# Patient Record
Sex: MALE | Race: WHITE | NOT HISPANIC OR LATINO | Employment: FULL TIME | URBAN - METROPOLITAN AREA
[De-identification: names, ages, dates, MRNs, and addresses within clinical notes are randomized per-mention and may not be internally consistent; named-entity substitution may affect disease eponyms.]

---

## 2017-06-01 ENCOUNTER — HOSPITAL ENCOUNTER (EMERGENCY)
Facility: CLINIC | Age: 32
Discharge: HOME | End: 2017-06-01
Attending: NURSE PRACTITIONER | Admitting: STUDENT IN AN ORGANIZED HEALTH CARE EDUCATION/TRAINING PROGRAM

## 2017-06-01 VITALS
OXYGEN SATURATION: 97 % | DIASTOLIC BLOOD PRESSURE: 89 MMHG | SYSTOLIC BLOOD PRESSURE: 125 MMHG | RESPIRATION RATE: 20 BRPM | TEMPERATURE: 98.6 F

## 2017-06-01 DIAGNOSIS — J03.90 TONSILLITIS WITH EXUDATE: ICD-10-CM

## 2017-06-01 DIAGNOSIS — J02.9 PHARYNGITIS, UNSPECIFIED ETIOLOGY: ICD-10-CM

## 2017-06-01 LAB
ALBUMIN SERPL-MCNC: 3.5 G/DL (ref 3.4–5)
ALP SERPL-CCNC: 84 U/L (ref 40–150)
ALT SERPL W P-5'-P-CCNC: 64 U/L (ref 0–70)
ANION GAP SERPL CALCULATED.3IONS-SCNC: 8 MMOL/L (ref 3–14)
AST SERPL W P-5'-P-CCNC: 27 U/L (ref 0–45)
BASOPHILS # BLD AUTO: 0 10E9/L (ref 0–0.2)
BASOPHILS NFR BLD AUTO: 0.2 %
BILIRUB SERPL-MCNC: 0.6 MG/DL (ref 0.2–1.3)
BUN SERPL-MCNC: 13 MG/DL (ref 7–30)
CALCIUM SERPL-MCNC: 8.8 MG/DL (ref 8.5–10.1)
CHLORIDE SERPL-SCNC: 107 MMOL/L (ref 94–109)
CO2 SERPL-SCNC: 26 MMOL/L (ref 20–32)
CREAT SERPL-MCNC: 0.79 MG/DL (ref 0.66–1.25)
DEPRECATED S PYO AG THROAT QL EIA: NORMAL
DIFFERENTIAL METHOD BLD: ABNORMAL
EOSINOPHIL # BLD AUTO: 0.1 10E9/L (ref 0–0.7)
EOSINOPHIL NFR BLD AUTO: 1.1 %
ERYTHROCYTE [DISTWIDTH] IN BLOOD BY AUTOMATED COUNT: 12.9 % (ref 10–15)
GFR SERPL CREATININE-BSD FRML MDRD: NORMAL ML/MIN/1.7M2
GLUCOSE SERPL-MCNC: 97 MG/DL (ref 70–99)
HCT VFR BLD AUTO: 44.4 % (ref 40–53)
HETEROPH AB SER QL: NEGATIVE
HGB BLD-MCNC: 14.7 G/DL (ref 13.3–17.7)
IMM GRANULOCYTES # BLD: 0.1 10E9/L (ref 0–0.4)
IMM GRANULOCYTES NFR BLD: 0.4 %
LYMPHOCYTES # BLD AUTO: 1.9 10E9/L (ref 0.8–5.3)
LYMPHOCYTES NFR BLD AUTO: 15.3 %
MCH RBC QN AUTO: 30.2 PG (ref 26.5–33)
MCHC RBC AUTO-ENTMCNC: 33.1 G/DL (ref 31.5–36.5)
MCV RBC AUTO: 91 FL (ref 78–100)
MICRO REPORT STATUS: NORMAL
MONOCYTES # BLD AUTO: 0.7 10E9/L (ref 0–1.3)
MONOCYTES NFR BLD AUTO: 5.5 %
NEUTROPHILS # BLD AUTO: 9.5 10E9/L (ref 1.6–8.3)
NEUTROPHILS NFR BLD AUTO: 77.5 %
PLATELET # BLD AUTO: 219 10E9/L (ref 150–450)
POTASSIUM SERPL-SCNC: 4 MMOL/L (ref 3.4–5.3)
PROT SERPL-MCNC: 7.6 G/DL (ref 6.8–8.8)
RBC # BLD AUTO: 4.87 10E12/L (ref 4.4–5.9)
SODIUM SERPL-SCNC: 141 MMOL/L (ref 133–144)
SPECIMEN SOURCE: NORMAL
WBC # BLD AUTO: 12.3 10E9/L (ref 4–11)

## 2017-06-01 PROCEDURE — 99284 EMERGENCY DEPT VISIT MOD MDM: CPT | Performed by: STUDENT IN AN ORGANIZED HEALTH CARE EDUCATION/TRAINING PROGRAM

## 2017-06-01 PROCEDURE — 87880 STREP A ASSAY W/OPTIC: CPT | Performed by: STUDENT IN AN ORGANIZED HEALTH CARE EDUCATION/TRAINING PROGRAM

## 2017-06-01 PROCEDURE — 96374 THER/PROPH/DIAG INJ IV PUSH: CPT

## 2017-06-01 PROCEDURE — 96361 HYDRATE IV INFUSION ADD-ON: CPT

## 2017-06-01 PROCEDURE — 86308 HETEROPHILE ANTIBODY SCREEN: CPT | Performed by: STUDENT IN AN ORGANIZED HEALTH CARE EDUCATION/TRAINING PROGRAM

## 2017-06-01 PROCEDURE — 25000128 H RX IP 250 OP 636: Performed by: STUDENT IN AN ORGANIZED HEALTH CARE EDUCATION/TRAINING PROGRAM

## 2017-06-01 PROCEDURE — 87077 CULTURE AEROBIC IDENTIFY: CPT | Performed by: STUDENT IN AN ORGANIZED HEALTH CARE EDUCATION/TRAINING PROGRAM

## 2017-06-01 PROCEDURE — 99284 EMERGENCY DEPT VISIT MOD MDM: CPT | Mod: 25

## 2017-06-01 PROCEDURE — 87081 CULTURE SCREEN ONLY: CPT | Performed by: STUDENT IN AN ORGANIZED HEALTH CARE EDUCATION/TRAINING PROGRAM

## 2017-06-01 PROCEDURE — 96375 TX/PRO/DX INJ NEW DRUG ADDON: CPT

## 2017-06-01 PROCEDURE — 85025 COMPLETE CBC W/AUTO DIFF WBC: CPT | Performed by: STUDENT IN AN ORGANIZED HEALTH CARE EDUCATION/TRAINING PROGRAM

## 2017-06-01 PROCEDURE — 80053 COMPREHEN METABOLIC PANEL: CPT | Performed by: STUDENT IN AN ORGANIZED HEALTH CARE EDUCATION/TRAINING PROGRAM

## 2017-06-01 RX ORDER — HYDROCODONE BITARTRATE AND ACETAMINOPHEN 7.5; 325 MG/15ML; MG/15ML
15 SOLUTION ORAL EVERY 4 HOURS PRN
Qty: 120 ML | Refills: 0 | Status: SHIPPED | OUTPATIENT
Start: 2017-06-01 | End: 2018-08-11

## 2017-06-01 RX ORDER — FENTANYL CITRATE 50 UG/ML
50 INJECTION, SOLUTION INTRAMUSCULAR; INTRAVENOUS ONCE
Status: DISCONTINUED | OUTPATIENT
Start: 2017-06-01 | End: 2017-06-01 | Stop reason: HOSPADM

## 2017-06-01 RX ORDER — DEXAMETHASONE SODIUM PHOSPHATE 10 MG/ML
12 INJECTION, SOLUTION INTRAMUSCULAR; INTRAVENOUS ONCE
Status: COMPLETED | OUTPATIENT
Start: 2017-06-01 | End: 2017-06-01

## 2017-06-01 RX ORDER — KETOROLAC TROMETHAMINE 30 MG/ML
15 INJECTION, SOLUTION INTRAMUSCULAR; INTRAVENOUS ONCE
Status: COMPLETED | OUTPATIENT
Start: 2017-06-01 | End: 2017-06-01

## 2017-06-01 RX ADMIN — KETOROLAC TROMETHAMINE 15 MG: 30 INJECTION, SOLUTION INTRAMUSCULAR at 20:09

## 2017-06-01 RX ADMIN — DEXAMETHASONE SODIUM PHOSPHATE 12 MG: 10 INJECTION, SOLUTION INTRAMUSCULAR; INTRAVENOUS at 17:36

## 2017-06-01 RX ADMIN — SODIUM CHLORIDE 1000 ML: 9 INJECTION, SOLUTION INTRAVENOUS at 17:36

## 2017-06-01 NOTE — ED PROVIDER NOTES
I have discussed the patient's presentation and complaint with the provider that initially evaluated the patient, ANTOINETTE Nagy. Please refer to their documentation for greater detail. I have also personally seen and evaluated the patient and feel the workup has been appropriate. My history and exam is consistent with the previous provider's. Given the patient's symptoms of sore throat with painful swallowing, we have ordered dexamethasone for tonsillitis and diagnostic testing including mononucleosis.    /89  Temp 98.6  F (37  C) (Oral)  Resp 20  SpO2 97%    Constitutional: Well developed, well nourished. Appears nontoxic and in no acute distress.  Head: Normocephalic and atraumatic. Symmetric in appearance.  Eyes: Conjunctivae are normal.  Oral: Patient is without trismus. Moist oral mucosa. Moderate pharyngeal erythema with significant tonsillitis and exudative formation. No uvular asymmetry. Without sublingual or submental swelling. Voice is slightly muffled but he tolerates secretions.   Neck: Neck supple. Negative for stridor.  Cardiovascular: No cyanosis. RRR. No audible murmurs noted.   Respiratory: Effort normal, no respiratory distress. CTAB without diminished regions. No wheezing, rhonchi, or crackles.  Gastrointestinal: Soft, nondistended abdomen. Nontender and without guarding. No rigidity or rebound tenderness. Negative McBurney's point. Negative for Bhardwaj's sign.   Musculoskeletal: Moves all extremities spontaneously and without complaint.  Neuro: Patient is alert.  Skin: Skin is warm and dry, not diaphoretic.      Results for orders placed or performed during the hospital encounter of 06/01/17 (from the past 24 hour(s))   CBC with platelets, differential   Result Value Ref Range    WBC 12.3 (H) 4.0 - 11.0 10e9/L    RBC Count 4.87 4.4 - 5.9 10e12/L    Hemoglobin 14.7 13.3 - 17.7 g/dL    Hematocrit 44.4 40.0 - 53.0 %    MCV 91 78 - 100 fl    MCH 30.2 26.5 - 33.0 pg    MCHC 33.1 31.5 - 36.5  g/dL    RDW 12.9 10.0 - 15.0 %    Platelet Count 219 150 - 450 10e9/L    Diff Method Automated Method     % Neutrophils 77.5 %    % Lymphocytes 15.3 %    % Monocytes 5.5 %    % Eosinophils 1.1 %    % Basophils 0.2 %    % Immature Granulocytes 0.4 %    Absolute Neutrophil 9.5 (H) 1.6 - 8.3 10e9/L    Absolute Lymphocytes 1.9 0.8 - 5.3 10e9/L    Absolute Monocytes 0.7 0.0 - 1.3 10e9/L    Absolute Eosinophils 0.1 0.0 - 0.7 10e9/L    Absolute Basophils 0.0 0.0 - 0.2 10e9/L    Abs Immature Granulocytes 0.1 0 - 0.4 10e9/L   Comprehensive metabolic panel   Result Value Ref Range    Sodium 141 133 - 144 mmol/L    Potassium 4.0 3.4 - 5.3 mmol/L    Chloride 107 94 - 109 mmol/L    Carbon Dioxide 26 20 - 32 mmol/L    Anion Gap 8 3 - 14 mmol/L    Glucose 97 70 - 99 mg/dL    Urea Nitrogen 13 7 - 30 mg/dL    Creatinine 0.79 0.66 - 1.25 mg/dL    GFR Estimate >90  Non  GFR Calc   >60 mL/min/1.7m2    GFR Estimate If Black >90   GFR Calc   >60 mL/min/1.7m2    Calcium 8.8 8.5 - 10.1 mg/dL    Bilirubin Total 0.6 0.2 - 1.3 mg/dL    Albumin 3.5 3.4 - 5.0 g/dL    Protein Total 7.6 6.8 - 8.8 g/dL    Alkaline Phosphatase 84 40 - 150 U/L    ALT 64 0 - 70 U/L    AST 27 0 - 45 U/L   Mono   Result Value Ref Range    Mononucleosis Screen Negative NEG   Rapid Strep Screen   Result Value Ref Range    Specimen Description Throat     Rapid Strep A Screen       NEGATIVE: No Group A streptococcal antigen detected by immunoassay, await   culture report.      Micro Report Status FINAL 06/01/2017          Ge Hansen is a 31 year old male who presents to the department for complaint of 24 hours of sore throat with painful swallowing. He has large bilateral exudative palatine tonsils, however rapid strep test negative. Uvula is midline, no sign of peritonsillar abscess or Jay's angina, and patient is able to breathe comfortably despite the size of his tonsils. They are not touching and he has become quite comfortable after  minimal dose of analgesic medication in the department. His apparent muscled voice resolved when pain was controlled. He was also given a dose of dexamethasone in the department for his tonsillitis.    Clinical impression is that the patient is likely suffering from viral pharyngitis, possibly mono despite negative Monospot test given the acute onset of symptoms. Recommend monitor closely for any increase in pain, difficulty swallowing, or difficulty breathing. He is also provided a short course of analgesic medication. Prior to discharge, I made it clear that illness can unexpectedly develop/progress so he has been instructed to return to the emergency department for reevaluation of evolving symptoms, change in severity, or other concerns. He seems comfortable with the discharge plan we discussed including follow up.    Disclaimer: This note consists of symbols derived from keyboarding, dictation, and/or voice recognition software. As a result, there may be errors in the script that have gone undetected.  Please consider this when interpreting information found in the chart.          Dx:  Tonsillitis with exudate, pharyngitis    Louie Vargas DO  Emergency Physician         Louie Vargas DO  06/02/17 0033

## 2017-06-01 NOTE — ED NOTES
Sent from urgent care with throat swelling and difficulty swallowing. Here for IV fluids and lab tests.  Currently spitting saliva in a basin, unable to swallow secretions.

## 2017-06-01 NOTE — LETTER
[facility] EMERGENCY DEPARTMENT  [address] [facility] [address]  [phone]    2017    Ge Hansen  [address]   [facility] [place] [address]  [place] (home)     : 1985      To Whom it may concern:    Ge Hansen was seen in our Emergency Department today, 2017.  I expect his condition to improve over the next 3 days.  He may return to work/school when improved.    Sincerely,        Louie Vargas

## 2017-06-01 NOTE — ED AVS SNAPSHOT
[facility] Emergency Department    [address] [facility] [address]    Phone:  [phone]    Fax:  [phone]                                       Ge Hansen   MRN: [id number]    Department:  [facility] Emergency Department   Date of Visit:  6/1/2017           Patient Information     Date Of Birth          1985        Your diagnoses for this visit were:     Pharyngitis, unspecified etiology     Tonsillitis with exudate        You were seen by Abi Nagy APRN CNP and Louie Vargas DO.      Follow-up Information     Follow up with [facility]. Schedule an appointment as soon as possible for a visit in 1 week.    Specialty:  ENT    Why:  Followup for reevaluation if symptoms persist.    Contact information:    [address] [place] [address] [phone]    Additional information:    The medical center is located at   [address] [place] [address] (between [facility] and   [address], four miles north   of [place]).        Go to [facility] Emergency Department.    Specialty:  EMERGENCY MEDICINE    Why:  Return if you develop difficulty breathing, swallowing, or your symptoms change/progress.    Contact information:    [address] [place] [address] [phone]    Additional information:    The [facility] center is located at   [address] [place] [address] (between [facility] and   [address], four miles north   of [place]).      Discharge References/Attachments     PHARYNGITIS, VIRAL (ENGLISH)    MONONUCLEOSIS (ENGLISH)      24 Hour Appointment Hotline       To make an appointment at any [facility], call [phone] ([phone]). If you don't have a family doctor or clinic, we will help you find one. [facility] are conveniently located to serve the needs of you and your family.             Review of your medicines      START taking        Dose / Directions Last dose taken    HYDROcodone-acetaminophen  7.5-325 MG/15ML solution   Commonly known as:  LORTAB   Dose:  15 mL   Quantity:  120 mL        Take 15 mLs by mouth every 4 hours as needed for moderate to severe pain   Refills:  0                Prescriptions were sent or printed at these locations (1 Prescription)                   Other Prescriptions                Printed at Department/Unit printer (1 of 1)         HYDROcodone-acetaminophen (LORTAB) 7.5-325 MG/15ML solution                Procedures and tests performed during your visit     Beta strep group A culture    CBC with platelets, differential    Comprehensive metabolic panel    Mono    Rapid Strep Screen      Orders Needing Specimen Collection     None      Pending Results     Date and Time Order Name Status Description    6/1/2017 1730 Beta strep group A culture In process             Pending Culture Results     Date and Time Order Name Status Description    6/1/2017 1730 Beta strep group A culture In process             Pending Results Instructions     If you had any lab results that were not finalized at the time of your Discharge, you can call the ED Lab Result RN at [phone]. You will be contacted by this team for any positive Lab results or changes in treatment. The nurses are available 7 days a week from 10A to 6:30P.  You can leave a message 24 hours per day and they will return your call.        Test Results From Your Hospital Stay        6/1/2017  5:52 PM      Component Results     Component Value Ref Range & Units Status    WBC 12.3 (H) 4.0 - 11.0 10e9/L Final    RBC Count 4.87 4.4 - 5.9 10e12/L Final    Hemoglobin 14.7 13.3 - 17.7 g/dL Final    Hematocrit 44.4 40.0 - 53.0 % Final    MCV 91 78 - 100 fl Final    MCH 30.2 26.5 - 33.0 pg Final    MCHC 33.1 31.5 - 36.5 g/dL Final    RDW 12.9 10.0 - 15.0 % Final    Platelet Count 219 150 - 450 10e9/L Final    Diff Method Automated Method  Final    % Neutrophils 77.5 % Final    % Lymphocytes 15.3 % Final    % Monocytes 5.5 % Final    %  Eosinophils 1.1 % Final    % Basophils 0.2 % Final    % Immature Granulocytes 0.4 % Final    Absolute Neutrophil 9.5 (H) 1.6 - 8.3 10e9/L Final    Absolute Lymphocytes 1.9 0.8 - 5.3 10e9/L Final    Absolute Monocytes 0.7 0.0 - 1.3 10e9/L Final    Absolute Eosinophils 0.1 0.0 - 0.7 10e9/L Final    Absolute Basophils 0.0 0.0 - 0.2 10e9/L Final    Abs Immature Granulocytes 0.1 0 - 0.4 10e9/L Final         6/1/2017  6:03 PM      Component Results     Component Value Ref Range & Units Status    Sodium 141 133 - 144 mmol/L Final    Potassium 4.0 3.4 - 5.3 mmol/L Final    Chloride 107 94 - 109 mmol/L Final    Carbon Dioxide 26 20 - 32 mmol/L Final    Anion Gap 8 3 - 14 mmol/L Final    Glucose 97 70 - 99 mg/dL Final    Urea Nitrogen 13 7 - 30 mg/dL Final    Creatinine 0.79 0.66 - 1.25 mg/dL Final    GFR Estimate >90  Non  GFR Calc   >60 mL/min/1.7m2 Final    GFR Estimate If Black >90   GFR Calc   >60 mL/min/1.7m2 Final    Calcium 8.8 8.5 - 10.1 mg/dL Final    Bilirubin Total 0.6 0.2 - 1.3 mg/dL Final    Albumin 3.5 3.4 - 5.0 g/dL Final    Protein Total 7.6 6.8 - 8.8 g/dL Final    Alkaline Phosphatase 84 40 - 150 U/L Final    ALT 64 0 - 70 U/L Final    AST 27 0 - 45 U/L Final         6/1/2017  6:05 PM      Component Results     Component Value Ref Range & Units Status    Mononucleosis Screen Negative NEG Final         6/1/2017  5:54 PM      Component Results     Component    Specimen Description    Throat    Rapid Strep A Screen    NEGATIVE: No Group A streptococcal antigen detected by immunoassay, await   culture report.      Micro Report Status    FINAL 06/01/2017 6/1/2017  5:57 PM                Thank you for choosing [facility]       Thank you for choosing [facility] for your care. Our goal is always to provide you with excellent care. Hearing back from our patients is one way we can continue to improve our services. Please take a few minutes to complete the written survey that you may  "receive in the mail after you visit with us. Thank you!        [employer] Information     [employer] lets you send messages to your doctor, view your test results, renew your prescriptions, schedule appointments and more. To sign up, go to [email or web address] . Click on \"Log in\" on the left side of the screen, which will take you to the Welcome page. Then click on \"Sign up Now\" on the right side of the page.     You will be asked to enter the access code listed below, as well as some personal information. Please follow the directions to create your username and password.     Your access code is: [email or web address]  Expires: 2017  8:04 PM     Your access code will  in 90 days. If you need help or a new code, please call your [facility] clinic or [phone].        Care EveryWhere ID     This is your Care EveryWhere ID. This could be used by other organizations to access your [facility] medical records  [id number]        After Visit Summary       This is your record. Keep this with you and show to your community pharmacist(s) and doctor(s) at your next visit.                  "

## 2017-06-01 NOTE — ED AVS SNAPSHOT
[facility] Emergency Department    [address] [facility] [address]    Phone:  [phone]    Fax:  [phone]                                       Ge Hansen   MRN: [id number]    Department:  [facility] Emergency Department   Date of Visit:  6/1/2017           After Visit Summary Signature Page     I have received my discharge instructions, and my questions have been answered. I have discussed any challenges I see with this plan with the nurse or doctor.    ..........................................................................................................................................  Patient/Patient Representative Signature      ..........................................................................................................................................  Patient Representative Print Name and Relationship to Patient    ..................................................               ................................................  Date                                            Time    ..........................................................................................................................................  Reviewed by Signature/Title    ...................................................              ..............................................  Date                                                            Time

## 2017-06-01 NOTE — ED PROVIDER NOTES
History     Chief Complaint   Patient presents with     Otalgia     Pharyngitis     states that he's also short of breath and has asthma but he hasn't taken his inhalors because they won't fix his problem on the sore throat     HPI  Ge Hansen is a 31 year old male who presents with acute onset of pharyngitis with difficulty swallowing and chills that are intermittent.  Pt states this morning he had difficulty swallowing and now he reports he is unable to eats solids and is unable to swallow any liquids.  He reports bilateral otalgia for the past two weeks also.  He reports the pain is severe and has prevented from sleeping today.  He has no known associated contacts with similar symptoms.    I have reviewed the Medications, Allergies, Past Medical and Surgical History, and Social History in the Epic system.    Review of Systems    Physical Exam   BP (!) 181/98  Temp 98.6  F (37  C) (Oral)  Resp 20  SpO2 97%  Focused Exam  Physical Exam  Exam:  Constitutional: alert, moderate distress, severe distress, cooperative and over weight and speech is muffled  Head: Normocephalic. No masses, lesions, tenderness or abnormalities  ENT: bilateral TM normal without fluid or infection and posterior pharynx severely swollen, tonsils red, enlarged, kissing with exudate present; pt unable to swallow and spitting up secretions  Cardiovascular: negative, PMI normal. No lifts, heaves, or thrills. RRR. No murmurs, clicks gallops or rub  Respiratory: negative, negative findings: normal respiratory rate and rhythm, positive findings: wheezing inspiratory bilateral, throughout and mild       ED Course     ED Course   discussed pt and exam with Dr. Vargas and request transfer to ER for higher level of care; he agreed;  Procedures    Labs Ordered and Resulted from Time of ED Arrival Up to the Time of Departure from the ED - No data to display  Quick strep obtained and was negative in urgent  Assessments & Plan (with Medical Decision  Making)     I have reviewed the nursing notes.    I have reviewed the findings, diagnosis, plan and need for follow up with the patient.  Pt transferred to ED for further evaluation and care. Concern due to lack of ability to swallow and exam of profusely enlarged tonsils and posterior pharynx swelling and also concern for possible impending airway management problems    New Prescriptions    No medications on file       Final diagnoses:   None       6/1/2017   [facility] EMERGENCY DEPARTMENT     Abi Nagy APRN CNP  06/01/17 1756       Abi Nagy APRN CNP  06/01/17 1800       Abi Nagy APRN CNP  06/01/17 1801

## 2017-06-03 LAB
BACTERIA SPEC CULT: ABNORMAL
MICRO REPORT STATUS: ABNORMAL
SPECIMEN SOURCE: ABNORMAL

## 2018-08-11 ENCOUNTER — APPOINTMENT (OUTPATIENT)
Dept: GENERAL RADIOLOGY | Facility: CLINIC | Age: 33
End: 2018-08-11
Attending: NURSE PRACTITIONER
Payer: MEDICAID

## 2018-08-11 ENCOUNTER — HOSPITAL ENCOUNTER (EMERGENCY)
Facility: CLINIC | Age: 33
Discharge: HOME | End: 2018-08-11
Attending: NURSE PRACTITIONER | Admitting: NURSE PRACTITIONER
Payer: MEDICAID

## 2018-08-11 VITALS
OXYGEN SATURATION: 97 % | SYSTOLIC BLOOD PRESSURE: 160 MMHG | TEMPERATURE: 98.2 F | RESPIRATION RATE: 24 BRPM | DIASTOLIC BLOOD PRESSURE: 104 MMHG

## 2018-08-11 DIAGNOSIS — S89.92XA KNEE INJURY, LEFT, INITIAL ENCOUNTER: ICD-10-CM

## 2018-08-11 PROCEDURE — 99213 OFFICE O/P EST LOW 20 MIN: CPT | Performed by: NURSE PRACTITIONER

## 2018-08-11 PROCEDURE — 73562 X-RAY EXAM OF KNEE 3: CPT | Mod: LT

## 2018-08-11 PROCEDURE — G0463 HOSPITAL OUTPT CLINIC VISIT: HCPCS

## 2018-08-11 RX ORDER — HYDROCODONE BITARTRATE AND ACETAMINOPHEN 5; 325 MG/1; MG/1
1 TABLET ORAL EVERY 4 HOURS PRN
Qty: 18 TABLET | Refills: 0 | Status: SHIPPED | OUTPATIENT
Start: 2018-08-11 | End: 2018-10-24

## 2018-08-11 ASSESSMENT — ENCOUNTER SYMPTOMS
APPETITE CHANGE: 0
WHEEZING: 0
NAUSEA: 0
DIFFICULTY URINATING: 0
DIAPHORESIS: 0
DIARRHEA: 0
HEMATURIA: 0
CHILLS: 0
COUGH: 0
SINUS PAIN: 0
VOMITING: 0
SHORTNESS OF BREATH: 0
SORE THROAT: 0
FACIAL SWELLING: 0
EYE PAIN: 0
FREQUENCY: 0
FEVER: 0
CONSTIPATION: 0
ARTHRALGIAS: 1
ABDOMINAL PAIN: 0
SEIZURES: 0
DYSURIA: 0

## 2018-08-11 NOTE — ED PROVIDER NOTES
"  History     Chief Complaint   Patient presents with     Knee Pain     felt a \"pop\" when walking up stairs     HPI  Ge Hansen is a 33 year old male who presents to urgent care with left knee pain.  Patient states that he was at [facility] today and he was going upstairs and as he was stepping up the stairs there was a pop and immediate pain in his left knee.  He states that it felt like his knee gave out.  He states he has applied ice and has been taking 3 Aleve tablets every 12 hours.  He reports that his pain level is an 8-9 out of 10 currently.  He denies any fever, aches, chills, sweats, rashes or lesions to the knee.  He reports he has not been able to bear weight.    He reports last week when he was carrying a box he recalls having twisted knee but not had no pain at that point in time but did report that later he felt a little discomfort and was taking Aleve for that and it was helping.  Problem List:    There are no active problems to display for this patient.       Past Medical History:    History reviewed. No pertinent past medical history.  Denies  Past Surgical History:    History reviewed. No pertinent surgical history.  Left rotator cuff surgery : 2014    Family History:    No family history on file.    Social History:  Marital Status:  Single [1]  Social History   Substance Use Topics     Smoking status: Current Every Day Smoker     Packs/day: 1.00     Smokeless tobacco: Never Used     Alcohol use Not on file        Medications:      HYDROcodone-acetaminophen (NORCO) 5-325 MG per tablet         Review of Systems   Constitutional: Negative for appetite change, chills, diaphoresis and fever.   HENT: Negative for congestion, ear pain, facial swelling, sinus pain and sore throat.    Eyes: Negative for pain and visual disturbance.   Respiratory: Negative for cough, shortness of breath and wheezing.    Gastrointestinal: Negative for abdominal pain, constipation, diarrhea, nausea and " vomiting.   Genitourinary: Negative for difficulty urinating, dysuria, frequency, hematuria and urgency.   Musculoskeletal: Positive for arthralgias (left knee pain).   Neurological: Negative for seizures.   All other systems reviewed and are negative.      Physical Exam   BP: (!) 160/104  Temp: 98.2  F (36.8  C)  Resp: 24  SpO2: 97 %      Physical Exam   Constitutional: He appears well-developed and well-nourished. He appears distressed (appear to be uncofortable with pain).   HENT:   Head: Normocephalic and atraumatic.   Cardiovascular: Normal rate, regular rhythm and normal heart sounds.  Exam reveals no gallop and no friction rub.    No murmur heard.  Pulmonary/Chest: Effort normal and breath sounds normal. No respiratory distress. He has no wheezes. He has no rales. He exhibits no tenderness.   Musculoskeletal:        Left knee: He exhibits no swelling, no effusion, no ecchymosis, no deformity, no laceration, no erythema and normal alignment.        Legs:  Skin: Skin is warm and dry. No rash noted. He is not diaphoretic. No erythema. No pallor.   Psychiatric: He has a normal mood and affect.   Nursing note and vitals reviewed.      ED Course     ED Course     Procedures      Results for orders placed or performed during the hospital encounter of 08/11/18 (from the past 24 hour(s))   XR Knee Left 3 Views    Narrative    LEFT KNEE THREE VIEWS   8/11/2018 6:34 PM     HISTORY: Stepping up and knee gave out.     COMPARISON: None.      Impression    IMPRESSION: Normal.    HAROLDO STEVENS MD       Medications - No data to display    Assessments & Plan (with Medical Decision Making)     I have reviewed the nursing notes.    I have reviewed the findings, diagnosis, plan and need for follow up with the patient.  Ge Hansen is a 33 year old male who presents to urgent care with left knee pain.  Patient is a morbidly obese man who was walking [place] cement [place] water park and felt that his left knee gave out.  There was  no acute trauma per se.  Patient recorded his pain at a 8-9 out of 10.  And denies being able to be weightbearing.  Exam of the knee reveals generalized tenderness with more distinct tenderness lateral to the left tibial insertion and superior to lateral joint line.  There is no obvious swelling or deformities noted.  X-ray obtained and no acute fractures or locations noted will treat as knee strain.  Patient is in crutches and advised to be on rest and then start walking.  Recommend follow-up within 1 week for reevaluation.  Patient given prescription for some Vicodin and given instructions on addictability and precautions for constipation.  Patient verbalizes understanding and denies any questions at this point in time was discharged in stable condition.  Discharge Medication List as of 8/11/2018  6:44 PM      START taking these medications    Details   HYDROcodone-acetaminophen (NORCO) 5-325 MG per tablet Take 1 tablet by mouth every 4 hours as needed for pain, Disp-18 tablet, R-0, Local Print             Final diagnoses:   Knee injury, left, initial encounter       8/11/2018   [facility] EMERGENCY DEPARTMENT     Abi Nagy APRN CNP  08/12/18 7378

## 2018-08-11 NOTE — ED AVS SNAPSHOT
[facility] Emergency Department    [address] [facility] [address]    Phone:  [phone]    Fax:  [phone]                                       Ge Hansen   MRN: [id number]    Department:  [facility] Emergency Department   Date of Visit:  8/11/2018           After Visit Summary Signature Page     I have received my discharge instructions, and my questions have been answered. I have discussed any challenges I see with this plan with the nurse or doctor.    ..........................................................................................................................................  Patient/Patient Representative Signature      ..........................................................................................................................................  Patient Representative Print Name and Relationship to Patient    ..................................................               ................................................  Date                                            Time    ..........................................................................................................................................  Reviewed by Signature/Title    ...................................................              ..............................................  Date                                                            Time

## 2018-08-11 NOTE — DISCHARGE INSTRUCTIONS
Use ibuprofen 400-600 mg up to 4 times per day if needed for pain. Stop if it is causing nausea or abdominal pain.   Add Norco 5-325 (hydrocodone-acetaminophen) 1-2 pills up to every 4 hours if needed for pain. Do not use alcohol, operate machinery, drive, or climb on ladders for 8 hours after taking Norco. Use docusate (100mg) 2 times a day to prevent constipation while on narcotics.

## 2018-08-11 NOTE — ED TRIAGE NOTES
Patient here for pain in the L knee - originally injured about a week ago - fell today.  Patient presents in wheel chair to the urgent care.

## 2018-08-11 NOTE — LETTER
August 11, 2018      To Whom It May Concern:      Ge Hansen was seen in our Emergency Department today, 08/11/18.  I expect his condition to improve over the next 5 days.  He may return to work/school when improved.    Sincerely,        SONIA Le CNP

## 2018-08-11 NOTE — ED AVS SNAPSHOT
[facility] Emergency Department    [address] [address]    Phone:  [phone]    Fax:  [phone]                                       Ge Hansen   MRN: [id number]    Department:  [facility] Emergency Department   Date of Visit:  8/11/2018           Patient Information     Date Of Birth          1985        Your diagnoses for this visit were:     Knee injury, left, initial encounter        You were seen by Abi Nagy APRN CNP.      Follow-up Information     Follow up with family practice. Schedule an appointment as soon as possible for a visit in 1 week.    Why:  follow up on knee pain        Discharge Instructions       Use ibuprofen 400-600 mg up to 4 times per day if needed for pain. Stop if it is causing nausea or abdominal pain.   Add Norco 5-325 (hydrocodone-acetaminophen) 1-2 pills up to every 4 hours if needed for pain. Do not use alcohol, operate machinery, drive, or climb on ladders for 8 hours after taking Norco. Use docusate (100mg) 2 times a day to prevent constipation while on narcotics.      Discharge References/Attachments     KNEE PAIN OF UNCERTAIN CAUSE (ENGLISH)      24 Hour Appointment Hotline       To make an appointment at any [facility] clinic, call [phone] ([phone]). If you don't have a family doctor or clinic, we will help you find one. [facility] clinics are conveniently located to serve the needs of you and your family.          ED Discharge Orders     Alum Crutches: Adult       Use gait belt during crutch training.                     Review of your medicines      START taking        Dose / Directions Last dose taken    HYDROcodone-acetaminophen 5-325 MG per tablet   Commonly known as:  NORCO   Dose:  1 tablet   Quantity:  18 tablet        Take 1 tablet by mouth every 4 hours as needed for pain   Refills:  0                Information about OPIOIDS     PRESCRIPTION OPIOIDS: WHAT YOU NEED TO KNOW   We gave you an opioid  (narcotic) pain medicine. It is important to manage your pain, but opioids are not always the best choice. You should first try all the other options your care team gave you. Take this medicine for as short a time (and as few doses) as possible.    Some activities can increase your pain, such as bandage changes or therapy sessions. It may help to take your pain medicine 30 to 60 minutes before these activities. Reduce your stress by getting enough sleep, working on hobbies you enjoy and practicing relaxation or meditation. Talk to your care team about ways to manage your pain beyond prescription opioids.    These medicines have risks:    DO NOT drive when on new or higher doses of pain medicine. These medicines can affect your alertness and reaction times, and you could be arrested for driving under the influence (DUI). If you need to use opioids long-term, talk to your care team about driving.    DO NOT operate heavy machinery    DO NOT do any other dangerous activities while taking these medicines.    DO NOT drink any alcohol while taking these medicines.     If the opioid prescribed includes acetaminophen, DO NOT take with any other medicines that contain acetaminophen. Read all labels carefully. Look for the word  acetaminophen  or  Tylenol.  Ask your pharmacist if you have questions or are unsure.    You can get addicted to pain medicines, especially if you have a history of addiction (chemical, alcohol or substance dependence). Talk to your care team about ways to reduce this risk.    All opioids tend to cause constipation. Drink plenty of water and eat foods that have a lot of fiber, such as fruits, vegetables, prune juice, apple juice and high-fiber cereal. Take a laxative (Miralax, milk of magnesia, Colace, Senna) if you don t move your bowels at least every other day. Other side effects include upset stomach, sleepiness, dizziness, throwing up, tolerance (needing more of the medicine to have the same  effect), physical dependence and slowed breathing.    Store your pills in a secure place, locked if possible. We will not replace any lost or stolen medicine. If you don t finish your medicine, please throw away (dispose) as directed by your pharmacist. The [place] [employer] has more information about safe disposal: https://www.pca.state.[place]/living-green/managing-unwanted-medications        Prescriptions were sent or printed at these locations (1 Prescription)                   Other Prescriptions                Printed at Department/Unit printer (1 of 1)         HYDROcodone-acetaminophen (NORCO) 5-325 MG per tablet                Procedures and tests performed during your visit     XR Knee Left 3 Views      Orders Needing Specimen Collection     None      Pending Results     Date and Time Order Name Status Description    8/11/2018 1809 XR Knee Left 3 Views Preliminary             Pending Culture Results     No orders found from 8/9/2018 to 8/12/2018.            Pending Results Instructions     If you had any lab results that were not finalized at the time of your Discharge, you can call the ED Lab Result RN at [phone]. You will be contacted by this team for any positive Lab results or changes in treatment. The nurses are available 7 days a week from 10A to 6:30P.  You can leave a message 24 hours per day and they will return your call.        Test Results From Your Hospital Stay        8/11/2018  6:37 PM      Narrative     LEFT KNEE THREE VIEWS   8/11/2018 6:34 PM     HISTORY: Stepping up and knee gave out.     COMPARISON: None.        Impression     IMPRESSION: Normal.                Thank you for choosing [facility]       Thank you for choosing [facility] for your care. Our goal is always to provide you with excellent care. Hearing back from our patients is one way we can continue to improve our services. Please take a few minutes to complete the written survey that you may receive in the mail  "after you visit with us. Thank you!        [employer] Information     [employer] lets you send messages to your doctor, view your test results, renew your prescriptions, schedule appointments and more. To sign up, go to [email or web address] . Click on \"Log in\" on the left side of the screen, which will take you to the Welcome page. Then click on \"Sign up Now\" on the right side of the page.     You will be asked to enter the access code listed below, as well as some personal information. Please follow the directions to create your username and password.     Your access code is: [email or web address]  Expires: 2018  6:41 PM     Your access code will  in 90 days. If you need help or a new code, please call your [facility] clinic or [phone].        Care EveryWhere ID     This is your Care EveryWhere ID. This could be used by other organizations to access your [facility] medical records  [id number]        Equal Access to Services     EDD STEWART : Hadii velma mccoy hadasho Sojose rafaelali, waaxda luqadaha, qaybta kaalmada adeegyahermilo, aracelis rojas . So [facility] [phone].    ATENCIÓN: Si habla español, tiene a barnes disposición servicios gratuitos de asistencia lingüística. Llame al [phone].    We comply with applicable federal civil rights laws and [place] laws. We do not discriminate on the basis of race, color, national origin, age, disability, sex, sexual orientation, or gender identity.            After Visit Summary       This is your record. Keep this with you and show to your community pharmacist(s) and doctor(s) at your next visit.                  "

## 2018-09-10 ENCOUNTER — HOSPITAL ENCOUNTER (EMERGENCY)
Facility: CLINIC | Age: 33
Discharge: HOME | End: 2018-09-10
Attending: EMERGENCY MEDICINE | Admitting: EMERGENCY MEDICINE
Payer: MEDICAID

## 2018-09-10 VITALS
WEIGHT: 315 LBS | HEIGHT: 73 IN | RESPIRATION RATE: 20 BRPM | BODY MASS INDEX: 41.75 KG/M2 | SYSTOLIC BLOOD PRESSURE: 156 MMHG | TEMPERATURE: 97 F | DIASTOLIC BLOOD PRESSURE: 103 MMHG | OXYGEN SATURATION: 99 %

## 2018-09-10 DIAGNOSIS — J45.901 EXACERBATION OF ASTHMA, UNSPECIFIED ASTHMA SEVERITY, UNSPECIFIED WHETHER PERSISTENT: ICD-10-CM

## 2018-09-10 PROCEDURE — 99283 EMERGENCY DEPT VISIT LOW MDM: CPT | Mod: 25

## 2018-09-10 PROCEDURE — 25000125 ZZHC RX 250: Performed by: EMERGENCY MEDICINE

## 2018-09-10 PROCEDURE — 99284 EMERGENCY DEPT VISIT MOD MDM: CPT | Mod: Z6 | Performed by: EMERGENCY MEDICINE

## 2018-09-10 PROCEDURE — 94640 AIRWAY INHALATION TREATMENT: CPT

## 2018-09-10 RX ORDER — IPRATROPIUM BROMIDE AND ALBUTEROL SULFATE 2.5; .5 MG/3ML; MG/3ML
3 SOLUTION RESPIRATORY (INHALATION) ONCE
Status: COMPLETED | OUTPATIENT
Start: 2018-09-10 | End: 2018-09-10

## 2018-09-10 RX ORDER — ALBUTEROL SULFATE 90 UG/1
2 AEROSOL, METERED RESPIRATORY (INHALATION) EVERY 6 HOURS PRN
Qty: 1 INHALER | Refills: 0 | Status: SHIPPED | OUTPATIENT
Start: 2018-09-10 | End: 2018-12-24

## 2018-09-10 RX ORDER — ALBUTEROL SULFATE 0.83 MG/ML
2.5 SOLUTION RESPIRATORY (INHALATION) EVERY 4 HOURS PRN
Qty: 1 BOX | Refills: 0 | Status: SHIPPED | OUTPATIENT
Start: 2018-09-10 | End: 2018-12-19

## 2018-09-10 RX ADMIN — IPRATROPIUM BROMIDE AND ALBUTEROL SULFATE 3 ML: .5; 3 SOLUTION RESPIRATORY (INHALATION) at 12:04

## 2018-09-10 NOTE — ED AVS SNAPSHOT
[facility] Emergency Department    [address] [address]    Phone:  [phone]    Fax:  [phone]                                       Ge Hansen   MRN: [id number]    Department:  [facility] Emergency Department   Date of Visit:  9/10/2018           After Visit Summary Signature Page     I have received my discharge instructions, and my questions have been answered. I have discussed any challenges I see with this plan with the nurse or doctor.    ..........................................................................................................................................  Patient/Patient Representative Signature      ..........................................................................................................................................  Patient Representative Print Name and Relationship to Patient    ..................................................               ................................................  Date                                            Time    ..........................................................................................................................................  Reviewed by Signature/Title    ...................................................              ..............................................  Date                                                            Time          22EPIC Rev 08/18

## 2018-09-10 NOTE — ED NOTES
Pt recently moved and lost his MDI.  He also states his nebulizer broke during the move, pt just got new health insurance and would like new Rx for both of these medications.

## 2018-09-10 NOTE — ED PROVIDER NOTES
"  History     Chief Complaint   Patient presents with     Asthma     out of asthma meds     HPI  Ge Hansen is a 33 year old male with history of asthma who reports an acute asthma exacerbation overnight last night.  No known precipitant or allergen exposure.  He has shortness of breath, chest tightness and wheezing.  No chest pain, cough, hemoptysis or acute leg pain or leg swelling.  He recently broke his nebulizer machine while moving.  He is out of albuterol neb solution, and albuterol inhaler and Advair inhaler.  He also wants to know if he could get a CPAP machine.  He has an appointment in primary care clinic later this week.  No other acute complaints or concerns.    Problem List:    There are no active problems to display for this patient.       Past Medical History: Asthma  History reviewed. No pertinent past medical history.    Past Surgical History:    History reviewed. No pertinent surgical history.    Family History:    No family history on file.    Social History:  Marital Status:  Single [1]  Social History   Substance Use Topics     Smoking status: Current Every Day Smoker     Packs/day: 1.00     Smokeless tobacco: Never Used     Alcohol use Not on file        Medications:      albuterol (2.5 MG/3ML) 0.083% neb solution   albuterol (PROAIR HFA/PROVENTIL HFA/VENTOLIN HFA) 108 (90 Base) MCG/ACT inhaler   fluticasone-salmeterol (ADVAIR DISKUS) 250-50 MCG/DOSE diskus inhaler   Respiratory Therapy Supplies (NEBULIZER) JENNY   HYDROcodone-acetaminophen (NORCO) 5-325 MG per tablet       Review of Systems  As mentioned above in the history present illness.  All other systems were reviewed and are negative.      Physical Exam   BP: (!) 156/103  Heart Rate: 94  Temp: 97  F (36.1  C)  Resp: 20  Height: 185.4 cm (6' 1\")  Weight: (!) 199.6 kg (440 lb)  SpO2: 95 %      Physical Exam   Constitutional: He is oriented to person, place, and time. He appears well-developed and well-nourished. No distress.   HENT: "   Head: Normocephalic and atraumatic.   Mouth/Throat: Oropharynx is clear and moist.   Eyes: Conjunctivae and EOM are normal. No scleral icterus.   Neck: Normal range of motion. Neck supple. No tracheal deviation present. No thyromegaly present.   Cardiovascular: Normal rate, regular rhythm and normal heart sounds.  Exam reveals no gallop and no friction rub.    No murmur heard.  Pulmonary/Chest: Effort normal. No stridor. No respiratory distress. He has no wheezes. He has no rales.   Prolonged expiratory phase with scattered faint and expiratory wheezes.  Good air movement.  No respiratory distress.  Speaks in normal sentences.   Musculoskeletal: Normal range of motion. He exhibits no tenderness.   Symmetrical, bilateral distal lower extremity lymphedema.   Neurological: He is alert and oriented to person, place, and time. No cranial nerve deficit. Coordination normal.   Skin: Skin is warm and dry. No rash noted. He is not diaphoretic. No erythema. No pallor.   Psychiatric: He has a normal mood and affect. His behavior is normal.   Nursing note and vitals reviewed.      ED Course     ED Course     Procedures               No results found for this or any previous visit (from the past 24 hour(s)).    Medications   ipratropium - albuterol 0.5 mg/2.5 mg/3 mL (DUONEB) neb solution 3 mL (3 mLs Nebulization Given 9/10/18 1204)     Improved after single DuoNeb.  Lungs are clear, good air movement.    Assessments & Plan (with Medical Decision Making)   Relatively mild acute asthma exacerbation, probably secondary to environmental allergen exposure, with resolution of symptoms with a single albuterol neb.  He is out of an albuterol inhaler, albuterol neb solution, Advair Diskus, and recently broke his neb machine while moving.  He was prescribed these and instructed in supportive care.  Doubt pneumonia, PE/DVT, CHF or emergent disease process.    I have reviewed the nursing notes.    I have reviewed the findings, diagnosis,  plan and need for follow up with the patient.    New Prescriptions    ALBUTEROL (2.5 MG/3ML) 0.083% NEB SOLUTION    Take 1 vial (2.5 mg) by nebulization every 4 hours as needed for shortness of breath / dyspnea or wheezing    ALBUTEROL (PROAIR HFA/PROVENTIL HFA/VENTOLIN HFA) 108 (90 BASE) MCG/ACT INHALER    Inhale 2 puffs into the lungs every 6 hours as needed for shortness of breath / dyspnea or wheezing    FLUTICASONE-SALMETEROL (ADVAIR DISKUS) 250-50 MCG/DOSE DISKUS INHALER    Inhale 1 puff into the lungs 2 times daily    RESPIRATORY THERAPY SUPPLIES (NEBULIZER) JENNY    Take 1 Device by mouth every 4 hours as needed Use for nebulizer treatments every 4 hours as needed for shortness of breath or wheezing       Final diagnoses:   Exacerbation of asthma, unspecified asthma severity, unspecified whether persistent       9/10/2018   [facility] EMERGENCY DEPARTMENT     Jovi Villanueva MD  09/10/18 4338

## 2018-09-10 NOTE — ED AVS SNAPSHOT
[facility] Emergency Department    [address] [address]    Phone:  [phone]    Fax:  [phone]                                       Ge Hansen   MRN: [id number]    Department:  [facility] Emergency Department   Date of Visit:  9/10/2018           Patient Information     Date Of Birth          1985        Your diagnoses for this visit were:     Exacerbation of asthma, unspecified asthma severity, unspecified whether persistent        You were seen by Jovi Villanueva MD.      Follow-up Information     Follow up with [facility] Emergency Department.    Specialty:  EMERGENCY MEDICINE    Why:  If symptoms worsen    Contact information:    [facility] [facility] [address] [phone]    Additional information:    The medical center is located at   [address]. (between [address] and   [address] in [place], [place] miles north   of [place]).        Follow up with No Ref-Primary, Physician.    Why:  Recheck in primary care clinic later this week as scheduled      Discharge References/Attachments     ASTHMA, ACUTE (ADULT) (ENGLISH)    ASTHMA, UNDERSTANDING (ENGLISH)    ASTHMA, CONTROLLING YOUR TRIGGERS: ALLERGENS (ENGLISH)      Your next 10 appointments already scheduled     Sep 13, 2018 11:20 AM CDT   PHYSICAL with Wesley Davis MD   [facility]    [address] [place] [address] [phone]              24 Hour Appointment Hotline       To make an appointment at any [facility] [place], call [phone] ([phone]). If you don't have a family doctor or clinic, we will help you find one. [facility] are conveniently located to serve the needs of you and your family.             Review of your medicines      START taking        Dose / Directions Last dose taken    * albuterol (2.5 MG/3ML) 0.083% neb solution   Dose:  2.5 mg   Quantity:  1 Box        Take 1 vial (2.5 mg) by  nebulization every 4 hours as needed for shortness of breath / dyspnea or wheezing   Refills:  0        * albuterol 108 (90 Base) MCG/ACT inhaler   Commonly known as:  PROAIR HFA/PROVENTIL HFA/VENTOLIN HFA   Dose:  2 puff   Quantity:  1 Inhaler        Inhale 2 puffs into the lungs every 6 hours as needed for shortness of breath / dyspnea or wheezing   Refills:  0        fluticasone-salmeterol 250-50 MCG/DOSE diskus inhaler   Commonly known as:  ADVAIR DISKUS   Dose:  1 puff   Quantity:  1 Inhaler        Inhale 1 puff into the lungs 2 times daily   Refills:  0        nebulizer Laverne   Dose:  1 Device   Quantity:  1 each        Take 1 Device by mouth every 4 hours as needed Use for nebulizer treatments every 4 hours as needed for shortness of breath or wheezing   Refills:  0        * Notice:  This list has 2 medication(s) that are the same as other medications prescribed for you. Read the directions carefully, and ask your doctor or other care provider to review them with you.      Our records show that you are taking the medicines listed below. If these are incorrect, please call your family doctor or clinic.        Dose / Directions Last dose taken    HYDROcodone-acetaminophen 5-325 MG per tablet   Commonly known as:  NORCO   Dose:  1 tablet   Quantity:  18 tablet        Take 1 tablet by mouth every 4 hours as needed for pain   Refills:  0                Prescriptions were sent or printed at these locations (4 Prescriptions)                   [id number] [place] [id number] [address]    Telephone:  [phone]   Fax:  [phone]   Hours:  Closed 10-16-08 business to [facility]                E-Prescribed (4 of 4)         albuterol (2.5 MG/3ML) 0.083% neb solution               albuterol (PROAIR HFA/PROVENTIL HFA/VENTOLIN HFA) 108 (90 Base) MCG/ACT inhaler               fluticasone-salmeterol (ADVAIR DISKUS) 250-50 MCG/DOSE diskus inhaler             "   Respiratory Therapy Supplies (NEBULIZER) JENNY                Orders Needing Specimen Collection     None      Pending Results     No orders found from 2018 to 2018.            Pending Culture Results     No orders found from 2018 to 2018.            Pending Results Instructions     If you had any lab results that were not finalized at the time of your Discharge, you can call the ED Lab Result RN at [phone]. You will be contacted by this team for any positive Lab results or changes in treatment. The nurses are available 7 days a week from 10A to 6:30P.  You can leave a message 24 hours per day and they will return your call.        Test Results From Your Hospital Stay               Thank you for choosing [facility]       Thank you for choosing [facility] for your care. Our goal is always to provide you with excellent care. Hearing back from our patients is one way we can continue to improve our services. Please take a few minutes to complete the written survey that you may receive in the mail after you visit with us. Thank you!        [employer] Information     [employer] lets you send messages to your doctor, view your test results, renew your prescriptions, schedule appointments and more. To sign up, go to [email or web address] . Click on \"Log in\" on the left side of the screen, which will take you to the Welcome page. Then click on \"Sign up Now\" on the right side of the page.     You will be asked to enter the access code listed below, as well as some personal information. Please follow the directions to create your username and password.     Your access code is: [email or web address]  Expires: 2018  6:41 PM     Your access code will  in 90 days. If you need help or a new code, please call your [facility] clinic or [phone].        Care EveryWhere ID     This is your Care EveryWhere ID. This could be used by other organizations to access your [facility] medical records  [id number]      "   Equal Access to Services     EDD STEWART : Dante Schaefer, rajan prince, aracelsi hernandez. So [facility] [phone].    ATENCIÓN: Si habla español, tiene a barnes disposición servicios gratuitos de asistencia lingüística. Llame al [phone].    We comply with applicable federal civil rights laws and [place] laws. We do not discriminate on the basis of race, color, national origin, age, disability, sex, sexual orientation, or gender identity.            After Visit Summary       This is your record. Keep this with you and show to your community pharmacist(s) and doctor(s) at your next visit.

## 2018-09-13 ENCOUNTER — OFFICE VISIT (OUTPATIENT)
Dept: FAMILY MEDICINE | Facility: CLINIC | Age: 33
End: 2018-09-13
Payer: MEDICAID

## 2018-09-13 VITALS
TEMPERATURE: 97.8 F | HEART RATE: 100 BPM | WEIGHT: 315 LBS | SYSTOLIC BLOOD PRESSURE: 148 MMHG | DIASTOLIC BLOOD PRESSURE: 88 MMHG | HEIGHT: 73 IN | BODY MASS INDEX: 41.75 KG/M2

## 2018-09-13 DIAGNOSIS — E66.2 OBESITY HYPOVENTILATION SYNDROME (H): ICD-10-CM

## 2018-09-13 DIAGNOSIS — R73.09 ELEVATED GLUCOSE: ICD-10-CM

## 2018-09-13 DIAGNOSIS — Z72.0 TOBACCO USE: ICD-10-CM

## 2018-09-13 DIAGNOSIS — Z00.00 ROUTINE GENERAL MEDICAL EXAMINATION AT A HEALTH CARE FACILITY: Primary | ICD-10-CM

## 2018-09-13 DIAGNOSIS — E66.01 MORBID OBESITY (H): ICD-10-CM

## 2018-09-13 DIAGNOSIS — G47.33 OSA (OBSTRUCTIVE SLEEP APNEA): ICD-10-CM

## 2018-09-13 DIAGNOSIS — R03.0 ELEVATED BLOOD PRESSURE READING WITHOUT DIAGNOSIS OF HYPERTENSION: ICD-10-CM

## 2018-09-13 DIAGNOSIS — Z11.3 SCREEN FOR STD (SEXUALLY TRANSMITTED DISEASE): ICD-10-CM

## 2018-09-13 LAB
ANION GAP SERPL CALCULATED.3IONS-SCNC: 5 MMOL/L (ref 3–14)
BUN SERPL-MCNC: 14 MG/DL (ref 7–30)
CALCIUM SERPL-MCNC: 8.2 MG/DL (ref 8.5–10.1)
CHLORIDE SERPL-SCNC: 103 MMOL/L (ref 94–109)
CO2 SERPL-SCNC: 29 MMOL/L (ref 20–32)
CREAT SERPL-MCNC: 0.69 MG/DL (ref 0.66–1.25)
GFR SERPL CREATININE-BSD FRML MDRD: >90 ML/MIN/1.7M2
GLUCOSE SERPL-MCNC: 85 MG/DL (ref 70–99)
HBA1C MFR BLD: 5.5 % (ref 0–5.6)
POTASSIUM SERPL-SCNC: 4.4 MMOL/L (ref 3.4–5.3)
SODIUM SERPL-SCNC: 137 MMOL/L (ref 133–144)
TSH SERPL DL<=0.005 MIU/L-ACNC: 1.64 MU/L (ref 0.4–4)

## 2018-09-13 PROCEDURE — 87591 N.GONORRHOEAE DNA AMP PROB: CPT | Performed by: FAMILY MEDICINE

## 2018-09-13 PROCEDURE — 87389 HIV-1 AG W/HIV-1&-2 AB AG IA: CPT | Performed by: FAMILY MEDICINE

## 2018-09-13 PROCEDURE — 80048 BASIC METABOLIC PNL TOTAL CA: CPT | Performed by: FAMILY MEDICINE

## 2018-09-13 PROCEDURE — 83036 HEMOGLOBIN GLYCOSYLATED A1C: CPT | Performed by: FAMILY MEDICINE

## 2018-09-13 PROCEDURE — 99385 PREV VISIT NEW AGE 18-39: CPT | Performed by: FAMILY MEDICINE

## 2018-09-13 PROCEDURE — 36415 COLL VENOUS BLD VENIPUNCTURE: CPT | Performed by: FAMILY MEDICINE

## 2018-09-13 PROCEDURE — 84443 ASSAY THYROID STIM HORMONE: CPT | Performed by: FAMILY MEDICINE

## 2018-09-13 PROCEDURE — 87491 CHLMYD TRACH DNA AMP PROBE: CPT | Performed by: FAMILY MEDICINE

## 2018-09-13 PROCEDURE — 86780 TREPONEMA PALLIDUM: CPT | Performed by: FAMILY MEDICINE

## 2018-09-13 PROCEDURE — G0499 HEPB SCREEN HIGH RISK INDIV: HCPCS | Performed by: FAMILY MEDICINE

## 2018-09-13 ASSESSMENT — PAIN SCALES - GENERAL: PAINLEVEL: SEVERE PAIN (6)

## 2018-09-13 NOTE — PROGRESS NOTES
SUBJECTIVE:   CC: Ge Hansen is an 33 year old male who presents for preventative health visit.     Physical   Annual:     Getting at least 3 servings of Calcium per day:  Yes    Bi-annual eye exam:  Yes    Dental care twice a year:  NO    Sleep apnea or symptoms of sleep apnea:  Sleep apnea    Diet:  Regular (no restrictions)    Frequency of exercise:  None    Taking medications regularly:  Yes    Medication side effects:  Not applicable    Additional concerns today:  No    1.) corn on bottom of foot, wart on finger  2.) Need nebulizer machine and CPAP machine  3.) Test diabetes and STDS      Tons of issues    500+ pounds.  Main issue.  Needs wt loss surgery, cpap machine, labs updated.  L knee with MRI tomorrow for suspected meniscal injuiry    Has wheezing intermittently, multifactorial.  Needs new neb machine, has nebs    Wants std screening, no sx but has unprotected sex with GF, wants to make sure things are clean    No cp or new sob.  No fever, no dysuria or hematuria, no fever or cough or other illness.            Today's PHQ-2 Score:   PHQ-2 ( 1999 Pfizer) 9/13/2018   Q1: Little interest or pleasure in doing things 0   Q2: Feeling down, depressed or hopeless 0   PHQ-2 Score 0   Q1: Little interest or pleasure in doing things Not at all   Q2: Feeling down, depressed or hopeless Not at all   PHQ-2 Score 0       Abuse: Current or Past(Physical, Sexual or Emotional)- No  Do you feel safe in your environment - Yes    Social History   Substance Use Topics     Smoking status: Current Every Day Smoker     Packs/day: 1.00     Smokeless tobacco: Never Used     Alcohol use Not on file     Alcohol Use 9/13/2018   If you drink alcohol do you typically have greater than 3 drinks per day OR greater than 7 drinks per week? No       Last PSA: No results found for: PSA    Reviewed orders with patient. Reviewed health maintenance and updated orders accordingly - Yes      Reviewed and updated as needed this visit by clinical  "staff         Reviewed and updated as needed this visit by Provider            Review of Systems  CONSTITUTIONAL: NEGATIVE for fever, chills, change in weight  INTEGUMENTARY/SKIN: NEGATIVE for worrisome rashes, moles or lesions  EYES: NEGATIVE for vision changes or irritation  ENT: NEGATIVE for ear, mouth and throat problems  RESP: NEGATIVE for significant cough or SOB  CV: NEGATIVE for chest pain, palpitations or peripheral edema  GI: NEGATIVE for nausea, abdominal pain, heartburn, or change in bowel habits   male: negative for dysuria, hematuria, decreased urinary stream, erectile dysfunction, urethral discharge  MUSCULOSKELETAL: NEGATIVE for significant arthralgias or myalgia  NEURO: NEGATIVE for weakness, dizziness or paresthesias  PSYCHIATRIC: NEGATIVE for changes in mood or affect    OBJECTIVE:   /88 (BP Location: Right arm, Patient Position: Chair, Cuff Size: Adult Large)  Pulse 100  Temp 97.8  F (36.6  C) (Tympanic)  Ht 6' 1\" (1.854 m)  Wt (!) 522 lb (236.8 kg)  BMI 68.87 kg/m2    Physical Exam  GEN: Alert and oriented, in no acute distress  Morbid obesity limiting exam otherwise  No redness in L knee  Gait OK               ASSESSMENT/PLAN:   Well visit  Morbid obesity  Std screening  Elevated glucose  Tobacco use  Elevated BP  Hypoventilation syndrome    Std screening.  Labs updated.  Neb machine.  Bariatric referral, sleep center referral.      He knows he needs bariatric surgery, everything else is secondary  F/u prn results       COUNSELING:   Reviewed preventive health counseling, as reflected in patient instructions       Regular exercise       Healthy diet/nutrition    BP Readings from Last 1 Encounters:   09/10/18 (!) 156/103     Estimated body mass index is 58.05 kg/(m^2) as calculated from the following:    Height as of 9/10/18: 6' 1\" (1.854 m).    Weight as of 9/10/18: 440 lb (199.6 kg).    BP Screening:   Last 3 BP Readings:    BP Readings from Last 3 Encounters:   09/13/18 148/88 "   09/10/18 (!) 156/103   08/11/18 (!) 160/104       The following was recommended to the patient:  Re-screen BP within a year and recommended lifestyle modifications  Weight management plan: see above      reports that he has been smoking.  He has been smoking about 1.00 pack per day. He has never used smokeless tobacco.  Tobacco Cessation Action Plan: next visit        Wesley Davis MD  [facility]

## 2018-09-13 NOTE — NURSING NOTE
"Chief Complaint   Patient presents with     Physical       Initial /88 (BP Location: Right arm, Patient Position: Chair, Cuff Size: Adult Large)  Pulse 100  Temp 97.8  F (36.6  C) (Tympanic)  Ht 6' 1\" (1.854 m)  Wt (!) 522 lb (236.8 kg)  BMI 68.87 kg/m2 Estimated body mass index is 68.87 kg/(m^2) as calculated from the following:    Height as of this encounter: 6' 1\" (1.854 m).    Weight as of this encounter: 522 lb (236.8 kg).    Patient presents to the clinic using No DME    Health Maintenance that is potentially due pending provider review:  NONE    Jenni Trevino MA  11:34 AM 9/13/2018        "

## 2018-09-13 NOTE — LETTER
September 17, 2018      Ge Hansen  [address] [address]        Dear ,    We are writing to inform you of your test results.    Your test results fall within the expected range(s) or remain unchanged from previous results.  Please continue with current treatment plan.    Resulted Orders   NEISSERIA GONORRHOEA PCR   Result Value Ref Range    Specimen Descrip Urine     N Gonorrhea PCR Negative NEG^Negative      Comment:      Negative for N. gonorrhoeae rRNA by transcription mediated amplification.  A negative result by transcription mediated amplification does not preclude   the presence of N. gonorrhoeae infection because results are dependent on   proper and adequate collection, absence of inhibitors, and sufficient rRNA to   be detected.     CHLAMYDIA TRACHOMATIS PCR   Result Value Ref Range    Specimen Description Urine     Chlamydia Trachomatis PCR Negative NEG^Negative      Comment:      Negative for C. trachomatis rRNA by transcription mediated amplification.  A negative result by transcription mediated amplification does not preclude   the presence of C. trachomatis infection because results are dependent on   proper and adequate collection, absence of inhibitors, and sufficient rRNA to   be detected.     Hepatitis B surface antigen   Result Value Ref Range    Hep B Surface Agn Nonreactive NR^Nonreactive   HIV Antigen Antibody Combo   Result Value Ref Range    HIV Antigen Antibody Combo Nonreactive NR^Nonreactive          Comment:      HIV-1 p24 Ag & HIV-1/HIV-2 Ab Not Detected   Treponema Abs w Reflex to RPR and Titer   Result Value Ref Range    Treponema Antibodies Nonreactive NR^Nonreactive   Hemoglobin A1c   Result Value Ref Range    Hemoglobin A1C 5.5 0 - 5.6 %      Comment:      Normal <5.7% Prediabetes 5.7-6.4%  Diabetes 6.5% or higher - adopted from ADA   consensus guidelines.     Basic metabolic panel  (Ca, Cl, CO2, Creat, Gluc, K, Na, BUN)   Result Value Ref Range    Sodium  137 133 - 144 mmol/L    Potassium 4.4 3.4 - 5.3 mmol/L    Chloride 103 94 - 109 mmol/L    Carbon Dioxide 29 20 - 32 mmol/L    Anion Gap 5 3 - 14 mmol/L    Glucose 85 70 - 99 mg/dL    Urea Nitrogen 14 7 - 30 mg/dL    Creatinine 0.69 0.66 - 1.25 mg/dL    GFR Estimate >90 >60 mL/min/1.7m2      Comment:      Non  GFR Calc    GFR Estimate If Black >90 >60 mL/min/1.7m2      Comment:       GFR Calc    Calcium 8.2 (L) 8.5 - 10.1 mg/dL   TSH with free T4 reflex   Result Value Ref Range    TSH 1.64 0.40 - 4.00 mU/L       If you have any questions or concerns, please call the clinic at the number listed above.       Sincerely,        Wesley Davis MD

## 2018-09-13 NOTE — MR AVS SNAPSHOT
After Visit Summary   9/13/2018    Ge Hansen    MRN: [id number]           Patient Information     Date Of Birth          1985        Visit Information        Provider Department      9/13/2018 11:20 AM Wesley Davis MD [facility]        Today's Diagnoses     Routine general medical examination at a health care facility    -  1    Screen for STD (sexually transmitted disease)        Morbid obesity (H)        Elevated glucose        Obesity hypoventilation syndrome (H)        MAYDA (obstructive sleep apnea)        Tobacco use        Elevated blood pressure reading without diagnosis of hypertension          Care Instructions      Preventive Health Recommendations  Male Ages 26 - 39    Yearly exam:             See your health care provider every year in order to  o   Review health changes.   o   Discuss preventive care.    o   Review your medicines if your doctor has prescribed any.    You should be tested each year for STDs (sexually transmitted diseases), if you re at risk.     After age 35, talk to your provider about cholesterol testing. If you are at risk for heart disease, have your cholesterol tested at least every 5 years.     If you are at risk for diabetes, you should have a diabetes test (fasting glucose).  Shots: Get a flu shot each year. Get a tetanus shot every 10 years.     Nutrition:    Eat at least 5 servings of fruits and vegetables daily.     Eat whole-grain bread, whole-wheat pasta and brown rice instead of white grains and rice.     Get adequate Calcium and Vitamin D.     Lifestyle    Exercise for at least 150 minutes a week (30 minutes a day, 5 days a week). This will help you control your weight and prevent disease.     Limit alcohol to one drink per day.     No smoking.     Wear sunscreen to prevent skin cancer.     See your dentist every six months for an exam and cleaning.             Follow-ups after your visit        Additional Services     BARIATRIC  ADULT REFERRAL       Your provider has referred you to: [facility]: [facility] [phone]. [email or web address]    Please be aware that coverage of these services is subject to the terms and limitations of your health insurance plan.  Call member services at your health plan with any benefit or coverage questions.      Please bring the following with you to your appointment:      (1) List of current medications   (2) This referral request   (3) Any documents/labs given to you for this referral            SLEEP EVALUATION & MANAGEMENT REFERRAL - ADULT -[facility]  [phone] (Age 2 and up)       Please be aware that coverage of these services is subject to the terms and limitations of your health insurance plan.  Call member services at your health plan with any benefit or coverage questions.      Please bring the following to your appointment:    >>   List of current medications   >>   This referral request   >>   Any documents/labs given to you for this referral                      Follow-up notes from your care team     Return based on resullts.      Your next 10 appointments already scheduled     Sep 14, 2018  8:30 AM CDT   MR KNEE LEFT W/O CONTRAST with WYMR2   [facility] ([facility])    [address] [address] [phone]           Take your medicines as usual, unless your doctor tells you not to. Bring a list of your current medicines to your exam (including vitamins, minerals and over-the-counter drugs). Also bring the results of similar scans you may have had.  Please remove any body piercings and hair extensions before you arrive.  Follow your doctor s orders. If you do not, we may have to postpone your exam.  You may or may not receive IV contrast for this exam pending the discretion of the Radiologist.  You do not need to do  anything special to prepare.  The MRI machine uses a strong magnet. Please wear clothes without metal (snaps, zippers). A sweatsuit works well, or we may give you a hospital gown.   **IMPORTANT** THE INSTRUCTIONS BELOW ARE ONLY FOR THOSE PATIENTS WHO HAVE BEEN PRESCRIBED SEDATION OR GENERAL ANESTHESIA DURING THEIR MRI PROCEDURE:  IF YOUR DOCTOR PRESCRIBED ORAL SEDATION (take medicine to help you relax during your exam):   You must get the medicine from your doctor (oral medication) before you arrive. Bring the medicine to the exam. Do not take it at home. You ll be told when to take it upon arriving for your exam.   Arrive one hour early. Bring someone who can take you home after the test. Your medicine will make you sleepy. After the exam, you may not drive, take a bus or take a taxi by yourself.  IF YOUR DOCTOR PRESCRIBED IV SEDATION:   Arrive one hour early. Bring someone who can take you home after the test. Your medicine will make you sleepy. After the exam, you may not drive, take a bus or take a taxi by yourself.   No eating 6 hours before your exam. You may have clear liquids up until 4 hours before your exam. (Clear liquids include water, clear tea, black coffee and fruit juice without pulp.)  IF YOUR DOCTOR PRESCRIBED ANESTHESIA (be asleep for your exam):   Arrive 1 1/2 hours early. Bring someone who can take you home after the test. You may not drive, take a bus or take a taxi by yourself.   No eating 8 hours before your exam. You may have clear liquids up until 4 hours before your exam. (Clear liquids include water, clear tea, black coffee and fruit juice without pulp.)   You will spend four to five hours in the recovery room.  Please call the Imaging Department at your exam site with any questions.              Future tests that were ordered for you today     Open Future Orders        Priority Expected Expires Ordered    SLEEP EVALUATION & MANAGEMENT REFERRAL - [facility]   "[phone] (Age 2 and up) Routine  9/13/2019 9/13/2018    MR Knee Left w/o Contrast Routine  9/12/2019 9/12/2018            Who to contact     If you have questions or need follow up information about today's clinic visit or your schedule please contact [facility] directly at [phone].  Normal or non-critical lab and imaging results will be communicated to you by MyChart, letter or phone within 4 business days after the clinic has received the results. If you do not hear from us within 7 days, please contact the clinic through MyChart or phone. If you have a critical or abnormal lab result, we will notify you by phone as soon as possible.  Submit refill requests through [facility] or call your pharmacy and they will forward the refill request to us. Please allow 3 business days for your refill to be completed.          Additional Information About Your Visit        Care EveryWhere ID     This is your Care EveryWhere ID. This could be used by other organizations to access your [place] medical records  [id number]        Your Vitals Were     Pulse Temperature Height BMI (Body Mass Index)          100 97.8  F (36.6  C) (Tympanic) 6' 1\" (1.854 m) 68.87 kg/m2         Blood Pressure from Last 3 Encounters:   09/13/18 148/88   09/10/18 (!) 156/103   08/11/18 (!) 160/104    Weight from Last 3 Encounters:   09/13/18 (!) 522 lb (236.8 kg)   09/10/18 (!) 440 lb (199.6 kg)              We Performed the Following     BARIATRIC ADULT REFERRAL     Basic metabolic panel  (Ca, Cl, CO2, Creat, Gluc, K, Na, BUN)     CHLAMYDIA TRACHOMATIS PCR     Hemoglobin A1c     Hepatitis B surface antigen     HIV Antigen Antibody Combo     NEISSERIA GONORRHOEA PCR     Treponema Abs w Reflex to RPR and Titer     TSH with free T4 reflex          Today's Medication Changes          These changes are accurate as of 9/13/18 12:16 PM.  If you have any questions, ask your nurse or doctor.               Start taking these medicines. "        Dose/Directions    order for DME   Used for:  Obesity hypoventilation syndrome (H)   Started by:  Wesley Davis MD        Equipment being ordered: Nebulizer   Quantity:  1 Package   Refills:  0            Where to get your medicines      Some of these will need a paper prescription and others can be bought over the counter.  Ask your nurse if you have questions.     Bring a paper prescription for each of these medications     order for DME                Primary Care Provider Office Phone # Fax #    Wesley Davis [phone] [address] [place] [id number]        Equal Access to Services     [facility]: Hadii aad ku hadasho Soomaali, waaxda luqadaha, qaybta kaalmada adeegyada, waxay warrenin hayaan adeeg clint rojas . So [facility] [phone].    ATENCIÓN: Si habla español, tiene a barnes disposición servicios gratuitos de asistencia lingüística. [facility] [phone].    We comply with applicable federal civil rights laws and [place] laws. We do not discriminate on the basis of race, color, national origin, age, disability, sex, sexual orientation, or gender identity.            Thank you!     Thank you for choosing [facility]  for your care. Our goal is always to provide you with excellent care. Hearing back from our patients is one way we can continue to improve our services. Please take a few minutes to complete the written survey that you may receive in the mail after your visit with us. Thank you!             Your Updated Medication List - Protect others around you: Learn how to safely use, store and throw away your medicines at www.disposemymeds.org.          This list is accurate as of 9/13/18 12:16 PM.  Always use your most recent med list.                   Brand Name Dispense Instructions for use Diagnosis    * albuterol (2.5 MG/3ML) 0.083% neb solution     1 Box    Take 1 vial (2.5 mg) by nebulization every 4 hours as needed for shortness of  breath / dyspnea or wheezing        * albuterol 108 (90 Base) MCG/ACT inhaler    PROAIR HFA/PROVENTIL HFA/VENTOLIN HFA    1 Inhaler    Inhale 2 puffs into the lungs every 6 hours as needed for shortness of breath / dyspnea or wheezing        fluticasone-salmeterol 250-50 MCG/DOSE diskus inhaler    ADVAIR DISKUS    1 Inhaler    Inhale 1 puff into the lungs 2 times daily        HYDROcodone-acetaminophen 5-325 MG per tablet    NORCO    18 tablet    Take 1 tablet by mouth every 4 hours as needed for pain        nebulizer Laverne     1 each    Take 1 Device by mouth every 4 hours as needed Use for nebulizer treatments every 4 hours as needed for shortness of breath or wheezing        order for DME     1 Package    Equipment being ordered: Nebulizer    Obesity hypoventilation syndrome (H)       * Notice:  This list has 2 medication(s) that are the same as other medications prescribed for you. Read the directions carefully, and ask your doctor or other care provider to review them with you.

## 2018-09-14 ENCOUNTER — TELEPHONE (OUTPATIENT)
Dept: FAMILY MEDICINE | Facility: CLINIC | Age: 33
End: 2018-09-14

## 2018-09-14 LAB
C TRACH DNA SPEC QL NAA+PROBE: NEGATIVE
HBV SURFACE AG SERPL QL IA: NONREACTIVE
HIV 1+2 AB+HIV1 P24 AG SERPL QL IA: NONREACTIVE
N GONORRHOEA DNA SPEC QL NAA+PROBE: NEGATIVE
SPECIMEN SOURCE: NORMAL
SPECIMEN SOURCE: NORMAL
T PALLIDUM AB SER QL: NONREACTIVE

## 2018-09-14 NOTE — TELEPHONE ENCOUNTER
Please call back calling looking for lab result all final not reviewed by Dr Davis.    Mary Gary CSS

## 2018-09-18 ENCOUNTER — OFFICE VISIT (OUTPATIENT)
Dept: SLEEP MEDICINE | Facility: CLINIC | Age: 33
End: 2018-09-18
Attending: FAMILY MEDICINE
Payer: MEDICAID

## 2018-09-18 VITALS
DIASTOLIC BLOOD PRESSURE: 89 MMHG | HEIGHT: 73 IN | HEART RATE: 87 BPM | WEIGHT: 315 LBS | SYSTOLIC BLOOD PRESSURE: 141 MMHG | OXYGEN SATURATION: 94 % | BODY MASS INDEX: 41.75 KG/M2

## 2018-09-18 DIAGNOSIS — R06.00 DYSPNEA AND RESPIRATORY ABNORMALITY: Primary | ICD-10-CM

## 2018-09-18 DIAGNOSIS — E66.01 MORBID OBESITY (H): ICD-10-CM

## 2018-09-18 DIAGNOSIS — G47.33 OSA (OBSTRUCTIVE SLEEP APNEA): ICD-10-CM

## 2018-09-18 DIAGNOSIS — R53.81 MALAISE AND FATIGUE: ICD-10-CM

## 2018-09-18 DIAGNOSIS — G47.30 SLEEP APNEA, UNSPECIFIED TYPE: ICD-10-CM

## 2018-09-18 DIAGNOSIS — R53.83 MALAISE AND FATIGUE: ICD-10-CM

## 2018-09-18 DIAGNOSIS — R06.89 DYSPNEA AND RESPIRATORY ABNORMALITY: Primary | ICD-10-CM

## 2018-09-18 PROCEDURE — 99244 OFF/OP CNSLTJ NEW/EST MOD 40: CPT | Performed by: PHYSICIAN ASSISTANT

## 2018-09-18 RX ORDER — ZOLPIDEM TARTRATE 5 MG/1
TABLET ORAL
Qty: 1 TABLET | Refills: 0 | Status: SHIPPED | OUTPATIENT
Start: 2018-09-18 | End: 2018-10-01

## 2018-09-18 NOTE — MR AVS SNAPSHOT
After Visit Summary   9/18/2018    Ge Hansen    MRN: [id number]           Patient Information     Date Of Birth          1985        Visit Information        Provider Department      9/18/2018 9:30 AM Elaine Rowe PA [facility]        Today's Diagnoses     Dyspnea and respiratory abnormality    -  1    Morbid obesity (H)        MAYDA (obstructive sleep apnea)        Class 3 obesity due to excess calories with body mass index (BMI) of 60.0 to 69.9 in adult, unspecified whether serious comorbidity present (H)        Sleep apnea, unspecified type        Malaise and fatigue          Care Instructions      Your BMI is Body mass index is 69.86 kg/(m^2).  Weight management is a personal decision.  If you are interested in exploring weight loss strategies, the following discussion covers the approaches that may be successful. Body mass index (BMI) is one way to tell whether you are at a healthy weight, overweight, or obese. It measures your weight in relation to your height.  A BMI of 18.5 to 24.9 is in the healthy range. A person with a BMI of 25 to 29.9 is considered overweight, and someone with a BMI of 30 or greater is considered obese. More than two-thirds of American adults are considered overweight or obese.  Being overweight or obese increases the risk for further weight gain. Excess weight may lead to heart disease and diabetes.  Creating and following plans for healthy eating and physical activity may help you improve your health.  Weight control is part of healthy lifestyle and includes exercise, emotional health, and healthy eating habits. Careful eating habits lifelong are the mainstay of weight control. Though there are significant health benefits from weight loss, long-term weight loss with diet alone may be very difficult to achieve- studies show long-term success with dietary management in less than 10% of people. Attaining a healthy weight may be especially difficult  to achieve in those with severe obesity. In some cases, medications, devices and surgical management might be considered.  What can you do?  If you are overweight or obese and are interested in methods for weight loss, you should discuss this with your provider.     Consider reducing daily calorie intake by 500 calories.     Keep a food journal.     Avoiding skipping meals, consider cutting portions instead.    Diet combined with exercise helps maintain muscle while optimizing fat loss. Strength training is particularly important for building and maintaining muscle mass. Exercise helps reduce stress, increase energy, and improves fitness. Increasing exercise without diet control, however, may not burn enough calories to loose weight.       Start walking three days a week 10-20 minutes at a time    Work towards walking thirty minutes five days a week     Eventually, increase the speed of your walking for 1-2 minutes at time    In addition, we recommend that you review healthy lifestyles and methods for weight loss available through the National Institutes of Health patient information sites:  http://win.niddk.nih.gov/publications/index.htm    And look into health and wellness programs that may be available through your health insurance provider, employer, local community center, or [employer].    Weight management plan: Patient was referred to their PCP to discuss a diet and exercise plan.            Follow-ups after your visit        Your next 10 appointments already scheduled     Oct 03, 2018 10:00 AM CDT   LAB with [facility] ([facility])    [address] [facility] [id number] [phone]           Please do not eat 10-12 hours before your appointment if you are coming in fasting for labs on lipids, cholesterol, or glucose (sugar). This does not apply to pregnant women. Water, hot tea and black coffee (with nothing added) are okay. Do not drink other fluids, diet  soda or chew gum.            Oct 03, 2018  8:00 PM CDT   PSG Titration w/TCM with SLEEP LAB, BED ONE   [facility])    [address]  [facility] [address]   [phone]            Oct 09, 2018  1:00 PM CDT   Return Sleep Patient with LISSY Ridley   [facility] ([facility])    [address]  [facility] [address]   [phone]            Oct 17, 2018  1:45 PM CDT   (Arrive by 1:30 PM)   New Bariatric Visit with Ilsa Bautista PA-C   [facility] ([facility])    [address] [place] [id number] [phone]            Oct 17, 2018  2:30 PM CDT   (Arrive by 2:15 PM)   New Bariatric Nutrition Visit with Clarissa Cox RD   [facility] ([facility])    [address] [place] [id number] [phone]              Future tests that were ordered for you today     Open Future Orders        Priority Expected Expires Ordered    Blood gas venous Routine  9/18/2019 9/18/2018    Comprehensive Sleep Study Routine  3/17/2019 9/18/2018            Who to contact     If you have questions or need follow up information about today's clinic visit or your schedule please contact [facility] directly at [phone].  Normal or non-critical lab and imaging results will be communicated to you by MyChart, letter or phone within 4 business days after the clinic has received the results. If you do not hear from us within 7 days, please contact the clinic through [facility] or phone. If you have a critical or abnormal lab result, we will notify you by phone as soon as possible.  Submit refill requests through [employer] or call your pharmacy and they will forward the refill request to us. Please allow 3 business days for your refill to be completed.        "   Additional Information About Your Visit        MyChart Information     [employer] gives you secure access to your electronic health record. If you see a primary care provider, you can also send messages to your care team and make appointments. If you have questions, please call your primary care clinic.  If you do not have a primary care provider, please call [phone] and they will assist you.        Care EveryWhere ID     This is your Care EveryWhere ID. This could be used by other organizations to access your [facility] medical records  [id number]        Your Vitals Were     Pulse Height Pulse Oximetry BMI (Body Mass Index)          87 1.854 m (6' 1\") 94% 69.86 kg/m2         Blood Pressure from Last 3 Encounters:   09/18/18 141/89   09/13/18 148/88   09/10/18 (!) 156/103    Weight from Last 3 Encounters:   09/18/18 (!) 240.2 kg (529 lb 8 oz)   09/13/18 (!) 236.8 kg (522 lb)   09/10/18 (!) 199.6 kg (440 lb)              We Performed the Following     SLEEP EVALUATION & MANAGEMENT REFERRAL - ADULT [facility]  [phone] (Age 2 and up)          Today's Medication Changes          These changes are accurate as of 9/18/18 10:54 AM.  If you have any questions, ask your nurse or doctor.               Start taking these medicines.        Dose/Directions    zolpidem 5 MG tablet   Commonly known as:  AMBIEN   Started by:  Elaine Rowe PA        Take tablet by mouth 15 minutes prior to sleep, for Sleep Study   Quantity:  1 tablet   Refills:  0            Where to get your medicines      Some of these will need a paper prescription and others can be bought over the counter.  Ask your nurse if you have questions.     Bring a paper prescription for each of these medications     zolpidem 5 MG tablet                Primary Care Provider Office Phone # Fax #    Wesley Davis [phone] [address] [place] [phone]        Equal Access to Services     [facility] " GAAR : Hadii aad ku hadstaci Estebanali, waaxda luqadaha, qaybta kaalmada adecesar, aracelis warrenin hayaavenice montielcharu jaramillo crystal . So [facility] [phone].    ATENCIÓN: Si habla español, tiene a barnes disposición servicios gratuitos de asistencia lingüística. Angelicaame al [phone].    We comply with applicable federal civil rights laws and [place] laws. We do not discriminate on the basis of race, color, national origin, age, disability, sex, sexual orientation, or gender identity.            Thank you!     Thank you for choosing [facility]  for your care. Our goal is always to provide you with excellent care. Hearing back from our patients is one way we can continue to improve our services. Please take a few minutes to complete the written survey that you may receive in the mail after your visit with us. Thank you!             Your Updated Medication List - Protect others around you: Learn how to safely use, store and throw away your medicines at www.disposemymeds.org.          This list is accurate as of 9/18/18 10:54 AM.  Always use your most recent med list.                   Brand Name Dispense Instructions for use Diagnosis    * albuterol (2.5 MG/3ML) 0.083% neb solution     1 Box    Take 1 vial (2.5 mg) by nebulization every 4 hours as needed for shortness of breath / dyspnea or wheezing        * albuterol 108 (90 Base) MCG/ACT inhaler    PROAIR HFA/PROVENTIL HFA/VENTOLIN HFA    1 Inhaler    Inhale 2 puffs into the lungs every 6 hours as needed for shortness of breath / dyspnea or wheezing        fluticasone-salmeterol 250-50 MCG/DOSE diskus inhaler    ADVAIR DISKUS    1 Inhaler    Inhale 1 puff into the lungs 2 times daily        HYDROcodone-acetaminophen 5-325 MG per tablet    NORCO    18 tablet    Take 1 tablet by mouth every 4 hours as needed for pain        nebulizer Laverne     1 each    Take 1 Device by mouth every 4 hours as needed Use for nebulizer treatments every 4 hours as needed for  shortness of breath or wheezing        order for DME     1 Package    Equipment being ordered: Nebulizer    Obesity hypoventilation syndrome (H)       zolpidem 5 MG tablet    AMBIEN    1 tablet    Take tablet by mouth 15 minutes prior to sleep, for Sleep Study        * Notice:  This list has 2 medication(s) that are the same as other medications prescribed for you. Read the directions carefully, and ask your doctor or other care provider to review them with you.

## 2018-09-18 NOTE — PATIENT INSTRUCTIONS

## 2018-09-18 NOTE — PROGRESS NOTES
Sleep Consultation:    Date on this visit: 9/18/2018    Primary Physician: Wesley Davis     Chief Complaint   Patient presents with     Consult     History of Sleep Apnea. Has had study in the past but unsure timing.     Ge Hansen is a 33 year old male with medical history remarkable for morbid obesity who  is sent by Wesley Davis for a sleep consultation regarding sleep disordered breathing.     Ge reports undergoing a sleep study >10 years ago(no records to review today) for snoring and was told that he was stopping breathing >30 times an hour and prescribed CPAP. He stopped using CPAP after about a month due to dislike for wearing a mask to sleep. He states that now that he is grown he wants to take care of his problems better and have refreshing sleep.     Currently Ge is unemployed. He goes to sleep at 11:00 PM. He wakes up at 9:00 AM with an alarm. He falls asleep in 30 minutes.  He takes melatonin some nights. He wakes up 3-4 times a night for few minutes before falling back to sleep.  Ge wakes up to go to the bathroom and gasping for air.  Patient gets an average of 6 hours of sleep per night. He does not feel refreshed.     Patient does not use electronics in bed.     Ge does not do shift work.      Ge does snore every night and snoring is very loud. Patient does have a regular bed partner. There is report of snoring and gasping.  He does have witnessed apneas. They occasionally sleep separately.  Patient sleeps on his back, side and stomach. He has frequent morning dry mouth and morning headaches(3-4/week). Ge has occasional sleep talking and denies any sleep walking, dream enactment, sleep paralysis, cataplexy and hypnogogic/hypnopompic hallucinations.    Ge denies difficulty breathing through his nose, claustrophobia and reflux at night.      Ge has gained 100 pounds in the last 2-3 years.  Patient describes himself as a night person.  He would prefer to go to sleep  at 11:00 PM and wake up at 6:00 AM.  Patient's Bumpass Sleepiness score 17/24 consistent with excessive  daytime sleepiness.      Ge naps 5-6 times per week for 30 minutes to 3 hours, feels unrefreshed after naps. He takes some inadvertant naps.  He denies closing eyes, dozing and falling asleep while driving.   Patient was counseled on the importance of driving while alert, to pull over if drowsy, or nap before getting into the vehicle if sleepy.  He uses 1-2 cups/day of coffee, 5 sodas/day, 1-2 energy drinks/day. Last caffeine intake is usually 1-2 hours before bedtime.     Allergies:    No Known Allergies    Medications:    Current Outpatient Prescriptions   Medication Sig Dispense Refill     albuterol (2.5 MG/3ML) 0.083% neb solution Take 1 vial (2.5 mg) by nebulization every 4 hours as needed for shortness of breath / dyspnea or wheezing 1 Box 0     albuterol (PROAIR HFA/PROVENTIL HFA/VENTOLIN HFA) 108 (90 Base) MCG/ACT inhaler Inhale 2 puffs into the lungs every 6 hours as needed for shortness of breath / dyspnea or wheezing 1 Inhaler 0     fluticasone-salmeterol (ADVAIR DISKUS) 250-50 MCG/DOSE diskus inhaler Inhale 1 puff into the lungs 2 times daily 1 Inhaler 0     HYDROcodone-acetaminophen (NORCO) 5-325 MG per tablet Take 1 tablet by mouth every 4 hours as needed for pain 18 tablet 0     order for DME Equipment being ordered: Nebulizer 1 Package 0     Respiratory Therapy Supplies (NEBULIZER) JENNY Take 1 Device by mouth every 4 hours as needed Use for nebulizer treatments every 4 hours as needed for shortness of breath or wheezing 1 each 0     zolpidem (AMBIEN) 5 MG tablet Take tablet by mouth 15 minutes prior to sleep, for Sleep Study 1 tablet 0       Problem List:  Patient Active Problem List    Diagnosis Date Noted     Morbid obesity (H) 09/13/2018     Priority: Medium     Elevated glucose 09/13/2018     Priority: Medium     Obesity hypoventilation syndrome (H) 09/13/2018     Priority: Medium     Some  asthma hx too.         Tobacco use 09/13/2018     Priority: Medium     MAYDA (obstructive sleep apnea) 09/13/2018     Priority: Medium     No records.        Elevated blood pressure reading without diagnosis of hypertension 09/13/2018     Priority: Medium        Past Medical/Surgical History:  No past medical history on file.  No past surgical history on file.    Social History:  Social History     Social History     Marital status: Single     Spouse name: N/A     Number of children: N/A     Years of education: N/A     Occupational History     Not on file.     Social History Main Topics     Smoking status: Current Every Day Smoker     Packs/day: 1.00     Types: Cigarettes     Smokeless tobacco: Current User     Types: Chew, Snuff     Alcohol use Not on file     Drug use: Not on file     Sexual activity: Not on file     Other Topics Concern     Not on file     Social History Narrative       Family History:  No family history on file.    Review of Systems:  A complete review of systems reviewed by me is negative with the exeption of what has been mentioned in the history of present illness.  CONSTITUTIONAL: NEGATIVE for weight gain/loss, fever, chills, sweats or night sweats, drug allergies.  EYES:  POSITIVE for changes in vision  ENT:  POSITIVE for  runny nose  CARDIAC:  POSITIVE for  swollen legs and swollen feet  NEUROLOGIC:  POSITIVE for  headaches and weakness or numbness in the arms or legs  DERMATOLOGIC: NEGATIVE for rashes, new moles or change in mole(s)  PULMONARY:  POSITIVE for  SOB at rest, SOB with activity, coughing up blood and wheezing   GASTROINTESTINAL: NEGATIVE for nausea or vomitting, loose or watery stools, fat or grease in stools, constipation, abdominal pain, bowel movements black in color or blood noted.  GENITOURINARY: NEGATIVE for pain during urination, blood in urine, urinating more frequently than usual, irregular menstrual periods.  MUSCULOSKELETAL:  POSITIVE for  bone or joint  "pain  ENDOCRINE: NEGATIVE for increased thirst or urination, diabetes.  LYMPHATIC: NEGATIVE for swollen lymph nodes, lumps or bumps in the breasts or nipple discharge.    Physical Examination:  Vitals: /89  Pulse 87  Ht 1.854 m (6' 1\")  Wt (!) 240.2 kg (529 lb 8 oz)  SpO2 94%  BMI 69.86 kg/m2  BMI= Body mass index is 69.86 kg/(m^2).    Neck Cir (cm): 50 cm    Boulder Total Score 9/18/2018   Total score - Boulder 17       SPARKLE Total Score: 21 (09/18/18 0900)    GENERAL APPEARANCE: no distress  EYES: Eyes grossly normal to inspection, PERRL and conjunctivae and sclerae normal  HENT: ear canals and TM's normal, nose and mouth without ulcers or lesions, oropharynx crowded, uvula elongated, tongue base enlarged and tonsillar exudate  NECK: no asymmetry, masses, or scars  RESP: lungs clear to auscultation - no rales, rhonchi or wheezes  CV: regular rates and rhythm and normal S1 S2, no S3 or S4  MS: extremities normal- no gross deformities noted  NEURO: Normal strength and tone, mentation intact and speech normal  PSYCH: mentation appears normal and affect normal/bright  Mallampati Class: III.  Tonsillar Stage: 3  extending beyond pillars.  Last Comprehensive Metabolic Panel:  Sodium   Date Value Ref Range Status   09/13/2018 137 133 - 144 mmol/L Final     Potassium   Date Value Ref Range Status   09/13/2018 4.4 3.4 - 5.3 mmol/L Final     Chloride   Date Value Ref Range Status   09/13/2018 103 94 - 109 mmol/L Final     Carbon Dioxide   Date Value Ref Range Status   09/13/2018 29 20 - 32 mmol/L Final     Anion Gap   Date Value Ref Range Status   09/13/2018 5 3 - 14 mmol/L Final     Glucose   Date Value Ref Range Status   09/13/2018 85 70 - 99 mg/dL Final     Urea Nitrogen   Date Value Ref Range Status   09/13/2018 14 7 - 30 mg/dL Final     Creatinine   Date Value Ref Range Status   09/13/2018 0.69 0.66 - 1.25 mg/dL Final     GFR Estimate   Date Value Ref Range Status   09/13/2018 >90 >60 mL/min/1.7m2 Final     " Comment:     Non  GFR Calc     Calcium   Date Value Ref Range Status   09/13/2018 8.2 (L) 8.5 - 10.1 mg/dL Final     TSH   Date Value Ref Range Status   09/13/2018 1.64 0.40 - 4.00 mU/L Final     Ge Hansen is a 33 year old male with medical history remarkable for morbid obesity who  is sent by Wesley Davis for a sleep consultation regarding sleep disordered breathing.    Impression:  1. History of obstructive sleep apnea, unknown severity, was told severe, currently untreated.  --Current features and risk factors of MAYDA include morbid obesity, loud snoring, witnessed apnea, excessive daytime sleepiness(ESS 17), frequent arousals, morning headaches, crowded oropharynx and large neck circumference. The STOP-BANG score is 6/8  2. Significant risk of hypoventilation based on Morbid obesity with BMI 69 kg/m^2, frequent morning headaches and elevated bicarb(29)  3.Poor sleep hygiene: 5 cans of soda/day, 1-2 coffee/day, 1-2 energy drinks/day till close to bedtime.    Plan:  1. Recommend Polysomnogram with transcutaneous C02 monitoring and pre-study VBG to evaluate for obstructive sleep apnea, hypoventilation and hypoxemia.  Ambien if needed.    2. Discussed sleep hygiene.   3. Advised him against drowsy driving.    4. Recommend weight management.     Literature provided regarding sleep apnea and sleep hygiene.      He will follow up with me in approximately one week after his sleep study has been completed to review the results and discuss plan of care.       Polysomnography reviewed.  Obstructive sleep apnea reviewed.  Complications of untreated sleep apnea were reviewed.    Elaine Rowe PA-C    CC: [facility] Davis

## 2018-09-20 ENCOUNTER — APPOINTMENT (OUTPATIENT)
Dept: GENERAL RADIOLOGY | Facility: CLINIC | Age: 33
End: 2018-09-20
Attending: PHYSICIAN ASSISTANT
Payer: MEDICAID

## 2018-09-20 ENCOUNTER — HOSPITAL ENCOUNTER (EMERGENCY)
Facility: CLINIC | Age: 33
Discharge: HOME | End: 2018-09-20
Attending: PHYSICIAN ASSISTANT | Admitting: PHYSICIAN ASSISTANT
Payer: MEDICAID

## 2018-09-20 ENCOUNTER — TELEPHONE (OUTPATIENT)
Dept: FAMILY MEDICINE | Facility: CLINIC | Age: 33
End: 2018-09-20

## 2018-09-20 VITALS
HEIGHT: 73 IN | BODY MASS INDEX: 41.75 KG/M2 | OXYGEN SATURATION: 96 % | WEIGHT: 315 LBS | DIASTOLIC BLOOD PRESSURE: 91 MMHG | TEMPERATURE: 98 F | RESPIRATION RATE: 20 BRPM | SYSTOLIC BLOOD PRESSURE: 179 MMHG

## 2018-09-20 DIAGNOSIS — J45.901 EXACERBATION OF ASTHMA, UNSPECIFIED ASTHMA SEVERITY, UNSPECIFIED WHETHER PERSISTENT: ICD-10-CM

## 2018-09-20 DIAGNOSIS — J06.9 UPPER RESPIRATORY TRACT INFECTION, UNSPECIFIED TYPE: ICD-10-CM

## 2018-09-20 PROCEDURE — 99213 OFFICE O/P EST LOW 20 MIN: CPT | Mod: Z6 | Performed by: PHYSICIAN ASSISTANT

## 2018-09-20 PROCEDURE — 71046 X-RAY EXAM CHEST 2 VIEWS: CPT

## 2018-09-20 PROCEDURE — G0463 HOSPITAL OUTPT CLINIC VISIT: HCPCS | Mod: 25 | Performed by: PHYSICIAN ASSISTANT

## 2018-09-20 PROCEDURE — 25000125 ZZHC RX 250: Performed by: PHYSICIAN ASSISTANT

## 2018-09-20 PROCEDURE — 94640 AIRWAY INHALATION TREATMENT: CPT | Performed by: PHYSICIAN ASSISTANT

## 2018-09-20 RX ORDER — ALBUTEROL SULFATE 90 UG/1
2 AEROSOL, METERED RESPIRATORY (INHALATION) EVERY 6 HOURS PRN
Qty: 1 INHALER | Refills: 0 | Status: CANCELLED | OUTPATIENT
Start: 2018-09-20

## 2018-09-20 RX ORDER — ALBUTEROL SULFATE 90 UG/1
2 AEROSOL, METERED RESPIRATORY (INHALATION) EVERY 6 HOURS PRN
Qty: 1 INHALER | Refills: 0 | Status: SHIPPED | OUTPATIENT
Start: 2018-09-20 | End: 2019-05-07

## 2018-09-20 RX ORDER — ALBUTEROL SULFATE 0.83 MG/ML
2.5 SOLUTION RESPIRATORY (INHALATION) EVERY 4 HOURS PRN
Qty: 1 BOX | Refills: 0 | Status: CANCELLED | OUTPATIENT
Start: 2018-09-20

## 2018-09-20 RX ORDER — PREDNISONE 20 MG/1
TABLET ORAL
Qty: 10 TABLET | Refills: 0 | Status: SHIPPED | OUTPATIENT
Start: 2018-09-20 | End: 2018-12-31

## 2018-09-20 RX ORDER — IPRATROPIUM BROMIDE AND ALBUTEROL SULFATE 2.5; .5 MG/3ML; MG/3ML
3 SOLUTION RESPIRATORY (INHALATION) ONCE
Status: COMPLETED | OUTPATIENT
Start: 2018-09-20 | End: 2018-09-20

## 2018-09-20 RX ORDER — ALBUTEROL SULFATE 0.83 MG/ML
2.5 SOLUTION RESPIRATORY (INHALATION) EVERY 4 HOURS PRN
Qty: 1 BOX | Refills: 0 | Status: SHIPPED | OUTPATIENT
Start: 2018-09-20 | End: 2019-05-07

## 2018-09-20 RX ADMIN — IPRATROPIUM BROMIDE AND ALBUTEROL SULFATE 3 ML: .5; 2.5 SOLUTION RESPIRATORY (INHALATION) at 12:36

## 2018-09-20 ASSESSMENT — ENCOUNTER SYMPTOMS
MUSCULOSKELETAL NEGATIVE: 1
FEVER: 0
CARDIOVASCULAR NEGATIVE: 1
RHINORRHEA: 1
SHORTNESS OF BREATH: 1
GASTROINTESTINAL NEGATIVE: 1
COUGH: 1
CHEST TIGHTNESS: 1
CHILLS: 0
NEUROLOGICAL NEGATIVE: 1
WHEEZING: 1
CONSTITUTIONAL NEGATIVE: 1

## 2018-09-20 NOTE — TELEPHONE ENCOUNTER
"S-(situation): states has\"yellow mucus crap in his chest\".  States has kind of shortness of breath.    B-(background): using albuterol nebs every 4 hours and that helps.  Does not need to use the nebs less than every 4 hours.  Temp yesterday of 99.      A-(assessment): cough, low grade fever     R-(recommendations): advised needs to be seen  Wilma Hays RN      "

## 2018-09-20 NOTE — DISCHARGE INSTRUCTIONS
"  Asthma (Adult)  Asthma is a disease where the medium and  small air passages within the lung go into spasm and restrict the flow of air. Inflammation and swelling of the airways cause further blockage. During an acute asthma attack, these factors cause trouble breathing, wheezing, cough and chest tightness.    An asthma attack can be triggered by many things. Common triggers include infections such as the common cold, bronchitis, and pneumonia. Irritants such as smoke or pollutants in the air, very cold air, emotional upset, and exercise can also trigger an attack. In many adults with asthma, allergies to dust, mold, pollen and animal dander can cause an asthma attack. Skipping doses of daily asthma medicine can also bring on an asthma attack.  Asthma can be controlled using the proper medicines prescribed by your healthcare provider and avoiding exposure to known triggers including allergens and irritants.  Home care    Take prescribed medicine exactly at the times advised. If you need medicine such as from a hand held inhaler or aerosol breathing machine more than every 4 hours, contact your healthcare provider or seek immediate medical attention. If prescribed an antibiotic or prednisone, take all of the medicine as prescribed, even if you are feeling better after a few days.    Don't smoke. Avoid being exposed to the smoke of others.    Some people with asthma have worsening of their symptoms when they take aspirin and non-steroidal or fever-reducing medicines like ibuprofen and naproxen. Talk to your healthcare provider if you think this may apply to you.  Follow-up care  Follow up with your healthcare provider, or as advised. Always bring all of your current medicines to any appointments with your healthcare provider. Also bring a complete list of medicines even those not taken for asthma. If you don't already have one, talk to your healthcare provider about developing your own \"Asthma Action Plan.\"  A " pneumococcal (pneumonia) vaccine and yearly flu shot (every fall) are recommended. Ask your doctor about this.  When to seek medical advice  Call your healthcare provider right away if any of these occur:     Increased wheezing or shortness of breath    Need to use your inhalers more often than usual without relief    Fever of 100.4 F (38 C) or higher, or as directed by your healthcare provider    Coughing up lots of dark-colored or bloody sputum (mucus)    Chest pain with each breath    If you use a peak flow meter as part of an Asthma Action Plan, and you are still in the yellow zone (50% to 80%) 15 minutes after using inhaler medicine.  Call 911  Call 911 if any of the following occur    Trouble walking or talking because of shortness of breath    If you use a peak flow meter as part of an Asthma Action Plan and you are still in the red zone (less than 50%) 15 minutes after using inhaler medicine    Lips or fingernails turning gray or blue  Date Last Reviewed: 5/1/2017 2000-2017 [employer]. [address] [place] [address], [place] [address]. All rights reserved. This information is not intended as a substitute for professional medical care. Always follow your healthcare professional's instructions.

## 2018-09-20 NOTE — ED AVS SNAPSHOT
[facility] Department    [address] [facility] [address]    Phone:  [phone]    Fax:  [phone]                                       Ge Hansen   MRN: [id number]    Department:  [facility]   Date of Visit:  9/20/2018           Patient Information     Date Of Birth          1985        Your diagnoses for this visit were:     Exacerbation of asthma, unspecified asthma severity, unspecified whether persistent     Upper respiratory tract infection, unspecified type        You were seen by Jesusita Vincent PA-C.      Follow-up Information     Follow up with Wesley Davis MD. Call in 5 days.    Specialty:  Family Practice    Why:  As needed, For persistent symptoms    Contact information:    [address] [place] [address] [phone]          Follow up with [facility].    Specialty:  EMERGENCY MEDICINE    Why:  As needed, If symptoms worsen    Contact information:    [address] [facility] [id number] [phone]    Additional information:    The medical center is located at   [address] [facility]. (between [address] in [place], [place] north   of [place]).        Discharge Instructions         Asthma (Adult)  Asthma is a disease where the medium and  small air passages within the lung go into spasm and restrict the flow of air. Inflammation and swelling of the airways cause further blockage. During an acute asthma attack, these factors cause trouble breathing, wheezing, cough and chest tightness.    An asthma attack can be triggered by many things. Common triggers include infections such as the common cold, bronchitis, and pneumonia. Irritants such as smoke or pollutants in the air, very cold air, emotional upset, and exercise can also trigger an attack. In many adults with asthma, allergies to dust, mold, pollen and animal dander can cause an asthma attack. Skipping doses of daily  "asthma medicine can also bring on an asthma attack.  Asthma can be controlled using the proper medicines prescribed by your healthcare provider and avoiding exposure to known triggers including allergens and irritants.  Home care    Take prescribed medicine exactly at the times advised. If you need medicine such as from a hand held inhaler or aerosol breathing machine more than every 4 hours, contact your healthcare provider or seek immediate medical attention. If prescribed an antibiotic or prednisone, take all of the medicine as prescribed, even if you are feeling better after a few days.    Don't smoke. Avoid being exposed to the smoke of others.    Some people with asthma have worsening of their symptoms when they take aspirin and non-steroidal or fever-reducing medicines like ibuprofen and naproxen. Talk to your healthcare provider if you think this may apply to you.  Follow-up care  Follow up with your healthcare provider, or as advised. Always bring all of your current medicines to any appointments with your healthcare provider. Also bring a complete list of medicines even those not taken for asthma. If you don't already have one, talk to your healthcare provider about developing your own \"Asthma Action Plan.\"  A pneumococcal (pneumonia) vaccine and yearly flu shot (every fall) are recommended. Ask your doctor about this.  When to seek medical advice  Call your healthcare provider right away if any of these occur:     Increased wheezing or shortness of breath    Need to use your inhalers more often than usual without relief    Fever of 100.4 F (38 C) or higher, or as directed by your healthcare provider    Coughing up lots of dark-colored or bloody sputum (mucus)    Chest pain with each breath    If you use a peak flow meter as part of an Asthma Action Plan, and you are still in the yellow zone (50% to 80%) 15 minutes after using inhaler medicine.  Call 911  Call 911 if any of the following occur    Trouble " walking or talking because of shortness of breath    If you use a peak flow meter as part of an Asthma Action Plan and you are still in the red zone (less than 50%) 15 minutes after using inhaler medicine    Lips or fingernails turning gray or blue  Date Last Reviewed: 5/1/2017 2000-2017 [employer]. [address] [place] [address]. All rights reserved. This information is not intended as a substitute for professional medical care. Always follow your healthcare professional's instructions.          Your next 10 appointments already scheduled     Oct 03, 2018 10:00 AM CDT   LAB with [facility])    [address] [facility] [address]   [phone]           Please do not eat 10-12 hours before your appointment if you are coming in fasting for labs on lipids, cholesterol, or glucose (sugar). This does not apply to pregnant women. Water, hot tea and black coffee (with nothing added) are okay. Do not drink other fluids, diet soda or chew gum.            Oct 03, 2018  8:00 PM CDT   PSG Titration w/TCM with SLEEP LAB, BED ONE   [facility] ([facility])    [address]  [facility] [address]   [phone]            Oct 09, 2018  1:00 PM CDT   Return Sleep Patient with LISSY Ridley   [facility] ([facility])    [address] [facility] [address]   [phone]            Oct 17, 2018  1:45 PM CDT   (Arrive by 1:30 PM)   New Bariatric Visit with Ilsa Bautista PA-C   [facility] ([facility])    [address] [facility]  4th Floor  [facility] [address] [phone]            Oct 17, 2018  2:30 PM CDT   (Arrive by 2:15 PM)   New Bariatric Nutrition Visit with Clarissa Cox RD   [facility] ([facility])    [address] [place] [address]  Se  4th Floor  [facility] [address] [phone]              24 Hour Appointment Hotline       To make an appointment at any [facility], call [phone] ([phone]). If you don't have a family doctor or clinic, we will help you find one. [facility] are conveniently located to serve the needs of you and your family.             Review of your medicines      START taking        Dose / Directions Last dose taken    predniSONE 20 MG tablet   Commonly known as:  DELTASONE   Quantity:  10 tablet        Take two tablets (= 40mg) each day for 5 (five) days   Refills:  0          CONTINUE these medicines which may have CHANGED, or have new prescriptions. If we are uncertain of the size of tablets/capsules you have at home, strength may be listed as something that might have changed.        Dose / Directions Last dose taken    * albuterol (2.5 MG/3ML) 0.083% neb solution   Dose:  2.5 mg   What changed:  Another medication with the same name was added. Make sure you understand how and when to take each.   Quantity:  1 Box        Take 1 vial (2.5 mg) by nebulization every 4 hours as needed for shortness of breath / dyspnea or wheezing   Refills:  0        * albuterol 108 (90 Base) MCG/ACT inhaler   Commonly known as:  PROAIR HFA/PROVENTIL HFA/VENTOLIN HFA   Dose:  2 puff   What changed:  Another medication with the same name was added. Make sure you understand how and when to take each.   Quantity:  1 Inhaler        Inhale 2 puffs into the lungs every 6 hours as needed for shortness of breath / dyspnea or wheezing   Refills:  0        * albuterol 108 (90 Base) MCG/ACT inhaler   Commonly known as:  PROAIR HFA/PROVENTIL HFA/VENTOLIN HFA   Dose:  2 puff   What changed:  You were already taking a medication with the same name, and this prescription was added. Make sure you understand how and when to take each.   Quantity:  1 Inhaler        Inhale 2 puffs into the lungs every 6 hours as needed for  shortness of breath / dyspnea or wheezing   Refills:  0        * albuterol (2.5 MG/3ML) 0.083% neb solution   Dose:  2.5 mg   What changed:  You were already taking a medication with the same name, and this prescription was added. Make sure you understand how and when to take each.   Quantity:  1 Box        Take 1 vial (2.5 mg) by nebulization every 4 hours as needed for shortness of breath / dyspnea or wheezing   Refills:  0        * Notice:  This list has 4 medication(s) that are the same as other medications prescribed for you. Read the directions carefully, and ask your doctor or other care provider to review them with you.      Our records show that you are taking the medicines listed below. If these are incorrect, please call your family doctor or clinic.        Dose / Directions Last dose taken    fluticasone-salmeterol 250-50 MCG/DOSE diskus inhaler   Commonly known as:  ADVAIR DISKUS   Dose:  1 puff   Quantity:  1 Inhaler        Inhale 1 puff into the lungs 2 times daily   Refills:  0        HYDROcodone-acetaminophen 5-325 MG per tablet   Commonly known as:  NORCO   Dose:  1 tablet   Quantity:  18 tablet        Take 1 tablet by mouth every 4 hours as needed for pain   Refills:  0        nebulizer Laverne   Dose:  1 Device   Quantity:  1 each        Take 1 Device by mouth every 4 hours as needed Use for nebulizer treatments every 4 hours as needed for shortness of breath or wheezing   Refills:  0        order for DME   Quantity:  1 Package        Equipment being ordered: Nebulizer   Refills:  0        zolpidem 5 MG tablet   Commonly known as:  AMBIEN   Quantity:  1 tablet        Take tablet by mouth 15 minutes prior to sleep, for Sleep Study   Refills:  0                Prescriptions were sent or printed at these locations (3 Prescriptions)                   [id number] - [facility] [address] [facility]   [address] [place] [address]    Telephone:  [phone]   Fax:  [phone]   Hours:   Closed 10-16-08 business to [facility]                E-Prescribed (3 of 3)         albuterol (2.5 MG/3ML) 0.083% neb solution               albuterol (PROAIR HFA/PROVENTIL HFA/VENTOLIN HFA) 108 (90 Base) MCG/ACT inhaler               predniSONE (DELTASONE) 20 MG tablet                Procedures and tests performed during your visit     XR Chest 2 Views      Orders Needing Specimen Collection     None      Pending Results     No orders found from 9/18/2018 to 9/21/2018.            Pending Culture Results     No orders found from 9/18/2018 to 9/21/2018.            Pending Results Instructions     If you had any lab results that were not finalized at the time of your Discharge, you can call the ED Lab Result RN at [phone]. You will be contacted by this team for any positive Lab results or changes in treatment. The nurses are available 7 days a week from 10A to 6:30P.  You can leave a message 24 hours per day and they will return your call.        Test Results From Your Hospital Stay        9/20/2018  1:15 PM      Narrative     CHEST TWO VIEWS  9/20/2018 12:46 PM     HISTORY: cough, wheezing;     COMPARISON: None.        Impression     IMPRESSION:   Heart and pulmonary vessels within normal limits. Lungs clear. No  pleural effusion.    GRETEL DALEY MD                Thank you for choosing [facility]       Thank you for choosing [facility] for your care. Our goal is always to provide you with excellent care. Hearing back from our patients is one way we can continue to improve our services. Please take a few minutes to complete the written survey that you may receive in the mail after you visit with us. Thank you!        [employer] Information     [facility] gives you secure access to your electronic health record. If you see a primary care provider, you can also send messages to your care team and make appointments. If you have questions, please call your primary care clinic.  If you do not have a primary care  provider, please call [phone] and they will assist you.        Care EveryWhere ID     This is your Care EveryWhere ID. This could be used by other organizations to access your [facility] medical records  [id number]        Equal Access to Services     EDD STEWART : Dante Schaefer, walaura prince, qawilberto kaalmahermilo harman, aracelis catalan. So [facility] [phone].    ATENCIÓN: Si habla español, tiene a barnes disposición servicios gratuitos de asistencia lingüística. Llame al [phone].    We comply with applicable federal civil rights laws and [place] laws. We do not discriminate on the basis of race, color, national origin, age, disability, sex, sexual orientation, or gender identity.            After Visit Summary       This is your record. Keep this with you and show to your community pharmacist(s) and doctor(s) at your next visit.

## 2018-09-20 NOTE — ED AVS SNAPSHOT
[facility] Emergency [facility]    [address] [facility] [address]    Phone:  [phone]    Fax:  [phone]                                       Ge Hansen   MRN: [id number]    Department:  [facility]   Date of Visit:  9/20/2018           After Visit Summary Signature Page     I have received my discharge instructions, and my questions have been answered. I have discussed any challenges I see with this plan with the nurse or doctor.    ..........................................................................................................................................  Patient/Patient Representative Signature      ..........................................................................................................................................  Patient Representative Print Name and Relationship to Patient    ..................................................               ................................................  Date                                   Time    ..........................................................................................................................................  Reviewed by Signature/Title    ...................................................              ..............................................  Date                                               Time          22EPIC Rev 08/18

## 2018-09-20 NOTE — TELEPHONE ENCOUNTER
Pt has lots of stuff in his chest. Would like inhalers filled and would also a for script for prescription strength musonex?  This is a new patient to Dr Davis and has had a physical only with him. He does not want to have to come in.  Enrique - [phone]

## 2018-10-01 ENCOUNTER — TELEPHONE (OUTPATIENT)
Dept: SLEEP MEDICINE | Facility: CLINIC | Age: 33
End: 2018-10-01

## 2018-10-01 RX ORDER — ZOLPIDEM TARTRATE 5 MG/1
TABLET ORAL
Qty: 1 TABLET | Refills: 0 | Status: SHIPPED | OUTPATIENT
Start: 2018-10-01 | End: 2018-10-24

## 2018-10-01 NOTE — TELEPHONE ENCOUNTER
"SUBJECTIVE:  Chief Complaint   Patient presents with     Medication Request     Ambien      OBJECTIVE:  Incoming voicemail message received today at 1:25 pm:     \"Hi my name is Ge Hansen. Birth date is [birth year]. I had a sleep consult a couple weeks ago. I have an upcoming sleep study Wednesday. I got my paper script from the doctor for one pill of Ambien. I misplaced that. I need to get another script sent electronically to [facility]. Phone number [phone]. Thank you. Bye.\"     ASSESSMENT/PLAN:  Please advise and route back so we can contact patient.  "

## 2018-10-03 ENCOUNTER — THERAPY VISIT (OUTPATIENT)
Dept: SLEEP MEDICINE | Facility: CLINIC | Age: 33
End: 2018-10-03
Payer: MEDICAID

## 2018-10-03 DIAGNOSIS — R06.89 DYSPNEA AND RESPIRATORY ABNORMALITY: ICD-10-CM

## 2018-10-03 DIAGNOSIS — G47.33 OSA (OBSTRUCTIVE SLEEP APNEA): ICD-10-CM

## 2018-10-03 DIAGNOSIS — R53.81 MALAISE AND FATIGUE: ICD-10-CM

## 2018-10-03 DIAGNOSIS — E66.01 MORBID OBESITY (H): ICD-10-CM

## 2018-10-03 DIAGNOSIS — R53.83 MALAISE AND FATIGUE: ICD-10-CM

## 2018-10-03 DIAGNOSIS — R06.00 DYSPNEA AND RESPIRATORY ABNORMALITY: ICD-10-CM

## 2018-10-03 DIAGNOSIS — G47.30 SLEEP APNEA, UNSPECIFIED TYPE: ICD-10-CM

## 2018-10-03 LAB
BASE EXCESS BLDV CALC-SCNC: 7.5 MMOL/L
HCO3 BLDV-SCNC: 35 MMOL/L (ref 21–28)
O2/TOTAL GAS SETTING VFR VENT: ABNORMAL %
PCO2 BLDV: 62 MM HG (ref 40–50)
PH BLDV: 7.36 PH (ref 7.32–7.43)
PO2 BLDV: 20 MM HG (ref 25–47)

## 2018-10-03 PROCEDURE — 82803 BLOOD GASES ANY COMBINATION: CPT | Performed by: PHYSICIAN ASSISTANT

## 2018-10-03 PROCEDURE — 95811 POLYSOM 6/>YRS CPAP 4/> PARM: CPT | Performed by: FAMILY MEDICINE

## 2018-10-03 PROCEDURE — 36415 COLL VENOUS BLD VENIPUNCTURE: CPT | Performed by: PHYSICIAN ASSISTANT

## 2018-10-03 NOTE — MR AVS SNAPSHOT
After Visit Summary   10/3/2018    Ge Hansen    MRN: [id number]           Patient Information     Date Of Birth          1985        Visit Information        Provider Department      10/3/2018 8:00 PM SLEEP LAB, BED ONE [facility]        Today's Diagnoses     Morbid obesity (H)        MAYDA (obstructive sleep apnea)        Class 3 obesity due to excess calories with body mass index (BMI) of 60.0 to 69.9 in adult, unspecified whether serious comorbidity present        Sleep apnea, unspecified type        Dyspnea and respiratory abnormality        Malaise and fatigue           Follow-ups after your visit        Your next 10 appointments already scheduled     Oct 09, 2018  1:00 PM CDT   Return Sleep Patient with LISSY Ridley   [facility] ([facility]    [address]  [facility] [address]   [phone]            Oct 17, 2018  1:45 PM CDT   (Arrive by 1:30 PM)   New Bariatric Visit with Ilsa Bautista PA-C   [facility] ([facility])    [address] [place] [address] [phone]            Oct 17, 2018  2:30 PM CDT   (Arrive by 2:15 PM)   New Bariatric Nutrition Visit with Clarissa Cox RD   [facility] ([facility])    [address] [place] [address] [phone]              Who to contact     If you have questions or need follow up information about today's clinic visit or your schedule please contact [facility] directly at [phone].  Normal or non-critical lab and imaging results will be communicated to you by MyChart, letter or phone within 4 business days after the clinic has received the results. If you do not hear from us within 7 days, please contact the clinic through MyChart or phone. If you have a critical or abnormal lab  result, we will notify you by phone as soon as possible.  Submit refill requests through [employer] or call your pharmacy and they will forward the refill request to us. Please allow 3 business days for your refill to be completed.          Additional Information About Your Visit        [employer] Information     [employer] gives you secure access to your electronic health record. If you see a primary care provider, you can also send messages to your care team and make appointments. If you have questions, please call your primary care clinic.  If you do not have a primary care provider, please call [phone] and they will assist you.        Care EveryWhere ID     This is your Care EveryWhere ID. This could be used by other organizations to access your [facility] medical records  [id number]         Blood Pressure from Last 3 Encounters:   09/20/18 (!) 179/91   09/18/18 141/89   09/13/18 148/88    Weight from Last 3 Encounters:   09/20/18 (!) 235.9 kg (520 lb)   09/18/18 (!) 240.2 kg (529 lb 8 oz)   09/13/18 (!) 236.8 kg (522 lb)              We Performed the Following     Comprehensive Sleep Study        Primary Care Provider Office Phone # Fax #    Wesley Davis [phone] [address] [place] [address]        Equal Access to Services     EDD STEWART : Hadii aad ku hadasho Soomaali, waaxda luqadaha, qaybta kaalmada adeegyada, waxay warrenin hayaan william rojas . So [facility] [phone].    ATENCIÓN: Si habla español, tiene a barnes disposición servicios gratuitos de asistencia lingüística. Llame al [phone].    We comply with applicable federal civil rights laws and [place] laws. We do not discriminate on the basis of race, color, national origin, age, disability, sex, sexual orientation, or gender identity.            Thank you!     Thank you for choosing [facility]  for your care. Our goal is always to provide you with excellent care. Hearing back from our  patients is one way we can continue to improve our services. Please take a few minutes to complete the written survey that you may receive in the mail after your visit with us. Thank you!             Your Updated Medication List - Protect others around you: Learn how to safely use, store and throw away your medicines at www.disposemymeds.org.          This list is accurate as of 10/3/18 11:59 PM.  Always use your most recent med list.                   Brand Name Dispense Instructions for use Diagnosis    * albuterol (2.5 MG/3ML) 0.083% neb solution     1 Box    Take 1 vial (2.5 mg) by nebulization every 4 hours as needed for shortness of breath / dyspnea or wheezing        * albuterol 108 (90 Base) MCG/ACT inhaler    PROAIR HFA/PROVENTIL HFA/VENTOLIN HFA    1 Inhaler    Inhale 2 puffs into the lungs every 6 hours as needed for shortness of breath / dyspnea or wheezing        * albuterol 108 (90 Base) MCG/ACT inhaler    PROAIR HFA/PROVENTIL HFA/VENTOLIN HFA    1 Inhaler    Inhale 2 puffs into the lungs every 6 hours as needed for shortness of breath / dyspnea or wheezing        * albuterol (2.5 MG/3ML) 0.083% neb solution     1 Box    Take 1 vial (2.5 mg) by nebulization every 4 hours as needed for shortness of breath / dyspnea or wheezing        fluticasone-salmeterol 250-50 MCG/DOSE diskus inhaler    ADVAIR DISKUS    1 Inhaler    Inhale 1 puff into the lungs 2 times daily        HYDROcodone-acetaminophen 5-325 MG per tablet    NORCO    18 tablet    Take 1 tablet by mouth every 4 hours as needed for pain        nebulizer Laverne     1 each    Take 1 Device by mouth every 4 hours as needed Use for nebulizer treatments every 4 hours as needed for shortness of breath or wheezing        order for DME     1 Package    Equipment being ordered: Nebulizer    Obesity hypoventilation syndrome (H)       predniSONE 20 MG tablet    DELTASONE    10 tablet    Take two tablets (= 40mg) each day for 5 (five) days        zolpidem 5 MG  tablet    AMBIEN    1 tablet    Take tablet by mouth 15 minutes prior to sleep, for Sleep Study        * Notice:  This list has 4 medication(s) that are the same as other medications prescribed for you. Read the directions carefully, and ask your doctor or other care provider to review them with you.

## 2018-10-04 LAB — SLPCOMP: NORMAL

## 2018-10-04 NOTE — PROGRESS NOTES
Completed a split night PSG per provider order.    Preliminary .7.  A final therapeutic PAP pressure was achieved.    Supine REM was seen on therapeutic pressure.    Patient reports feeling refreshed in AM.

## 2018-10-08 ENCOUNTER — OFFICE VISIT (OUTPATIENT)
Dept: FAMILY MEDICINE | Facility: CLINIC | Age: 33
End: 2018-10-08
Payer: MEDICAID

## 2018-10-08 VITALS
BODY MASS INDEX: 70.06 KG/M2 | RESPIRATION RATE: 20 BRPM | SYSTOLIC BLOOD PRESSURE: 146 MMHG | WEIGHT: 315 LBS | TEMPERATURE: 98 F | HEART RATE: 96 BPM | DIASTOLIC BLOOD PRESSURE: 94 MMHG

## 2018-10-08 DIAGNOSIS — Z72.0 TOBACCO USE: ICD-10-CM

## 2018-10-08 DIAGNOSIS — E66.01 MORBID OBESITY (H): Chronic | ICD-10-CM

## 2018-10-08 DIAGNOSIS — R60.9 DEPENDENT EDEMA: ICD-10-CM

## 2018-10-08 DIAGNOSIS — G47.33 OSA (OBSTRUCTIVE SLEEP APNEA): Primary | ICD-10-CM

## 2018-10-08 PROCEDURE — 99214 OFFICE O/P EST MOD 30 MIN: CPT | Performed by: FAMILY MEDICINE

## 2018-10-08 RX ORDER — FUROSEMIDE 20 MG
20 TABLET ORAL 2 TIMES DAILY
Qty: 60 TABLET | Refills: 1 | Status: SHIPPED | OUTPATIENT
Start: 2018-10-08 | End: 2018-12-10

## 2018-10-08 RX ORDER — ZOLPIDEM TARTRATE 10 MG/1
10 TABLET ORAL
Qty: 1 TABLET | Refills: 0 | Status: SHIPPED | OUTPATIENT
Start: 2018-10-08 | End: 2018-10-24

## 2018-10-08 RX ORDER — VARENICLINE TARTRATE 1 MG/1
1 TABLET, FILM COATED ORAL 2 TIMES DAILY
Qty: 56 TABLET | Refills: 3 | Status: SHIPPED | OUTPATIENT
Start: 2018-10-08 | End: 2018-12-10

## 2018-10-08 NOTE — MR AVS SNAPSHOT
After Visit Summary   10/8/2018    Ge Hansen    MRN: [id number]           Patient Information     Date Of Birth          1985        Visit Information        Provider Department      10/8/2018 10:40 AM Wesley Davis MD [facility]        Today's Diagnoses     MAYDA (obstructive sleep apnea)    -  1    Dependent edema        Tobacco use        Morbid obesity (H)           Follow-ups after your visit        Your next 10 appointments already scheduled     Oct 09, 2018  1:00 PM CDT   Return Sleep Patient with LISSY Ridley   [facility] [place] [place] [facility]    [address]  [facility] [address]   [phone]            Oct 12, 2018  1:00 PM CDT   Ortho Eval with Freda Urias PT   [facility] ([facility])    [address] [place] [address]   [phone]            Oct 17, 2018  1:45 PM CDT   (Arrive by 1:30 PM)   New Bariatric Visit with Ilsa Bautista PA-C   [facility] ([facility])    [address] [address] [phone]            Oct 17, 2018  2:30 PM CDT   (Arrive by 2:15 PM)   New Bariatric Nutrition Visit with Clarissa Cox RD   [facility] ([facility])    [address] [address] [phone]              Who to contact     If you have questions or need follow up information about today's clinic visit or your schedule please contact [facility] directly at [phone].  Normal or non-critical lab and imaging results will be communicated to you by MyChart, letter or phone within 4 business days after the clinic has received the results. If you do not hear from us within 7 days, please contact the clinic through MyChart or phone. If you have a critical or abnormal lab result,  we will notify you by phone as soon as possible.  Submit refill requests through [employer] or call your pharmacy and they will forward the refill request to us. Please allow 3 business days for your refill to be completed.          Additional Information About Your Visit        [employer] Information     [employer] gives you secure access to your electronic health record. If you see a primary care provider, you can also send messages to your care team and make appointments. If you have questions, please call your primary care clinic.  If you do not have a primary care provider, please call [phone] and they will assist you.        Care EveryWhere ID     This is your Care EveryWhere ID. This could be used by other organizations to access your [facility] medical records  [id number]        Your Vitals Were     Pulse Temperature Respirations BMI (Body Mass Index)          96 98  F (36.7  C) (Tympanic) 20 70.06 kg/m2         Blood Pressure from Last 3 Encounters:   10/08/18 (!) 146/94   09/20/18 (!) 179/91   09/18/18 141/89    Weight from Last 3 Encounters:   10/08/18 (!) 531 lb (240.9 kg)   09/20/18 (!) 520 lb (235.9 kg)   09/18/18 (!) 529 lb 8 oz (240.2 kg)              Today, you had the following     No orders found for display         Today's Medication Changes          These changes are accurate as of 10/8/18 11:18 AM.  If you have any questions, ask your nurse or doctor.               Start taking these medicines.        Dose/Directions    furosemide 20 MG tablet   Commonly known as:  LASIX   Used for:  Dependent edema   Started by:  Wesley Davis MD        Dose:  20 mg   Take 1 tablet (20 mg) by mouth 2 times daily   Quantity:  60 tablet   Refills:  1       * varenicline 0.5 MG X 11 & 1 MG X 42 tablet   Commonly known as:  CHANTIX STARTING MONTH PAK   Used for:  Tobacco use   Started by:  Wesley Davis MD        Take 0.5 mg tab daily for 3 days, then 0.5 mg tab twice daily for 4 days, then 1 mg twice daily.    Quantity:  53 tablet   Refills:  0       * varenicline 1 MG tablet   Commonly known as:  CHANTIX   Used for:  Tobacco use   Started by:  Wesley Davis MD        Dose:  1 mg   Take 1 tablet (1 mg) by mouth 2 times daily   Quantity:  56 tablet   Refills:  3       * Notice:  This list has 2 medication(s) that are the same as other medications prescribed for you. Read the directions carefully, and ask your doctor or other care provider to review them with you.      These medicines have changed or have updated prescriptions.        Dose/Directions    * zolpidem 5 MG tablet   Commonly known as:  AMBIEN   This may have changed:  Another medication with the same name was added. Make sure you understand how and when to take each.   Changed by:  Wesley Davis MD        Take tablet by mouth 15 minutes prior to sleep, for Sleep Study   Quantity:  1 tablet   Refills:  0       * zolpidem 10 MG tablet   Commonly known as:  AMBIEN   This may have changed:  You were already taking a medication with the same name, and this prescription was added. Make sure you understand how and when to take each.   Used for:  MAYDA (obstructive sleep apnea)   Changed by:  Wesley Davis MD        Dose:  10 mg   Take 1 tablet (10 mg) by mouth nightly as needed for sleep   Quantity:  1 tablet   Refills:  0       * Notice:  This list has 2 medication(s) that are the same as other medications prescribed for you. Read the directions carefully, and ask your doctor or other care provider to review them with you.         Where to get your medicines      These medications were sent to [id number] [address] [facility]  [address] [facility] [address]    Hours:  Closed 10-16-08 business to [facility] Phone:  [phone]     furosemide 20 MG tablet         Some of these will need a paper prescription and others can be bought over the counter.  Ask your nurse if you have questions.     Bring a paper prescription for  each of these medications     varenicline 0.5 MG X 11 & 1 MG X 42 tablet    varenicline 1 MG tablet    zolpidem 10 MG tablet                Primary Care Provider Office Phone # Fax #    Wesley Davis [phone] [address] [place] [address]        Equal Access to Services     EDD STEWART : Hadii aad ku hadasho Soomaali, waaxda luqadaha, qaybta kaalmada adeegyada, waxay idiin hayaan adeeg khsherriesh la'aan . So [facility] [phone].    ATENCIÓN: Si habla español, tiene a barnes disposición servicios gratuitos de asistencia lingüística. [facility] [phone].    We comply with applicable federal civil rights laws and [place] laws. We do not discriminate on the basis of race, color, national origin, age, disability, sex, sexual orientation, or gender identity.            Thank you!     Thank you for choosing [facility]  for your care. Our goal is always to provide you with excellent care. Hearing back from our patients is one way we can continue to improve our services. Please take a few minutes to complete the written survey that you may receive in the mail after your visit with us. Thank you!             Your Updated Medication List - Protect others around you: Learn how to safely use, store and throw away your medicines at www.disposemymeds.org.          This list is accurate as of 10/8/18 11:18 AM.  Always use your most recent med list.                   Brand Name Dispense Instructions for use Diagnosis    * albuterol (2.5 MG/3ML) 0.083% neb solution     1 Box    Take 1 vial (2.5 mg) by nebulization every 4 hours as needed for shortness of breath / dyspnea or wheezing        * albuterol 108 (90 Base) MCG/ACT inhaler    PROAIR HFA/PROVENTIL HFA/VENTOLIN HFA    1 Inhaler    Inhale 2 puffs into the lungs every 6 hours as needed for shortness of breath / dyspnea or wheezing        * albuterol 108 (90 Base) MCG/ACT inhaler    PROAIR HFA/PROVENTIL HFA/VENTOLIN HFA     1 Inhaler    Inhale 2 puffs into the lungs every 6 hours as needed for shortness of breath / dyspnea or wheezing        * albuterol (2.5 MG/3ML) 0.083% neb solution     1 Box    Take 1 vial (2.5 mg) by nebulization every 4 hours as needed for shortness of breath / dyspnea or wheezing        fluticasone-salmeterol 250-50 MCG/DOSE diskus inhaler    ADVAIR DISKUS    1 Inhaler    Inhale 1 puff into the lungs 2 times daily        furosemide 20 MG tablet    LASIX    60 tablet    Take 1 tablet (20 mg) by mouth 2 times daily    Dependent edema       HYDROcodone-acetaminophen 5-325 MG per tablet    NORCO    18 tablet    Take 1 tablet by mouth every 4 hours as needed for pain        nebulizer Laverne     1 each    Take 1 Device by mouth every 4 hours as needed Use for nebulizer treatments every 4 hours as needed for shortness of breath or wheezing        order for DME     1 Package    Equipment being ordered: Nebulizer    Obesity hypoventilation syndrome (H)       predniSONE 20 MG tablet    DELTASONE    10 tablet    Take two tablets (= 40mg) each day for 5 (five) days        * varenicline 0.5 MG X 11 & 1 MG X 42 tablet    CHANTIX STARTING MONTH ISAK    53 tablet    Take 0.5 mg tab daily for 3 days, then 0.5 mg tab twice daily for 4 days, then 1 mg twice daily.    Tobacco use       * varenicline 1 MG tablet    CHANTIX    56 tablet    Take 1 tablet (1 mg) by mouth 2 times daily    Tobacco use       * zolpidem 5 MG tablet    AMBIEN    1 tablet    Take tablet by mouth 15 minutes prior to sleep, for Sleep Study        * zolpidem 10 MG tablet    AMBIEN    1 tablet    Take 1 tablet (10 mg) by mouth nightly as needed for sleep    MAYDA (obstructive sleep apnea)       * Notice:  This list has 8 medication(s) that are the same as other medications prescribed for you. Read the directions carefully, and ask your doctor or other care provider to review them with you.

## 2018-10-08 NOTE — NURSING NOTE
"Chief Complaint   Patient presents with     Insomnia       Initial BP (!) 146/94 (BP Location: Right arm, Patient Position: Chair, Cuff Size: Adult Large)  Pulse 96  Temp 98  F (36.7  C) (Tympanic)  Resp 20  Wt (!) 531 lb (240.9 kg)  BMI 70.06 kg/m2 Estimated body mass index is 70.06 kg/(m^2) as calculated from the following:    Height as of 9/20/18: 6' 1\" (1.854 m).    Weight as of this encounter: 531 lb (240.9 kg).    Patient presents to the clinic using No DME    Health Maintenance that is potentially due pending provider review:  NONE    Jenni Trevino MA  10:55 AM 10/8/2018  .      "

## 2018-10-08 NOTE — PROGRESS NOTES
"  SUBJECTIVE:   Ge Hansen is a 33 year old male who presents to clinic today for the following health issues:    1.) Lactose Intolerant   2.) Right Foot - wondering about something for toenail fungus    Insomnia  Onset: Ongoing Issue    Description:   Time to fall asleep (sleep latency): 2-3 hours  Middle of night awakening:  YES- waking every 3 hours to use restroom  Early morning awakening:  YES    Progression of Symptoms:  worsening    Accompanying Signs & Symptoms:  Daytime sleepiness/napping: YES  Excessive snoring/apnea: YES - recently diagnosed with sleep apnea  Restless legs: YES  Frequent urination: YES  Chronic pain:  no     History:  Prior Insomnia: YES    Precipitating factors:   New stressful situation: no   Caffeine intake: YES- has been trying to limit his intake  OTC decongestants: no   Any new medications: no     Alleviating factors:  Self medicating (alcohol, etc.):  No    Therapies Tried and outcome: OTC sleep aid. No working        s :Ge Hansen is a 33 year old male with several issues    Apnea: severe.  Will need bipap. F/u tomorrow.  Not on machine yet.  \"I need something just to sleep tonight, I'm miserable.\"      Waking up regularly to urinate.  lonstanding issue.  Water pills helped in past, \"got more out during day.\"  Has chronic edema both legs from morbid obesity.  No diabetes on recent labs     Tobacco use: wants to try chantix    Problem list, med list, additional histories reviewed and updated, as indicated.      O:BP (!) 146/94 (BP Location: Right arm, Patient Position: Chair, Cuff Size: Adult Large)  Pulse 96  Temp 98  F (36.7  C) (Tympanic)  Resp 20  Wt (!) 531 lb (240.9 kg)  BMI 70.06 kg/m2  GEN: Alert and oriented, in no acute distress  Has 1+ pitting edema both legs.    CV: RRR, no murmur    A: dependent edema       Morbid obsesity       severe sleep apnea       Tobacco use    P: will give an ambien for tonight so he can get a little sleep.  Told him treating his apnea " with bipap is what he really needs long term.   F/u tomorrow with them    Some lasix 20mg bid for edema, should help him have less to mobilize at night.    chantix for smoking cessation, he's motivated      Needs wt loss surgery, he knows.  Has appt.

## 2018-10-12 ENCOUNTER — HOSPITAL ENCOUNTER (OUTPATIENT)
Dept: PHYSICAL THERAPY | Facility: CLINIC | Age: 33
Setting detail: THERAPIES SERIES
End: 2018-10-12
Payer: MEDICAID

## 2018-10-12 PROCEDURE — 40000718 ZZHC STATISTIC PT DEPARTMENT ORTHO VISIT: Performed by: PHYSICAL THERAPIST

## 2018-10-12 PROCEDURE — 97110 THERAPEUTIC EXERCISES: CPT | Mod: GP | Performed by: PHYSICAL THERAPIST

## 2018-10-12 PROCEDURE — 97161 PT EVAL LOW COMPLEX 20 MIN: CPT | Mod: GP | Performed by: PHYSICAL THERAPIST

## 2018-10-12 NOTE — PROGRESS NOTES
[facility]          OUTPATIENT PHYSICAL THERAPY ORTHOPEDIC EVALUATION  PLAN OF TREATMENT FOR OUTPATIENT REHABILITATION  (COMPLETE FOR INITIAL CLAIMS ONLY)  Patient's Last Name, First Name, M.I.  YOB: 1985  TyroneGe       Provider s Name:  [facility]   Medical Record No.  [id number]   Start of Care Date:  10/12/18   Onset Date:  08/11/18   Type:     _X__PT   ___OT   ___SLP Medical Diagnosis:        PT Diagnosis:  decreased L knee strength and ROM secondary to L knee pain   Visits from SOC:  1      _________________________________________________________________________________  Plan of Treatment/Functional Goals:  balance training, gait training, joint mobilization, manual therapy, neuromuscular re-education, ROM, strengthening, stretching           Goals  Goal Identifier: 1  Goal Description: Patient will be able to ascend and descend stairs reciprical pattern with <1/10 pain  Target Date: 12/07/18    Goal Identifier: 2  Goal Description: Patient will be able to walk 10 minutes without significant deviations and with <1/10 pain for shopping  Target Date: 12/07/18    Goal Identifier: 3  Goal Description: Patient will have 4+/5 LE knee extension to prevent knee buckling with ambulation  Target Date: 12/07/18          Therapy Frequency:  1 time/week  Predicted Duration of Therapy Intervention:  8 weeks    Freda Urias PT                 I CERTIFY THE NEED FOR THESE SERVICES FURNISHED UNDER        THIS PLAN OF TREATMENT AND WHILE UNDER MY CARE .             Physician Signature               Date    X_____________________________________________________                             Certification Date From:      Certification Date To:       Referring Provider:  Curt Miles MD    Initial Assessment        See Epic Evaluation Start of Care Date: 10/12/18

## 2018-10-12 NOTE — PROGRESS NOTES
10/12/18 1200   General Information   Type of Visit Initial OP Ortho PT Evaluation   Start of Care Date 10/12/18   Referring Physician Curt Miles MD   Patient/Family Goals Statement less pain with walking for shopping, stairs   Orders Evaluate and Treat   Date of Order 10/05/18   Insurance Type (Medicaid)   Medical Diagnosis Patellofemoral pain syndrome of left knee   Surgical/Medical history reviewed Yes   Precautions/Limitations no known precautions/limitations   Body Part(s)   Body Part(s) Knee   Presentation and Etiology   Pertinent history of current problem (include personal factors and/or comorbidities that impact the POC) Pt reports August 11 was walking up stairs and felt a loud pop and had immediate pain. Has somewhat improved however still not back to normal. Still very painful with stairs. Afraid knee will give out. PMH including asthma, smoking.  Pt reports does want to lose weight and is planning appt with bariatrics.    Impairments A. Pain;B. Decreased WB tolerance;D. Decreased ROM;E. Decreased flexibility;G. Impaired balance;F. Decreased strength and endurance;H. Impaired gait;J. Burning;M. Locking or catching   Functional Limitations perform activities of daily living;perform desired leisure / sports activities   Symptom Location Left knee, posterior popiteal space   How/Where did it occur From insidious onset   Onset date of current episode/exacerbation 08/11/18   Chronicity New   Pain rating (0-10 point scale) Best (/10);Worst (/10)   Best (/10) 3   Worst (/10) 10   Pain quality A. Sharp;B. Dull;C. Aching;G. Cramping   Frequency of pain/symptoms A. Constant   Pain/symptoms exacerbated by B. Walking;D. Carrying;I. Bending;K. Home tasks   Pain/symptoms eased by A. Sitting;H. Cold;I. OTC medication(s)   Progression of symptoms since onset: Improved   Current / Previous Interventions   Diagnostic Tests: X-ray   X-ray Results unremarkable   Current Level of Function   Patient role/employment  history E. Unemployed   Living environment House/townhome   Home/community accessibility 2 flights of stairs   Fall Risk Screen   Have you fallen 2 or more times in the past year? No   Have you fallen and had an injury in the past year? No   Is patient a fall risk? No   System Outcome Measures   Outcome Measures (LEFS 22)   Knee Objective Findings   Side (if bilateral, select both right and left) Left   Gait/Locomotion increased lateral weight shifting, wide base of support, BLE ER   Balance/Proprioception (Single Leg Stance) nt painful, pt reporting feels like LLE will give out   Knee Special Test Comments Excessive soft tissue limits special testing   Palpation gross tenderness with palpation superior to patella, medial and lateral joint line, and posterior distal hamstring insertion and popiteal space   Left Knee Extension AROM Lacking 4* from full extension   Left Knee Flexion AROM 83 (R 104)   Left Knee Flexion Strength 4/5   Left Knee Extension Strength 3+/5, painful   Left Hip Abduction Strength 2+/5   Left Quad Set Strength fair   Left Gastrocnemius Flexibility limited   Left Hamstring Flexibility decreased   Planned Therapy Interventions   Planned Therapy Interventions balance training;gait training;joint mobilization;manual therapy;neuromuscular re-education;ROM;strengthening;stretching   Clinical Impression   Criteria for Skilled Therapeutic Interventions Met yes, treatment indicated   PT Diagnosis decreased L knee strength and ROM secondary to L knee pain   Influenced by the following impairments pain, decreased ROM, decreased strength, impaired gait and balance   Functional limitations due to impairments decreased participation stairs, squatting, lifting, walking, standing   Clinical Presentation Evolving/Changing   Clinical Presentation Rationale subacute condition, comorbidities including obesity, smoking   Clinical Decision Making (Complexity) Low complexity   Therapy Frequency 1 time/week    Predicted Duration of Therapy Intervention (days/wks) 8 weeks   Risk & Benefits of therapy have been explained Yes   Patient, Family & other staff in agreement with plan of care Yes   Education Assessment   Preferred Learning Style Demonstration;Pictures/video   Barriers to Learning No barriers   ORTHO GOALS   PT Ortho Eval Goals 1;2;3   Ortho Goal 1   Goal Identifier 1   Goal Description Patient will be able to ascend and descend stairs reciprical pattern with <1/10 pain   Target Date 12/07/18   Ortho Goal 2   Goal Identifier 2   Goal Description Patient will be able to walk 10 minutes without significant deviations and with <1/10 pain for shopping   Target Date 12/07/18   Ortho Goal 3   Goal Identifier 3   Goal Description Patient will have 4+/5 LE knee extension to prevent knee buckling with ambulation   Target Date 12/07/18   Total Evaluation Time   Total Evaluation Time 20

## 2018-10-15 ENCOUNTER — TELEPHONE (OUTPATIENT)
Dept: SURGERY | Facility: CLINIC | Age: 33
End: 2018-10-15

## 2018-10-15 NOTE — TELEPHONE ENCOUNTER
Called patient to confirm appointment on 10/17/18 at 1:45pm and dietitian appointment at 2:30pm. Patient confirmed appointment. I asked patient to complete questionnaire and online seminar and if unable to complete questionnaire at home to arrive at least 30 minutes early to complete it.

## 2018-10-16 ENCOUNTER — HOSPITAL ENCOUNTER (OUTPATIENT)
Dept: PHYSICAL THERAPY | Facility: CLINIC | Age: 33
Setting detail: THERAPIES SERIES
End: 2018-10-16
Attending: ORTHOPAEDIC SURGERY
Payer: MEDICAID

## 2018-10-16 PROCEDURE — 97110 THERAPEUTIC EXERCISES: CPT | Mod: GP | Performed by: PHYSICAL THERAPIST

## 2018-10-16 PROCEDURE — 40000718 ZZHC STATISTIC PT DEPARTMENT ORTHO VISIT: Performed by: PHYSICAL THERAPIST

## 2018-10-23 ENCOUNTER — HOSPITAL ENCOUNTER (OUTPATIENT)
Dept: PHYSICAL THERAPY | Facility: CLINIC | Age: 33
Setting detail: THERAPIES SERIES
End: 2018-10-23
Attending: ORTHOPAEDIC SURGERY
Payer: MEDICAID

## 2018-10-23 PROCEDURE — 40000718 ZZHC STATISTIC PT DEPARTMENT ORTHO VISIT: Performed by: PHYSICAL THERAPIST

## 2018-10-23 PROCEDURE — 97110 THERAPEUTIC EXERCISES: CPT | Mod: GP | Performed by: PHYSICAL THERAPIST

## 2018-10-24 ENCOUNTER — OFFICE VISIT (OUTPATIENT)
Dept: SLEEP MEDICINE | Facility: CLINIC | Age: 33
End: 2018-10-24
Payer: MEDICAID

## 2018-10-24 ENCOUNTER — DOCUMENTATION ONLY (OUTPATIENT)
Dept: SLEEP MEDICINE | Facility: CLINIC | Age: 33
End: 2018-10-24

## 2018-10-24 VITALS
WEIGHT: 315 LBS | BODY MASS INDEX: 41.75 KG/M2 | HEART RATE: 103 BPM | SYSTOLIC BLOOD PRESSURE: 131 MMHG | DIASTOLIC BLOOD PRESSURE: 89 MMHG | HEIGHT: 73 IN | OXYGEN SATURATION: 95 %

## 2018-10-24 DIAGNOSIS — G47.33 OSA (OBSTRUCTIVE SLEEP APNEA): Primary | ICD-10-CM

## 2018-10-24 PROBLEM — E66.2 OBESITY HYPOVENTILATION SYNDROME (H): Chronic | Status: ACTIVE | Noted: 2018-09-13

## 2018-10-24 PROCEDURE — 99214 OFFICE O/P EST MOD 30 MIN: CPT | Performed by: PHYSICIAN ASSISTANT

## 2018-10-24 NOTE — PROGRESS NOTES
Pt called and scheduled for cpap setup tomorrow 10/25/18 in [place].     Michelle Gudino on 10/24/2018 at 3:11 PM

## 2018-10-24 NOTE — PROGRESS NOTES
"    Chief Complaint   Patient presents with     Study Results     Discuss sleep study       Ge Hansen is a 33 year old male who returns to [facility] for review of sleep testing results. He presented with history of obstructive sleep apnea, unknown severity, was told severe, currently untreated.Current features and risk factors of MAYDA include morbid obesity, loud snoring, witnessed apnea, excessive daytime sleepiness(ESS 17), frequent arousals, morning headaches, crowded oropharynx and large neck circumference. The STOP-BANG score is 6/8. Significant risk of hypoventilation based on morbid obesity with BMI 69 kg/m^2, frequent morning headaches and elevated bicarb(29).    Estimated body mass index is 70.07 kg/(m^2) as calculated from the following:    Height as of this encounter: 1.854 m (6' 0.99\").    Weight as of this encounter: 240.9 kg (531 lb).  Total score - Rousseau: 17 (9/18/2018  9:00 AM)    Polysomnography - Test date 10/3/2018  Due to presence of respiratory events, this study was done in two parts (baseline followed by CPAP titration). Sleep study interpreted by Dr. Mcintyre.    Diagnostic PSG:  Sleep Architecture:   The total recording time of the polysomnogram was 207.5 minutes. The total sleep time was 145.5 minutes. Sleep latency was decreased at 3.5 minutes with the use of a sleep aid (Zolpidem 5mg). REM latency was - minutes. Arousal index was increased at 129.1 arousals per hour. Sleep efficiency was decreased at 70.1%. Wake after sleep onset was 55.0 minutes. The patient spent 58.1% of total sleep time in Stage N1, 41.9% in Stage N2, 0.0% in Stage N3, and 0.0% in REM. Time in REM supine was - minutes.   Respiration: Very severe MAYDA (.8, .8) with significant sleep-associated hypoxemia. Severity may have been under-reported given no REM observed. TCM was suggestive of hypoventilation and VBG was consistent with chronic compensated respiratory acidosis (pH 7.36, pCO2 " 62, HCO3 35).     Events ? The polysomnogram revealed a presence of 194 obstructive, - central, and - mixed apneas resulting in an apnea index of 80.0 events per hour. There were 123 obstructive hypopneas and 10 central hypopneas resulting in an obstructive hypopnea index of 50.7 and central hypopnea index of 4.1 events per hour. The combined apnea/hypopnea index was 134.8 events per hour (central apnea/hypopnea index was 4.1 events per hour). The REM AHI was - events per hour. The supine AHI was 128.9 events per hour. The RERA index was - events per hour. The RDI was 134.8 events per hour.     Snoring - was reported as moderate.     Respiratory rate and pattern - was notable for normal respiratory rate and pattern.     Sustained Sleep Associated Hypoventilation - Transcutaneous carbon dioxide monitoring was used, and significant hypoventilation was present with a maximum change from ~11 mmHg and 46.0 minutes at or greater than 55 mmHg. VBG was consistent with chronic compensated respiratory acidosis (pH 7.36, pCO2 62, HCO3 35).     Sleep Associated Hypoxemia - (Greater than 5 minutes O2 sat at or below 88%) was present. Baseline oxygen saturation was 89.4%. Lowest oxygen saturation was 52.0%. Time spent less than or equal to 88% was 59.5 minutes. Time spent less than or equal to 89% was 65.5 minutes.     Treatment PSG   Sleep Architecture:   At 02:05:45 AM the patient was placed on PAP treatment and was titrated at pressures ranging from CPAP 8 cmH2O up to BiLevel 23/13/0 cmH2O. The total recording time of the treatment portion of the study was 261.9 minutes. The total sleep time was 231.0 minutes. During the treatment portion of the study the sleep latency was 2.0 minutes. REM latency was 13.0 minutes. Arousal index was drastically improved at 19.5 arousals per hour. Sleep efficiency was normal at 88.2%. Wake after sleep onset was 28.5 minutes. The patient spent 12.6% of total sleep time in Stage N1, 27.3% in  Stage N2, 19.9% in Stage N3, and 40.3% in REM. Time in REM supine was 93.0 minutes.  Respiration: Bilevel PAP 23/13 cm H2O in spontaneous mode on room air appeared optimal.     CPAP at 10 cm H2O appeared effective to normalize SpO2 and prevent events in supine REM, though TCM remained elevated in the mid-50 s. Was transitioned to bilevel PAP and titrated to 23/13 cm H2O in spontaneous mode on room and appeared also effective in supine REM and TCM improved to ~51-52 mmHg.     The final pressure was BiLevel 23/13/0 cmH2O with an AHI of 1.7 events per hour. Time in REM supine on final pressure was 49.0 minutes.   - This titration was considered Optimal (residual AHI < 5 events per hour and including REM?supine sleep at final pressure).     Movement Activity: Nothing of note     Periodic Limb Movements   o During the diagnostic portion of the study, there were - PLMs recorded. The PLM index was - movements per hour. The PLM Arousal Index was - per hour.   o During the treatment portion of the study, there were - PLMs recorded. The PLM index was - movements per hour. The PLM Arousal Index was - per hour.     REM EMG Activity - Excessive transient/sustained muscle activity was not present.     Nocturnal Behavior - Abnormal sleep related behaviors were not noted during/arising out of NREM / REM sleep.     Bruxism - None apparent.     Cardiac Summary: Appears NSR   During the diagnostic portion of the study, the average pulse rate was 90.1 bpm. The minimum pulse rate was 25.0 bpm while the maximum pulse rate was 119.3 bpm.   During the treatment portion of the study, the average pulse rate was 87.1 bpm. The minimum pulse rate was 31.0 bpm while the maximum pulse rate was 108.0 bpm.     Ge Hansen reports that he slept well.     Results were reviewed in detail today with Ge and a copy given to him for his records.    He reports that he is sad today because he broke up with his girlfriend. He denies suicidal and homicidal  "ideations.      Reviewed by team: Tobacco  Allergies  Med Hx  Soc Hx      Reviewed by provider:          Problem List:  Patient Active Problem List    Diagnosis Date Noted     Morbid obesity (H) 09/13/2018     Priority: Medium     Elevated glucose 09/13/2018     Priority: Medium     Obesity hypoventilation syndrome (H) 09/13/2018     Priority: Medium     Some asthma hx too.         Tobacco use 09/13/2018     Priority: Medium     MAYDA (obstructive sleep apnea) 09/13/2018     Priority: Medium     Study Date: 10/3/2018( 520.0 lbs).  The combined apnea/hypopnea index was 134.8 events per hour (central apnea/hypopnea index was 4.1 events per hour). The REM AHI was - events per hour. The supine AHI was 128.9 events per hour. The RERA index was - events per hour. The RDI was 134.8 events per hour. Transcutaneous carbon dioxide monitoring was used, and significant hypoventilation was present with a maximum change from ~11 mmHg and 46.0 minutes at or greater than 55 mmHg. VBG was consistent with chronic compensated respiratory acidosis (pH 7.36, pCO2 62, HCO3 35). Baseline oxygen saturation was 89.4%. Lowest oxygen saturation was 52.0%. Time spent less than or equal to 88% was 59.5 minutes. Time spent less than or equal to 89% was 65.5 minutes. The final pressure was BiLevel 23/13/0 cmH2O with an AHI of 1.7 events per hour. Time in REM supine on final pressure was 49.0 minutes.          Elevated blood pressure reading without diagnosis of hypertension 09/13/2018     Priority: Medium        /89  Pulse 103  Ht 1.854 m (6' 0.99\")  Wt (!) 240.9 kg (531 lb)  SpO2 95%  BMI 70.07 kg/m2    Impression/Plan:  1. Severe MAYDA with sleep-associated hypoxemia. TCM suggestive of mild sleep related hypoventilation-  - Overnight polysomnogram was reviewed in detail today and a copy given to him for his records.    - Discussed this level of MAYDA is associated with increase in long-term cardiovascular risk factors.  - Treatment " options for severe MAYDA reviewed.  - Will arrange treatment with bilevel PAP 23/13 cm/H20    He will follow up with me in about 7 weeks, sooner if questions/concerns.     Twenty-five minutes spent with patient, all of which were spent face-to-face counseling, consulting, coordinating plan of care regarding MAYDA, hypoxemia and hypoventilation.      Elaine Rowe PA-C    CC:  [facility]

## 2018-10-24 NOTE — MR AVS SNAPSHOT
After Visit Summary   10/24/2018    Ge Hansen    MRN: [id number]           Patient Information     Date Of Birth          1985        Visit Information        Provider Department      10/24/2018 1:00 PM Elaine Rowe PA [facility]        Today's Diagnoses     MAYDA (obstructive sleep apnea)    -  1      Care Instructions      Your BMI is Body mass index is 70.07 kg/(m^2).  Weight management is a personal decision.  If you are interested in exploring weight loss strategies, the following discussion covers the approaches that may be successful. Body mass index (BMI) is one way to tell whether you are at a healthy weight, overweight, or obese. It measures your weight in relation to your height.  A BMI of 18.5 to 24.9 is in the healthy range. A person with a BMI of 25 to 29.9 is considered overweight, and someone with a BMI of 30 or greater is considered obese. More than two-thirds of American adults are considered overweight or obese.  Being overweight or obese increases the risk for further weight gain. Excess weight may lead to heart disease and diabetes.  Creating and following plans for healthy eating and physical activity may help you improve your health.  Weight control is part of healthy lifestyle and includes exercise, emotional health, and healthy eating habits. Careful eating habits lifelong are the mainstay of weight control. Though there are significant health benefits from weight loss, long-term weight loss with diet alone may be very difficult to achieve- studies show long-term success with dietary management in less than 10% of people. Attaining a healthy weight may be especially difficult to achieve in those with severe obesity. In some cases, medications, devices and surgical management might be considered.  What can you do?  If you are overweight or obese and are interested in methods for weight loss, you should discuss this with your provider.     Consider  reducing daily calorie intake by 500 calories.     Keep a food journal.     Avoiding skipping meals, consider cutting portions instead.    Diet combined with exercise helps maintain muscle while optimizing fat loss. Strength training is particularly important for building and maintaining muscle mass. Exercise helps reduce stress, increase energy, and improves fitness. Increasing exercise without diet control, however, may not burn enough calories to loose weight.       Start walking three days a week 10-20 minutes at a time    Work towards walking thirty minutes five days a week     Eventually, increase the speed of your walking for 1-2 minutes at time    In addition, we recommend that you review healthy lifestyles and methods for weight loss available through the National Institutes of Health patient information sites:  http://win.niddk.nih.gov/publications/index.htm    And look into health and wellness programs that may be available through your health insurance provider, employer, local community center, or [employer] club.    Weight management plan: Patient was referred to their PCP to discuss a diet and exercise plan.        Your Body mass index is 70.07 kg/(m^2).  Weight management is a personal decision.  If you are interested in exploring weight loss strategies, the following discussion covers the approaches that may be successful. Body mass index (BMI) is one way to tell whether you are at a healthy weight, overweight, or obese. It measures your weight in relation to your height.  A BMI of 18.5 to 24.9 is in the healthy range. A person with a BMI of 25 to 29.9 is considered overweight, and someone with a BMI of 30 or greater is considered obese. More than two-thirds of American adults are considered overweight or obese.  Being overweight or obese increases the risk for further weight gain. Excess weight may lead to heart disease and diabetes.  Creating and following plans for healthy eating and physical activity  may help you improve your health.  Weight control is part of healthy lifestyle and includes exercise, emotional health, and healthy eating habits. Careful eating habits lifelong are the mainstay of weight control. Though there are significant health benefits from weight loss, long-term weight loss with diet alone may be very difficult to achieve- studies show long-term success with dietary management in less than 10% of people. Attaining a healthy weight may be especially difficult to achieve in those with severe obesity. In some cases, medications, devices and surgical management might be considered.  What can you do?  If you are overweight or obese and are interested in methods for weight loss, you should discuss this with your provider.     Consider reducing daily calorie intake by 500 calories.     Keep a food journal.     Avoiding skipping meals, consider cutting portions instead.    Diet combined with exercise helps maintain muscle while optimizing fat loss. Strength training is particularly important for building and maintaining muscle mass. Exercise helps reduce stress, increase energy, and improves fitness. Increasing exercise without diet control, however, may not burn enough calories to loose weight.       Start walking three days a week 10-20 minutes at a time    Work towards walking thirty minutes five days a week     Eventually, increase the speed of your walking for 1-2 minutes at time    In addition, we recommend that you review healthy lifestyles and methods for weight loss available through the National Institutes of Health patient information sites:  http://win.niddk.nih.gov/publications/index.htm    And look into health and wellness programs that may be available through your health insurance provider, employer, local community center, or [employer].    Weight management plan: Patient was referred to their PCP to discuss a diet and exercise plan.          Follow-ups after your visit         Follow-up notes from your care team     Return in about 7 weeks (around 12/12/2018).      Your next 10 appointments already scheduled     Oct 30, 2018 10:30 AM CDT   Ortho Treatment with Freda Urias, STEVEN   [facility] ([facility])    [address] [place] [id number]   [phone]            Nov 06, 2018 10:30 AM CST   Ortho Treatment with Freda Urias PT   [facility] ([facility])    [address] [place] [id number]   [phone]            Nov 08, 2018  9:00 AM CST   (Arrive by 8:45 AM)   New Bariatric Visit with Ilsa Bautista PA-C   [facility] ([facility])    [address] [place] [id number] [phone]            Nov 08, 2018 10:00 AM CST   (Arrive by 9:45 AM)   New Bariatric Nutrition Visit with Clarissa Cox RD   [facility] ([facility])    [address] [place] [id number] [phone]              Who to contact     If you have questions or need follow up information about today's clinic visit or your schedule please contact [facility] directly at [phone].  Normal or non-critical lab and imaging results will be communicated to you by Apptimizehart, letter or phone within 4 business days after the clinic has received the results. If you do not hear from us within 7 days, please contact the clinic through Apptimizehart or phone. If you have a critical or abnormal lab result, we will notify you by phone as soon as possible.  Submit refill requests through [employer] or call your pharmacy and they will forward the refill request to us. Please allow 3 business days for your refill to be completed.          Additional Information About Your Visit        [employer] Information     [employer] gives you secure access to your electronic  "health record. If you see a primary care provider, you can also send messages to your care team and make appointments. If you have questions, please call your primary care clinic.  If you do not have a primary care provider, please call [phone] and they will assist you.        Care EveryWhere ID     This is your Care EveryWhere ID. This could be used by other organizations to access your [facility] medical records  [id number]        Your Vitals Were     Pulse Height Pulse Oximetry BMI (Body Mass Index)          103 1.854 m (6' 0.99\") 95% 70.07 kg/m2         Blood Pressure from Last 3 Encounters:   10/24/18 131/89   10/08/18 (!) 146/94   09/20/18 (!) 179/91    Weight from Last 3 Encounters:   10/24/18 (!) 240.9 kg (531 lb)   10/08/18 (!) 240.9 kg (531 lb)   09/20/18 (!) 235.9 kg (520 lb)              We Performed the Following     Sleep Comprehensive DME        Primary Care Provider Office Phone # Fax #    Wesley Davis [phone]       [address] [place] [id number]        Equal Access to Services     EDD STEWART AH: Hadii velma mccoy hadasho Soomaali, waaxda luqadaha, qaybta kaalmada adeegyada, aracelis catalan. So [facility] [phone].    ATENCIÓN: Si habla español, tiene a barnes disposición servicios gratuitos de asistencia lingüística. Llame al [phone].    We comply with applicable federal civil rights laws and [place] laws. We do not discriminate on the basis of race, color, national origin, age, disability, sex, sexual orientation, or gender identity.            Thank you!     Thank you for choosing [facility]  for your care. Our goal is always to provide you with excellent care. Hearing back from our patients is one way we can continue to improve our services. Please take a few minutes to complete the written survey that you may receive in the mail after your visit with us. Thank you!             Your Updated Medication List - " Protect others around you: Learn how to safely use, store and throw away your medicines at www.disposemymeds.org.          This list is accurate as of 10/24/18  1:22 PM.  Always use your most recent med list.                   Brand Name Dispense Instructions for use Diagnosis    * albuterol (2.5 MG/3ML) 0.083% neb solution     1 Box    Take 1 vial (2.5 mg) by nebulization every 4 hours as needed for shortness of breath / dyspnea or wheezing        * albuterol 108 (90 Base) MCG/ACT inhaler    PROAIR HFA/PROVENTIL HFA/VENTOLIN HFA    1 Inhaler    Inhale 2 puffs into the lungs every 6 hours as needed for shortness of breath / dyspnea or wheezing        * albuterol (2.5 MG/3ML) 0.083% neb solution     1 Box    Take 1 vial (2.5 mg) by nebulization every 4 hours as needed for shortness of breath / dyspnea or wheezing        fluticasone-salmeterol 250-50 MCG/DOSE diskus inhaler    ADVAIR DISKUS    1 Inhaler    Inhale 1 puff into the lungs 2 times daily        furosemide 20 MG tablet    LASIX    60 tablet    Take 1 tablet (20 mg) by mouth 2 times daily    Dependent edema       nebulizer Laverne     1 each    Take 1 Device by mouth every 4 hours as needed Use for nebulizer treatments every 4 hours as needed for shortness of breath or wheezing        order for DME     1 Package    Equipment being ordered: Nebulizer    Obesity hypoventilation syndrome (H)       predniSONE 20 MG tablet    DELTASONE    10 tablet    Take two tablets (= 40mg) each day for 5 (five) days        * varenicline 0.5 MG X 11 & 1 MG X 42 tablet    CHANTIX STARTING MONTH ISAK    53 tablet    Take 0.5 mg tab daily for 3 days, then 0.5 mg tab twice daily for 4 days, then 1 mg twice daily.    Tobacco use       * varenicline 1 MG tablet    CHANTIX    56 tablet    Take 1 tablet (1 mg) by mouth 2 times daily    Tobacco use       * Notice:  This list has 5 medication(s) that are the same as other medications prescribed for you. Read the directions carefully, and ask  your doctor or other care provider to review them with you.

## 2018-10-24 NOTE — PATIENT INSTRUCTIONS

## 2018-10-25 ENCOUNTER — DOCUMENTATION ONLY (OUTPATIENT)
Dept: SLEEP MEDICINE | Facility: CLINIC | Age: 33
End: 2018-10-25
Payer: MEDICAID

## 2018-10-25 NOTE — PROGRESS NOTES
Patient was offered choice of vendor and chose [facility].  Patient Ge Hansen was set up at [place] on October 25, 2018. Patient received a [employer] DreamStation Bilevel. Pressures were set at IPAP: 23 / EPAP: 13 cm H2O.   Patient s ramp is 6 cm H2O for 15 min and FLEX/EPR is BiFlex, 2.  Patient received a [employer] Mask name: Simplus  Full Face mask Size Medium, heated tubing and heated humidifier.  Patient is enrolled in the STM Program and does need to meet compliance. Patient was informed to schedule a follow up appointment with LISSY Fink.    Danika Gomez

## 2018-10-26 ENCOUNTER — TELEPHONE (OUTPATIENT)
Dept: SURGERY | Facility: CLINIC | Age: 33
End: 2018-10-26

## 2018-10-26 NOTE — TELEPHONE ENCOUNTER
Patient interested in weight loss surgery.    Name: Ge Hansen      Ht: 6' 1'    Wt: 534    BMI: 70.5      Instructed to check with insurance company regarding exclusions prior to first appt.    Spoke with patient regarding appointment on Thursday 11/08/2018.    Scheduled to see Ilsa Bautista PA-C 9:00am  followed by an appt with a dietician at 10:00am.      Instructed to view seminar and complete pre-visit questionnaires prior to visit at [email or web address].    [phone] Contact Center phone number

## 2018-10-29 ENCOUNTER — DOCUMENTATION ONLY (OUTPATIENT)
Dept: SLEEP MEDICINE | Facility: CLINIC | Age: 33
End: 2018-10-29

## 2018-10-29 NOTE — PROGRESS NOTES
3 DAY STM VISIT    Diagnostic AHI: 134.8 PSG    Patient contacted for 3 day STM visit  Subjective measures:  Pt states that things are going ok.  He's struggling to get used to the pressure.  He will continue to use it and try using his ramp in the middle of the night if it wakes him up.  He will let us know if there is anything we can do to help.     Device type: Bilevel  PAP settings from order::  23/13cm  H20  Mask type:    Full Face Mask     Device settings from machine: 23/13cm  H20  Assessment: Nightly usage, most nights over four hours   Action plan: Pt to have f/u 14 day STM visit.  Patient has a follow up visit scheduled:   yes within 31-90 days of set up.

## 2018-10-30 ENCOUNTER — HOSPITAL ENCOUNTER (OUTPATIENT)
Dept: PHYSICAL THERAPY | Facility: CLINIC | Age: 33
Setting detail: THERAPIES SERIES
End: 2018-10-30
Attending: ORTHOPAEDIC SURGERY
Payer: MEDICAID

## 2018-10-30 PROCEDURE — 40000718 ZZHC STATISTIC PT DEPARTMENT ORTHO VISIT: Performed by: PHYSICAL THERAPIST

## 2018-10-30 PROCEDURE — 97110 THERAPEUTIC EXERCISES: CPT | Mod: GP | Performed by: PHYSICAL THERAPIST

## 2018-11-09 ENCOUNTER — DOCUMENTATION ONLY (OUTPATIENT)
Dept: SLEEP MEDICINE | Facility: CLINIC | Age: 33
End: 2018-11-09

## 2018-11-09 DIAGNOSIS — G47.33 OBSTRUCTIVE SLEEP APNEA: Primary | ICD-10-CM

## 2018-11-09 NOTE — PROGRESS NOTES
14 DAY STM VISIT    Diagnostic AHI: 134.8  PSG    Subjective measures:   Pt states that he wakes up a lot during the night because the pressure is to high.  He would like the pressure lowered.  He also is open to trying a nasal mask as he can breathe through his nose and then the pressure maybe better tolerated.  He would like to do it the same day as his follow-up if possible    Assessment: Pt not meeting objective benchmarks for compliance  Patient failing following subjective benchmarks: pressure issues and mask discomfort   Action plan: order placed to provider for pressure change and pt to have 30 day STM visit.   Device type: Bilevel  PAP settings: 23/13cm  H20    Mask type:   Full Face Mask     Objective measures: 14 day rolling measures         Compliance  14 %      % of night spent in large leak  0 % last  upload      AHI 2.25   last  upload      Average number of minutes 76     Average hours of usage 1.3              Objective measure goal  Compliance   Goal >70%  Leak   Goal < 10%  AHI  Goal < 5  Usage  Goal >240

## 2018-11-09 NOTE — Clinical Note
Pt feels like his pressure is too high.  Would like to try going down on pressure to see if he can tolerate it better. Let me know what pressures you'd like.  Thanks

## 2018-11-27 ENCOUNTER — DOCUMENTATION ONLY (OUTPATIENT)
Dept: SLEEP MEDICINE | Facility: CLINIC | Age: 33
End: 2018-11-27

## 2018-11-27 NOTE — PROGRESS NOTES
30 DAY STM VISIT    Diagnostic AHI: 134.8 PSG    Subjective measures:    Pt hasn't been using since we adjusted the pressure.  He is going in for a mask fitting tomorrow and will try it then    Assessment: Pt not meeting objective benchmarks for compliance  Patient failing following subjective benchmarks: pressure issues and mask discomfort   Action plan: schedule mask fit appointment and 2 week STM recheck appt scheduled  Patient has scheduled a follow up visit with LISSY Fink on 1/2/19.   Device type: Bilevel  PAP settings: 20/10cm  H20   Mask type:  Full Face Mask  Objective measures: No Use since 11/7/18

## 2018-11-28 ENCOUNTER — DOCUMENTATION ONLY (OUTPATIENT)
Dept: SLEEP MEDICINE | Facility: CLINIC | Age: 33
End: 2018-11-28
Payer: COMMERCIAL

## 2018-11-28 NOTE — PROGRESS NOTES
HARPREET CAME TO [facility] PER [facility] REQUEST.   HE WANTED TO TRY NASAL MASKS DUE TO FEELING CLAUSTROPHOBIC WITH FF MASK.  HE TRIED ON MIRAGE FX WIDE AND AIRFIT N20 MEDIUM AND LARGE CUSHIONS WITH STD AND  XTRA LG HEADGEAR.  HE TOOK HOME DEMO AIRFIT N20 WITH STD HEADGEAR  TO TRY UNTIL 12/3/18 AT 11AM WHEN HE RETURNS TO COMPLETE 30 DAY MASK EXCHANGE.  HE STATED HE LIKED THE WAY THE MASK FELT ON HIS FACE AND HE WAS ABLE TO KEEP HIS MOUTH CLOSED WHILE MACHINE RUNNING. HE WILL LET ME KNOW IF HE WOULD LIKE TO USE CHIN STRAP.

## 2018-11-29 NOTE — PROGRESS NOTES
Outpatient Physical Therapy Discharge Note     Patient: Ge Hansen  : 1985    Beginning/End Dates of Reporting Period:  10/12/18 to 2018    Referring Provider: Dr Miles    Therapy Diagnosis: decreased left knee range of motion and strength secondary to left knee pain     Client Self Report: Pt reports broke up with girlfriend so has been not focused on exercises. Notes knee has not been hurting and can walk up and down stairs.     Objective Measurements:  Objective Measure: ROM  Details: 105* AROM knee flexion      Goals:  Goal Identifier 1   Goal Description Patient will be able to ascend and descend stairs reciprical pattern with <1/10 pain   Target Date 18   Date Met  10/30/18   Progress:     Goal Identifier 2   Goal Description Patient will be able to walk 10 minutes without significant deviations and with <1/10 pain for shopping   Target Date 18   Date Met   (10-15 minutes depending on hema day)   Progress:     Goal Identifier 3   Goal Description Patient will have 4+/5 LE knee extension to prevent knee buckling with ambulation   Target Date 18   Date Met      Progress: not assessed         Progress Toward Goals:   Progress this reporting period: Pt completed 4 PT sessions to address L knee pain. Demonstrated excellent gains in range of motion, reporting good improvements in pain and activity tolerance as well. Met 2/3 goals. Pt independent with HEP and anticipate will achieve final goal with HEP compliance.    Plan:  Discharge from therapy.    Discharge:    Reason for Discharge: Patient chooses to discontinue therapy.  Patient has failed to schedule further appointments.    Equipment Issued: na    Discharge Plan: Patient to continue home program.

## 2018-12-03 ENCOUNTER — TELEPHONE (OUTPATIENT)
Dept: SURGERY | Facility: CLINIC | Age: 33
End: 2018-12-03

## 2018-12-03 ENCOUNTER — DOCUMENTATION ONLY (OUTPATIENT)
Dept: SLEEP MEDICINE | Facility: CLINIC | Age: 33
End: 2018-12-03
Payer: COMMERCIAL

## 2018-12-03 NOTE — PROGRESS NOTES
HARPREET RETURNED TO [facility] AND RETURNED DEMO AIRFIT N20 WITH  HEADGEAR.  HE COMPLETED 30 DAY MASK EXCHANGE FOR AIRFIT N20 MEDIUM CUSHION WITH  HEADGEAR.  HE ALSO RECD DELUXE CHINSTRAP.  WENT OVER SUPPLY REPLACEMENT SCHEDULE. HE STATED HE WILL BE CALLING [facility] FOR SUPPLIES TO BE MAILED TO HIM.

## 2018-12-03 NOTE — TELEPHONE ENCOUNTER
"Patient interested in weight loss surgery.    Name: Ge Hansen      Ht: 6' 1\"    Wt: 534    BMI: 70.5    Spoke with patient regarding appointment for Wednesday 12/12/2018.    Instructed to check with insurance company regarding exclusions prior to first appt.    Scheduled to see Ilsa Bautista PA-C 10:45am followed by an appt with a dietician 11:30am.      Instructed to view seminar and complete pre-visit questionnaires prior to visit at [email or web address].    [phone] Contact Center phone number    "

## 2018-12-10 ENCOUNTER — MYC REFILL (OUTPATIENT)
Dept: FAMILY MEDICINE | Facility: CLINIC | Age: 33
End: 2018-12-10

## 2018-12-10 DIAGNOSIS — Z72.0 TOBACCO USE: ICD-10-CM

## 2018-12-10 DIAGNOSIS — R60.9 DEPENDENT EDEMA: ICD-10-CM

## 2018-12-10 RX ORDER — FUROSEMIDE 20 MG
20 TABLET ORAL 2 TIMES DAILY
Qty: 60 TABLET | Refills: 1 | Status: SHIPPED | OUTPATIENT
Start: 2018-12-10 | End: 2019-04-05

## 2018-12-10 RX ORDER — VARENICLINE TARTRATE 1 MG/1
1 TABLET, FILM COATED ORAL 2 TIMES DAILY
Qty: 56 TABLET | Refills: 3 | Status: SHIPPED | OUTPATIENT
Start: 2018-12-10 | End: 2018-12-31

## 2018-12-12 ENCOUNTER — DOCUMENTATION ONLY (OUTPATIENT)
Dept: SLEEP MEDICINE | Facility: CLINIC | Age: 33
End: 2018-12-12

## 2018-12-12 NOTE — PROGRESS NOTES
STM Recheck Visit    Subjective measures:   Pt states that things are going much better since he got a different mask.  He's able to wear it a lot longer     Assessment: Pt not meeting objective benchmarks for compliance  Patient meeting subjective benchmarks.   Action plan: Patient has a follow up visit with LISSY Fink on 1/2/19  Device type: Bilevel    PAP settings: 20/10cm  H20    Objective measures: 14 day rolling measures      Compliance: 14 %     % of night spent in large leak: 0 % last  upload      AHI: 2.46    last  upload      Average number of minutes: 133      Diagnostic AHI:   PSG AHI: 134.8            Objective measure goal  Compliance   Goal >70%  Leak   Goal < 10%  AHI  Goal < 5  Usage  Goal >240

## 2018-12-17 ENCOUNTER — TELEPHONE (OUTPATIENT)
Dept: FAMILY MEDICINE | Facility: CLINIC | Age: 33
End: 2018-12-17

## 2018-12-17 DIAGNOSIS — J45.909 ASTHMA: Primary | ICD-10-CM

## 2018-12-17 NOTE — TELEPHONE ENCOUNTER
"Requested Prescriptions   Pending Prescriptions Disp Refills     albuterol (PROVENTIL) (2.5 MG/3ML) 0.083% neb solution 1 Box 0    Last Written Prescription Date:  9/20/18  Last Fill Quantity: 1 inhaler,  # refills: 0   Last office visit: 10/8/2018 with prescribing provider:  Susan   Future Office Visit:     Sig: Take 1 vial (2.5 mg) by nebulization every 4 hours as needed for shortness of breath / dyspnea or wheezing    Asthma Maintenance Inhalers - Anticholinergics Failed - 12/17/2018  3:53 PM       Failed - Recent (12 mo) or future (30 days) visit within the authorizing provider's specialty    Patient had office visit in the last 12 months or has a visit in the next 30 days with authorizing provider or within the authorizing provider's specialty.  See \"Patient Info\" tab in inbasket, or \"Choose Columns\" in Meds & Orders section of the refill encounter.             Passed - Patient is age 12 years or older          "

## 2018-12-19 RX ORDER — ALBUTEROL SULFATE 0.83 MG/ML
2.5 SOLUTION RESPIRATORY (INHALATION) EVERY 4 HOURS PRN
Qty: 1 BOX | Refills: 0 | Status: SHIPPED | OUTPATIENT
Start: 2018-12-19 | End: 2019-05-07

## 2018-12-21 DIAGNOSIS — J45.909 UNCOMPLICATED ASTHMA, UNSPECIFIED ASTHMA SEVERITY, UNSPECIFIED WHETHER PERSISTENT: Primary | ICD-10-CM

## 2018-12-21 NOTE — TELEPHONE ENCOUNTER
"Requested Prescriptions   Pending Prescriptions Disp Refills     albuterol (PROAIR HFA/PROVENTIL HFA/VENTOLIN HFA) 108 (90 Base) MCG/ACT inhaler 1 Inhaler 0     Sig: Inhale 2 puffs into the lungs every 6 hours as needed for shortness of breath / dyspnea or wheezing    Asthma Maintenance Inhalers - Anticholinergics Failed - 12/21/2018  2:55 PM   Last Written Prescription Date:  9/10/18  Last Fill Quantity: 1,  # refills: 0   Last office visit: 10/8/2018 with prescribing provider:  Carlton   Future Office Visit:        Failed - Asthma control assessment score within normal limits in last 6 months    Please review ACT score.          Passed - Patient is age 12 years or older       Passed - Recent (6 mo) or future (30 days) visit within the authorizing provider's specialty    Patient had office visit in the last 6 months or has a visit in the next 30 days with authorizing provider or within the authorizing provider's specialty.  See \"Patient Info\" tab in inbasket, or \"Choose Columns\" in Meds & Orders section of the refill encounter.              "

## 2018-12-21 NOTE — TELEPHONE ENCOUNTER
Routing refill request to provider for review/approval because:  Prescribed by alternative provider Jovi Villanueva.

## 2018-12-24 RX ORDER — ALBUTEROL SULFATE 90 UG/1
2 AEROSOL, METERED RESPIRATORY (INHALATION) EVERY 6 HOURS PRN
Qty: 1 INHALER | Refills: 0 | Status: SHIPPED | OUTPATIENT
Start: 2018-12-24 | End: 2019-05-07

## 2018-12-31 ENCOUNTER — OFFICE VISIT (OUTPATIENT)
Dept: ENDOCRINOLOGY | Facility: CLINIC | Age: 33
End: 2018-12-31
Payer: COMMERCIAL

## 2018-12-31 ENCOUNTER — OFFICE VISIT (OUTPATIENT)
Dept: SURGERY | Facility: CLINIC | Age: 33
End: 2018-12-31
Payer: COMMERCIAL

## 2018-12-31 VITALS
OXYGEN SATURATION: 93 % | BODY MASS INDEX: 42.66 KG/M2 | WEIGHT: 315 LBS | SYSTOLIC BLOOD PRESSURE: 135 MMHG | HEART RATE: 113 BPM | HEIGHT: 72 IN | TEMPERATURE: 98.4 F | DIASTOLIC BLOOD PRESSURE: 77 MMHG

## 2018-12-31 RX ORDER — TOPIRAMATE 25 MG/1
TABLET, FILM COATED ORAL
Qty: 90 TABLET | Refills: 1 | Status: SHIPPED | OUTPATIENT
Start: 2018-12-31 | End: 2019-04-05

## 2018-12-31 ASSESSMENT — MIFFLIN-ST. JEOR: SCORE: 3364.41

## 2018-12-31 NOTE — NURSING NOTE
(   Chief Complaint   Patient presents with     Consult     NBS, BMI 69.01    )    ( Weight: (!) 237.1 kg (522 lb 12.8 oz) )  ( Height: 182.9 cm (6') )  ( BMI (Calculated): 71.05 )  ( Initial Weight: 237.1 kg (522 lb 12.8 oz) )  ( Cumulative weight loss (lbs): 0 )  (   )  (   )  (   )  (   )    ( BP: 135/77 )  (   )  ( Temp: 98.4  F (36.9  C) )  ( Temp src: Oral )  ( Pulse: 113 )  (   )  ( SpO2: 93 % )    (   Patient Active Problem List   Diagnosis     Morbid obesity (H)     Elevated glucose     Obesity hypoventilation syndrome (H)     Tobacco use     MYADA (obstructive sleep apnea)     Elevated blood pressure reading without diagnosis of hypertension     Asthma    )  (   Current Outpatient Medications   Medication Sig Dispense Refill     albuterol (PROAIR HFA/PROVENTIL HFA/VENTOLIN HFA) 108 (90 Base) MCG/ACT inhaler Inhale 2 puffs into the lungs every 6 hours as needed for shortness of breath / dyspnea or wheezing 1 Inhaler 0     albuterol (PROAIR HFA/PROVENTIL HFA/VENTOLIN HFA) 108 (90 Base) MCG/ACT inhaler Inhale 2 puffs into the lungs every 6 hours as needed for shortness of breath / dyspnea or wheezing 1 Inhaler 0     albuterol (PROVENTIL) (2.5 MG/3ML) 0.083% neb solution Take 1 vial (2.5 mg) by nebulization every 4 hours as needed for shortness of breath / dyspnea or wheezing 1 Box 0     albuterol (2.5 MG/3ML) 0.083% neb solution Take 1 vial (2.5 mg) by nebulization every 4 hours as needed for shortness of breath / dyspnea or wheezing 1 Box 0     fluticasone-salmeterol (ADVAIR DISKUS) 250-50 MCG/DOSE diskus inhaler Inhale 1 puff into the lungs 2 times daily 1 Inhaler 0     furosemide (LASIX) 20 MG tablet Take 1 tablet (20 mg) by mouth 2 times daily 60 tablet 1     order for DME Equipment being ordered: Nebulizer 1 Package 0     predniSONE (DELTASONE) 20 MG tablet Take two tablets (= 40mg) each day for 5 (five) days (Patient not taking: Reported on 12/31/2018.) 10 tablet 0     varenicline (CHANTIX STARTING MONTH  ISAK) 0.5 MG X 11 & 1 MG X 42 tablet Take 0.5 mg tab daily for 3 days, then 0.5 mg tab twice daily for 4 days, then 1 mg twice daily. (Patient not taking: Reported on 12/31/2018) 53 tablet 0     varenicline (CHANTIX) 1 MG tablet Take 1 tablet (1 mg) by mouth 2 times daily (Patient not taking: Reported on 12/31/2018) 56 tablet 3    )  ( Diabetes Eval:    )    ( Pain Eval:  Data Unavailable )    ( Wound Eval:       )    (   History   Smoking Status     Current Every Day Smoker     Packs/day: 1.00     Years: 10.00     Types: Cigarettes     Start date: 7/7/1997   Smokeless Tobacco     Current User     Types: Chew, Snuff    )    ( Signed By:  Ruiz Concepcion; December 31, 2018; 2:23 PM )

## 2018-12-31 NOTE — PATIENT INSTRUCTIONS
It was a pleasure meeting with you today.     Thank you for allowing us the privilege of caring for you. We hope we provided you with the excellent service you deserve.     Please let us know if there is anything else we can do for you so that we can be sure you are leaving completely satisfied with your care experience.    You saw Marcela Ewing today.     Instructions:  Labs at convenience  Follow up with sleep clinic and primary care provider for clearance  Schedule psychological evaluation  Dietician monthly until surgery    Medications started today: Topiramate    See Marcela Ewing  in 2-3 months  To schedule appointments with our team, please call [phone] option #1    Please call during clinic hours Monday through Friday 8:00a - 4:00p if you have questions or you can contact us via [employer] at anytime.      Nurses: [phone]  Fax: [phone]  Surgery Scheduler: [phone]    Please call the hospital at [phone] to speak with our on call MDs if you have urgent needs after hours, during weekends, or holidays.    **Please note if you need to go to the Emergency Room for any reason in the first 90 days after surgery it is important to go to the [facility] ER on the [place] on [address] off of the [place] [address] off [address]    *For patients who are currently enrolled in our program and have not yet had weight loss surgery please note*:    You must be at your required goal weight at the time of your Anesthesia clinic visit as well as the day of surgery or your surgery will be canceled. You will not be able to obtain a new surgery date until you have met your goal weight.    Weight loss medications before and after surgery protocol:    Adipex (phentermine): Stop two days before surgery. Do not restart after surgery. May reconsider restarting as needed in the future.    Topimax (topiramate): Can take right up to surgery including morning of surgery and restart post op day #1.    Qsymia  (phentermine/topiramate): Hold morning of surgery. Restart after surgery post op day #1    Contrave (bupropion/naltrxone): Fast taper before surgery. 1 tablet twice daily for 1 week and then discontinue 4 days before surgery.  Will reconsider restarting at postop appt once no longer taking narcotic pain meds.  Will slowly ramp up again.    Naltrexone: Stop 4 days before surgery.  Will reconsider starting again at postop appts when no longer taking narcotic pain meds.    Belviq (locaserin): Stop 2 days before surgery and restart immediately after surgery    Victoza(liraglutide)/Saxenda(liraglutide 3mg)/Trulicity (dulaglutide) (Any GLP-1): Can take up to surgery date and restart immediately after surgery.    Main follow-up parameters for all bariatric patients     Patients who have undergone Bariatric surgery:    1. Follow-up interval during the first year: ~1 week, 1 month, 3 month, 6 month,12 months.  Every 6 months until 5 years; and then annually thereafter.  The goal of follow-up sessions is to ensure that food intake behavior (choice of food and eating speed) and exercise/activity level are set appropriately.    2. Yearly lab tests ordered by our clinic.      3. The bariatric team should be aware and potentially evaluate all adverse gastrointestinal symptoms (dysphagia, abdominal pain, nausea, vomiting, diarrhea, heartburn, reflux, etc), which can be a sign of complication.  The bariatric surgeons perform general surgery procedures in addition to bariatric surgery (laparoscopic cholecystectomy, etc.)    4. Inability to tolerate textured food (chicken, steak, fish, eggs, beans) is NOT normal and may need to be evaluated by endoscopy performed by the bariatric surgeon.                  MEDICATION STARTED AT THIS APPOINTMENT  We are starting topiramate at bedtime.  Start one tab, 25 mg, for a week. Go up to 50 mg (2 tabs) for the next week. At the third week, take   3 tabs (75 mg).  Stay at 3 tabs until you are  seen again. Call the nurse at [phone] if you have any questions or concerns. (Do not stop taking it if you don't think it's working. For some people it works even though they do not feel much different.)    Topiramate (Topamax) is a medication that is used most often to treat migraine headaches or for seizures. It has also been found to help with weight loss. Although it's not currently FDA approved for weight loss, it has been used safely for a number of years to help people who are carrying extra weight.     Just how topiramate helps with weight loss has not been exactly determined. However it seems to work on areas of the brain to quiet down signals related to eating.      Topiramate may make you:    >feel less interest in eating in between meals   >think less about food and eating   >find it easier to push the plate away   >find giving up pop easier    >have an easier time eating less    For some of our patients, the pills work right away. They feel and think quite differently about food. Other patients don't feel much of a change but find in fact they have lost weight! Like all weight loss medications, topiramate works best when you help it work.  This means:    1) Have less tempting high calorie (fattening) food around the house or office    2) Have lower calorie food (fruits, vegetables,low fat meats and dairy) for snacks    3) Eat out only one time or less each week.   4) Eat your meals at a table with the TV or computer off.    Side-effects. Topiramate is generally well tolerated. The main side-effects we see are:   Tingling in hands,feet, or face (usually not very troublesome)   Mental confusion and word finding trouble (about 10% of patients have this.)     Feeling sleepy or a bit dopey- this goes away very soon after starting.    One of the dangers of topiramate is the possibility of birth defects--if you get pregnant when you are on it, there is the risk that your baby will be born with a cleft lip  or palate.  If you are on topiramate and of child bearing age, you need to be on a reliable form of birth control or refrain from sexual intercourse.     Please refer to the pharmacy insert for more information on side-effects. Since many pharmacists are not familiar with the use of topiramate in weight loss, calling the clinic will get you the most accurate information on the use of this medication for weight loss.     In order to get refills of this or any medication we prescribe you must be seen in the medical weight mgmt clinic every 2-3 months. Please have your pharmacy fax a refill request to [phone].

## 2018-12-31 NOTE — LETTER
"2018       RE: Ge Hansen  [address] [place] [id number]     Dear Colleague,    Thank you for referring your patient, Ge Hansen, to the [facility] MEDICAL WEIGHT MANAGEMENT at [facility]. Please see a copy of my visit note below.            New Bariatric Surgery Consultation Note    RE: Ge Hansen  MR#: [id number]  : 1985      Referring provider:       10/16/2018   Who referred you? Wesley bernal       Chief Complaint/Reason for visit: evaluation for possible weight loss surgery    Dear Wesley Bernal MD (General),    I had the pleasure of seeing your patient, Ge Hansen, to evaluate his obesity and consider him for possible weight loss surgery. As you know, Ge Hansen is 33 year old.  He has a height of 6' 0\", a weight of 522 lbs 12.8 oz, and calculated Body mass index is 70.9 kg/m .      HISTORY OF PRESENT ILLNESS:  Weight Loss History Reviewed with Patient 2018   How long have you been overweight? Since puberty   What is the most that you have ever weighed? 550   What is the most weight you have lost? 30   I have tried the following methods to lose weight Watching portions or calories, Exercise, Slimfast   I have tried the following weight loss medications? (Check all that apply) None   Have you ever had weight loss surgery? No     Had considered Jaya-N-Y gastric bypass at age 20- 350 lb. \"Family emergency\" caused him to reconsider surgery at the time.   Weight issues since then, gradual gain over time.      slimfast meal replacements- didn't like that  Atkins diet in the past with good results  Has done medications with PCP in the past- lost about 50lbs \"years ago\". He feels like he did not lose enough weight with the medications and has gained more weight since then.     Recent end of a relationship and motivation from sister and brother-in-law have brought him back to considering weight loss surgery.     CO-MORBIDITIES OF OBESITY INCLUDE:     " 2018   I have the following co-morbidities associated with obesity: Sleep Apnea, Asthma, Weight Bearing Joint Pain   Do you use a CPAP? -   uses bi-pap nightly    Reflux- occasional, more at night    Denies HTN but states he BP is high occasionally. Denies other cardiac history.   Denies Diabetes.       PAST MEDICAL HISTORY:  Insomnia  Lactose Intolerant  Sleep Apnea  Obesity hypoventilation syndrome  Tobacco use  Asthma  Elevated blood pressure readings  Elevated blood sugar readings  Depression (not current)  ADHD (not current)    PAST SURGICAL HISTORY:  Left shoulder rotator cuff repair      FAMILY HISTORY:   Family History   Problem Relation Age of Onset     Obesity Sister    father has obesity and DMII- lost a leg due to complications  Sister had lap band x 2 occasions, successful the second attempt  Mom had kandis-en-y, had lots of side effects ()    SOCIAL HISTORY:   Social History Questions Reviewed With Patient 10/16/2018   Which best describes your employment status (select all that apply) I am unemployed   Which best describes your marital status: in a relationship   Do you have children? No   Who do you have in your support network that can be available to help you for the first 2 weeks after surgery? Girl friend   Who can you count on for support throughout your weight loss surgery journey? Girl friend   Can you afford 3 meals a day?  Yes   Can you afford 50-60 dollars a month for vitamins? 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 Did not re-fill out today  No longer in a relationship, sister and brother-in-law would support him through this    HABITS:     10/16/2018   How often do you drink alcohol? 2-3 times a week   If you do drink alcohol, how many drinks might you have in a day? (one drink = 5 oz. wine, 1 can/bottle of beer, 1 shot liquor) 1 or 2   Do you  "currently use any of the following Nicotine products? cigarettes, e-cigarettes, smokeless tobacco   Have you ever used any of the following nicotine products? No   Have you or are you currently using street drugs or prescription strength medication for which you do not have a prescription for? No   Do you have a history of chemical dependency (alcohol or drug abuse)? No     Plans to discuss smoking cessation with PCP    PSYCHOLOGICAL HISTORY:   Psychological History Reviewed With Patient 10/16/2018   Have you ever attempted suicide? Never.   Have you had thoughts of suicide in the past year? No   Have you ever been hospitalized for mental illness or a suicide attempt? Never.   Do you have a history of chronic pain? No   Have you ever been diagnosed with fibromyalgia? No   Are you currently being treated for any of the following? (select all that apply) I do not have a mental illness       ROS:     10/16/2018   Skin:  Skin fold rashes (groin or other folds), Varicose veins   HEENT: Headaches   Musculoskeletal: Joint Pain, Back pain, Limited mobility, Arthritis   Cardiovascular: Shortness of breath with activity   Pulmonary: Snoring, Awaken from sleep to catch your breath, People have told me I stop breathing while asleep, Excessively sleep during the day, Experience morning headaches   Gastrointestinal: Reflux   Genitourinary: None of the above   Hematological: None of the above   Neurological: None of the above     History of insomnia, having difficulty sleeping recently again even though using bi-pap, \"mind can shut off\"      EATING BEHAVIORS:     10/16/2018   Have you or anyone else thought that you had an eating disorder? No   Do you currently binge eat (eat a large amount of food in a short time)? No   Are you an emotional eater? No   Do you get up to eat after falling asleep? Yes     Eating out of boredom and cravings  Wakes at night to eat  Eats starting at lunch and then into the evening, skips " breakfast    EXERCISE:     10/16/2018   How often do you exercise? 1 to 2 times per week   What is the duration of your exercise (in minutes)? 30 Minutes   What types of exercise do you do? walking   What keeps you from being more active?  Pain, My ability to walk or move around is limited, Shortness of breath       MEDICATIONS:  Current Outpatient Medications   Medication     albuterol (PROAIR HFA/PROVENTIL HFA/VENTOLIN HFA) 108 (90 Base) MCG/ACT inhaler     albuterol (PROAIR HFA/PROVENTIL HFA/VENTOLIN HFA) 108 (90 Base) MCG/ACT inhaler     albuterol (PROVENTIL) (2.5 MG/3ML) 0.083% neb solution     albuterol (2.5 MG/3ML) 0.083% neb solution     fluticasone-salmeterol (ADVAIR DISKUS) 250-50 MCG/DOSE diskus inhaler     furosemide (LASIX) 20 MG tablet     order for DME     No current facility-administered medications for this visit.        ALLERGIES:  No Known Allergies    LABS/IMAGING/MEDICAL RECORDS REVIEW:     A1C 5.5 9/2018    PHYSICAL EXAM:  /77   Pulse 113   Temp 98.4  F (36.9  C) (Oral)   Ht 1.829 m (6')   Wt (!) 237.1 kg (522 lb 12.8 oz)   SpO2 93%   BMI 70.90 kg/m         In summary, Ge Hansen has Class III obesity with a body mass index of Body mass index is 70.9 kg/m . kg/m2 and the comorbidities stated above. He completed an informational seminar and is a candidate for the laparoscopic gastric sleeve.  He will have to complete the following pre-requisites:    Received weight loss goal of 78 lb prior to surgery.  6 consecutive dietician visits (insurance required).  Follow up in 2-3 months with me to discuss ongoing medications and progress.   Psychological Evaluation with MMPI and clearance for weight loss surgery.  Letter of clearance from the following PCP and Sleep.    Today in the office we discussed gastric sleeve surgery. Preoperative, perioperative, and postoperative processes, management, and follow up were addressed.  Risks and benefits were outlined including the risk of death,  staple line leak (1-2%), PE, DVT, ulcer, worsening GERD, N/V, stricture, hernia, wound infection, weight regain, and vitamin deficiencies. I emphasized exercise and activity along with appropriate food choice as the main foundation for weight loss with surgery providing surgical reinforcement of this.  All questions were answered.  A goal sheet and support group handout were given to the patient.    Once the patient has completed the requirements in their task list and there are no further recommendations, the pt will be allowed to see the surgeon of her choice for consultation on the laparoscopic gastric sleeve surgery. Patient verbalizes understanding of the process to surgery and expectations for the postoperative period including the need for lifelong lifestyle changes, vitamin supplementation, and laboratory monitoring.   ** Discussed smoking cessation with patient. Encouraged cessation as soon as possible as being completely tobacco free is a requirement for surgery. Would recommend (xyban) buproprion for smoking cessation which may help some with weight loss (at least weight neutral).     Sincerely,    Marcela Ewing NP    I spent a total of 60 minutes face to face with Ge during today's office visit. Over 50% of this time was spent counseling the patient and/or coordinating care.      Bariatric Task List  Status:  Is patient a candidate for bariatric surgery?:  patient is a candidate for bariatric surgery -     Cleared to schedule surgeon consult?:    -     Status:    -     Surgeon: Dr. Manuel -     Tentative surgery month/year: July 2019 -        Insurance: Insurance:  Blue Plus ([facility]) -     Cign[employer]: PCP Recommendation and Medical Clearance:    -     HP Referral:    -     Other:    -        Patient Info: Initial Weight:  522lb -     Date of Initial Weight/Height:  12/31/2018 -     Goal Weight (lbs):  444 -     Required Weight Loss:  78lb -     Surgery Type:  sleeve gastrectomy -     Multidisciplinary  "Meeting:    -        Dietician Visits: Structured weight loss required by insurance?:  structured weight loss required -     Dietician Visit 1:  Needed -     Dietician Visit 2:  Needed -     Dietician Visit 3:  Needed -     Dietician Visit 4:  Needed -     Dietician Visit 5:  Needed -     Dietician Visit 6:  Needed -     Dietician Visit additional:    -     Clearance from dietician to see surgeon?:    -     Dietician Notes:    -        Psychological Evaluation: Psych eval:  Needed -     Therapist letter of support:    -     Psychiatrist letter of support:    -     Establish care with therapist:    -     Complete eating disorder evaluation:    -     Letter of clearance from therapist/eating disorder program:    -     Other:    -        Lab Work: Complete Blood Count:  Needed -     Comprehensive Metabolic Panel:  Needed -     Vitamin D:  Needed -     Hgb A1c:  Needed -     PTH:  Needed -     H. pylori:    -     TSH:    -     Nicotine Testing:    -     Other:    -        Consults/ Clearance: Sleep Medicine:  Needed -     Cardiac:    -     Pain:   -     Dental:    -     Endocrine:    -     Gastroenterology:    -     Vascular Medicine:    -     Hematology:    -     Medical Weight Management:   -     Physical Therapy/Exercise:    -     Nephrology:    -     Neurology:    -     Pulmonology:    -     Rheumatology:    -     Other    -     Other    -     Other    -           Testing: UGI:    -     EGD:    -     Other:    -        PCP: Establish care with PCP:    -     Follow up with PCP:    -     PCP letter of support:  Needed -        Smoking: Quit tobacco use (3 months smoke free)?:  Needed -     Quit date:    -        Patient Education:  Information Session:  Completed -     Given \"Making your decision\" handout?:  Given \"\"Making your decision\"\" handout? -     Given support group information?:  support group contact information provided -     Attended support group?:  Needed -     Support plan in place?:  Needed -   "   Research consents signed?:  research consent signed -        Additional Surgery Requirements: Review Coag plan:    -     HgA1c <8:    -     Inpatient pain consult:    -     Final nicotine screen:  Needed -     Dental work complete:    -     Birth control plan:    -     Other Requirements:    -     Other Requirements:    -        Final Tasks:  Before surgery online class:  Completed -     Before surgery online class website link:  [email or web address]   After surgery online class:  Needed -     After surgery online class website link:  [email or web address]   Nurse visit for weigh-in and information:    -     Pre-assessment clinic visit with anesthesia team for H&P:    -     Final labs (Hgb, plt, T&S, UA):    -        Notes:   -           Marcela Ewing NP

## 2018-12-31 NOTE — PATIENT INSTRUCTIONS
"GOALS:  Relating To Eating:  - Eat slowly (20-30 minutes per meal), chewing foods well (25 chews per bite/applesauce consistency)  - 9\" Plate method (1/2 plate non-starchy vegetables/fruit, 1/4 plate lean protein, 1/4 plate whole grain starch - no more than 1/2 cup carb/meal)   - Instead of 2-3 sandwiches for lunch, make 1 sandwich + 1-2 snacks from 100 calorie snack sheet  - Eat 3 meals/day    Relating to beverages:  - Eliminate calorie-containing beverages.  Try reducing current intake by 50% over the next month   - Can use CESAR to flavor water     Call Tamie for questions regarding other dietitian visits [phone]    Follow up with RD in one month    Clarissa Cox, MS, RD, LD  If you need to schedule or reschedule with a dietitian please call [phone].  "

## 2018-12-31 NOTE — PROGRESS NOTES
New Bariatric Nutrition Consultation Note    Reason For Visit: Nutrition Assessment    Ge Hansen is a 33 year old presenting today for new bariatric nutrition consult.  Pt is interested in laparoscopic sleeve gastrectomy.  Patient is accompanied by self. Referred by Marcela Ewing 12/31/18.    Support System Reviewed With Patient 10/16/2018   Who do you have in your support network that can be available to help you for the first 2 weeks after surgery? Girl friend   Who can you count on for support throughout your weight loss surgery journey? Girl friend       ANTHROPOMETRICS:  Estimated body mass index is 70.9 kg/m  as calculated from the following:    Height as of an earlier encounter on 12/31/18: 1.829 m (6').    Weight as of an earlier encounter on 12/31/18: 237.1 kg (522 lb 12.8 oz).    Required weight loss goal pre-op: ? lbs from initial consult weight (goal weight ? lbs or less before surgery)  No task list created at this point       12/31/2018   I have tried the following methods to lose weight Watching portions or calories, Exercise, Slimfast       Weight Loss Questions Reviewed With Patient 12/31/2018   How long have you been overweight? Since puberty       SUPPLEMENT INFORMATION:  Multivitamin Mens 1-a-day, biotin    NUTRITION HISTORY:  Recall Diet Questions Reviewed With Patient 12/31/2018   Describe what you typically consume for breakfast (typical or most recent): none   Describe what you typically consume for lunch (typical or most recent): 2-3 sandwhiches (ham & cheese) with chips   Describe what you typically consume for supper (typical or most recent): veggies stir mcgowan or home cooking   Describe what you typically consume as snacks (typical or most recent): bottle of pop   How many ounces of water, or other low calorie drinks, do you drink daily (8 oz=1 glass)? 8 oz   How many ounces of caffeine (coffee, tea, pop) do you drink daily (8 oz=1 glass)? 48 oz   How many ounces of carbonated (pop, beer,  sparkling water) drinks do you drinky daily (8 oz=1 glass)? 4 2L bottles/day (regular)   How many ounces of juice, pop, sweet tea, sports drinks, protein drinks, other sweetened drinks, do you drink daily (8 oz=1 glass)? 48 oz   How many ounces of milk do you drink daily (8 oz=1 glass) 8 oz   Please indicate the type of milk: 1%   How often do you drink alcohol? -   If you do drink alcohol, how many drinks might you have in a day? (one drink = 5 oz. wine, 1 can/bottle of beer, 1 shot liquor) -       Eating Habits 12/31/2018   Do you have any dietary restrictions? -   Do you currently binge eat (eat a large amount of food in a short time)? -   Are you an emotional eater? -   Do you get up to eat after falling asleep? -   What foods do you crave? none       ADDITIONAL INFORMATION:  His mother had RNYGB and sister had lap band.   Just dislocated knee cap and finished PT - doing PT exercises at home.  Main goals are reduce back pain and knee pain so he can increase mobility and not feel so heavy anymore.  Pt is currently unemployed and having issues with his car which is why he had to reschedule this appointment.   Currently lives with sister and brother-in-law.  Ge says that when he eats, he barely chews his food (~2-3 chews and swallows foods practically whole).  He is wondering if he can see a dietitian closer to where he lives in [place], [place]. Writer told pt to contact Tamie.    Dining Out History Reviewed With Patient 12/31/2018   How often do you dine out? Rarely.   Where do you dine out? (select all that apply) fast food chains   What types of food do you order when you dine out? de       Physical Activity Reviewed With Patient 10/16/2018   How often do you exercise? 1 to 2 times per week   What is the duration of your exercise (in minutes)? 30 Minutes   What types of exercise do you do? walking   What keeps you from being more active?  Pain, My ability to walk or move around is limited, Shortness of  "breath       NUTRITION DIAGNOSIS:  Obesity r/t long history of self-monitoring deficit and excessive energy intake aeb BMI >30 kg/m2.    INTERVENTION:  Intervention Provided/Education Provided on post-op diet guidelines, vitamins/minerals essential post-operatively, GI anatomy of bariatric surgeries, ways to help prepare for post-op diet guidelines pre-operatively, portion/calorie-control, mindful eating. Discussed reducing soda and beer intake as well as reducing portion sizes for weight loss (plate method). Encouraged pt to use smaller plates and slow down eating pace. Also discussed importance of 3 meals per day rather than only 2 very large meals. Discussed using protein shakes to help with weight loss and pt agreed to try this in the future. Did not have time to go over all of [facility] products during this initial visit but will discuss next visit. Provided encouragement and support. Provided pt with list of goals RD contact information.      Questions Reviewed With Patient 10/16/2018   How ready are you to make changes regarding your weight? Number 1 = Not ready at all to make changes up to 10 = very ready. 10   How confident are you that you can change? 1 = Not confident that you will be successful making changes up to 10 = very confident. 10       Patient Understanding: good  Expected Compliance: good    GOALS:  Relating To Eating:  - Eat slowly (20-30 minutes per meal), chewing foods well (25 chews per bite/applesauce consistency)  - 9\" Plate method (1/2 plate non-starchy vegetables/fruit, 1/4 plate lean protein, 1/4 plate whole grain starch - no more than 1/2 cup carb/meal)   - Instead of 2-3 sandwiches for lunch, make 1 sandwich + 1-2 snacks from 100 calorie snack sheet  - Eat 3 meals/day    Relating to beverages:  - Eliminate calorie-containing beverages.  Try reducing current intake by 50% over the next month   - Can use CESAR to flavor water     Call Tamie for questions regarding other dietitian visits " [phone]    Follow-Up:   Recommend 1 month follow up visits to assist with lifestyle changes or per insurance.      Time spent with patient: 45 minutes.  Clarissa Cox MS, RD, LD

## 2018-12-31 NOTE — LETTER
12/31/2018       RE: Ge Hansen  [address] [place] [id number]     Dear Colleague,    Thank you for referring your patient, Ge Hansen, to the [facility] SURGICAL WEIGHT MANAGEMENT at [facility]. Please see a copy of my visit note below.    New Bariatric Nutrition Consultation Note    Reason For Visit: Nutrition Assessment    Ge Hansen is a 33 year old presenting today for new bariatric nutrition consult.  Pt is interested in laparoscopic sleeve gastrectomy.  Patient is accompanied by self. Referred by Marcela Ewing 12/31/18.    Support System Reviewed With Patient 10/16/2018   Who do you have in your support network that can be available to help you for the first 2 weeks after surgery? Girl friend   Who can you count on for support throughout your weight loss surgery journey? Girl friend       ANTHROPOMETRICS:  Estimated body mass index is 70.9 kg/m  as calculated from the following:    Height as of an earlier encounter on 12/31/18: 1.829 m (6').    Weight as of an earlier encounter on 12/31/18: 237.1 kg (522 lb 12.8 oz).    Required weight loss goal pre-op: ? lbs from initial consult weight (goal weight ? lbs or less before surgery)  No task list created at this point       12/31/2018   I have tried the following methods to lose weight Watching portions or calories, Exercise, Slimfast       Weight Loss Questions Reviewed With Patient 12/31/2018   How long have you been overweight? Since puberty       SUPPLEMENT INFORMATION:  Multivitamin Mens 1-a-day, biotin    NUTRITION HISTORY:  Recall Diet Questions Reviewed With Patient 12/31/2018   Describe what you typically consume for breakfast (typical or most recent): none   Describe what you typically consume for lunch (typical or most recent): 2-3 sandwhiches (ham & cheese) with chips   Describe what you typically consume for supper (typical or most recent): veggies stir mcgowan or home cooking   Describe what you typically  consume as snacks (typical or most recent): bottle of pop   How many ounces of water, or other low calorie drinks, do you drink daily (8 oz=1 glass)? 8 oz   How many ounces of caffeine (coffee, tea, pop) do you drink daily (8 oz=1 glass)? 48 oz   How many ounces of carbonated (pop, beer, sparkling water) drinks do you drinky daily (8 oz=1 glass)? 4 2L bottles/day (regular)   How many ounces of juice, pop, sweet tea, sports drinks, protein drinks, other sweetened drinks, do you drink daily (8 oz=1 glass)? 48 oz   How many ounces of milk do you drink daily (8 oz=1 glass) 8 oz   Please indicate the type of milk: 1%   How often do you drink alcohol? -   If you do drink alcohol, how many drinks might you have in a day? (one drink = 5 oz. wine, 1 can/bottle of beer, 1 shot liquor) -       Eating Habits 12/31/2018   Do you have any dietary restrictions? -   Do you currently binge eat (eat a large amount of food in a short time)? -   Are you an emotional eater? -   Do you get up to eat after falling asleep? -   What foods do you crave? none       ADDITIONAL INFORMATION:  His mother had RNYGB and sister had lap band.   Just dislocated knee cap and finished PT - doing PT exercises at home.  Main goals are reduce back pain and knee pain so he can increase mobility and not feel so heavy anymore.  Pt is currently unemployed and having issues with his car which is why he had to reschedule this appointment.   Currently lives with sister and brother-in-law.  Ge says that when he eats, he barely chews his food (~2-3 chews and swallows foods practically whole).  He is wondering if he can see a dietitian closer to where he lives in [place], [place]. Writer told pt to contact Tamie.    Dining Out History Reviewed With Patient 12/31/2018   How often do you dine out? Rarely.   Where do you dine out? (select all that apply) fast food chains   What types of food do you order when you dine out? de       Physical Activity Reviewed With  "Patient 10/16/2018   How often do you exercise? 1 to 2 times per week   What is the duration of your exercise (in minutes)? 30 Minutes   What types of exercise do you do? walking   What keeps you from being more active?  Pain, My ability to walk or move around is limited, Shortness of breath       NUTRITION DIAGNOSIS:  Obesity r/t long history of self-monitoring deficit and excessive energy intake aeb BMI >30 kg/m2.    INTERVENTION:  Intervention Provided/Education Provided on post-op diet guidelines, vitamins/minerals essential post-operatively, GI anatomy of bariatric surgeries, ways to help prepare for post-op diet guidelines pre-operatively, portion/calorie-control, mindful eating. Discussed reducing soda and beer intake as well as reducing portion sizes for weight loss (plate method). Encouraged pt to use smaller plates and slow down eating pace. Also discussed importance of 3 meals per day rather than only 2 very large meals. Discussed using protein shakes to help with weight loss and pt agreed to try this in the future. Did not have time to go over all of [employer] products during this initial visit but will discuss next visit. Provided encouragement and support. Provided pt with list of goals RD contact information.      Questions Reviewed With Patient 10/16/2018   How ready are you to make changes regarding your weight? Number 1 = Not ready at all to make changes up to 10 = very ready. 10   How confident are you that you can change? 1 = Not confident that you will be successful making changes up to 10 = very confident. 10       Patient Understanding: good  Expected Compliance: good    GOALS:  Relating To Eating:  - Eat slowly (20-30 minutes per meal), chewing foods well (25 chews per bite/applesauce consistency)  - 9\" Plate method (1/2 plate non-starchy vegetables/fruit, 1/4 plate lean protein, 1/4 plate whole grain starch - no more than 1/2 cup carb/meal)   - Instead of 2-3 sandwiches for lunch, make 1 " sandwich + 1-2 snacks from 100 calorie snack sheet  - Eat 3 meals/day    Relating to beverages:  - Eliminate calorie-containing beverages.  Try reducing current intake by 50% over the next month   - Can use CESAR to flavor water     Call Tamie for questions regarding other dietitian visits [phone]    Follow-Up:   Recommend 1 month follow up visits to assist with lifestyle changes or per insurance.      Time spent with patient: 45 minutes.  Clarissa Cox MS, RD, LD

## 2019-01-01 NOTE — PROGRESS NOTES
"    New Bariatric Surgery Consultation Note    RE: Ge Hansen  MR#: [id number]  : 1985      Referring provider:       10/16/2018   Who referred you? Wesley bernal       Chief Complaint/Reason for visit: evaluation for possible weight loss surgery    Dear Wesley Bernal MD (General),    I had the pleasure of seeing your patient, Ge Hansen, to evaluate his obesity and consider him for possible weight loss surgery. As you know, Ge Hansen is 33 year old.  He has a height of 6' 0\", a weight of 522 lbs 12.8 oz, and calculated Body mass index is 70.9 kg/m .      HISTORY OF PRESENT ILLNESS:  Weight Loss History Reviewed with Patient 2018   How long have you been overweight? Since puberty   What is the most that you have ever weighed? 550   What is the most weight you have lost? 30   I have tried the following methods to lose weight Watching portions or calories, Exercise, Slimfast   I have tried the following weight loss medications? (Check all that apply) None   Have you ever had weight loss surgery? No     Had considered Jaya-N-Y gastric bypass at age 20- 350 lb. \"Family emergency\" caused him to reconsider surgery at the time.   Weight issues since then, gradual gain over time.      slimfast meal replacements- didn't like that  Atkins diet in the past with good results  Has done medications with PCP in the past- lost about 50lbs \"years ago\". He feels like he did not lose enough weight with the medications and has gained more weight since then.     Recent end of a relationship and motivation from sister and brother-in-law have brought him back to considering weight loss surgery.     CO-MORBIDITIES OF OBESITY INCLUDE:     2018   I have the following co-morbidities associated with obesity: Sleep Apnea, Asthma, Weight Bearing Joint Pain   Do you use a CPAP? -   uses bi-pap nightly    Reflux- occasional, more at night    Denies HTN but states he BP is high occasionally. Denies other cardiac history.   Denies " Diabetes.       PAST MEDICAL HISTORY:  Insomnia  Lactose Intolerant  Sleep Apnea  Obesity hypoventilation syndrome  Tobacco use  Asthma  Elevated blood pressure readings  Elevated blood sugar readings  Depression (not current)  ADHD (not current)    PAST SURGICAL HISTORY:  Left shoulder rotator cuff repair      FAMILY HISTORY:   Family History   Problem Relation Age of Onset     Obesity Sister    father has obesity and DMII- lost a leg due to complications  Sister had lap band x 2 occasions, successful the second attempt  Mom had kandis-en-y, had lots of side effects ()    SOCIAL HISTORY:   Social History Questions Reviewed With Patient 10/16/2018   Which best describes your employment status (select all that apply) I am unemployed   Which best describes your marital status: in a relationship   Do you have children? No   Who do you have in your support network that can be available to help you for the first 2 weeks after surgery? Girl friend   Who can you count on for support throughout your weight loss surgery journey? Girl friend   Can you afford 3 meals a day?  Yes   Can you afford 50-60 dollars a month for vitamins? 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 Did not re-fill out today  No longer in a relationship, sister and brother-in-law would support him through this    HABITS:     10/16/2018   How often do you drink alcohol? 2-3 times a week   If you do drink alcohol, how many drinks might you have in a day? (one drink = 5 oz. wine, 1 can/bottle of beer, 1 shot liquor) 1 or 2   Do you currently use any of the following Nicotine products? cigarettes, e-cigarettes, smokeless tobacco   Have you ever used any of the following nicotine products? No   Have you or are you currently using street drugs or prescription strength medication for which you do not have a prescription for?  "No   Do you have a history of chemical dependency (alcohol or drug abuse)? No     Plans to discuss smoking cessation with PCP    PSYCHOLOGICAL HISTORY:   Psychological History Reviewed With Patient 10/16/2018   Have you ever attempted suicide? Never.   Have you had thoughts of suicide in the past year? No   Have you ever been hospitalized for mental illness or a suicide attempt? Never.   Do you have a history of chronic pain? No   Have you ever been diagnosed with fibromyalgia? No   Are you currently being treated for any of the following? (select all that apply) I do not have a mental illness       ROS:     10/16/2018   Skin:  Skin fold rashes (groin or other folds), Varicose veins   HEENT: Headaches   Musculoskeletal: Joint Pain, Back pain, Limited mobility, Arthritis   Cardiovascular: Shortness of breath with activity   Pulmonary: Snoring, Awaken from sleep to catch your breath, People have told me I stop breathing while asleep, Excessively sleep during the day, Experience morning headaches   Gastrointestinal: Reflux   Genitourinary: None of the above   Hematological: None of the above   Neurological: None of the above     History of insomnia, having difficulty sleeping recently again even though using bi-pap, \"mind can shut off\"      EATING BEHAVIORS:     10/16/2018   Have you or anyone else thought that you had an eating disorder? No   Do you currently binge eat (eat a large amount of food in a short time)? No   Are you an emotional eater? No   Do you get up to eat after falling asleep? Yes     Eating out of boredom and cravings  Wakes at night to eat  Eats starting at lunch and then into the evening, skips breakfast    EXERCISE:     10/16/2018   How often do you exercise? 1 to 2 times per week   What is the duration of your exercise (in minutes)? 30 Minutes   What types of exercise do you do? walking   What keeps you from being more active?  Pain, My ability to walk or move around is limited, Shortness of " breath       MEDICATIONS:  Current Outpatient Medications   Medication     albuterol (PROAIR HFA/PROVENTIL HFA/VENTOLIN HFA) 108 (90 Base) MCG/ACT inhaler     albuterol (PROAIR HFA/PROVENTIL HFA/VENTOLIN HFA) 108 (90 Base) MCG/ACT inhaler     albuterol (PROVENTIL) (2.5 MG/3ML) 0.083% neb solution     albuterol (2.5 MG/3ML) 0.083% neb solution     fluticasone-salmeterol (ADVAIR DISKUS) 250-50 MCG/DOSE diskus inhaler     furosemide (LASIX) 20 MG tablet     order for DME     No current facility-administered medications for this visit.        ALLERGIES:  No Known Allergies    LABS/IMAGING/MEDICAL RECORDS REVIEW:     A1C 5.5 9/2018    PHYSICAL EXAM:  /77   Pulse 113   Temp 98.4  F (36.9  C) (Oral)   Ht 1.829 m (6')   Wt (!) 237.1 kg (522 lb 12.8 oz)   SpO2 93%   BMI 70.90 kg/m        In summary, Ge Hansen has Class III obesity with a body mass index of Body mass index is 70.9 kg/m . kg/m2 and the comorbidities stated above. He completed an informational seminar and is a candidate for the laparoscopic gastric sleeve.  He will have to complete the following pre-requisites:    Received weight loss goal of 78 lb prior to surgery.  6 consecutive dietician visits (insurance required).  Follow up in 2-3 months with me to discuss ongoing medications and progress.   Psychological Evaluation with MMPI and clearance for weight loss surgery.  Letter of clearance from the following PCP and Sleep.    Today in the office we discussed gastric sleeve surgery. Preoperative, perioperative, and postoperative processes, management, and follow up were addressed.  Risks and benefits were outlined including the risk of death, staple line leak (1-2%), PE, DVT, ulcer, worsening GERD, N/V, stricture, hernia, wound infection, weight regain, and vitamin deficiencies. I emphasized exercise and activity along with appropriate food choice as the main foundation for weight loss with surgery providing surgical reinforcement of this.  All  questions were answered.  A goal sheet and support group handout were given to the patient.    Once the patient has completed the requirements in their task list and there are no further recommendations, the pt will be allowed to see the surgeon of her choice for consultation on the laparoscopic gastric sleeve surgery. Patient verbalizes understanding of the process to surgery and expectations for the postoperative period including the need for lifelong lifestyle changes, vitamin supplementation, and laboratory monitoring.   ** Discussed smoking cessation with patient. Encouraged cessation as soon as possible as being completely tobacco free is a requirement for surgery. Would recommend (xyban) buproprion for smoking cessation which may help some with weight loss (at least weight neutral).     Sincerely,    Marcela Ewing NP    I spent a total of 60 minutes face to face with Ge during today's office visit. Over 50% of this time was spent counseling the patient and/or coordinating care.      Bariatric Task List  Status:  Is patient a candidate for bariatric surgery?:  patient is a candidate for bariatric surgery -     Cleared to schedule surgeon consult?:    -     Status:    -     Surgeon: Dr. Manuel -     Tentative surgery month/year: July 2019 -        Insurance: Insurance:  Blue Plus ([facility]) -     C[employer]a: PCP Recommendation and Medical Clearance:    -     HP Referral:    -     Other:    -        Patient Info: Initial Weight:  522lb -     Date of Initial Weight/Height:  12/31/2018 -     Goal Weight (lbs):  444 -     Required Weight Loss:  78lb -     Surgery Type:  sleeve gastrectomy -     Multidisciplinary Meeting:    -        Dietician Visits: Structured weight loss required by insurance?:  structured weight loss required -     Dietician Visit 1:  Needed -     Dietician Visit 2:  Needed -     Dietician Visit 3:  Needed -     Dietician Visit 4:  Needed -     Dietician Visit 5:  Needed -     Dietician Visit 6:   "Needed -     Dietician Visit additional:    -     Clearance from dietician to see surgeon?:    -     Dietician Notes:    -        Psychological Evaluation: Psych eval:  Needed -     Therapist letter of support:    -     Psychiatrist letter of support:    -     Establish care with therapist:    -     Complete eating disorder evaluation:    -     Letter of clearance from therapist/eating disorder program:    -     Other:    -        Lab Work: Complete Blood Count:  Needed -     Comprehensive Metabolic Panel:  Needed -     Vitamin D:  Needed -     Hgb A1c:  Needed -     PTH:  Needed -     H. pylori:    -     TSH:    -     Nicotine Testing:    -     Other:    -        Consults/ Clearance: Sleep Medicine:  Needed -     Cardiac:    -     Pain:   -     Dental:    -     Endocrine:    -     Gastroenterology:    -     Vascular Medicine:    -     Hematology:    -     Medical Weight Management:   -     Physical Therapy/Exercise:    -     Nephrology:    -     Neurology:    -     Pulmonology:    -     Rheumatology:    -     Other    -     Other    -     Other    -           Testing: UGI:    -     EGD:    -     Other:    -        PCP: Establish care with PCP:    -     Follow up with PCP:    -     PCP letter of support:  Needed -        Smoking: Quit tobacco use (3 months smoke free)?:  Needed -     Quit date:    -        Patient Education:  Information Session:  Completed -     Given \"Making your decision\" handout?:  Given \"\"Making your decision\"\" handout? -     Given support group information?:  support group contact information provided -     Attended support group?:  Needed -     Support plan in place?:  Needed -     Research consents signed?:  research consent signed -        Additional Surgery Requirements: Review Coag plan:    -     HgA1c <8:    -     Inpatient pain consult:    -     Final nicotine screen:  Needed -     Dental work complete:    -     Birth control plan:    -     Other Requirements:    -     Other " Requirements:    -        Final Tasks:  Before surgery online class:  Completed -     Before surgery online class website link:  [email or web address]   After surgery online class:  Needed -     After surgery online class website link:  [email or web address]   Nurse visit for weigh-in and information:    -     Pre-assessment clinic visit with anesthesia team for H&P:    -     Final labs (Hgb, plt, T&S, UA):    -        Notes:   -

## 2019-01-07 ENCOUNTER — APPOINTMENT (OUTPATIENT)
Dept: OCCUPATIONAL MEDICINE | Facility: CLINIC | Age: 34
End: 2019-01-07

## 2019-01-07 PROCEDURE — 99000 SPECIMEN HANDLING OFFICE-LAB: CPT | Performed by: FAMILY MEDICINE

## 2019-01-10 ENCOUNTER — TELEPHONE (OUTPATIENT)
Dept: SLEEP MEDICINE | Facility: CLINIC | Age: 34
End: 2019-01-10

## 2019-01-10 NOTE — TELEPHONE ENCOUNTER
Patient called me back today at 9:04 am and I explained to him that he is not meeting the compliance and he's at 23.3% of usage only. I let him know he has until 01/25/19 to meet the compliance. patient said he is having problems sleeping at night and I let him know to reach out to the Dr's to see if they can suggest anything to help him sleep better. Patient also said he is in the middle of looking for a job too so he's not sleeping. Patient will reach out to the Dr's.

## 2019-01-10 NOTE — TELEPHONE ENCOUNTER
I CALLED PATIENT TODAY AT 8:49 AM TO LET HIM KNOW HE IS NOT MEETING THE COMPLIANCE PER INSURANCE GUIDELINES AND WANTED TO SEE IF THERE IS ANYTHING I CAN DO TO HELP. NO ANSWER LEFT MESSAGE.

## 2019-01-30 ENCOUNTER — TELEPHONE (OUTPATIENT)
Dept: SLEEP MEDICINE | Facility: CLINIC | Age: 34
End: 2019-01-30

## 2019-01-30 NOTE — TELEPHONE ENCOUNTER
I called patient today 01/30/19 at 2:27 pm to let him know he has not meet compliance and will have to return the machine back to us. He said he is in the middle of looking for a job and don't know when he can drop it off. I explained to him that he should try to return the machine back to us asa because when the insurance stops paying for it, what ever month he still has the machine he may get a bill. Patient said he will call me back to let me know when he can stop in the [facility] clinic so i can put him on the schedule.

## 2019-02-07 ENCOUNTER — TELEPHONE (OUTPATIENT)
Dept: SLEEP MEDICINE | Facility: CLINIC | Age: 34
End: 2019-02-07

## 2019-02-07 NOTE — TELEPHONE ENCOUNTER
I called patient today 02/07/19 at 9:47 am to let him know he will need to return the machine and that I spoke with him on 01/30/19 about this as well. I let him know insurance will stop paying for the machine and he will need to return it to us and to call me. No answer Left message.

## 2019-02-22 ENCOUNTER — TELEPHONE (OUTPATIENT)
Dept: SLEEP MEDICINE | Facility: CLINIC | Age: 34
End: 2019-02-22

## 2019-02-22 NOTE — TELEPHONE ENCOUNTER
I called patient today 02/22/19 at 12:23 pm to remind him that he will need to return the bipap machine back us. No answer left message for him to call me back here in [place].

## 2019-04-05 ENCOUNTER — HOSPITAL ENCOUNTER (EMERGENCY)
Facility: CLINIC | Age: 34
Discharge: HOME | End: 2019-04-05
Attending: NURSE PRACTITIONER | Admitting: NURSE PRACTITIONER
Payer: COMMERCIAL

## 2019-04-05 VITALS
WEIGHT: 315 LBS | OXYGEN SATURATION: 96 % | RESPIRATION RATE: 20 BRPM | HEART RATE: 108 BPM | BODY MASS INDEX: 41.75 KG/M2 | HEIGHT: 73 IN | TEMPERATURE: 98.5 F | SYSTOLIC BLOOD PRESSURE: 121 MMHG | DIASTOLIC BLOOD PRESSURE: 80 MMHG

## 2019-04-05 DIAGNOSIS — L40.9 PSORIASIS: ICD-10-CM

## 2019-04-05 DIAGNOSIS — K04.7 DENTAL INFECTION: ICD-10-CM

## 2019-04-05 PROCEDURE — 99214 OFFICE O/P EST MOD 30 MIN: CPT | Mod: Z6 | Performed by: NURSE PRACTITIONER

## 2019-04-05 PROCEDURE — G0463 HOSPITAL OUTPT CLINIC VISIT: HCPCS | Performed by: NURSE PRACTITIONER

## 2019-04-05 RX ORDER — HYDROCODONE BITARTRATE AND ACETAMINOPHEN 5; 325 MG/1; MG/1
1 TABLET ORAL EVERY 6 HOURS PRN
Qty: 10 TABLET | Refills: 0 | Status: SHIPPED | OUTPATIENT
Start: 2019-04-05 | End: 2019-04-08

## 2019-04-05 RX ORDER — PENICILLIN V POTASSIUM 500 MG/1
500 TABLET, FILM COATED ORAL 4 TIMES DAILY
Qty: 20 TABLET | Refills: 0 | Status: SHIPPED | OUTPATIENT
Start: 2019-04-05 | End: 2019-04-05

## 2019-04-05 RX ORDER — TRIAMCINOLONE ACETONIDE 1 MG/G
OINTMENT TOPICAL 2 TIMES DAILY
Qty: 30 G | Refills: 0 | Status: SHIPPED | OUTPATIENT
Start: 2019-04-05 | End: 2020-04-24

## 2019-04-05 RX ORDER — PENICILLIN V POTASSIUM 500 MG/1
500 TABLET, FILM COATED ORAL 4 TIMES DAILY
Qty: 28 TABLET | Refills: 0 | Status: SHIPPED | OUTPATIENT
Start: 2019-04-05 | End: 2019-05-07

## 2019-04-05 ASSESSMENT — MIFFLIN-ST. JEOR: SCORE: 2959.35

## 2019-04-05 NOTE — ED AVS SNAPSHOT
[facility] Emergency Department  [address] [facility] [address] [address]  Phone:  [phone]  Fax:  [phone]                                    Ge Hansen   MRN: [id number]    Department:  [facility] Emergency Department   Date of Visit:  4/5/2019           After Visit Summary Signature Page    I have received my discharge instructions, and my questions have been answered. I have discussed any challenges I see with this plan with the nurse or doctor.    ..........................................................................................................................................  Patient/Patient Representative Signature      ..........................................................................................................................................  Patient Representative Print Name and Relationship to Patient    ..................................................               ................................................  Date                                   Time    ..........................................................................................................................................  Reviewed by Signature/Title    ...................................................              ..............................................  Date                                               Time          22EPIC Rev 08/18

## 2019-04-06 ASSESSMENT — ENCOUNTER SYMPTOMS
HEADACHES: 0
COUGH: 0
NAUSEA: 0
FEVER: 0
FACIAL SWELLING: 1
VOMITING: 0
SORE THROAT: 0

## 2019-04-06 NOTE — ED PROVIDER NOTES
History     Chief Complaint   Patient presents with     Dental Pain     broke tooth, right side of face swelling present.      Derm Problem     psoriasis outbreak around genitals, skin cracking, painful, inner leg rash.      HPI  Ge Hansen is a 33 year old male who presents for the following complaints:  #1-- right lower dental pain.  Reports tooth broke off a couple days ago. Increased pain and face swelling started today. Denies fevers. Denies  Nausea or vomiting.    #2--hx psoriasis. Worsening outbreak around his scrotum and perineum.  Difficulty keeping the area dry. 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    Allergies:  No Known Allergies    Problem List:    Patient Active Problem List    Diagnosis Date Noted     Morbid obesity (H) 09/13/2018     Priority: Medium     Elevated glucose 09/13/2018     Priority: Medium     Obesity hypoventilation syndrome (H) 09/13/2018     Priority: Medium     Some asthma hx too.         Tobacco use 09/13/2018     Priority: Medium     MAYDA (obstructive sleep apnea) 09/13/2018     Priority: Medium     Study Date: 10/3/2018( 520.0 lbs).  The combined apnea/hypopnea index was 134.8 events per hour (central apnea/hypopnea index was 4.1 events per hour). The REM AHI was - events per hour. The supine AHI was 128.9 events per hour. The RERA index was - events per hour. The RDI was 134.8 events per hour. Transcutaneous carbon dioxide monitoring was used, and significant hypoventilation was present with a maximum change from ~11 mmHg and 46.0 minutes at or greater than 55 mmHg. VBG was consistent with chronic compensated respiratory acidosis (pH 7.36, pCO2 62, HCO3 35). Baseline oxygen saturation was 89.4%. Lowest oxygen saturation was 52.0%. Time spent less than or equal to 88% was 59.5 minutes. Time spent less than or equal to 89% was 65.5 minutes. The final pressure was BiLevel 23/13/0 cmH2O with an AHI of 1.7 events per hour. Time in REM supine on final pressure was 49.0 minutes.           "Elevated blood pressure reading without diagnosis of hypertension 09/13/2018     Priority: Medium     Asthma 06/01/2006     Priority: Medium        Past Medical History:    Past Medical History:   Diagnosis Date     ADHD      Asthma      Lactose intolerance      Obesity hypoventilation syndrome (H)      Sleep apnea      Tobacco use        Past Surgical History:    Past Surgical History:   Procedure Laterality Date     ROTATOR CUFF REPAIR RT/LT         Family History:    Family History   Problem Relation Age of Onset     Obesity Sister        Social History:  Marital Status:  Single [1]  Social History     Tobacco Use     Smoking status: Current Every Day Smoker     Packs/day: 1.00     Years: 10.00     Pack years: 10.00     Types: Cigarettes     Start date: 7/7/1997     Smokeless tobacco: Current User     Types: Chew, Snuff   Substance Use Topics     Alcohol use: Yes     Drug use: No        Medications:      HYDROcodone-acetaminophen (NORCO) 5-325 MG tablet   penicillin V (VEETID) 500 MG tablet   triamcinolone (KENALOG) 0.1 % external ointment   albuterol (2.5 MG/3ML) 0.083% neb solution   albuterol (PROAIR HFA/PROVENTIL HFA/VENTOLIN HFA) 108 (90 Base) MCG/ACT inhaler   albuterol (PROAIR HFA/PROVENTIL HFA/VENTOLIN HFA) 108 (90 Base) MCG/ACT inhaler   albuterol (PROVENTIL) (2.5 MG/3ML) 0.083% neb solution   order for DME         Review of Systems   Constitutional: Negative for fever.   HENT: Positive for dental problem and facial swelling. Negative for congestion, ear pain and sore throat.    Respiratory: Negative for cough.    Gastrointestinal: Negative for nausea and vomiting.   Skin: Positive for rash.   Neurological: Negative for headaches.       Physical Exam   BP: 121/80  Pulse: 108  Temp: 98.5  F (36.9  C)  Resp: 20  Height: 185.4 cm (6' 1\")  Weight: (!) 195 kg (430 lb)  SpO2: 96 %      Physical Exam  General: healthy, alert and mild distress  ENT: ENT exam normal, no neck nodes or sinus tenderness. No " trismus.  Mouth: facial swelling is Present and mild   tenderness to touch at tooth: #28   Teeth carious:no    Teeth broken: yes   Visible abscess: no     Skin: several areas of plaques on elbows.  : plaques noted around genitals as well as erythema.      ED Course        Procedures             No results found for this or any previous visit (from the past 24 hour(s)).    Medications - No data to display    Assessments & Plan (with Medical Decision Making)   Instructed to make a dental appointment. Return for fevers or increased facial swelling.    Dental infection:  Penicillin 500 mg 4 times a day for 7 days.  Use ibuprofen 400-600 mg up to 4 times per day if needed for pain. Stop if it is causing nausea or abdominal pain.   Add Norco 5-325 (hydrocodone-acetaminophen) 1 tablet up to every 6 hours if needed for pain. Do not use alcohol, operate machinery, drive, or climb on ladders for 8 hours after taking Norco. Use docusate (100mg) 2 times a day to prevent constipation while on narcotics.      Psoriasis:  Triamcinolone 0.1% ointment, apply twice a day to rash.  Recommend a visit with dermatology.     I have reviewed the nursing notes.    I have reviewed the findings, diagnosis, plan and need for follow up with the patient.         Medication List      Started    HYDROcodone-acetaminophen 5-325 MG tablet  Commonly known as:  NORCO  1 tablet, Oral, EVERY 6 HOURS PRN     penicillin V 500 MG tablet  Commonly known as:  VEETID  500 mg, Oral, 4 TIMES DAILY     triamcinolone 0.1 % external ointment  Commonly known as:  KENALOG  Topical, 2 TIMES DAILY        ASK your doctor about these medications    nebulizer Laverne  1 Device, Mouth/Throat, EVERY 4 HOURS PRN, Use for nebulizer treatments every 4 hours as needed for shortness of breath or wheezing  Ask about: Should I take this medication?            Final diagnoses:   Dental infection   Psoriasis       4/5/2019   [facility] EMERGENCY DEPARTMENT     Mouna Jefferson  Silvana, SONIA MERLOS  04/06/19 [id number]

## 2019-04-08 ENCOUNTER — OFFICE VISIT (OUTPATIENT)
Dept: FAMILY MEDICINE | Facility: CLINIC | Age: 34
End: 2019-04-08
Payer: COMMERCIAL

## 2019-04-08 VITALS
RESPIRATION RATE: 16 BRPM | DIASTOLIC BLOOD PRESSURE: 84 MMHG | HEART RATE: 102 BPM | BODY MASS INDEX: 67.18 KG/M2 | WEIGHT: 315 LBS | SYSTOLIC BLOOD PRESSURE: 138 MMHG | TEMPERATURE: 98 F | OXYGEN SATURATION: 94 %

## 2019-04-08 DIAGNOSIS — E66.01 MORBID OBESITY (H): Chronic | ICD-10-CM

## 2019-04-08 DIAGNOSIS — M25.562 ACUTE PAIN OF LEFT KNEE: Primary | ICD-10-CM

## 2019-04-08 PROCEDURE — 99213 OFFICE O/P EST LOW 20 MIN: CPT | Performed by: NURSE PRACTITIONER

## 2019-04-08 NOTE — PATIENT INSTRUCTIONS
Thank you for choosing [facility].  You may be receiving an email and/or telephone survey request from [facility] Customer Experience regarding your visit today.  Please take a few minutes to respond to the survey to let us know how we are doing.      If you have questions or concerns, please contact us via [employer] or you can contact your care team at [phone].    Our Clinic hours are:  Monday 6:40 am  to 7:00 pm  Tuesday -Friday 6:40 am to 5:00 pm    The [place] outpatient lab hours are:  Monday - Friday 6:10 am to 4:45 pm  Saturdays 7:00 am to 11:00 am  Appointments are required, call [phone]    If you have clinical questions after hours or would like to schedule an appointment,  call the clinic at [phone].

## 2019-04-08 NOTE — PROGRESS NOTES
"  SUBJECTIVE:                                                    Ge Hansen is a 33 year old male who presents to clinic today for the following health issues:      Joint Pain    Onset: today 4/8/19    Description:   Location: left knee  Character: Sharp    Intensity: moderate    Progression of Symptoms: worse    Accompanying Signs & Symptoms:  Other symptoms: none    History:     Previous similar pain: YES- \"HX of dislocated knee cap on the same side\" 1 year ago.     Precipitating factors:   Trauma or overuse: YES- twisted while going up stairs    Alleviating factors:  Improved by: ice    Therapies Tried and outcome: Ice    Patient requesting work note for today and tomorrow due to the pain.     Obesity:             -------------------------------------    Problem list and histories reviewed & adjusted, as indicated.  Additional history: as documented    Patient Active Problem List   Diagnosis     Morbid obesity (H)     Elevated glucose     Obesity hypoventilation syndrome (H)     Tobacco use     MAYDA (obstructive sleep apnea)     Elevated blood pressure reading without diagnosis of hypertension     Asthma     Past Surgical History:   Procedure Laterality Date     ROTATOR CUFF REPAIR RT/LT         Social History     Tobacco Use     Smoking status: Current Every Day Smoker     Packs/day: 1.00     Years: 10.00     Pack years: 10.00     Types: Cigarettes     Start date: 7/7/1997     Smokeless tobacco: Current User     Types: Chew, Snuff   Substance Use Topics     Alcohol use: Yes     Family History   Problem Relation Age of Onset     Obesity Sister            ROS:  Constitutional, HEENT, cardiovascular, pulmonary, GI, , musculoskeletal, neuro, skin, endocrine and psych systems are negative, except as otherwise noted.    OBJECTIVE:     /84   Pulse 102   Temp 98  F (36.7  C) (Tympanic)   Resp 16   Wt (!) 231 kg (509 lb 3.2 oz)   SpO2 94%   BMI 67.18 kg/m    Body mass index is 67.18 kg/m .  GENERAL " APPEARANCE: healthy, alert and no distress  ORTHO: Knee Exam: Inspection: No effusion  Tender: entire patella and surrounding site  Active Range of Motion: full flexion, no pain with flexion, full extension, no pain with extension      ASSESSMENT/PLAN:       1. Acute pain of left knee  Occurred today when walking up stairs- previously had similar incident that he received physical therapy 6 months ago - recommend follow up with sport med clinic for recurrent symptoms. Patient is morbidly obese and body habitus makes it difficult to evaluate knee and surrounding area    - SPORTS MEDICINE REFERRAL  - work noted given for 2 days as requested by patient  - recommend ice, ACE wrap for support/Ibuprofen prn    2. Morbid obesity (H)          SONIA Patel CNP  [facility]

## 2019-04-08 NOTE — LETTER
[facility]  [address] [address]  Phone: [phone]    April 8, 2019        Ge Hansen  [address] [place] [id number]          To whom it may concern:    RE: Ge Hansen    Patient was seen and treated today at our clinic. Please excuse Ge from work 4/8/19 and 4/9/19 due to medical reasons     Please contact me for questions or concerns.      Sincerely,        SONIA Michelle CNP

## 2019-04-09 ASSESSMENT — ASTHMA QUESTIONNAIRES: ACT_TOTALSCORE: 25

## 2019-04-24 ENCOUNTER — DOCUMENTATION ONLY (OUTPATIENT)
Dept: SLEEP MEDICINE | Facility: CLINIC | Age: 34
End: 2019-04-24

## 2019-04-24 NOTE — PROGRESS NOTES
6 month [facility] Recheck Visit     Diagnostic AHI: 134.8  PSG    Subjective measures:   Pt states that he is still currently using but that he needs to replace the filter.  He is feeling benefit from therapy.  I told him that we aren't receiving any data, that it looks like his machine is unplugged. He is going to force a call and see if we get data.  Will do two week data recheck    Assessment:   No data available.  Action plan:  pt to follow up per provider request

## 2019-05-07 ENCOUNTER — OFFICE VISIT (OUTPATIENT)
Dept: FAMILY MEDICINE | Facility: CLINIC | Age: 34
End: 2019-05-07
Payer: COMMERCIAL

## 2019-05-07 ENCOUNTER — ANCILLARY PROCEDURE (OUTPATIENT)
Dept: GENERAL RADIOLOGY | Facility: CLINIC | Age: 34
End: 2019-05-07
Attending: NURSE PRACTITIONER
Payer: COMMERCIAL

## 2019-05-07 VITALS
DIASTOLIC BLOOD PRESSURE: 84 MMHG | SYSTOLIC BLOOD PRESSURE: 126 MMHG | HEIGHT: 73 IN | OXYGEN SATURATION: 92 % | HEART RATE: 100 BPM | RESPIRATION RATE: 18 BRPM | BODY MASS INDEX: 41.75 KG/M2 | WEIGHT: 315 LBS | TEMPERATURE: 98 F

## 2019-05-07 DIAGNOSIS — J45.41 MODERATE PERSISTENT ASTHMA WITH EXACERBATION: Primary | ICD-10-CM

## 2019-05-07 DIAGNOSIS — E66.2 OBESITY HYPOVENTILATION SYNDROME (H): Chronic | ICD-10-CM

## 2019-05-07 DIAGNOSIS — J35.1 TONSILLAR HYPERTROPHY: ICD-10-CM

## 2019-05-07 DIAGNOSIS — R05.9 COUGH: ICD-10-CM

## 2019-05-07 LAB
DEPRECATED S PYO AG THROAT QL EIA: NORMAL
SPECIMEN SOURCE: NORMAL

## 2019-05-07 PROCEDURE — 99214 OFFICE O/P EST MOD 30 MIN: CPT | Mod: 25 | Performed by: NURSE PRACTITIONER

## 2019-05-07 PROCEDURE — 87880 STREP A ASSAY W/OPTIC: CPT | Performed by: NURSE PRACTITIONER

## 2019-05-07 PROCEDURE — 94640 AIRWAY INHALATION TREATMENT: CPT | Performed by: NURSE PRACTITIONER

## 2019-05-07 PROCEDURE — 71046 X-RAY EXAM CHEST 2 VIEWS: CPT | Performed by: FAMILY MEDICINE

## 2019-05-07 PROCEDURE — 87081 CULTURE SCREEN ONLY: CPT | Performed by: NURSE PRACTITIONER

## 2019-05-07 RX ORDER — PREDNISONE 20 MG/1
20 TABLET ORAL 2 TIMES DAILY
Qty: 10 TABLET | Refills: 0 | Status: SHIPPED | OUTPATIENT
Start: 2019-05-07 | End: 2020-04-24

## 2019-05-07 RX ORDER — ALBUTEROL SULFATE 0.83 MG/ML
2.5 SOLUTION RESPIRATORY (INHALATION) ONCE
Status: CANCELLED | OUTPATIENT
Start: 2019-05-07 | End: 2019-05-07

## 2019-05-07 RX ORDER — ALBUTEROL SULFATE 90 UG/1
2 AEROSOL, METERED RESPIRATORY (INHALATION) EVERY 6 HOURS PRN
Qty: 8.5 G | Refills: 3 | Status: SHIPPED | OUTPATIENT
Start: 2019-05-07 | End: 2019-11-13

## 2019-05-07 RX ORDER — ALBUTEROL SULFATE 0.83 MG/ML
2.5 SOLUTION RESPIRATORY (INHALATION) ONCE
Status: COMPLETED | OUTPATIENT
Start: 2019-05-07 | End: 2019-05-07

## 2019-05-07 RX ORDER — ALBUTEROL SULFATE 0.83 MG/ML
2.5 SOLUTION RESPIRATORY (INHALATION) EVERY 4 HOURS PRN
Qty: 1 BOX | Refills: 3 | Status: SHIPPED | OUTPATIENT
Start: 2019-05-07 | End: 2019-11-13

## 2019-05-07 RX ADMIN — ALBUTEROL SULFATE 2.5 MG: 0.83 SOLUTION RESPIRATORY (INHALATION) at 10:30

## 2019-05-07 ASSESSMENT — MIFFLIN-ST. JEOR: SCORE: 3367.58

## 2019-05-07 NOTE — PATIENT INSTRUCTIONS
Stop the amoxicillin and start the Augmentin    Start the prednisone    Follow up if symptoms do not improve or worsen.

## 2019-05-07 NOTE — PROGRESS NOTES
SUBJECTIVE:   Ge Hansen is a 33 year old male who presents to clinic today for the following   health issues:    RESPIRATORY SYMPTOMS      Duration: 2 days     Description  cough, wheezing and SOB / Felt as if he had a fever     Severity: mild     Accompanying signs and symptoms: None    History (predisposing factors):  Asthma ./ tobacco abuse     Precipitating or alleviating factors: None    Therapies tried and outcome:  Inhaler/ nebs     Was at a bonfire on Saturday night and symptoms started after that  Out of albuterol inhaler, did try a neb and that seemed to have helped for a little while  On amoxicillin for a tooth infection-started 2 days ago-dentist follow up next week    Asthma Follow-Up    Was ACT completed today?    Yes    ACT Total Scores 4/8/2019   ACT TOTAL SCORE (Goal Greater than or Equal to 20) 25   In the past 12 months, how many times did you visit the emergency room for your asthma without being admitted to the hospital? 0   In the past 12 months, how many times were you hospitalized overnight because of your asthma? 0       Recent asthma triggers that patient is dealing with: Respiratory symptoms         Additional history: as documented    Reviewed  and updated as needed this visit by clinical staff  Tobacco  Allergies  Meds  Med Hx  Surg Hx  Fam Hx  Soc Hx        Reviewed and updated as needed this visit by Provider         Patient Active Problem List   Diagnosis     Morbid obesity (H)     Elevated glucose     Obesity hypoventilation syndrome (H)     Tobacco use     MAYDA (obstructive sleep apnea)     Elevated blood pressure reading without diagnosis of hypertension     Asthma     Past Surgical History:   Procedure Laterality Date     ROTATOR CUFF REPAIR RT/LT         Social History     Tobacco Use     Smoking status: Current Every Day Smoker     Packs/day: 1.00     Years: 10.00     Pack years: 10.00     Types: Cigarettes     Start date: 7/7/1997     Smokeless tobacco: Current User      "Types: Chew, Snuff   Substance Use Topics     Alcohol use: Yes     Family History   Problem Relation Age of Onset     Obesity Sister          Current Outpatient Medications   Medication Sig Dispense Refill     albuterol (PROAIR HFA/PROVENTIL HFA/VENTOLIN HFA) 108 (90 Base) MCG/ACT inhaler Inhale 2 puffs into the lungs every 6 hours as needed for shortness of breath / dyspnea or wheezing 8.5 g 3     albuterol (PROVENTIL) (2.5 MG/3ML) 0.083% neb solution Take 1 vial (2.5 mg) by nebulization every 4 hours as needed for shortness of breath / dyspnea or wheezing 1 Box 3     amoxicillin-clavulanate (AUGMENTIN) 875-125 MG tablet Take 1 tablet by mouth 2 times daily 20 tablet 0     order for DME Equipment being ordered: Nebulizer 1 Package 0     predniSONE (DELTASONE) 20 MG tablet Take 20 mg by mouth 2 times daily. 10 tablet 0     triamcinolone (KENALOG) 0.1 % external ointment Apply topically 2 times daily 30 g 0     No Known Allergies  Labs reviewed in EPIC    ROS:  Constitutional, HEENT, cardiovascular, pulmonary, gi and gu systems are negative, except as otherwise noted.    OBJECTIVE:     /84 (Cuff Size: Thigh)   Pulse 100   Temp 98  F (36.7  C) (Tympanic)   Resp 18   Ht 1.854 m (6' 1\")   Wt (!) 235.9 kg (520 lb)   SpO2 92%   BMI 68.61 kg/m    Body mass index is 68.61 kg/m .  GENERAL: healthy, alert and no distress  HENT: ear canals and TM's normal, nose and mouth without ulcers or lesions  NECK: no adenopathy  RESP: wheezes throughout  CV: regular rate and rhythm, normal S1 S2, no S3 or S4, no murmur, click or rub, no peripheral edema and peripheral pulses strong  ABDOMEN: soft, nontender, no hepatosplenomegaly, no masses and bowel sounds normal  PSYCH: mentation appears normal, affect normal/bright    Diagnostic Test Results:  Xray -   XR CHEST 2 VW 5/7/2019 10:57 AM     HISTORY: Cough      Impression     IMPRESSION: Negative exam.    MARINE BERG MD       ASSESSMENT/PLAN:     1. Moderate persistent " asthma with exacerbation  With significant symptoms neb given in clinic with some relief of symptoms.  Start prednisone and change from amoxicillin to Augmentin for better coverage.  Symptomatic care and follow up discussed.- albuterol (PROVENTIL) (2.5 MG/3ML) 0.083% neb solution; Take 1 vial (2.5 mg) by nebulization every 4 hours as needed for shortness of breath / dyspnea or wheezing  Dispense: 1 Box; Refill: 3  - albuterol (PROAIR HFA/PROVENTIL HFA/VENTOLIN HFA) 108 (90 Base) MCG/ACT inhaler; Inhale 2 puffs into the lungs every 6 hours as needed for shortness of breath / dyspnea or wheezing  Dispense: 8.5 g; Refill: 3  - amoxicillin-clavulanate (AUGMENTIN) 875-125 MG tablet; Take 1 tablet by mouth 2 times daily  Dispense: 20 tablet; Refill: 0  - predniSONE (DELTASONE) 20 MG tablet; Take 20 mg by mouth 2 times daily.  Dispense: 10 tablet; Refill: 0  - INHALATION/NEBULIZER TREATMENT, INITIAL    2. Obesity hypoventilation syndrome (H)  Per above.   - predniSONE (DELTASONE) 20 MG tablet; Take 20 mg by mouth 2 times daily.  Dispense: 10 tablet; Refill: 0  - INHALATION/NEBULIZER TREATMENT, INITIAL    3. Tonsillar hypertrophy  Rapid negative  - Strep, Rapid Screen    4. Cough  Per above.   - albuterol (PROVENTIL) (2.5 MG/3ML) 0.083% neb solution; Take 1 vial (2.5 mg) by nebulization every 4 hours as needed for shortness of breath / dyspnea or wheezing  Dispense: 1 Box; Refill: 3  - albuterol (PROVENTIL) neb solution 2.5 mg  - XR Chest 2 Views; Future  - Beta strep group A culture    Home care instructions were reviewed with the patient. The risks, benefits and treatment options of prescribed medications or other treatments have been discussed with the patient. The patient verbalized their understanding and should call or follow up if no improvement or if they develop further problems.    Patient Instructions   Stop the amoxicillin and start the Augmentin    Start the prednisone    Follow up if symptoms do not improve or  worsen.        SONIA Gallego [facility]

## 2019-05-07 NOTE — LETTER
My Asthma Action Plan  Name: Ge Hansen   YOB: 1985  Date: 5/14/2019   My doctor: SONIA Gallego CNP   My clinic: [facility]        My Control Medicine: None  My Rescue Medicine: Albuterol nebulizer solution .  Albuterol (Proair/Ventolin/Proventil) inhaler .   My Asthma Severity: .  Avoid your asthma triggers: .  Respiratory symptoms             GREEN ZONE   Good Control    I feel good    No cough or wheeze    Can work, sleep and play without asthma symptoms       Take your asthma control medicine every day.     1. If exercise triggers your asthma, take your rescue medication    15 minutes before exercise or sports, and    During exercise if you have asthma symptoms  2. Spacer to use with inhaler: If you have a spacer, make sure to use it with your inhaler             YELLOW ZONE Getting Worse  I have ANY of these:    I do not feel good    Cough or wheeze    Chest feels tight    Wake up at night   1. Keep taking your Green Zone medications  2. Start taking your rescue medicine:    every 20 minutes for up to 1 hour. Then every 4 hours for 24-48 hours.  3. If you stay in the Yellow Zone for more than 12-24 hours, contact your doctor.  4. If you do not return to the Green Zone in 12-24 hours or you get worse, start taking your oral steroid medicine if prescribed by your provider.           RED ZONE Medical Alert - Get Help  I have ANY of these:    I feel awful    Medicine is not helping    Breathing getting harder    Trouble walking or talking    Nose opens wide to breathe       1. Take your rescue medicine NOW  2. If your provider has prescribed an oral steroid medicine, start taking it NOW  3. Call your doctor NOW  4. If you are still in the Red Zone after 20 minutes and you have not reached your doctor:    Take your rescue medicine again and    Call 911 or go to the emergency room right away    See your regular doctor within 2 weeks of an Emergency Room or Urgent Care visit  for follow-up treatment.          Annual Reminders:  Meet with Asthma Educator,  Flu Shot in the Fall, consider Pneumonia Vaccination for patients with asthma (aged 19 and older).    Pharmacy: [id number] [place] [id number] [place]                      Asthma Triggers  How To Control Things That Make Your Asthma Worse    Triggers are things that make your asthma worse.  Look at the list below to help you find your triggers and what you can do about them.  You can help prevent asthma flare-ups by staying away from your triggers.      Trigger                                                          What you can do   Cigarette Smoke  Tobacco smoke can make asthma worse. Do not allow smoking in your home, car or around you.  Be sure no one smokes at a child s day care or school.  If you smoke, ask your health care provider for ways to help you quit.  Ask family members to quit too.  Ask your health care provider for a referral to Quit Plan to help you quit smoking, or call 3-920-277-PLAN.     Colds, Flu, Bronchitis  These are common triggers of asthma. Wash your hands often.  Don t touch your eyes, nose or mouth.  Get a flu shot every year.     Dust Mites  These are tiny bugs that live in cloth or carpet. They are too small to see. Wash sheets and blankets in hot water every week.   Encase pillows and mattress in dust mite proof covers.  Avoid having carpet if you can. If you have carpet, vacuum weekly.   Use a dust mask and HEPA vacuum.   Pollen and Outdoor Mold  Some people are allergic to trees, grass, or weed pollen, or molds. Try to keep your windows closed.  Limit time out doors when pollen count is high.   Ask you health care provider about taking medicine during allergy season.     Animal Dander  Some people are allergic to skin flakes, urine or saliva from pets with fur or feathers. Keep pets with fur or feathers out of your home.    If you can t keep the pet outdoors, then keep the pet out  of your bedroom.  Keep the bedroom door closed.  Keep pets off cloth furniture and away from stuffed toys.     Mice, Rats, and Cockroaches  Some people are allergic to the waste from these pests.   Cover food and garbage.  Clean up spills and food crumbs.  Store grease in the refrigerator.   Keep food out of the bedroom.   Indoor Mold  This can be a trigger if your home has high moisture. Fix leaking faucets, pipes, or other sources of water.   Clean moldy surfaces.  Dehumidify basement if it is damp and smelly.   Smoke, Strong Odors, and Sprays  These can reduce air quality. Stay away from strong odors and sprays, such as perfume, powder, hair spray, paints, smoke incense, paint, cleaning products, candles and new carpet.   Exercise or Sports  Some people with asthma have this trigger. Be active!  Ask your doctor about taking medicine before sports or exercise to prevent symptoms.    Warm up for 5-10 minutes before and after sports or exercise.     Other Triggers of Asthma  Cold air:  Cover your nose and mouth with a scarf.  Sometimes laughing or crying can be a trigger.  Some medicines and food can trigger asthma.

## 2019-05-07 NOTE — NURSING NOTE
The following nebulizer treatment was given:     MEDICATION: Albuterol Sulfate 2.5 mg  : [employer]  LOT #: [id number]  EXPIRATION DATE:  01/2020  NDC # 7916-8138-67     Nebulizer Start Time:  10:30  Nebulizer Stop Time:  10:40  See Vital Signs Flowsheet

## 2019-05-08 ENCOUNTER — DOCUMENTATION ONLY (OUTPATIENT)
Dept: SLEEP MEDICINE | Facility: CLINIC | Age: 34
End: 2019-05-08

## 2019-05-08 LAB
BACTERIA SPEC CULT: NORMAL
SPECIMEN SOURCE: NORMAL

## 2019-05-08 NOTE — PROGRESS NOTES
STM Recheck Visit    Subjective measures:   Still not getting pt data.  When pt gets home he will force a call and let us know if he gets an error code.     Assessment:   No data available  Action plan: follow up per provider request   Diagnostic AHI: 134.8

## 2019-05-28 ENCOUNTER — OFFICE VISIT (OUTPATIENT)
Dept: ORTHOPEDICS | Facility: CLINIC | Age: 34
End: 2019-05-28
Payer: COMMERCIAL

## 2019-05-28 VITALS
SYSTOLIC BLOOD PRESSURE: 126 MMHG | WEIGHT: 315 LBS | DIASTOLIC BLOOD PRESSURE: 84 MMHG | HEIGHT: 73 IN | BODY MASS INDEX: 41.75 KG/M2

## 2019-05-28 DIAGNOSIS — G89.29 CHRONIC PAIN OF LEFT KNEE: Primary | ICD-10-CM

## 2019-05-28 DIAGNOSIS — S83.005A PATELLAR DISLOCATION, LEFT, INITIAL ENCOUNTER: ICD-10-CM

## 2019-05-28 DIAGNOSIS — S83.242S ACUTE MEDIAL MENISCUS TEAR OF LEFT KNEE, SEQUELA: ICD-10-CM

## 2019-05-28 DIAGNOSIS — M25.562 CHRONIC PAIN OF LEFT KNEE: Primary | ICD-10-CM

## 2019-05-28 PROCEDURE — 99244 OFF/OP CNSLTJ NEW/EST MOD 40: CPT | Performed by: PEDIATRICS

## 2019-05-28 ASSESSMENT — MIFFLIN-ST. JEOR: SCORE: 3372.12

## 2019-05-28 NOTE — LETTER
5/28/2019         RE: Ge Hansen  [address] [place] [id number]        Dear Colleague,    Thank you for referring your patient, Ge Hansen, to the [facility]. Please see a copy of my visit note below.    Sports Medicine Clinic Visit    PCP: Wesley Davis    Ge Hansen is a 33 year old male who is seen  in consultation at the request of  Abi Gorman C.N.P. presenting with left knee pain    Injury: He reports left knee pain for 9 months. He reports he dislocated his patella after slipping on a flight of stairs. He was seen in the ED in August of 2018. He had an MRI completed at [facility] in [place]. He did 6 weeks of physical therapy in October and November 2018. He has recently started a new job involving standing. He has pain in the anterior and medial knee and states the knee feels unstable.  No new injury.    Location of Pain: left anterior knee  Duration of Pain: 9 month(s)  Rating of Pain at worst: 10/10  Rating of Pain Currently: 7/10  Symptoms are better with: Ice and Rest  Symptoms are worse with: standing and walking  Additional Features:   Positive: weakness   Negative: swelling, bruising, popping, grinding, catching, locking, instability, paresthesias and numbness  Other evaluation and/or treatments so far consists of: physical therapy with limited improvement in hispain.  Prior History of related problems: nothing    Social History: works at [employer]    Review of Systems  Skin: no bruising, no swelling  Musculoskeletal: as above  Neurologic: no numbness, paresthesias  Remainder of review of systems is negative including constitutional, CV, pulmonary, GI, except as noted in HPI or medical history.    Patient's current problem list, past medical and surgical history, and family history were reviewed.    Patient Active Problem List   Diagnosis     Morbid obesity (H)     Elevated glucose     Obesity hypoventilation syndrome (H)     Tobacco use     MAYDA  "(obstructive sleep apnea)     Elevated blood pressure reading without diagnosis of hypertension     Asthma     Past Medical History:   Diagnosis Date     ADHD      Asthma      Lactose intolerance      Obesity hypoventilation syndrome (H)      Sleep apnea      Tobacco use      Past Surgical History:   Procedure Laterality Date     ROTATOR CUFF REPAIR RT/LT       Family History   Problem Relation Age of Onset     Obesity Sister          Objective  /84   Ht 1.854 m (6' 1\")   Wt (!) 236.3 kg (521 lb)   BMI 68.74 kg/m       GENERAL APPEARANCE: healthy, alert, no distress and over weight   GAIT: antalgic  SKIN: no suspicious lesions or rashes  HEENT: Sclera clear, anicteric  CV: good peripheral pulses  RESP: Breathing not labored  NEURO: Normal strength and tone, mentation intact and speech normal  PSYCH:  mentation appears normal and affect normal/bright    Bilateral Knee exam  *difficult exam due to body habitus  Inspection:      No appreciable swelling or effusion due to body habitus    Patella:      Normal patellar tracking noted through range of motion bilateral    Tender:      medial joint line bilateral    Non Tender:      remainder of knee area bilateral    Knee ROM:      Flexion 110 degrees bilateral       Extension 0 degrees bilateral    Hip ROM:     symmetric    Strength:      5-/5 with knee extension left    Special Tests:     positive (+) Shanell left       neg (-) anterior drawer left       neg (-) posterior drawer left       neg (-) varus at 0 deg and 30 deg left       neg (-) valgus at 0 deg and 30 deg left    Gait:      normal    Neurovascular:      2+ peripheral pulses bilaterally and brisk capillary refill       sensation grossly intact    Radiology  I visualized and reviewed these images with the patient  LEFT KNEE THREE VIEWS   8/11/2018 6:34 PM   HISTORY: Stepping up and knee gave out.   COMPARISON: None.                                                        IMPRESSION: Normal.    EXAM: " MRI EXAMINATION OF THE LEFT KNEE  CLINICAL INFORMATION: Left knee pain ongoing for months. No specific injury. No history of surgery to this area. Feeling of instability.  TECHNICAL INFORMATION: Multiple proton density images were obtained in the axial, sagittal and coronal planes, followed by proton density fat suppression images in the axial and sagittal planes. Coronal T2 fat suppression images were also obtained.   This exam was acquired on a 1.2 Keira open scanner.  INTERPRETATION:  Bones: Minimal osseous cystic changes involving the roof of the intercondylar notch.   No evidence for subchondral edema signal or cystic change.   No evidence for occult fracture, osseous contusion or stress reaction. No other abnormal bone marrow edema pattern is identified.   Ligaments and tendons: The medial collateral ligament is intact, without acute sprain or tear.   The iliotibial band, fibular collateral ligament, biceps femoris tendon and popliteus tendon all are intact.   Residua of a mild chronic sprain injury versus early myxoid degeneration of intact ACL. The posterior cruciate ligament is intact.   Extensor Mechanism: The patellar and quadriceps tendons are intact. The medial and lateral retinacula are intact.   Knee Joint: There is a small knee joint effusion. No discrete popliteal cyst.  There is no discrete loose body seen within the joint.   Series 12 images 8 through 15 demonstrate multiple small soft tissue ganglion cysts over approximately 1.9 cm centered superficial to the distal portion of the ACL.   Medial Compartment: [Meniscal evaluation is limited on this exam. As seen on series 12 image 11 and series 11 image 11, suspicion for a flap tear and slender adjacent flap fragment towards the far posterior horn of the medial meniscus.   No evidence for a parameniscal cyst.   There is no focal chondral defect. No significant changes of medial compartment chondromalacia.   Lateral Compartment: Meniscal evaluation  is limited on this exam. No convincing evidence for a lateral meniscus tear.   No displaced flap fragment or parameniscal cyst.   There is no focal chondral defect. No other significant changes of chondromalacia.   Patellofemoral articulation: Lateral tilt and subluxation of the patella in relation to the trochlear groove. No evidence for patella tonio. No evidence for a chondral defect.   The apex of the tibial tubercle is situated 2.4 cm lateral to the level of the midline trochlear groove.  CONCLUSION: The image quality is degraded by the patient's body habitus combined with some patient motion artifact as well.  1. Meniscal evaluation is limited on this exam.  2. Suspicion for a flap tear and slender adjacent flap fragment involving the far posterior horn medial meniscus.  3. No convincing evidence for a lateral meniscus tear.  4. Residua of a mild chronic sprain versus early myxoid degeneration involving the ACL. Several small soft tissue ganglion cysts measuring 1.9 cm in total centered anterior to the distal ACL.  5. Findings as may be associated with patellar maltracking. Prominent lateral tilt and subluxation of the patella in relation to the trochlear groove. Abnormal elevated TT-TG distance of 2.4 cm. Intact patellofemoral articular cartilage.  KES  Electronically signed on 9/19/2018 12:52:00 PM by Dharmesh Becerril M.D.     Assessment:  1. Chronic pain of left knee    2. Acute medial meniscus tear of left knee, sequela    3. Patellar dislocation, left, initial encounter      Chronic knee pain despite PT.  I recommend orthopedic surgery referral given concern for meniscal flap tear.  Discussed conservative treatment including PT to help with strength, trial of bracing.  Could consider US guided corticosteroid injection pending surgical consultation.    Plan:  - Today's Plan of Care:  Referral to an Orthopedic Surgeon  Medical Equipment: XXXL Knee brace    -We also discussed other future treatment  options:  Rehab: Physical Therapy or US guided Steroid injection of knee    Follow Up: with orthopedic surgeon    Concerning signs and symptoms were reviewed.  The patient expressed understanding of this management plan and all questions were answered at this time.    Alisson Rodriguez MD [facility]  Primary Care Sports Medicine  [facility]      Again, thank you for allowing me to participate in the care of your patient.        Sincerely,        Alisson Rodriguez MD

## 2019-05-28 NOTE — PROGRESS NOTES
Sports Medicine Clinic Visit    PCP: Wesley Davis    Ge Hansen is a 33 year old male who is seen  in consultation at the request of  Abi Gorman C.N.P. presenting with left knee pain    Injury: He reports left knee pain for 9 months. He reports he dislocated his patella after slipping on a flight of stairs. He was seen in the ED in August of 2018. He had an MRI completed at [facility] in [place]. He did 6 weeks of physical therapy in October and November 2018. He has recently started a new job involving standing. He has pain in the anterior and medial knee and states the knee feels unstable.  No new injury.    Location of Pain: left anterior knee  Duration of Pain: 9 month(s)  Rating of Pain at worst: 10/10  Rating of Pain Currently: 7/10  Symptoms are better with: Ice and Rest  Symptoms are worse with: standing and walking  Additional Features:   Positive: weakness   Negative: swelling, bruising, popping, grinding, catching, locking, instability, paresthesias and numbness  Other evaluation and/or treatments so far consists of: physical therapy with limited improvement in hispain.  Prior History of related problems: nothing    Social History: works at [employer]    Review of Systems  Skin: no bruising, no swelling  Musculoskeletal: as above  Neurologic: no numbness, paresthesias  Remainder of review of systems is negative including constitutional, CV, pulmonary, GI, except as noted in HPI or medical history.    Patient's current problem list, past medical and surgical history, and family history were reviewed.    Patient Active Problem List   Diagnosis     Morbid obesity (H)     Elevated glucose     Obesity hypoventilation syndrome (H)     Tobacco use     MAYDA (obstructive sleep apnea)     Elevated blood pressure reading without diagnosis of hypertension     Asthma     Past Medical History:   Diagnosis Date     ADHD      Asthma      Lactose intolerance      Obesity hypoventilation syndrome (H)      Sleep apnea       "Tobacco use      Past Surgical History:   Procedure Laterality Date     ROTATOR CUFF REPAIR RT/LT       Family History   Problem Relation Age of Onset     Obesity Sister          Objective  /84   Ht 1.854 m (6' 1\")   Wt (!) 236.3 kg (521 lb)   BMI 68.74 kg/m      GENERAL APPEARANCE: healthy, alert, no distress and over weight   GAIT: antalgic  SKIN: no suspicious lesions or rashes  HEENT: Sclera clear, anicteric  CV: good peripheral pulses  RESP: Breathing not labored  NEURO: Normal strength and tone, mentation intact and speech normal  PSYCH:  mentation appears normal and affect normal/bright    Bilateral Knee exam  *difficult exam due to body habitus  Inspection:      No appreciable swelling or effusion due to body habitus    Patella:      Normal patellar tracking noted through range of motion bilateral    Tender:      medial joint line bilateral    Non Tender:      remainder of knee area bilateral    Knee ROM:      Flexion 110 degrees bilateral       Extension 0 degrees bilateral    Hip ROM:     symmetric    Strength:      5-/5 with knee extension left    Special Tests:     positive (+) Shanell left       neg (-) anterior drawer left       neg (-) posterior drawer left       neg (-) varus at 0 deg and 30 deg left       neg (-) valgus at 0 deg and 30 deg left    Gait:      normal    Neurovascular:      2+ peripheral pulses bilaterally and brisk capillary refill       sensation grossly intact    Radiology  I visualized and reviewed these images with the patient  LEFT KNEE THREE VIEWS   8/11/2018 6:34 PM   HISTORY: Stepping up and knee gave out.   COMPARISON: None.                                                        IMPRESSION: Normal.    EXAM: MRI EXAMINATION OF THE LEFT KNEE  CLINICAL INFORMATION: Left knee pain ongoing for months. No specific injury. No history of surgery to this area. Feeling of instability.  TECHNICAL INFORMATION: Multiple proton density images were obtained in the axial, sagittal " and coronal planes, followed by proton density fat suppression images in the axial and sagittal planes. Coronal T2 fat suppression images were also obtained.   This exam was acquired on a 1.2 Keira open scanner.  INTERPRETATION:  Bones: Minimal osseous cystic changes involving the roof of the intercondylar notch.   No evidence for subchondral edema signal or cystic change.   No evidence for occult fracture, osseous contusion or stress reaction. No other abnormal bone marrow edema pattern is identified.   Ligaments and tendons: The medial collateral ligament is intact, without acute sprain or tear.   The iliotibial band, fibular collateral ligament, biceps femoris tendon and popliteus tendon all are intact.   Residua of a mild chronic sprain injury versus early myxoid degeneration of intact ACL. The posterior cruciate ligament is intact.   Extensor Mechanism: The patellar and quadriceps tendons are intact. The medial and lateral retinacula are intact.   Knee Joint: There is a small knee joint effusion. No discrete popliteal cyst.  There is no discrete loose body seen within the joint.   Series 12 images 8 through 15 demonstrate multiple small soft tissue ganglion cysts over approximately 1.9 cm centered superficial to the distal portion of the ACL.   Medial Compartment: [Meniscal evaluation is limited on this exam. As seen on series 12 image 11 and series 11 image 11, suspicion for a flap tear and slender adjacent flap fragment towards the far posterior horn of the medial meniscus.   No evidence for a parameniscal cyst.   There is no focal chondral defect. No significant changes of medial compartment chondromalacia.   Lateral Compartment: Meniscal evaluation is limited on this exam. No convincing evidence for a lateral meniscus tear.   No displaced flap fragment or parameniscal cyst.   There is no focal chondral defect. No other significant changes of chondromalacia.   Patellofemoral articulation: Lateral tilt and  subluxation of the patella in relation to the trochlear groove. No evidence for patella tonio. No evidence for a chondral defect.   The apex of the tibial tubercle is situated 2.4 cm lateral to the level of the midline trochlear groove.  CONCLUSION: The image quality is degraded by the patient's body habitus combined with some patient motion artifact as well.  1. Meniscal evaluation is limited on this exam.  2. Suspicion for a flap tear and slender adjacent flap fragment involving the far posterior horn medial meniscus.  3. No convincing evidence for a lateral meniscus tear.  4. Residua of a mild chronic sprain versus early myxoid degeneration involving the ACL. Several small soft tissue ganglion cysts measuring 1.9 cm in total centered anterior to the distal ACL.  5. Findings as may be associated with patellar maltracking. Prominent lateral tilt and subluxation of the patella in relation to the trochlear groove. Abnormal elevated TT-TG distance of 2.4 cm. Intact patellofemoral articular cartilage.  KES  Electronically signed on 9/19/2018 12:52:00 PM by Dharmesh Becerril M.D.     Assessment:  1. Chronic pain of left knee    2. Acute medial meniscus tear of left knee, sequela    3. Patellar dislocation, left, initial encounter      Chronic knee pain despite PT.  I recommend orthopedic surgery referral given concern for meniscal flap tear.  Discussed conservative treatment including PT to help with strength, trial of bracing.  Could consider US guided corticosteroid injection pending surgical consultation.    Plan:  - Today's Plan of Care:  Referral to an Orthopedic Surgeon  Medical Equipment: XXXL Knee brace    -We also discussed other future treatment options:  Rehab: Physical Therapy or US guided Steroid injection of knee    Follow Up: with orthopedic surgeon    Concerning signs and symptoms were reviewed.  The patient expressed understanding of this management plan and all questions were answered at this  time.    Alisson Rodriguez MD CAQ  Primary Care Sports Medicine  [facility]

## 2019-05-28 NOTE — PATIENT INSTRUCTIONS
Plan:  - Today's Plan of Care:  Referral to an Orthopedic Surgeon  Medical Equipment: XXXL Knee brace    -We also discussed other future treatment options:  Rehab: Physical Therapy or US guided Steroid injection of knee    Follow Up: with orthopedic surgeon    If you have any further questions for your physician or physician s care team you can call [phone] and use option 3 to leave a voice message. Calls received during business hours will be returned same day.

## 2019-05-28 NOTE — LETTER
May 28, 2019      eG Hansen  [address] [place] [id number]        To Whom It May Concern,     Ge was seen for left knee pain.  He may return to work, please limit shifts to 4-5 hours.  Follow up will be in 1-2 weeks from orthopedic surgery.      Sincerely,        Alisson Rodriguez MD

## 2019-11-07 ENCOUNTER — HEALTH MAINTENANCE LETTER (OUTPATIENT)
Age: 34
End: 2019-11-07

## 2019-11-13 ENCOUNTER — OFFICE VISIT (OUTPATIENT)
Dept: FAMILY MEDICINE | Facility: CLINIC | Age: 34
End: 2019-11-13
Payer: COMMERCIAL

## 2019-11-13 VITALS
SYSTOLIC BLOOD PRESSURE: 118 MMHG | OXYGEN SATURATION: 96 % | DIASTOLIC BLOOD PRESSURE: 68 MMHG | HEART RATE: 92 BPM | TEMPERATURE: 98 F

## 2019-11-13 DIAGNOSIS — L85.3 DRY SKIN: ICD-10-CM

## 2019-11-13 DIAGNOSIS — L84 CORN OR CALLUS: ICD-10-CM

## 2019-11-13 DIAGNOSIS — L40.9 PSORIASIS: Primary | ICD-10-CM

## 2019-11-13 DIAGNOSIS — B07.8 COMMON WART: ICD-10-CM

## 2019-11-13 DIAGNOSIS — Z13.1 SCREENING FOR DIABETES MELLITUS: ICD-10-CM

## 2019-11-13 DIAGNOSIS — J45.41 MODERATE PERSISTENT ASTHMA WITH EXACERBATION: ICD-10-CM

## 2019-11-13 DIAGNOSIS — R05.9 COUGH: ICD-10-CM

## 2019-11-13 LAB
ALBUMIN SERPL-MCNC: 3.5 G/DL (ref 3.4–5)
ALP SERPL-CCNC: 72 U/L (ref 40–150)
ALT SERPL W P-5'-P-CCNC: 79 U/L (ref 0–70)
ANION GAP SERPL CALCULATED.3IONS-SCNC: <1 MMOL/L (ref 3–14)
AST SERPL W P-5'-P-CCNC: 43 U/L (ref 0–45)
BASOPHILS # BLD AUTO: 0 10E9/L (ref 0–0.2)
BASOPHILS NFR BLD AUTO: 0.2 %
BILIRUB SERPL-MCNC: 0.4 MG/DL (ref 0.2–1.3)
BUN SERPL-MCNC: 14 MG/DL (ref 7–30)
CALCIUM SERPL-MCNC: 8.9 MG/DL (ref 8.5–10.1)
CHLORIDE SERPL-SCNC: 103 MMOL/L (ref 94–109)
CHOLEST SERPL-MCNC: 229 MG/DL
CO2 SERPL-SCNC: 34 MMOL/L (ref 20–32)
CREAT SERPL-MCNC: 0.77 MG/DL (ref 0.66–1.25)
DIFFERENTIAL METHOD BLD: NORMAL
EOSINOPHIL # BLD AUTO: 0.3 10E9/L (ref 0–0.7)
EOSINOPHIL NFR BLD AUTO: 2.9 %
ERYTHROCYTE [DISTWIDTH] IN BLOOD BY AUTOMATED COUNT: 12.5 % (ref 10–15)
GFR SERPL CREATININE-BSD FRML MDRD: >90 ML/MIN/{1.73_M2}
GLUCOSE SERPL-MCNC: 81 MG/DL (ref 70–99)
HBA1C MFR BLD: 5.4 % (ref 0–5.6)
HCT VFR BLD AUTO: 45.4 % (ref 40–53)
HDLC SERPL-MCNC: 50 MG/DL
HGB BLD-MCNC: 14.4 G/DL (ref 13.3–17.7)
LDLC SERPL CALC-MCNC: 153 MG/DL
LYMPHOCYTES # BLD AUTO: 2.9 10E9/L (ref 0.8–5.3)
LYMPHOCYTES NFR BLD AUTO: 31.2 %
MCH RBC QN AUTO: 29.7 PG (ref 26.5–33)
MCHC RBC AUTO-ENTMCNC: 31.7 G/DL (ref 31.5–36.5)
MCV RBC AUTO: 94 FL (ref 78–100)
MONOCYTES # BLD AUTO: 0.6 10E9/L (ref 0–1.3)
MONOCYTES NFR BLD AUTO: 5.9 %
NEUTROPHILS # BLD AUTO: 5.6 10E9/L (ref 1.6–8.3)
NEUTROPHILS NFR BLD AUTO: 59.8 %
NONHDLC SERPL-MCNC: 179 MG/DL
PLATELET # BLD AUTO: 226 10E9/L (ref 150–450)
POTASSIUM SERPL-SCNC: 4.7 MMOL/L (ref 3.4–5.3)
PROT SERPL-MCNC: 7.6 G/DL (ref 6.8–8.8)
RBC # BLD AUTO: 4.85 10E12/L (ref 4.4–5.9)
SODIUM SERPL-SCNC: 137 MMOL/L (ref 133–144)
TRIGL SERPL-MCNC: 128 MG/DL
WBC # BLD AUTO: 9.3 10E9/L (ref 4–11)

## 2019-11-13 PROCEDURE — 83036 HEMOGLOBIN GLYCOSYLATED A1C: CPT | Performed by: NURSE PRACTITIONER

## 2019-11-13 PROCEDURE — 80053 COMPREHEN METABOLIC PANEL: CPT | Performed by: NURSE PRACTITIONER

## 2019-11-13 PROCEDURE — 99214 OFFICE O/P EST MOD 30 MIN: CPT | Performed by: NURSE PRACTITIONER

## 2019-11-13 PROCEDURE — 80061 LIPID PANEL: CPT | Performed by: NURSE PRACTITIONER

## 2019-11-13 PROCEDURE — 36415 COLL VENOUS BLD VENIPUNCTURE: CPT | Performed by: NURSE PRACTITIONER

## 2019-11-13 PROCEDURE — 85025 COMPLETE CBC W/AUTO DIFF WBC: CPT | Performed by: NURSE PRACTITIONER

## 2019-11-13 RX ORDER — EMOLLIENT BASE
CREAM (GRAM) TOPICAL PRN
Qty: 453 G | Refills: 0 | Status: SHIPPED | OUTPATIENT
Start: 2019-11-13 | End: 2021-01-15

## 2019-11-13 RX ORDER — ALBUTEROL SULFATE 0.83 MG/ML
2.5 SOLUTION RESPIRATORY (INHALATION) EVERY 4 HOURS PRN
Qty: 1 BOX | Refills: 3 | Status: SHIPPED | OUTPATIENT
Start: 2019-11-13 | End: 2021-03-19

## 2019-11-13 RX ORDER — ALBUTEROL SULFATE 90 UG/1
2 AEROSOL, METERED RESPIRATORY (INHALATION) EVERY 6 HOURS PRN
Qty: 8.5 G | Refills: 3 | Status: SHIPPED | OUTPATIENT
Start: 2019-11-13 | End: 2020-04-27

## 2019-11-13 NOTE — LETTER
November 21, 2019      Ge Hansen  [address] [place] [id number]        Dear ,    We have been unable to reach you by telephone after multiple attempts.    We are writing to inform you of your test results.    Please make an appointment to follow up with Dr. Davis about some of your labs.     Cholesterol is elevated.    If you have any questions or concerns, please call the clinic at the number listed above.       Sincerely,        Kathleen Martinez, SONIA CNP/baj

## 2019-11-13 NOTE — PATIENT INSTRUCTIONS
Set up appointments for derm and podiatry.    We will contact you about your labs but make a follow up appointment with Dr. Davis before you leave today.    Follow-up with your primary care provider next week and as needed.    Indications for emergent return to emergency department discussed with patient, who verbalized good understanding and agreement.  Patient understands the limitations of today's evaluation.         Patient Education     Psoriasis  Psoriasis is an inflammatory condition that affects the skin and nails. You may have patches of thick, red skin (plaques) covered with silvery scales. These often appear on the elbows, knees, legs, lower back, and scalp.  The plaques itch and can be painful. People with this condition are more likely to have emotional stress and depression.  Psoriasis is not contagious. It can t spread to someone else who touches it. But it can be inherited. It is an autoimmune skin disease. This means that the immune system has an abnormal reaction. It treats healthy skin like it is a foreign substance. This causes skin cells to grow faster than normal and to stack up in raised red patches. Psoriasis is a long-term (chronic) disease. You will have flare-ups that come and go over time.  Smoking, sun exposure, and alcohol use may affect how often the psoriasis occurs and how long the flare-ups last.  There is no cure, but treatments can offer relief. Treatment can include topical creams, light therapy (phototherapy), and oral or injectable medicines.  Home care    No specific diet is needed. Eat a healthy, well-balanced diet that includes fresh fruits and vegetables, whole grains, and lean meats. Psoriasis can increase your risk for diabetes and heart disease.    Increasing omega-3 fatty acids in your diet can help improve dry skin. The best dietary sources are fatty fish (salmon, mackerel, lake trout, albacore tuna) or fish oil (such as cod liver oil). A great way to take fish oil is  to add it to a juice, shake, or smoothie. Flaxseeds and flaxseed oil, canola oil, walnuts, soybean, and tofu are converted to omega-3 fatty acid in the body.    Stay at a healthy weight. Overlapping skin folds can be a site for psoriasis plaques. If you are overweight, talk to your healthcare provider about a weight-loss program.    Bathing daily can help remove scales and calm inflamed skin. Use lukewarm water and mild soaps that have added oils, fats, and moisturizers. Avoid deodorants, antiperspirants, and antibacterial soaps. These have a drying effect. Many people find it helpful to soak in a tub with added bath oils, oatmeal, apple cider vinegar, or Epsom salts.    After bathing, put on skin cream (or a skin oil for a stronger effect).    Some exposure to UV rays from the sun can improve psoriasis. But too much sun can trigger an outbreak. It also raises your risk for skin cancer. Limit sun exposure and use sunscreen on healthy skin (at least 15 SPF).    If you are prescribed medicine, take it as directed.    Unless another steroid cream was prescribed, you may use over-the-counter hydrocortisone cream for a few weeks during symptom flare-ups.    Stop smoking. If you are a long-time smoker, this can be hard. Think about joining a stop-smoking program.    Tell your provider if your joints start to ache or get stiff.    Tell your provider if you notice changes in your fingernails.    Depression is more common among psoriasis patients. Get help if you notice changes in your mood.  Follow-up care  Follow up with your healthcare provider, or as advised.  When to seek medical advice  Call your healthcare provider right away if any of these occur:    Skin pain gets worse    Bleeding from the skin plaques that is hard to control    Signs of skin infection (redness, increasing pain, swelling, pus)    Fever of 1 degree, or higher, above your normal temperature, or as directed by your provider  Date Last Reviewed:  8/1/2016 2000-2018 [employer]. [address] [place] [address], [place] [address]. All rights reserved. This information is not intended as a substitute for professional medical care. Always follow your healthcare professional's instructions.           Patient Education     Managing Psoriasis     Take baths in warm water to help soften scales.   The success of your medical treatment depends on you. When your healthcare provider gives you a treatment plan, ask when you should expect to see results. Then, follow your plan. If your treatment does not work in the expected time, let your healthcare provider know. Psoriasis is a common disease, and it can respond to many different treatments. It depends on the location, size, and symptoms each person experiences. Some treatments are simple (tar-based therapies or topical steroids). Other treatments are complex (new biologic medicines or light therapy). Your healthcare provider will need to personalize your treatment. Psoriasis will often get better with treatment. But it can get worse later if you stop treatment or if a new illness occurs. In most cases, you can get control of your psoriasis again. You will likely need to see your healthcare provider regularly about treatment options.  Psoriasis self-care  Follow these steps to help manage your symptoms:    Take baths to help soften scales. Use warm water, not hot water. To avoid drying out your skin, limit each bath to about 15 minutes. Add bath oil or [place] salts.    After you bathe, apply lotion right away, while your skin is damp. Dry skin can make symptoms worse.    Use a scalp treatment as prescribed by your healthcare provider. There are different solutions and dosages based on your symptoms.     Seek treatment right away for any illnesses or skin injuries because they can cause flare-ups.    Manage your stress, and use relaxation techniques.    Expose your psoriatic skin to sunlight for 5 minutes a  day. But don t do this if you feel that sun exposure makes your psoriasis worse. Use sunscreen on the normal, unaffected skin, but avoid sunburns.    Use over-the-counter hydrocortisone cream for itching to reduce scaling for active outbreaks. Ask your healthcare provider about long-term use.    Stick with treatment that your healthcare provider has recommended for you, especially if it's controlling your psoriasis.    Avoid abrasive cleansers, harsh detergents, and household chemicals.  Getting good results  Now that you know more about psoriasis, the next step is up to you. Follow your healthcare provider's treatment plan and self-care routine. Doing so can help you control your symptoms. If your symptoms don t improve or they get worse, call your healthcare provider. Psoriasis can t be cured. But its symptoms can be managed.   Date Last Reviewed: 2/1/2017 2000-2018 [employer]. [address] [place] [address]. All rights reserved. This information is not intended as a substitute for professional medical care. Always follow your healthcare professional's instructions.           Patient Education     What Is Psoriasis?  Psoriasis is a chronic skin disease. Researchers believe this condition develops due to a combination of immune, genetic, and environmental factors. Psoriasis can start at any age. It is most common between ages 30 and 39 and also between ages 50 and 69. Psoriasis affects nearly equal numbers of men and women. In people with this disease, the skin grows too fast. Dead skin cells build up on the skin s surface to form inflamed, thick, silvery scales called plaques. Sometimes people form many small lesions that can hurt or have pus in them. Psoriasis does not spread from person to person.  About your symptoms  Psoriasis plaques tend to form on the elbows, knees, scalp, navel, arms, legs, and the penis or vulva. They can be unsightly, painful, and itchy. Plaques on the joints  can limit movement. On the fingernails or toenails, psoriasis can cause pitting, a change in nail color, and a change in nail shape. In some cases, psoriasis also causes arthritis. Symptoms may come and go on their own. Factors such as stress, infection, and certain medicines may cause flare-ups. If symptoms bother you, many effective medical treatments are available to help relieve symptoms. Discuss your treatment options with your healthcare provider.  External medical treatments  There are many types of external medical treatments that are used to treat the affected skin lesions. Your healthcare provider may prescribe one of many types of topical medicines. These include strong topical steroids or steroid-sparing agents. You apply them to your skin on a regular basis. Coal tar (a thick black liquid) may be applied to thick plaques. In some cases, the skin may be exposed to a special light in the healthcare provider's office. Or, you can expose the psoriatic plaques to short periods (5 minutes) of natural sun as directed by your healthcare provider. This method is called phototherapy. It can be enhanced with the use of psoralen (a type of medicine).  Internal medical treatments  Internal treatments are taken orally (by mouth) or given by injection. There are a number of new oral and injectable medicines for more severe psoriasis. Your healthcare provider can tell you more about these treatments.  Date Last Reviewed: 5/1/2017 2000-2018 [employer]. [address] [place] [address], [place] [address]. All rights reserved. This information is not intended as a substitute for professional medical care. Always follow your healthcare professional's instructions.           Patient Education     What Are Corns and Calluses?    Corns and calluses are your body s response to friction or pressure against the skin. If your foot rubs the inside of your shoe, the affected area of skin thickens. Or, if a bone is not in  the normal position, skin caught between bone and shoe or bone and ground builds up. In either case, the outer layer of skin thickens to protect the foot from unusual pressure. In many cases, corns and calluses look bad but are not harmful. However, more severe corns and calluses may hurt, become infected, destroy healthy tissue, or affect foot movement.    Corns  Corns usually grow on top of the foot, often at the toe joint. Corns can range from a slight thickening of skin to a painful soft or hard bump. They often form on top of buckled toe joints (hammer toes). If your toes curl under, corns may grow on the tips of the toes. You may also get a corn on the end of a toe if it rubs against your shoe. Corns can also grow between toes, often between the first and second toes.    Calluses  Calluses grow on the bottom of the foot or on the outer edge of a toe or heel. A callus may spread across the ball of your foot. This type of callus is usually due to a problem with a metatarsal (the long bone at the base of a toe, near the ball of the foot). A pinch callus may grow along the outer edge of the heel or the big toe. Some calluses press up into the foot instead of spreading on the outside. A callus may form a central core or plug of tissue where pressure is greatest.  Date Last Reviewed: 1/1/2018 2000-2018 [employer]. [address] [place] [address]. All rights reserved. This information is not intended as a substitute for professional medical care. Always follow your healthcare professional's instructions.           Patient Education     Treating Corns and Calluses  If your corns or calluses are mild, reducing friction may help. Different shoes, moleskin patches, or soft pads may be all the treatment you need. In more severe cases, treating tissue buildup may require your doctor s care. Sometimes custom-made shoe inserts (orthotics) or special pads are prescribed to reduce friction and  pressure.     Doctor examining senior man's foot.   Change shoes  If you have corns, your doctor may suggest wearing shoes that have more toe room. This way, buckled joints are less likely to be pinched against the top of the shoe. If you have calluses, wearing a cushioned insole, arch support, or heel counter can help reduce friction.  Visit your doctor  In some cases, your doctor may trim away the outer layers of skin that make up the corn or callus. For a painful corn, medicine may be injected beneath the built-up tissue.  Wear orthotics  Orthotics are specially made to meet the needs of your feet. They cushion calluses or divert pressure away from these problem areas. Worn as directed, orthotics help limit existing problems and prevent new ones from forming.  If you need surgery  If a bone or joint is out of place, certain parts of your foot may be under too much pressure. This can cause severe corns and calluses. In such cases, surgery may be the best way to correct the problem.  Outpatient procedures  In most cases, surgery to improve bone position is an outpatient procedure. Your doctor may cut away excess bone, reposition prominent bones, or even fuse joints. Sometimes tendons or ligaments are cut to reduce tension on a bone or joint. Your doctor will talk with you about the procedure that is best suited to your needs.  Date Last Reviewed: 7/1/2016 2000-2018 [employer]. [address] [place] [address]. All rights reserved. This information is not intended as a substitute for professional medical care. Always follow your healthcare professional's instructions.           Patient Education     Salicylic Acid topical gel, cream, lotion, solution  Brand Names: Akurza, Clear Away Liquid, Compound W, Corn/Callus Remover, Dermarest Psoriasis Scalp Treatment, Dermarest Psoriasis Skin Treatment, DuoFilm Wart Remover, Freezone, Gordofilm, Keralyt, MOSCO Callus & Corn Remover, Neutrogena Acne  Wash, Occlusal-HP, RE SA, Salactic Film, Salacyn, Salex, Salimez, Salimez Forte, Salisol, Salisol Forte, Salitech, Salitech Forte, Salitop, UltraSal-ER, VIRASAL, Wart-Off, XALIX  What is this medicine?  SALICYCLIC ACID (SAL i LAWSON ik AS id) breaks down layers of thick skin. It is used to treat common and plantar warts, psoriasis, calluses, and corns. It is also used to treat or to prevent acne.  How should I use this medicine?  This medicine is for external use only. Follow the directions on the label. Do not apply to raw or irritated skin. Avoid getting medicine in your eyes, lips, nose, mouth, or other sensitive areas. Use this medicine at regular intervals. Do not use more often than directed.  Talk to your pediatrician regarding the use of this medicine in children. Special care may be needed. This medicine is not approved for use in children under 2 years old.  What side effects may I notice from receiving this medicine?  Side effects that you should report to your doctor or health care professional as soon as possible:    allergic reactions like skin rash, itching or hives, swelling of the face, lips, or tongue  Side effects that usually do not require medical attention (report to your doctor or health care professional if they continue or are bothersome):    skin irritation  What may interact with this medicine?    medicines that change urine pH like ammonium chloride, sodium bicarbonate, and others    medicines that treat or prevent blood clots like warfarin    methotrexate    pyrazinamide    some medicines for diabetes    some medicines for gout    steroid medicines like prednisone or cortisone    What if I miss a dose?  If you miss a dose, use it as soon as you can. If it is almost time for your next dose, use only that dose. Do not use double or extra doses.  Where should I keep my medicine?  Keep out of the reach of children.  Store at room temperature between 15 and 30 degrees C (59 and 86 degrees F). Do  not freeze. Throw away any unused medicine after the expiration date.  What should I tell my health care provider before I take this medicine?  They need to know if you have any of these conditions:    child with chickenpox, the flu, or other viral infection    kidney disease    liver disease    an unusual or allergic reaction to salicylic acid, other medicines, foods, dyes, or preservatives    pregnant or trying to get pregnant    breast-feeding  What should I watch for while using this medicine?  Tell your doctor is your symptoms do not get better or if they get worse.  This medicine can make you more sensitive to the sun. Keep out of the sun. If you cannot avoid being in the sun, wear protective clothing and use sunscreen. Do not use sun lamps or tanning beds/booths.  Use of this medicine in children under 12 years or in patients with kidney or liver disease may increase the risk of serious side effects. These patients should not use this medicine over large areas of skin. If you notice symptoms such as nausea, vomiting, dizziness, loss of hearing, ringing in the ears, unusual weakness or tiredness, fast or labored breathing, diarrhea, or confusion, stop using this medicine and contact your doctor or health care professional.  NOTE:This sheet is a summary. It may not cover all possible information. If you have questions about this medicine, talk to your doctor, pharmacist, or health care provider. Copyright  2019 [employer]

## 2019-11-13 NOTE — NURSING NOTE
"Chief Complaint   Patient presents with     Psoriasis     Musculoskeletal Problem     corn on bottom of foot        Initial /68 (BP Location: Right arm, Patient Position: Chair, Cuff Size: Adult Large)   Pulse 92   Temp 98  F (36.7  C) (Tympanic)   SpO2 96%  Estimated body mass index is 68.74 kg/m  as calculated from the following:    Height as of 5/28/19: 1.854 m (6' 1\").    Weight as of 5/28/19: 236.3 kg (521 lb).    Patient presents to the clinic using No DME    Health Maintenance that is potentially due pending provider review:  NONE    n/a    Is there anyone who you would like to be able to receive your results? No  If yes have patient fill out [employer]  Salas Grimaldo M.A.        "

## 2019-11-13 NOTE — PROGRESS NOTES
Subjective     Ge Hansen is a 34 year old male who presents to clinic today for the following health issues:    HPI   Psoriasis       Duration: ongoing for a while     Description (location/character/radiation): on arms and other spots- hands, knees     Intensity:  moderate    Accompanying signs and symptoms: na     History (similar episodes/previous evaluation): None    Precipitating or alleviating factors: None    Therapies tried and outcome: Lotion      Has a corn on the bottom of right foot.  Also has a wart on foot.     Patient Active Problem List   Diagnosis     Morbid obesity (H)     Elevated glucose     Obesity hypoventilation syndrome (H)     Tobacco use     MAYDA (obstructive sleep apnea)     Elevated blood pressure reading without diagnosis of hypertension     Asthma     Past Surgical History:   Procedure Laterality Date     ROTATOR CUFF REPAIR RT/LT         Social History     Tobacco Use     Smoking status: Current Every Day Smoker     Packs/day: 1.00     Years: 10.00     Pack years: 10.00     Types: Other     Start date: 7/7/1997     Smokeless tobacco: Current User     Types: Chew, Snuff     Tobacco comment: Vaping    Substance Use Topics     Alcohol use: Yes     Family History   Problem Relation Age of Onset     Obesity Sister          Current Outpatient Medications   Medication Sig Dispense Refill     albuterol (PROAIR HFA/PROVENTIL HFA/VENTOLIN HFA) 108 (90 Base) MCG/ACT inhaler Inhale 2 puffs into the lungs every 6 hours as needed for shortness of breath / dyspnea or wheezing 8.5 g 3     albuterol (PROVENTIL) (2.5 MG/3ML) 0.083% neb solution Take 1 vial (2.5 mg) by nebulization every 4 hours as needed for shortness of breath / dyspnea or wheezing 1 Box 3     emollient (VANICREAM) external cream Apply topically as needed for other (dry skin) Dry skin 453 g 0     salicylic acid (DUOFILM) 17 % external solution Apply to wart on hand twice daily protecting surrounding skin with vasiline 9.8 mL 0      triamcinolone (KENALOG) 0.1 % external ointment Apply topically 2 times daily 30 g 0     amoxicillin-clavulanate (AUGMENTIN) 875-125 MG tablet Take 1 tablet by mouth 2 times daily 20 tablet 0     order for DME Equipment being ordered: XXXL hinged knee brace (Patient not taking: Reported on 11/13/2019) 1 Device 0     order for DME Equipment being ordered: Nebulizer 1 Package 0     predniSONE (DELTASONE) 20 MG tablet Take 20 mg by mouth 2 times daily. 10 tablet 0     No Known Allergies      Reviewed and updated as needed this visit by Provider  Tobacco  Allergies  Meds  Problems  Med Hx  Surg Hx  Fam Hx         Review of Systems   ROS COMP: Constitutional, HEENT, cardiovascular, pulmonary, GI, , musculoskeletal, neuro, skin, endocrine and psych systems are negative, except as otherwise noted.      Objective    /68 (BP Location: Right arm, Patient Position: Chair, Cuff Size: Adult Large)   Pulse 92   Temp 98  F (36.7  C) (Tympanic)   SpO2 96%   There is no height or weight on file to calculate BMI.  Physical Exam   GENERAL: healthy, alert and no distress, nontoxic in appearance, morbidly obese  EYES: Eyes grossly normal to inspection, PERRL and conjunctivae and sclerae normal  HENT: normocephalic  NECK: no adenopathy, supple with full ROM  RESP: lungs clear to auscultation - no rales, rhonchi or wheezes  CV: regular rate and rhythm, normal S1 S2, no S3 or S4, no murmur, click or rub, 1+ nonpitting peripheral edema  ABDOMEN: soft, nontender, body too large to accurately assess for masses or megaly  MS: no gross musculoskeletal defects noted, no edema  Wart on side of right thumb. Corn on bottom of right foot near base of 4th and 5th toes.    Diagnostic Test Results:  Labs reviewed in Epic  No results found for this or any previous visit (from the past 24 hour(s)).        Assessment & Plan   Problem List Items Addressed This Visit     None      Visit Diagnoses     Psoriasis    -  Primary    Relevant  "Medications    albuterol (PROAIR HFA/PROVENTIL HFA/VENTOLIN HFA) 108 (90 Base) MCG/ACT inhaler    albuterol (PROVENTIL) (2.5 MG/3ML) 0.083% neb solution    emollient (VANICREAM) external cream    salicylic acid (DUOFILM) 17 % external solution    Other Relevant Orders    DERMATOLOGY REFERRAL    Moderate persistent asthma with exacerbation        Relevant Medications    albuterol (PROAIR HFA/PROVENTIL HFA/VENTOLIN HFA) 108 (90 Base) MCG/ACT inhaler    albuterol (PROVENTIL) (2.5 MG/3ML) 0.083% neb solution    Cough        Relevant Medications    albuterol (PROAIR HFA/PROVENTIL HFA/VENTOLIN HFA) 108 (90 Base) MCG/ACT inhaler    albuterol (PROVENTIL) (2.5 MG/3ML) 0.083% neb solution    Corn or callus        Relevant Medications    emollient (VANICREAM) external cream    salicylic acid (DUOFILM) 17 % external solution    Other Relevant Orders    PODIATRY/FOOT & ANKLE SURGERY REFERRAL    Dry skin        Relevant Medications    emollient (VANICREAM) external cream    salicylic acid (DUOFILM) 17 % external solution    Common wart        Relevant Medications    salicylic acid (DUOFILM) 17 % external solution    Other Relevant Orders    DERMATOLOGY REFERRAL    Screening for diabetes mellitus        Relevant Orders    Hemoglobin A1c (Completed)    Comprehensive metabolic panel (Completed)    CBC with platelets and differential (Completed)    Lipid panel reflex to direct LDL Fasting (Completed)             Tobacco Cessation:   reports that he has been smoking other. He started smoking about 22 years ago. He has a 10.00 pack-year smoking history. His smokeless tobacco use includes chew and snuff.  Tobacco Cessation Action Plan: Information offered: Patient not interested at this time      BMI:   Estimated body mass index is 68.74 kg/m  as calculated from the following:    Height as of 5/28/19: 1.854 m (6' 1\").    Weight as of 5/28/19: 236.3 kg (521 lb).   Weight management plan: Patient was referred to their PCP to discuss a " diet and exercise plan.        Patient Instructions     Set up appointments for derm and podiatry.    We will contact you about your labs but make a follow up appointment with Dr. Davis before you leave today.    Follow-up with your primary care provider next week and as needed.    Indications for emergent return to emergency department discussed with patient, who verbalized good understanding and agreement.  Patient understands the limitations of today's evaluation.         Patient Education     Psoriasis  Psoriasis is an inflammatory condition that affects the skin and nails. You may have patches of thick, red skin (plaques) covered with silvery scales. These often appear on the elbows, knees, legs, lower back, and scalp.  The plaques itch and can be painful. People with this condition are more likely to have emotional stress and depression.  Psoriasis is not contagious. It can t spread to someone else who touches it. But it can be inherited. It is an autoimmune skin disease. This means that the immune system has an abnormal reaction. It treats healthy skin like it is a foreign substance. This causes skin cells to grow faster than normal and to stack up in raised red patches. Psoriasis is a long-term (chronic) disease. You will have flare-ups that come and go over time.  Smoking, sun exposure, and alcohol use may affect how often the psoriasis occurs and how long the flare-ups last.  There is no cure, but treatments can offer relief. Treatment can include topical creams, light therapy (phototherapy), and oral or injectable medicines.  Home care    No specific diet is needed. Eat a healthy, well-balanced diet that includes fresh fruits and vegetables, whole grains, and lean meats. Psoriasis can increase your risk for diabetes and heart disease.    Increasing omega-3 fatty acids in your diet can help improve dry skin. The best dietary sources are fatty fish (salmon, mackerel, lake trout, albacore tuna) or fish oil  (such as cod liver oil). A great way to take fish oil is to add it to a juice, shake, or smoothie. Flaxseeds and flaxseed oil, canola oil, walnuts, soybean, and tofu are converted to omega-3 fatty acid in the body.    Stay at a healthy weight. Overlapping skin folds can be a site for psoriasis plaques. If you are overweight, talk to your healthcare provider about a weight-loss program.    Bathing daily can help remove scales and calm inflamed skin. Use lukewarm water and mild soaps that have added oils, fats, and moisturizers. Avoid deodorants, antiperspirants, and antibacterial soaps. These have a drying effect. Many people find it helpful to soak in a tub with added bath oils, oatmeal, apple cider vinegar, or Epsom salts.    After bathing, put on skin cream (or a skin oil for a stronger effect).    Some exposure to UV rays from the sun can improve psoriasis. But too much sun can trigger an outbreak. It also raises your risk for skin cancer. Limit sun exposure and use sunscreen on healthy skin (at least 15 SPF).    If you are prescribed medicine, take it as directed.    Unless another steroid cream was prescribed, you may use over-the-counter hydrocortisone cream for a few weeks during symptom flare-ups.    Stop smoking. If you are a long-time smoker, this can be hard. Think about joining a stop-smoking program.    Tell your provider if your joints start to ache or get stiff.    Tell your provider if you notice changes in your fingernails.    Depression is more common among psoriasis patients. Get help if you notice changes in your mood.  Follow-up care  Follow up with your healthcare provider, or as advised.  When to seek medical advice  Call your healthcare provider right away if any of these occur:    Skin pain gets worse    Bleeding from the skin plaques that is hard to control    Signs of skin infection (redness, increasing pain, swelling, pus)    Fever of 1 degree, or higher, above your normal temperature, or  as directed by your provider  Date Last Reviewed: 8/1/2016 2000-2018 [employer]. [address] [place] [address], [place] [address]. All rights reserved. This information is not intended as a substitute for professional medical care. Always follow your healthcare professional's instructions.           Patient Education     Managing Psoriasis     Take baths in warm water to help soften scales.   The success of your medical treatment depends on you. When your healthcare provider gives you a treatment plan, ask when you should expect to see results. Then, follow your plan. If your treatment does not work in the expected time, let your healthcare provider know. Psoriasis is a common disease, and it can respond to many different treatments. It depends on the location, size, and symptoms each person experiences. Some treatments are simple (tar-based therapies or topical steroids). Other treatments are complex (new biologic medicines or light therapy). Your healthcare provider will need to personalize your treatment. Psoriasis will often get better with treatment. But it can get worse later if you stop treatment or if a new illness occurs. In most cases, you can get control of your psoriasis again. You will likely need to see your healthcare provider regularly about treatment options.  Psoriasis self-care  Follow these steps to help manage your symptoms:    Take baths to help soften scales. Use warm water, not hot water. To avoid drying out your skin, limit each bath to about 15 minutes. Add bath oil or [place] salts.    After you bathe, apply lotion right away, while your skin is damp. Dry skin can make symptoms worse.    Use a scalp treatment as prescribed by your healthcare provider. There are different solutions and dosages based on your symptoms.     Seek treatment right away for any illnesses or skin injuries because they can cause flare-ups.    Manage your stress, and use relaxation techniques.    Expose  your psoriatic skin to sunlight for 5 minutes a day. But don t do this if you feel that sun exposure makes your psoriasis worse. Use sunscreen on the normal, unaffected skin, but avoid sunburns.    Use over-the-counter hydrocortisone cream for itching to reduce scaling for active outbreaks. Ask your healthcare provider about long-term use.    Stick with treatment that your healthcare provider has recommended for you, especially if it's controlling your psoriasis.    Avoid abrasive cleansers, harsh detergents, and household chemicals.  Getting good results  Now that you know more about psoriasis, the next step is up to you. Follow your healthcare provider's treatment plan and self-care routine. Doing so can help you control your symptoms. If your symptoms don t improve or they get worse, call your healthcare provider. Psoriasis can t be cured. But its symptoms can be managed.   Date Last Reviewed: 2/1/2017 2000-2018 [employer] [address] [place] [address]. All rights reserved. This information is not intended as a substitute for professional medical care. Always follow your healthcare professional's instructions.           Patient Education     What Is Psoriasis?  Psoriasis is a chronic skin disease. Researchers believe this condition develops due to a combination of immune, genetic, and environmental factors. Psoriasis can start at any age. It is most common between ages 30 and 39 and also between ages 50 and 69. Psoriasis affects nearly equal numbers of men and women. In people with this disease, the skin grows too fast. Dead skin cells build up on the skin s surface to form inflamed, thick, silvery scales called plaques. Sometimes people form many small lesions that can hurt or have pus in them. Psoriasis does not spread from person to person.  About your symptoms  Psoriasis plaques tend to form on the elbows, knees, scalp, navel, arms, legs, and the penis or vulva. They can be  unsightly, painful, and itchy. Plaques on the joints can limit movement. On the fingernails or toenails, psoriasis can cause pitting, a change in nail color, and a change in nail shape. In some cases, psoriasis also causes arthritis. Symptoms may come and go on their own. Factors such as stress, infection, and certain medicines may cause flare-ups. If symptoms bother you, many effective medical treatments are available to help relieve symptoms. Discuss your treatment options with your healthcare provider.  External medical treatments  There are many types of external medical treatments that are used to treat the affected skin lesions. Your healthcare provider may prescribe one of many types of topical medicines. These include strong topical steroids or steroid-sparing agents. You apply them to your skin on a regular basis. Coal tar (a thick black liquid) may be applied to thick plaques. In some cases, the skin may be exposed to a special light in the healthcare provider's office. Or, you can expose the psoriatic plaques to short periods (5 minutes) of natural sun as directed by your healthcare provider. This method is called phototherapy. It can be enhanced with the use of psoralen (a type of medicine).  Internal medical treatments  Internal treatments are taken orally (by mouth) or given by injection. There are a number of new oral and injectable medicines for more severe psoriasis. Your healthcare provider can tell you more about these treatments.  Date Last Reviewed: 5/1/2017 2000-2018 [employer]. [address] [place] [address]. All rights reserved. This information is not intended as a substitute for professional medical care. Always follow your healthcare professional's instructions.           Patient Education     What Are Corns and Calluses?    Corns and calluses are your body s response to friction or pressure against the skin. If your foot rubs the inside of your shoe, the  affected area of skin thickens. Or, if a bone is not in the normal position, skin caught between bone and shoe or bone and ground builds up. In either case, the outer layer of skin thickens to protect the foot from unusual pressure. In many cases, corns and calluses look bad but are not harmful. However, more severe corns and calluses may hurt, become infected, destroy healthy tissue, or affect foot movement.    Corns  Corns usually grow on top of the foot, often at the toe joint. Corns can range from a slight thickening of skin to a painful soft or hard bump. They often form on top of buckled toe joints (hammer toes). If your toes curl under, corns may grow on the tips of the toes. You may also get a corn on the end of a toe if it rubs against your shoe. Corns can also grow between toes, often between the first and second toes.    Calluses  Calluses grow on the bottom of the foot or on the outer edge of a toe or heel. A callus may spread across the ball of your foot. This type of callus is usually due to a problem with a metatarsal (the long bone at the base of a toe, near the ball of the foot). A pinch callus may grow along the outer edge of the heel or the big toe. Some calluses press up into the foot instead of spreading on the outside. A callus may form a central core or plug of tissue where pressure is greatest.  Date Last Reviewed: 1/1/2018 2000-2018 The [employer]. [address] [place] [address], [place] [address]. All rights reserved. This information is not intended as a substitute for professional medical care. Always follow your healthcare professional's instructions.           Patient Education     Treating Corns and Calluses  If your corns or calluses are mild, reducing friction may help. Different shoes, moleskin patches, or soft pads may be all the treatment you need. In more severe cases, treating tissue buildup may require your doctor s care. Sometimes custom-made shoe inserts (orthotics) or  special pads are prescribed to reduce friction and pressure.     Doctor examining senior man's foot.   Change shoes  If you have corns, your doctor may suggest wearing shoes that have more toe room. This way, buckled joints are less likely to be pinched against the top of the shoe. If you have calluses, wearing a cushioned insole, arch support, or heel counter can help reduce friction.  Visit your doctor  In some cases, your doctor may trim away the outer layers of skin that make up the corn or callus. For a painful corn, medicine may be injected beneath the built-up tissue.  Wear orthotics  Orthotics are specially made to meet the needs of your feet. They cushion calluses or divert pressure away from these problem areas. Worn as directed, orthotics help limit existing problems and prevent new ones from forming.  If you need surgery  If a bone or joint is out of place, certain parts of your foot may be under too much pressure. This can cause severe corns and calluses. In such cases, surgery may be the best way to correct the problem.  Outpatient procedures  In most cases, surgery to improve bone position is an outpatient procedure. Your doctor may cut away excess bone, reposition prominent bones, or even fuse joints. Sometimes tendons or ligaments are cut to reduce tension on a bone or joint. Your doctor will talk with you about the procedure that is best suited to your needs.  Date Last Reviewed: 7/1/2016 2000-2018 [employer]. [address] [place] [address]. All rights reserved. This information is not intended as a substitute for professional medical care. Always follow your healthcare professional's instructions.           Patient Education     Salicylic Acid topical gel, cream, lotion, solution  Brand Names: Akurza, Clear Away Liquid, Compound W, Corn/Callus Remover, Dermarest Psoriasis Scalp Treatment, Dermarest Psoriasis Skin Treatment, DuoFilm Wart Remover, Freezone, Gordofilm,  Keralyt, MOSCO Callus & Corn Remover, Neutrogena Acne Wash, Occlusal-HP, RE SA, Salactic Film, Salacyn, Salex, Salimez, Salimez Forte, Salisol, Salisol Forte, Salitech, Salitech Forte, Salitop, UltraSal-ER, VIRASAL, Wart-Off, XALIX  What is this medicine?  SALICYCLIC ACID (SAL i LAWSON ik AS id) breaks down layers of thick skin. It is used to treat common and plantar warts, psoriasis, calluses, and corns. It is also used to treat or to prevent acne.  How should I use this medicine?  This medicine is for external use only. Follow the directions on the label. Do not apply to raw or irritated skin. Avoid getting medicine in your eyes, lips, nose, mouth, or other sensitive areas. Use this medicine at regular intervals. Do not use more often than directed.  Talk to your pediatrician regarding the use of this medicine in children. Special care may be needed. This medicine is not approved for use in children under 2 years old.  What side effects may I notice from receiving this medicine?  Side effects that you should report to your doctor or health care professional as soon as possible:    allergic reactions like skin rash, itching or hives, swelling of the face, lips, or tongue  Side effects that usually do not require medical attention (report to your doctor or health care professional if they continue or are bothersome):    skin irritation  What may interact with this medicine?    medicines that change urine pH like ammonium chloride, sodium bicarbonate, and others    medicines that treat or prevent blood clots like warfarin    methotrexate    pyrazinamide    some medicines for diabetes    some medicines for gout    steroid medicines like prednisone or cortisone    What if I miss a dose?  If you miss a dose, use it as soon as you can. If it is almost time for your next dose, use only that dose. Do not use double or extra doses.  Where should I keep my medicine?  Keep out of the reach of children.  Store at room temperature  between 15 and 30 degrees C (59 and 86 degrees F). Do not freeze. Throw away any unused medicine after the expiration date.  What should I tell my health care provider before I take this medicine?  They need to know if you have any of these conditions:    child with chickenpox, the flu, or other viral infection    kidney disease    liver disease    an unusual or allergic reaction to salicylic acid, other medicines, foods, dyes, or preservatives    pregnant or trying to get pregnant    breast-feeding  What should I watch for while using this medicine?  Tell your doctor is your symptoms do not get better or if they get worse.  This medicine can make you more sensitive to the sun. Keep out of the sun. If you cannot avoid being in the sun, wear protective clothing and use sunscreen. Do not use sun lamps or tanning beds/booths.  Use of this medicine in children under 12 years or in patients with kidney or liver disease may increase the risk of serious side effects. These patients should not use this medicine over large areas of skin. If you notice symptoms such as nausea, vomiting, dizziness, loss of hearing, ringing in the ears, unusual weakness or tiredness, fast or labored breathing, diarrhea, or confusion, stop using this medicine and contact your doctor or health care professional.  NOTE:This sheet is a summary. It may not cover all possible information. If you have questions about this medicine, talk to your doctor, pharmacist, or health care provider. Copyright  2019 [employer]             Return in about 1 week (around 11/20/2019) for Follow up with your primary care provider.    SONIA Marcial [facility]

## 2019-12-29 ENCOUNTER — APPOINTMENT (OUTPATIENT)
Dept: URGENT CARE | Facility: URGENT CARE | Age: 34
End: 2019-12-29
Payer: COMMERCIAL

## 2020-01-10 ENCOUNTER — OFFICE VISIT (OUTPATIENT)
Dept: DERMATOLOGY | Facility: CLINIC | Age: 35
End: 2020-01-10
Attending: NURSE PRACTITIONER
Payer: COMMERCIAL

## 2020-01-10 ENCOUNTER — TELEPHONE (OUTPATIENT)
Dept: DERMATOLOGY | Facility: CLINIC | Age: 35
End: 2020-01-10

## 2020-01-10 VITALS — SYSTOLIC BLOOD PRESSURE: 144 MMHG | OXYGEN SATURATION: 98 % | HEART RATE: 101 BPM | DIASTOLIC BLOOD PRESSURE: 87 MMHG

## 2020-01-10 DIAGNOSIS — L40.9 PSORIASIS: Primary | ICD-10-CM

## 2020-01-10 DIAGNOSIS — L40.8 INVERSE PSORIASIS: Primary | ICD-10-CM

## 2020-01-10 DIAGNOSIS — B07.8 COMMON WART: ICD-10-CM

## 2020-01-10 PROCEDURE — 17110 DESTRUCTION B9 LES UP TO 14: CPT | Performed by: PHYSICIAN ASSISTANT

## 2020-01-10 PROCEDURE — 99214 OFFICE O/P EST MOD 30 MIN: CPT | Mod: 25 | Performed by: PHYSICIAN ASSISTANT

## 2020-01-10 RX ORDER — CLOBETASOL PROPIONATE 0.5 MG/G
CREAM TOPICAL
Qty: 60 G | Refills: 4 | Status: SHIPPED | OUTPATIENT
Start: 2020-01-10 | End: 2021-01-15

## 2020-01-10 RX ORDER — TACROLIMUS 1 MG/G
OINTMENT TOPICAL
Qty: 100 G | Refills: 1 | Status: SHIPPED | OUTPATIENT
Start: 2020-01-10 | End: 2020-04-24

## 2020-01-10 RX ORDER — PIMECROLIMUS 10 MG/G
CREAM TOPICAL
Qty: 60 G | Refills: 4 | Status: SHIPPED | OUTPATIENT
Start: 2020-01-10 | End: 2021-08-04

## 2020-01-10 RX ORDER — CALCIPOTRIENE 50 UG/G
CREAM TOPICAL
Qty: 120 G | Refills: 4 | Status: SHIPPED | OUTPATIENT
Start: 2020-01-10 | End: 2021-01-15

## 2020-01-10 NOTE — TELEPHONE ENCOUNTER
Reason for Call:  Other prescription    Detailed comments: liv calling from  pharmacy stating PROTOPIC is not covered under ins, Elidel is, can they switch it to this?     Phone Number Patient can be reached at: Other phone number:  [phone]*    Best Time: any     Can we leave a detailed message on this number? YES    Call taken on 1/10/2020 at 10:43 AM by Tamra Sequeira

## 2020-01-10 NOTE — PATIENT INSTRUCTIONS
Clobetasol cream apply twice daily to affected areas on hands, elbows, knees Monday through Thursday.  Calcipotriene cream Apply twice daily to affected areas on Friday through Sunday  Tacrolimus ointment Apply twice daily to affected areas on groin and  folds of skin    WOUND CARE INSTRUCTIONS   FOR CRYOSURGERY   This area treated with liquid nitrogen should form a blister (areas treated may or may not blister-skin may just turn dark and slough off). You do not need to bandage the area unless a blister forms and breaks (which may be a few days). When the blister breaks, begin daily dressing changes as follows:  1) Clean and dry the area with tap water using clean Q-tip or sterile gauze pad.   2) Apply Polysporin ointment or Bacitracin ointment over entire wound. Do NOT use Neosporin ointment.   3) Cover the wound with a band-aid or sterile non-stick gauze pad and micropore paper tape.   REPEAT THESE INSTRUCTIONS AT LEAST ONCE A DAY UNTIL THE WOUND HAS COMPLETELY HEALED.   It is an old wives tale that a wound heals better when it is exposed to air and allowed to dry out. The wound will heal faster with a better cosmetic result if it is kept moist with ointment and covered with a bandage.   Do not let the wound dry out.   IMPORTANT INFORMATION ON REVERSE SIDE   Supplies Needed:   *Cotton tipped applicators (Q-tips)   *Polysporin ointment or Bacitracin ointment (NOT NEOSPORIN)   *Band-aids, or non stick gauze pads and micropore paper tape   PATIENT INFORMATION   During the healing process you will notice a number of changes. All wounds develop a small halo of redness surrounding the wound. This means healing is occurring. Severe itching with extensive redness usually indicates sensitivity to the ointment or bandage tape used to dress the wound. You should call our office if this develops.   Swelling and/or discoloration around your surgical site is common, particularly when performed around the eye.   All wounds  normally drain. The larger the wound the more drainage there will be. After 7-10 days, you will notice the wound beginning to shrink and new skin will begin to grow. The wound is healed when you can see skin has formed over the entire area. A healed wound has a healthy, shiny look to the surface and is red to dark pink in color to normalize. Wounds may take approximately 4-6 weeks to heal. Larger wounds may take 6-8 weeks. After the wound is healed you may discontinue dressing changes.   You may experience a sensation of tightness as your wound heals. This is normal and will gradually subside.   Your healed wound may be sensitive to temperature changes. This sensitivity improves with time, but if you re having a lot of discomfort, try to avoid temperature extremes.   Patients frequently experience itching after their wound appears to have healed because of the continue healing under the skin. Plain Vaseline will help relieve the itching.

## 2020-01-10 NOTE — LETTER
1/10/2020         RE: Ge Hansen  [address] [place] [id number]        Dear Colleague,    Thank you for referring your patient, Ge Hansen, to the [facility]. Please see a copy of my visit note below.    Ge Hansen is a 34 year old year old male patient here today for wart on thumb and psoriasis. He has had wart for a few years, has tried OTC treatment with little success sometimes painful. He has had psoriasis since he was a child but worse now on elbow, knees, hand and groin. He notes his hands are the most bothersome. He is not using any prescription medication.  Patient has no other skin complaints today.  Remainder of the HPI, Meds, PMH, Allergies, FH, and SH was reviewed in chart.    Past Medical History:   Diagnosis Date     ADHD      Asthma      Lactose intolerance      Obesity hypoventilation syndrome (H)      Sleep apnea      Tobacco use        Past Surgical History:   Procedure Laterality Date     ROTATOR CUFF REPAIR RT/LT          Family History   Problem Relation Age of Onset     Obesity Sister        Social History     Socioeconomic History     Marital status: Single     Spouse name: Not on file     Number of children: Not on file     Years of education: Not on file     Highest education level: Not on file   Occupational History     Not on file   Social Needs     Financial resource strain: Not on file     Food insecurity:     Worry: Not on file     Inability: Not on file     Transportation needs:     Medical: Not on file     Non-medical: Not on file   Tobacco Use     Smoking status: Current Every Day Smoker     Packs/day: 1.00     Years: 10.00     Pack years: 10.00     Types: Other     Start date: 7/7/1997     Smokeless tobacco: Current User     Types: Chew, Snuff     Tobacco comment: Vaping    Substance and Sexual Activity     Alcohol use: Yes     Drug use: No     Sexual activity: Yes     Partners: Female     Birth control/protection: Pill   Lifestyle     Physical  activity:     Days per week: Not on file     Minutes per session: Not on file     Stress: Not on file   Relationships     Social connections:     Talks on phone: Not on file     Gets together: Not on file     Attends [employer] service: Not on file     Active member of club or organization: Not on file     Attends meetings of clubs or organizations: Not on file     Relationship status: Not on file     Intimate partner violence:     Fear of current or ex partner: Not on file     Emotionally abused: Not on file     Physically abused: Not on file     Forced sexual activity: Not on file   Other Topics Concern     Not on file   Social History Narrative     Not on file       Outpatient Encounter Medications as of 1/10/2020   Medication Sig Dispense Refill     Acetaminophen (TYLENOL PO)        albuterol (PROAIR HFA/PROVENTIL HFA/VENTOLIN HFA) 108 (90 Base) MCG/ACT inhaler Inhale 2 puffs into the lungs every 6 hours as needed for shortness of breath / dyspnea or wheezing 8.5 g 3     albuterol (PROVENTIL) (2.5 MG/3ML) 0.083% neb solution Take 1 vial (2.5 mg) by nebulization every 4 hours as needed for shortness of breath / dyspnea or wheezing 1 Box 3     amoxicillin-clavulanate (AUGMENTIN) 875-125 MG tablet Take 1 tablet by mouth 2 times daily 20 tablet 0     calcipotriene (DOVONOX) 0.005 % external cream Apply twice daily to affected areas on Friday through Sunday 120 g 4     clobetasol (TEMOVATE) 0.05 % external cream Apply twice daily to affected areas on hands, elbows, knees Monday through Thursday. 60 g 4     emollient (VANICREAM) external cream Apply topically as needed for other (dry skin) Dry skin 453 g 0     IBUPROFEN PO        order for DME Equipment being ordered: Nebulizer 1 Package 0     predniSONE (DELTASONE) 20 MG tablet Take 20 mg by mouth 2 times daily. 10 tablet 0     tacrolimus (PROTOPIC) 0.1 % external ointment Apply twice daily to affected areas on groin and  folds of skin 100 g 1     triamcinolone  (KENALOG) 0.1 % external ointment Apply topically 2 times daily 30 g 0     order for DME Equipment being ordered: XXXL hinged knee brace (Patient not taking: Reported on 1/10/2020) 1 Device 0     salicylic acid (DUOFILM) 17 % external solution Apply to wart on hand twice daily protecting surrounding skin with vasiline (Patient not taking: Reported on 1/10/2020) 9.8 mL 0     No facility-administered encounter medications on file as of 1/10/2020.              Review Of Systems  Skin: As above  Eyes: negative  Ears/Nose/Throat: negative  Respiratory: No shortness of breath, dyspnea on exertion, cough, or hemoptysis  Cardiovascular: negative  Gastrointestinal: negative  Genitourinary: negative  Musculoskeletal: negative  Neurologic: negative  Psychiatric: negative  Hematologic/Lymphatic/Immunologic: negative  Endocrine: negative      O:   NAD, WDWN, Alert & Oriented, Mood & Affect wnl, Vitals stable   Here today alone   BP (!) 144/87 (BP Location: Right arm, Patient Position: Chair, Cuff Size: Adult Large)   Pulse 101   SpO2 98%    General appearance normal   Vitals stable   Alert, oriented and in no acute distress     Psoriasiform plaques on elbows, knees, palms of hands  Psoriasiform thin plaques on fold on hips, groin       Eyes: Conjunctivae/lids:Normal     ENT: Lips: normal    MSK:Normal    Cardiovascular: peripheral edema none    Pulm: Breathing Normal    Neuro/Psych: Orientation:Alert and Orientedx3 ; Mood/Affect:normal   A/P:  1. Common wart on left thumb x 1  Pared down.   LN2:  Treated with LN2 for 5s for 1-2 cycles. Warned risks of blistering, pain, pigment change, scarring, and incomplete resolution.  Advised patient to return if lesions do not completely resolve.  Wound care sheet given.  2. Psoriasis  Discussed topicals vs intralesional steroids vs biologics  Patient would like to try topicals first and intralesional injections on hands  Clobetasol cream apply twice daily to affected areas on hands,  elbows, knees yo Monday through Thursday.  Calcipotriene cream Apply twice daily to affected areas on Friday through Sunday  Tacrolimus ointment Apply twice daily to affected areas on groin and  folds of skin  Skin care regimen reviewed with patient: Eliminate harsh soaps, i.e. Dial, zest, irsih spring; Mild soaps such as Cetaphil or Dove sensitive skin, avoid hot or cold showers, aggressive use of emollients including vanicream, cetaphil or cerave discussed with patient.    Return to clinic in 6-8 weeks.   Ge to follow up with Primary Care provider regarding elevated blood pressure.      Again, thank you for allowing me to participate in the care of your patient.        Sincerely,        Yisel Rosenthal PA-C

## 2020-01-10 NOTE — PROGRESS NOTES
Ge Hansen is a 34 year old year old male patient here today for wart on thumb and psoriasis. He has had wart for a few years, has tried OTC treatment with little success sometimes painful. He has had psoriasis since he was a child but worse now on elbow, knees, hand and groin. He notes his hands are the most bothersome. He is not using any prescription medication.  Patient has no other skin complaints today.  Remainder of the HPI, Meds, PMH, Allergies, FH, and SH was reviewed in chart.    Past Medical History:   Diagnosis Date     ADHD      Asthma      Lactose intolerance      Obesity hypoventilation syndrome (H)      Sleep apnea      Tobacco use        Past Surgical History:   Procedure Laterality Date     ROTATOR CUFF REPAIR RT/LT          Family History   Problem Relation Age of Onset     Obesity Sister        Social History     Socioeconomic History     Marital status: Single     Spouse name: Not on file     Number of children: Not on file     Years of education: Not on file     Highest education level: Not on file   Occupational History     Not on file   Social Needs     Financial resource strain: Not on file     Food insecurity:     Worry: Not on file     Inability: Not on file     Transportation needs:     Medical: Not on file     Non-medical: Not on file   Tobacco Use     Smoking status: Current Every Day Smoker     Packs/day: 1.00     Years: 10.00     Pack years: 10.00     Types: Other     Start date: 7/7/1997     Smokeless tobacco: Current User     Types: Chew, Snuff     Tobacco comment: Vaping    Substance and Sexual Activity     Alcohol use: Yes     Drug use: No     Sexual activity: Yes     Partners: Female     Birth control/protection: Pill   Lifestyle     Physical activity:     Days per week: Not on file     Minutes per session: Not on file     Stress: Not on file   Relationships     Social connections:     Talks on phone: Not on file     Gets together: Not on file     Attends Orthodoxy service: Not  on file     Active member of club or organization: Not on file     Attends meetings of clubs or organizations: Not on file     Relationship status: Not on file     Intimate partner violence:     Fear of current or ex partner: Not on file     Emotionally abused: Not on file     Physically abused: Not on file     Forced sexual activity: Not on file   Other Topics Concern     Not on file   Social History Narrative     Not on file       Outpatient Encounter Medications as of 1/10/2020   Medication Sig Dispense Refill     Acetaminophen (TYLENOL PO)        albuterol (PROAIR HFA/PROVENTIL HFA/VENTOLIN HFA) 108 (90 Base) MCG/ACT inhaler Inhale 2 puffs into the lungs every 6 hours as needed for shortness of breath / dyspnea or wheezing 8.5 g 3     albuterol (PROVENTIL) (2.5 MG/3ML) 0.083% neb solution Take 1 vial (2.5 mg) by nebulization every 4 hours as needed for shortness of breath / dyspnea or wheezing 1 Box 3     amoxicillin-clavulanate (AUGMENTIN) 875-125 MG tablet Take 1 tablet by mouth 2 times daily 20 tablet 0     calcipotriene (DOVONOX) 0.005 % external cream Apply twice daily to affected areas on Friday through Sunday 120 g 4     clobetasol (TEMOVATE) 0.05 % external cream Apply twice daily to affected areas on hands, elbows, knees Monday through Thursday. 60 g 4     emollient (VANICREAM) external cream Apply topically as needed for other (dry skin) Dry skin 453 g 0     IBUPROFEN PO        order for DME Equipment being ordered: Nebulizer 1 Package 0     predniSONE (DELTASONE) 20 MG tablet Take 20 mg by mouth 2 times daily. 10 tablet 0     tacrolimus (PROTOPIC) 0.1 % external ointment Apply twice daily to affected areas on groin and  folds of skin 100 g 1     triamcinolone (KENALOG) 0.1 % external ointment Apply topically 2 times daily 30 g 0     order for DME Equipment being ordered: XXXL hinged knee brace (Patient not taking: Reported on 1/10/2020) 1 Device 0     salicylic acid (DUOFILM) 17 % external solution  Apply to wart on hand twice daily protecting surrounding skin with vasiline (Patient not taking: Reported on 1/10/2020) 9.8 mL 0     No facility-administered encounter medications on file as of 1/10/2020.              Review Of Systems  Skin: As above  Eyes: negative  Ears/Nose/Throat: negative  Respiratory: No shortness of breath, dyspnea on exertion, cough, or hemoptysis  Cardiovascular: negative  Gastrointestinal: negative  Genitourinary: negative  Musculoskeletal: negative  Neurologic: negative  Psychiatric: negative  Hematologic/Lymphatic/Immunologic: negative  Endocrine: negative      O:   NAD, WDWN, Alert & Oriented, Mood & Affect wnl, Vitals stable   Here today alone   BP (!) 144/87 (BP Location: Right arm, Patient Position: Chair, Cuff Size: Adult Large)   Pulse 101   SpO2 98%    General appearance normal   Vitals stable   Alert, oriented and in no acute distress     Psoriasiform plaques on elbows, knees, palms of hands  Psoriasiform thin plaques on fold on hips, groin       Eyes: Conjunctivae/lids:Normal     ENT: Lips: normal    MSK:Normal    Cardiovascular: peripheral edema none    Pulm: Breathing Normal    Neuro/Psych: Orientation:Alert and Orientedx3 ; Mood/Affect:normal   A/P:  1. Common wart on left thumb x 1  Pared down.   LN2:  Treated with LN2 for 5s for 1-2 cycles. Warned risks of blistering, pain, pigment change, scarring, and incomplete resolution.  Advised patient to return if lesions do not completely resolve.  Wound care sheet given.  2. Psoriasis  Discussed topicals vs intralesional steroids vs biologics  Patient would like to try topicals first and intralesional injections on hands  Clobetasol cream apply twice daily to affected areas on hands, elbows, knees yo Monday through Thursday.  Calcipotriene cream Apply twice daily to affected areas on Friday through Sunday  Tacrolimus ointment Apply twice daily to affected areas on groin and  folds of skin  Skin care regimen reviewed with  patient: Eliminate harsh soaps, i.e. Dial, zest, irsih spring; Mild soaps such as Cetaphil or Dove sensitive skin, avoid hot or cold showers, aggressive use of emollients including vanicream, cetaphil or cerave discussed with patient.    Return to clinic in 6-8 weeks.   Ge to follow up with Primary Care provider regarding elevated blood pressure.

## 2020-01-10 NOTE — NURSING NOTE
"Chief Complaint   Patient presents with     Psoriasis     Derm Problem     wart       Initial BP (!) 144/87 (BP Location: Right arm, Patient Position: Chair, Cuff Size: Adult Large)   Pulse 101   SpO2 98%  Estimated body mass index is 68.74 kg/m  as calculated from the following:    Height as of 5/28/19: 1.854 m (6' 1\").    Weight as of 5/28/19: 236.3 kg (521 lb).  Medications and allergies reviewed.    Denis JHAVERI CMA (AAMA)    "

## 2020-02-12 DIAGNOSIS — G47.33 OSA (OBSTRUCTIVE SLEEP APNEA): Primary | ICD-10-CM

## 2020-02-12 NOTE — PROGRESS NOTES
PSG with TCM and ABG ordered for requalification of PAP therapy due to insurance reason. Patient will be seen after the test is completed to review results and treatment options.     Kike Art MD

## 2020-04-24 ENCOUNTER — OFFICE VISIT (OUTPATIENT)
Dept: FAMILY MEDICINE | Facility: CLINIC | Age: 35
End: 2020-04-24
Payer: COMMERCIAL

## 2020-04-24 VITALS
RESPIRATION RATE: 24 BRPM | OXYGEN SATURATION: 96 % | SYSTOLIC BLOOD PRESSURE: 138 MMHG | BODY MASS INDEX: 68.34 KG/M2 | WEIGHT: 315 LBS | TEMPERATURE: 98.7 F | DIASTOLIC BLOOD PRESSURE: 84 MMHG | HEART RATE: 88 BPM

## 2020-04-24 DIAGNOSIS — S39.012A BACK STRAIN, INITIAL ENCOUNTER: ICD-10-CM

## 2020-04-24 DIAGNOSIS — R82.90 ABNORMAL URINE ODOR: Primary | ICD-10-CM

## 2020-04-24 PROBLEM — G47.33 OSA (OBSTRUCTIVE SLEEP APNEA): Chronic | Status: ACTIVE | Noted: 2018-09-13

## 2020-04-24 PROBLEM — Z72.0 TOBACCO USE: Chronic | Status: ACTIVE | Noted: 2018-09-13

## 2020-04-24 PROBLEM — E66.01 MORBID OBESITY (H): Chronic | Status: ACTIVE | Noted: 2018-09-13

## 2020-04-24 LAB
ALBUMIN UR-MCNC: NEGATIVE MG/DL
APPEARANCE UR: CLEAR
BILIRUB UR QL STRIP: NEGATIVE
COLOR UR AUTO: YELLOW
GLUCOSE UR STRIP-MCNC: NEGATIVE MG/DL
HGB UR QL STRIP: NEGATIVE
KETONES UR STRIP-MCNC: NEGATIVE MG/DL
LEUKOCYTE ESTERASE UR QL STRIP: NEGATIVE
NITRATE UR QL: NEGATIVE
PH UR STRIP: 6 PH (ref 5–7)
SOURCE: NORMAL
SP GR UR STRIP: 1.02 (ref 1–1.03)
UROBILINOGEN UR STRIP-ACNC: 1 EU/DL (ref 0.2–1)

## 2020-04-24 PROCEDURE — 81003 URINALYSIS AUTO W/O SCOPE: CPT | Performed by: NURSE PRACTITIONER

## 2020-04-24 PROCEDURE — 99213 OFFICE O/P EST LOW 20 MIN: CPT | Performed by: NURSE PRACTITIONER

## 2020-04-24 RX ORDER — IBUPROFEN 800 MG/1
800 TABLET, FILM COATED ORAL EVERY 8 HOURS PRN
Qty: 30 TABLET | Refills: 0 | Status: SHIPPED | OUTPATIENT
Start: 2020-04-24 | End: 2021-01-28

## 2020-04-24 RX ORDER — CYCLOBENZAPRINE HCL 10 MG
10 TABLET ORAL 3 TIMES DAILY PRN
Qty: 30 TABLET | Refills: 0 | Status: SHIPPED | OUTPATIENT
Start: 2020-04-24 | End: 2021-01-15

## 2020-04-24 NOTE — PATIENT INSTRUCTIONS
1. Abnormal urine odor  Acute, clearing  - UA reflex to Microscopic and Culture  Results for orders placed or performed in visit on 04/24/20   UA reflex to Microscopic and Culture     Status: None    Specimen: Midstream Urine   Result Value Ref Range    Color Urine Yellow     Appearance Urine Clear     Glucose Urine Negative NEG^Negative mg/dL    Bilirubin Urine Negative NEG^Negative    Ketones Urine Negative NEG^Negative mg/dL    Specific Gravity Urine 1.025 1.003 - 1.035    Blood Urine Negative NEG^Negative    pH Urine 6.0 5.0 - 7.0 pH    Protein Albumin Urine Negative NEG^Negative mg/dL    Urobilinogen Urine 1.0 0.2 - 1.0 EU/dL    Nitrite Urine Negative NEG^Negative    Leukocyte Esterase Urine Negative NEG^Negative    Source Midstream Urine        2. Back strain, initial encounter  Acute, stable  - cyclobenzaprine (FLEXERIL) 10 MG tablet; Take 1 tablet (10 mg) by mouth 3 times daily as needed for muscle spasms  Dispense: 30 tablet; Refill: 0  Tylenol 1000 mg every 8 hours  Ibuprofen 800 mg every 8 hours      Patient Education     Back Sprain or Strain    Injury to the muscles (strain) or ligaments (sprain) around the spine can be troubling. Injury may occur after a sudden forceful twisting or bending such as in a car accident, after a simple awkward movement, or after lifting something heavy with poor body positioning. In any case, muscle spasm is often present and adds to the pain.  Thankfully, most people feel better in 1 to 2 weeks. Most of the rest feel better in 1 to 2 months. Most people can remain active. Unless you had a forceful or traumatic physical injury such as a car accident or fall, X-rays may not be done for the first assessment of a back sprain or strain. If pain continues and doesn't respond to medical treatment, your healthcare provider may then do X-rays and other tests.  Home care  These guidelines will help you care for your injury at home:    When in bed, try to find a comfortable position.  A firm mattress is best. Try lying flat on your back with pillows under your knees. You can also try lying on your side with your knees bent up toward your chest and a pillow between your knees.    Don't sit for long periods. Try not to take long car rides or take other trips that have you sitting for a long time. This puts more stress on the lower back than standing or walking.    During the first 24 to 72 hours after an injury or flare-up, put an ice pack on the painful area for 20 minutes. Then remove it for 20 minutes. Do this for 60 to 90 minutes, or several times a day. This will reduce swelling and pain. Always wrap the ice pack in a thin towel or plastic to protect your skin.    You can start with ice, then switch to heat. Heat from a hot shower, hot bath, or heating pad reduces pain and works well for muscle spasms. Put heat on the painful area for 20 minutes, then remove for 20 minutes. Do this for 60 to 90 minutes, or several times a day. Don't use a heating pad while sleeping. It can burn the skin.    You can alternate the ice and heat. Talk with your healthcare provider to find out the best treatment or therapy for your back pain.    Therapeutic massage can help relax the back muscles without stretching them.    Be aware of safe lifting methods. Don't lift anything over 15 pounds until all of the pain is gone.  Medicines  Talk with your healthcare provider before using medicines, especially if you have other health problems or are taking other medicines.    You may use over-the-counter medicines such as acetaminophen, ibuprofen, or naproxen to control pain, unless another pain medicine was prescribed. Talk with your healthcare provider before taking any medicines if you have a chronic condition such as diabetes, liver or kidney disease, stomach ulcers, or digestive bleeding, or are taking blood-thinner medicines.    Be careful if you are given prescription medicines, opioids, or medicine for muscle  spasm. They can cause drowsiness, and affect your coordination, reflexes, and judgment. Don't drive or operate heavy machinery when taking these types of medicines. Only take pain medicine as prescribed by your healthcare provider.  Follow-up care  Follow up with your healthcare provider, or as advised. You may need physical therapy or more tests if your symptoms get worse.  If you had X-rays, your healthcare provider may be checking for any broken bones, breaks, or fractures. Bruises and sprains can sometimes hurt as much as a fracture. These injuries can take time to heal fully. If your symptoms don t get better or they get worse, talk with your healthcare provider. You may need a repeat X-ray or other tests.  Call 911  Call 911 if any of the following occur:    Trouble breathing    Confused    Very drowsy or trouble awakening    Fainting or loss of consciousness    Rapid or very slow heart rate    Loss of bowel or bladder control  When to seek medical advice  Call your healthcare provider right away if any of the following occur:    Pain gets worse or spreads to your arms or legs    Weakness or numbness in one or both arms or legs    Numbness in the groin or genital area  Date Last Reviewed: 6/1/2016 2000-2019 [employer]. [address] [place] [address], [place] [address]. All rights reserved. This information is not intended as a substitute for professional medical care. Always follow your healthcare professional's instructions.

## 2020-04-24 NOTE — PROGRESS NOTES
SUBJECTIVE   Ge Hansen is a  male who presents to clinic today for the following health issue(s):       Genitourinary - Male  Onset: 3 days     Description:   Dysuria (painful urination): no   Hematuria (blood in urine): no   Frequency: YES  Are you urinating at night : no   Hesitancy (delay in urine): no   Retention (unable to empty): no   Decrease in urinary flow: no   Incontinence: no     Progression of Symptoms:  worsening    Accompanying Signs & Symptoms: Dizziness   Fever: no   Back/Flank pain: YES- Lt mid- back pain   Urethral discharge: no   Testicle lumps/masses/pain: no   Nausea and/or vomiting: no   Abdominal pain: no     History:   History of frequent UTI's: no   History of kidney stones: no   History of hernias: no   Personal or Family history of Prostate problems: no  Sexually active: no    Stable relationship? NA  Concern for potential STD? None for 2 years   Last STD check (if new partners with unknown STD history): NA    Precipitating factors:   NA    Alleviating factors:  IBU with no relief    Patient admits to trying marijuana yesterday  No known injury  Denies constipation, or changes to bladder function  No GERD symptoms  Morbidly obese male at BMI 68  Currently not at work ([employer])    Health Maintenance        Health Maintenance Due   Topic Date Due     PNEUMOCOCCAL IMMUNIZATION 19-64 MEDIUM RISK (1 of 1 - PPSV23) 07/07/2006     DTAP/TDAP/TD IMMUNIZATION (1 - Tdap) 07/07/2012     INFLUENZA VACCINE (1) 09/01/2019     PREVENTIVE CARE VISIT  09/13/2019     ASTHMA CONTROL TEST  10/08/2019     PHQ-2  01/01/2020     ASTHMA ACTION PLAN  05/07/2020       PCP   [facility] [phone]    PROBLEM LIST        Patient Active Problem List   Diagnosis     Morbid obesity (H) BMI 68.34     Elevated glucose     Obesity hypoventilation syndrome (H)     Tobacco use     MAYDA (obstructive sleep apnea)     Elevated blood pressure reading without diagnosis of hypertension     Asthma        MEDICATIONS        Current Outpatient Medications   Medication     albuterol (PROAIR HFA/PROVENTIL HFA/VENTOLIN HFA) 108 (90 Base) MCG/ACT inhaler     albuterol (PROVENTIL) (2.5 MG/3ML) 0.083% neb solution     calcipotriene (DOVONOX) 0.005 % external cream     clobetasol (TEMOVATE) 0.05 % external cream     cyclobenzaprine (FLEXERIL) 10 MG tablet     emollient (VANICREAM) external cream     ibuprofen (ADVIL/MOTRIN) 800 MG tablet     pimecrolimus (ELIDEL) 1 % external cream     order for DME     No current facility-administered medications for this visit.        Reviewed and updated as needed this visit by Provider:  Tobacco  Allergies  Meds  Med Hx  Surg Hx  Fam Hx  Soc Hx     ROS      Constitutional, neuro, ENT, endocrine, pulmonary, cardiac, gastrointestinal, genitourinary, musculoskeletal, integument and psychiatric systems are negative, except as otherwise noted.    PHYSICAL EXAM   /84   Pulse 88   Temp 98.7  F (37.1  C) (Tympanic)   Resp 24   Wt (!) 235 kg (518 lb)   SpO2 96%   BMI 68.34 kg/m    Body mass index is 68.34 kg/m .  GENERAL APPEARANCE: healthy, alert, no distress and morbidly obese  NECK: no adenopathy, no asymmetry, masses, or scars and thyroid normal to palpation  RESP: lungs clear to auscultation - no rales, rhonchi or wheezes  CV: regular rates and rhythm, normal S1 S2, no S3 or S4 and no murmur, click or rub  ABDOMEN: soft, nontender, without hepatosplenomegaly or masses, bowel sounds normal, obese and no CVA tenderness, (difficulty to examine due to habitus)   MS: extremities normal- no gross deformities noted  Back ROM shows mild extension/flexion tightness   SKIN: Psoriasis to multiple areas  NEURO: Normal strength and tone, mentation intact and speech normal  PSYCH: mentation appears normal and affect normal/bright    Results for orders placed or performed in visit on 04/24/20   UA reflex to Microscopic and Culture     Status: None    Specimen: Midstream Urine    Result Value Ref Range    Color Urine Yellow     Appearance Urine Clear     Glucose Urine Negative NEG^Negative mg/dL    Bilirubin Urine Negative NEG^Negative    Ketones Urine Negative NEG^Negative mg/dL    Specific Gravity Urine 1.025 1.003 - 1.035    Blood Urine Negative NEG^Negative    pH Urine 6.0 5.0 - 7.0 pH    Protein Albumin Urine Negative NEG^Negative mg/dL    Urobilinogen Urine 1.0 0.2 - 1.0 EU/dL    Nitrite Urine Negative NEG^Negative    Leukocyte Esterase Urine Negative NEG^Negative    Source Midstream Urine        ASSESSMENT & PLAN     1. Abnormal urine odor  Acute, clearing today  - UA reflex to Microscopic and Culture  Results for orders placed or performed in visit on 04/24/20   UA reflex to Microscopic and Culture     Status: None    Specimen: Midstream Urine   Result Value Ref Range    Color Urine Yellow     Appearance Urine Clear     Glucose Urine Negative NEG^Negative mg/dL    Bilirubin Urine Negative NEG^Negative    Ketones Urine Negative NEG^Negative mg/dL    Specific Gravity Urine 1.025 1.003 - 1.035    Blood Urine Negative NEG^Negative    pH Urine 6.0 5.0 - 7.0 pH    Protein Albumin Urine Negative NEG^Negative mg/dL    Urobilinogen Urine 1.0 0.2 - 1.0 EU/dL    Nitrite Urine Negative NEG^Negative    Leukocyte Esterase Urine Negative NEG^Negative    Source Midstream Urine        2. Back strain, initial encounter  Acute, stable  - cyclobenzaprine (FLEXERIL) 10 MG tablet; Take 1 tablet (10 mg) by mouth 3 times daily as needed for muscle spasms  Dispense: 30 tablet; Refill: 0  Tylenol 1000 mg every 8 hours  Ibuprofen 800 mg every 8 hours      Patient Education     Back Sprain or Strain    Injury to the muscles (strain) or ligaments (sprain) around the spine can be troubling. Injury may occur after a sudden forceful twisting or bending such as in a car accident, after a simple awkward movement, or after lifting something heavy with poor body positioning. In any case, muscle spasm is often present  and adds to the pain.  Thankfully, most people feel better in 1 to 2 weeks. Most of the rest feel better in 1 to 2 months. Most people can remain active. Unless you had a forceful or traumatic physical injury such as a car accident or fall, X-rays may not be done for the first assessment of a back sprain or strain. If pain continues and doesn't respond to medical treatment, your healthcare provider may then do X-rays and other tests.  Home care  These guidelines will help you care for your injury at home:    When in bed, try to find a comfortable position. A firm mattress is best. Try lying flat on your back with pillows under your knees. You can also try lying on your side with your knees bent up toward your chest and a pillow between your knees.    Don't sit for long periods. Try not to take long car rides or take other trips that have you sitting for a long time. This puts more stress on the lower back than standing or walking.    During the first 24 to 72 hours after an injury or flare-up, put an ice pack on the painful area for 20 minutes. Then remove it for 20 minutes. Do this for 60 to 90 minutes, or several times a day. This will reduce swelling and pain. Always wrap the ice pack in a thin towel or plastic to protect your skin.    You can start with ice, then switch to heat. Heat from a hot shower, hot bath, or heating pad reduces pain and works well for muscle spasms. Put heat on the painful area for 20 minutes, then remove for 20 minutes. Do this for 60 to 90 minutes, or several times a day. Don't use a heating pad while sleeping. It can burn the skin.    You can alternate the ice and heat. Talk with your healthcare provider to find out the best treatment or therapy for your back pain.    Therapeutic massage can help relax the back muscles without stretching them.    Be aware of safe lifting methods. Don't lift anything over 15 pounds until all of the pain is gone.  Medicines  Talk with your healthcare  provider before using medicines, especially if you have other health problems or are taking other medicines.    You may use over-the-counter medicines such as acetaminophen, ibuprofen, or naproxen to control pain, unless another pain medicine was prescribed. Talk with your healthcare provider before taking any medicines if you have a chronic condition such as diabetes, liver or kidney disease, stomach ulcers, or digestive bleeding, or are taking blood-thinner medicines.    Be careful if you are given prescription medicines, opioids, or medicine for muscle spasm. They can cause drowsiness, and affect your coordination, reflexes, and judgment. Don't drive or operate heavy machinery when taking these types of medicines. Only take pain medicine as prescribed by your healthcare provider.  Follow-up care  Follow up with your healthcare provider, or as advised. You may need physical therapy or more tests if your symptoms get worse.  If you had X-rays, your healthcare provider may be checking for any broken bones, breaks, or fractures. Bruises and sprains can sometimes hurt as much as a fracture. These injuries can take time to heal fully. If your symptoms don t get better or they get worse, talk with your healthcare provider. You may need a repeat X-ray or other tests.  Call 911  Call 911 if any of the following occur:    Trouble breathing    Confused    Very drowsy or trouble awakening    Fainting or loss of consciousness    Rapid or very slow heart rate    Loss of bowel or bladder control  When to seek medical advice  Call your healthcare provider right away if any of the following occur:    Pain gets worse or spreads to your arms or legs    Weakness or numbness in one or both arms or legs    Numbness in the groin or genital area  Date Last Reviewed: 6/1/2016 2000-2019 [employer]. [address] [place] [address], [place] [address]. All rights reserved. This information is not intended as a substitute for  professional medical care. Always follow your healthcare professional's instructions.             Risks, benefits, side effects and rationale for treatment plan fully discussed with the patient and understanding expressed.  ARLEN Davies-BC  [facility]

## 2020-04-24 NOTE — NURSING NOTE
"Chief Complaint   Patient presents with     Urinary Problem       Initial /84   Pulse 88   Temp 98.7  F (37.1  C) (Tympanic)   Resp 24   Wt (!) 235 kg (518 lb)   SpO2 96%   BMI 68.34 kg/m   Estimated body mass index is 68.34 kg/m  as calculated from the following:    Height as of 5/28/19: 1.854 m (6' 1\").    Weight as of this encounter: 235 kg (518 lb).    Patient presents to the clinic using No DME    Health Maintenance that is potentially due pending provider review:  ACT    Done.    Is there anyone who you would like to be able to receive your results? No  If yes have patient fill out EUSE[employer]    "

## 2020-04-27 DIAGNOSIS — J45.41 MODERATE PERSISTENT ASTHMA WITH EXACERBATION: ICD-10-CM

## 2020-04-27 RX ORDER — ALBUTEROL SULFATE 90 UG/1
2 AEROSOL, METERED RESPIRATORY (INHALATION) EVERY 6 HOURS PRN
Qty: 1 INHALER | Refills: 0 | Status: SHIPPED | OUTPATIENT
Start: 2020-04-27 | End: 2020-07-28

## 2020-07-27 DIAGNOSIS — J45.41 MODERATE PERSISTENT ASTHMA WITH EXACERBATION: ICD-10-CM

## 2020-07-27 NOTE — TELEPHONE ENCOUNTER
"Requested Prescriptions   Pending Prescriptions Disp Refills     albuterol (PROAIR HFA/PROVENTIL HFA/VENTOLIN HFA) 108 (90 Base) MCG/ACT inhaler 1 Inhaler 0     Sig: Inhale 2 puffs into the lungs every 6 hours as needed for shortness of breath / dyspnea or wheezing       Asthma Maintenance Inhalers - Anticholinergics Failed - 7/27/2020 11:38 AM        Failed - Asthma control assessment score within normal limits in last 6 months     Please review ACT score.           Passed - Patient is age 12 years or older        Passed - Medication is active on med list        Passed - Recent (6 mo) or future (30 days) visit within the authorizing provider's specialty     Patient had office visit in the last 6 months or has a visit in the next 30 days with authorizing provider or within the authorizing provider's specialty.  See \"Patient Info\" tab in inbasket, or \"Choose Columns\" in Meds & Orders section of the refill encounter.           Short-Acting Beta Agonist Inhalers Protocol  Failed - 7/27/2020 11:38 AM        Failed - Asthma control assessment score within normal limits in last 6 months     Please review ACT score.           Passed - Patient is age 12 or older        Passed - Medication is active on med list        Passed - Recent (6 mo) or future (30 days) visit within the authorizing provider's specialty     Patient had office visit in the last 6 months or has a visit in the next 30 days with authorizing provider or within the authorizing provider's specialty.  See \"Patient Info\" tab in inbasket, or \"Choose Columns\" in Meds & Orders section of the refill encounter.                 "

## 2020-07-28 RX ORDER — ALBUTEROL SULFATE 90 UG/1
2 AEROSOL, METERED RESPIRATORY (INHALATION) EVERY 6 HOURS PRN
Qty: 1 INHALER | Refills: 0 | Status: SHIPPED | OUTPATIENT
Start: 2020-07-28 | End: 2021-07-23

## 2020-08-03 ENCOUNTER — TELEPHONE (OUTPATIENT)
Dept: FAMILY MEDICINE | Facility: CLINIC | Age: 35
End: 2020-08-03

## 2020-08-03 ENCOUNTER — VIRTUAL VISIT (OUTPATIENT)
Dept: FAMILY MEDICINE | Facility: CLINIC | Age: 35
End: 2020-08-03
Payer: COMMERCIAL

## 2020-08-03 VITALS — TEMPERATURE: 98 F

## 2020-08-03 DIAGNOSIS — K04.7 DENTAL ABSCESS: Primary | ICD-10-CM

## 2020-08-03 PROCEDURE — 99213 OFFICE O/P EST LOW 20 MIN: CPT | Mod: 95 | Performed by: PHYSICIAN ASSISTANT

## 2020-08-03 RX ORDER — PENICILLIN V POTASSIUM 500 MG/1
500 TABLET, FILM COATED ORAL 4 TIMES DAILY
Qty: 28 TABLET | Refills: 0 | Status: SHIPPED | OUTPATIENT
Start: 2020-08-03 | End: 2020-08-10

## 2020-08-03 ASSESSMENT — PAIN SCALES - GENERAL: PAINLEVEL: SEVERE PAIN (6)

## 2020-08-03 NOTE — TELEPHONE ENCOUNTER
left message for patient to return call.  He needs appointment.  Could be video.  Last saw Dr Davis 2018.  Wilma Hays RN

## 2020-08-03 NOTE — TELEPHONE ENCOUNTER
Reason for call:  Patient reporting a symptom    Symptom or request: Dental Issues- Jaw hurting and swell inside of Rt side of mouth. Pt has had Dental Issues in the past and Dr. Davis gave him Antibiotic. Pt said he does not have Dental Ins and unemployed.  Could he get another Antibiotic. Please Advise.    Phone Number patient can be reached at:  Home number on file [phone] (home)    Best Time:  Any Time      Can we leave a detailed message on this number:  YES    Call taken on 8/3/2020 at 10:19 AM by Opal Hidalgo

## 2020-08-03 NOTE — PROGRESS NOTES
"Ge Hansen is a 35 year old male who is being evaluated via a billable video visit.      The patient has been notified of following:     \"This video visit will be conducted via a call between you and your physician/provider. We have found that certain health care needs can be provided without the need for an in-person physical exam.  This service lets us provide the care you need with a video conversation.  If a prescription is necessary we can send it directly to your pharmacy.  If lab work is needed we can place an order for that and you can then stop by our lab to have the test done at a later time.    Video visits are billed at different rates depending on your insurance coverage.  Please reach out to your insurance provider with any questions.    If during the course of the call the physician/provider feels a video visit is not appropriate, you will not be charged for this service.\"    Patient has given verbal consent for Video visit? Yes  How would you like to obtain your AVS? Mail a copy  If you are dropped from the video visit, the video invite should be resent to: Text to cell phone: [phone]  Will anyone else be joining your video visit? No  Subjective     Ge Hansen is a 35 year old male who presents today via video visit for the following health issues:    HPI  Jaw pain     Duration: off and on for one year     Description (location/character/radiation): Right sided jaw pain     Intensity:  severe    Accompanying signs and symptoms: Facial swelling     History (similar episodes/previous evaluation): None    Precipitating or alleviating factors: None    Therapies tried and outcome: Ibuprofen, aleve and tylenol, ice      Video Start Time: 5:07 PM    Patient Active Problem List   Diagnosis     Morbid obesity (H) BMI 68.34     Elevated glucose     Obesity hypoventilation syndrome (H)     Tobacco use     MAYDA (obstructive sleep apnea)     Elevated blood pressure reading without diagnosis of hypertension "     Asthma     Past Surgical History:   Procedure Laterality Date     ROTATOR CUFF REPAIR RT/LT         Social History     Tobacco Use     Smoking status: Current Every Day Smoker     Packs/day: 1.00     Years: 10.00     Pack years: 10.00     Types: Other     Start date: 7/7/1997     Smokeless tobacco: Current User     Types: Chew, Snuff     Tobacco comment: Vaping    Substance Use Topics     Alcohol use: Yes     Family History   Problem Relation Age of Onset     Obesity Sister          Current Outpatient Medications   Medication Sig Dispense Refill     albuterol (PROAIR HFA/PROVENTIL HFA/VENTOLIN HFA) 108 (90 Base) MCG/ACT inhaler Inhale 2 puffs into the lungs every 6 hours as needed for shortness of breath / dyspnea or wheezing 1 Inhaler 0     albuterol (PROVENTIL) (2.5 MG/3ML) 0.083% neb solution Take 1 vial (2.5 mg) by nebulization every 4 hours as needed for shortness of breath / dyspnea or wheezing 1 Box 3     calcipotriene (DOVONOX) 0.005 % external cream Apply twice daily to affected areas on Friday through Sunday 120 g 4     clobetasol (TEMOVATE) 0.05 % external cream Apply twice daily to affected areas on hands, elbows, knees Monday through Thursday. 60 g 4     cyclobenzaprine (FLEXERIL) 10 MG tablet Take 1 tablet (10 mg) by mouth 3 times daily as needed for muscle spasms 30 tablet 0     ibuprofen (ADVIL/MOTRIN) 800 MG tablet Take 1 tablet (800 mg) by mouth every 8 hours as needed for moderate pain 30 tablet 0     order for DME Equipment being ordered: Nebulizer 1 Package 0     penicillin V (VEETID) 500 MG tablet Take 1 tablet (500 mg) by mouth 4 times daily for 7 days 28 tablet 0     pimecrolimus (ELIDEL) 1 % external cream Apply sparingly twice daily to affected areas on groin and hips. 60 g 4     emollient (VANICREAM) external cream Apply topically as needed for other (dry skin) Dry skin (Patient not taking: Reported on 8/3/2020) 453 g 0     No Known Allergies    Reviewed and updated as needed this  visit by Provider  Tobacco  Allergies  Meds  Problems  Med Hx  Surg Hx  Fam Hx         Review of Systems   Constitutional, HEENT, cardiovascular, pulmonary, gi and gu systems are negative, except as otherwise noted.      Objective    Vitals - Patient Reported  Pain Score: Severe Pain (6)    Physical Exam   GENERAL: Healthy, alert and no distress  EYES: Eyes grossly normal to inspection.  No discharge or erythema, or obvious scleral/conjunctival abnormalities.  Mouth: Broken tooth R mandible that is erythema. No hot potato voice. Pt able to swallow own secretions.   Neck: supple, FROM in all directions.    RESP: No audible wheeze, cough, or visible cyanosis.  No visible retractions or increased work of breathing.    SKIN: Visible skin clear. No significant rash, abnormal pigmentation or lesions.  NEURO: Cranial nerves grossly intact.  Mentation and speech appropriate for age.  PSYCH: Mentation appears normal, affect normal/bright, judgement and insight intact, normal speech and appearance well-groomed.    Diagnostic Test Results:  none       Assessment & Plan   (K04.7) Dental abscess  (primary encounter diagnosis)  Comment: History and exam are consistent with a dental abscess. Poor dentition overall. There is no hot potato voice, and he is able to swallow his own secretions without difficulty. No breathing issues either. No red flag signs or symptoms today. Will treat with Penicillin for a dental abscess. Referred to [facility] to see if he is able to get dental care that he can afford. RTC prn for any new, changing or worsening symptoms.    Plan: penicillin V (VEETID) 500 MG tablet    Return in about 1 week (around 8/10/2020), or if symptoms worsen or fail to improve, for In-Clinic Visit.    Gareth Orozco PA-C  [facility]    Video-Visit Details    Type of service:  Video Visit    Video End Time:5:15 PM    Originating Location (pt. Location): Home    Distant Location (provider  location):  [facility]     Platform used for Video Visit: Daniel Orozco PA-C

## 2020-08-04 NOTE — PATIENT INSTRUCTIONS
"Start Penicillin as prescribed.     Use Ibuprofen and Tylenol as discussed. Use mouthwash and salt water gargles as well.     Patient Education     Dental Abscess    An abscess is a pocket of pus at the tip of a tooth root in your jaw bone. It is caused by an infection at the root of the tooth. It can cause pain and swelling of the gum, cheek, or jaw. Pain may spread from the tooth to your ear or the area of your jaw on the same side. If the abscess isn t treated, it appears as a bubble or swelling on the gum near the tooth. The pressure that builds in this swelling is the source of the pain. More serious infections cause your face to swell.  An abscess can be caused by a crack in the tooth, a cavity, a gum infection, or a combination of these. Once the pulp of the tooth is exposed, bacteria can spread down the roots to the tip. If the bacteria are not stopped, they can damage the bone and soft tissue, and an abscess can form.  Home care  Follow these guidelines when caring for yourself at home:    Don't have hot and cold foods and drinks. Your tooth may be sensitive to changes in temperature. Don t chew on the side of the infected tooth.    If your tooth is chipped or cracked, or if there is a large open cavity, put oil of cloves directly on the tooth to relieve pain. You can buy oil of cloves at drugstores. Some pharmacies carry an over-the-counter \"toothache kit.\" This contains a paste that you can put on the exposed tooth to make it less sensitive.    Put a cold pack on your jaw over the sore area to help reduce pain.    You may use over-the-counter medicine to ease pain, unless another medicine was prescribed. If you have chronic liver or kidney disease, talk with your healthcare provider before using acetaminophen or ibuprofen. Also talk with your provider if you ve had a stomach ulcer or GI bleeding.    An antibiotic will be prescribed. Take it until finished, even if you are feeling better after a few " days.  Follow-up care  Follow up with your dentist or an oral surgeon, or as advised. Once an infection occurs in a tooth, it will continue to be a problem until the infection is drained. This is done through surgery or a root canal. Or you may need to have your tooth pulled.  Call 911  Call 911 if any of these occur:    Unusual drowsiness    Headache or stiff neck    Weakness or fainting    Difficulty swallowing, breathing, or opening your mouth    Swollen eyelids  When to seek medical advice  Call your healthcare provider right away if any of these occur:    Your face becomes more swollen or red    Pain gets worse or spreads to your neck    Fever of 100.4  F (38.0  C) or higher, or as directed by your healthcare provider    Pus drains from the tooth  Date Last Reviewed: 10/1/2016    8993-2319 [employer]. [address] [place] [address], [place] [address]. All rights reserved. This information is not intended as a substitute for professional medical care. Always follow your healthcare professional's instructions.

## 2020-09-15 ENCOUNTER — OFFICE VISIT (OUTPATIENT)
Dept: FAMILY MEDICINE | Facility: CLINIC | Age: 35
End: 2020-09-15
Payer: COMMERCIAL

## 2020-09-15 VITALS
TEMPERATURE: 98.8 F | RESPIRATION RATE: 20 BRPM | HEART RATE: 88 BPM | WEIGHT: 315 LBS | SYSTOLIC BLOOD PRESSURE: 153 MMHG | DIASTOLIC BLOOD PRESSURE: 93 MMHG | BODY MASS INDEX: 69.4 KG/M2

## 2020-09-15 DIAGNOSIS — R82.90 ABNORMAL URINE ODOR: ICD-10-CM

## 2020-09-15 DIAGNOSIS — E66.01 MORBID OBESITY (H): ICD-10-CM

## 2020-09-15 DIAGNOSIS — M25.562 ACUTE PAIN OF LEFT KNEE: Primary | ICD-10-CM

## 2020-09-15 DIAGNOSIS — Z13.6 CARDIOVASCULAR SCREENING; LDL GOAL LESS THAN 100: ICD-10-CM

## 2020-09-15 DIAGNOSIS — R03.0 ELEVATED BLOOD PRESSURE READING WITHOUT DIAGNOSIS OF HYPERTENSION: ICD-10-CM

## 2020-09-15 LAB
ALBUMIN UR-MCNC: NEGATIVE MG/DL
APPEARANCE UR: CLEAR
BILIRUB UR QL STRIP: NEGATIVE
CHOLEST SERPL-MCNC: 179 MG/DL
COLOR UR AUTO: YELLOW
GLUCOSE UR STRIP-MCNC: NEGATIVE MG/DL
HBA1C MFR BLD: 5.5 % (ref 0–5.6)
HDLC SERPL-MCNC: 43 MG/DL
HGB UR QL STRIP: NEGATIVE
KETONES UR STRIP-MCNC: NEGATIVE MG/DL
LDLC SERPL CALC-MCNC: 107 MG/DL
LEUKOCYTE ESTERASE UR QL STRIP: NEGATIVE
NITRATE UR QL: NEGATIVE
NONHDLC SERPL-MCNC: 136 MG/DL
PH UR STRIP: 8 PH (ref 5–7)
SOURCE: ABNORMAL
SP GR UR STRIP: 1.02 (ref 1–1.03)
TRIGL SERPL-MCNC: 145 MG/DL
TSH SERPL DL<=0.005 MIU/L-ACNC: 1.23 MU/L (ref 0.4–4)
UROBILINOGEN UR STRIP-ACNC: 2 EU/DL (ref 0.2–1)

## 2020-09-15 PROCEDURE — 81003 URINALYSIS AUTO W/O SCOPE: CPT | Performed by: PHYSICIAN ASSISTANT

## 2020-09-15 PROCEDURE — 99214 OFFICE O/P EST MOD 30 MIN: CPT | Performed by: PHYSICIAN ASSISTANT

## 2020-09-15 PROCEDURE — 84443 ASSAY THYROID STIM HORMONE: CPT | Performed by: PHYSICIAN ASSISTANT

## 2020-09-15 PROCEDURE — 83036 HEMOGLOBIN GLYCOSYLATED A1C: CPT | Performed by: PHYSICIAN ASSISTANT

## 2020-09-15 PROCEDURE — 36415 COLL VENOUS BLD VENIPUNCTURE: CPT | Performed by: PHYSICIAN ASSISTANT

## 2020-09-15 PROCEDURE — 80061 LIPID PANEL: CPT | Performed by: PHYSICIAN ASSISTANT

## 2020-09-15 RX ORDER — MELOXICAM 15 MG/1
15 TABLET ORAL DAILY
Qty: 90 TABLET | Refills: 0 | Status: SHIPPED | OUTPATIENT
Start: 2020-09-15 | End: 2021-01-28

## 2020-09-15 RX ORDER — CYCLOBENZAPRINE HCL 10 MG
10 TABLET ORAL 3 TIMES DAILY PRN
Qty: 30 TABLET | Refills: 0 | Status: SHIPPED | OUTPATIENT
Start: 2020-09-15 | End: 2021-07-30

## 2020-09-15 ASSESSMENT — PAIN SCALES - GENERAL: PAINLEVEL: SEVERE PAIN (7)

## 2020-09-15 NOTE — PROGRESS NOTES
Subjective     Ge Hansen is a 35 year old male who presents to clinic today for the following health issues:    HPI   Musculoskeletal problem/pain  Onset/Duration: 4 days  Description  Location: knee - left says that he has a torn meniscus and fell over the weekend. Says he did a full 8 hr shift at work yesterday and he has a sharp pain going from his knee down his shin   Joint Swelling: YES  Redness: no  Pain: YES  Warmth: no  Intensity:  moderate, severe  Progression of Symptoms:  worsening  Accompanying signs and symptoms:   Fevers: no  Numbness/tingling/weakness: YES  History  Trauma to the area: YES- he fell on Friday   Recent illness:  no  Previous similar problem: YES  Previous evaluation:  YES  Precipitating or alleviating factors:  Aggravating factors include: walking and overuse  Therapies tried and outcome: ice, acetaminophen and Ibuprofen    Pt would like referral to the weight management center for discussion about bariatric surgery.       Review of Systems   Constitutional, HEENT, cardiovascular, pulmonary, gi and gu systems are negative, except as otherwise noted.      Objective    BP (!) 153/93   Pulse 88   Temp 98.8  F (37.1  C) (Tympanic)   Resp 20   Wt (!) 238.6 kg (526 lb)   BMI 69.40 kg/m    Body mass index is 69.4 kg/m .  Physical Exam   Constitutional: healthy, alert, and no distress  Head: Normocephalic. Atraumatic  Eyes: No conjunctival injection, sclera anicteric  Respiratory: No resp distress.  Musculoskeletal: extremities normal- no gross deformities noted, and normal muscle tone. Tenderness inferiorly, medially, and laterally along the left knee. Unable to appreciate any swelling. No bruising.   Neurologic: Gait antalgic favoring the L. CN 2-12 grossly intact  Psychiatric: mentation appears normal and affect normal/bright     Results for orders placed or performed in visit on 09/15/20   TSH with free T4 reflex     Status: None   Result Value Ref Range    TSH 1.23 0.40 - 4.00 mU/L    Lipid panel reflex to direct LDL Fasting     Status: Abnormal   Result Value Ref Range    Cholesterol 179 <200 mg/dL    Triglycerides 145 <150 mg/dL    HDL Cholesterol 43 >39 mg/dL    LDL Cholesterol Calculated 107 (H) <100 mg/dL    Non HDL Cholesterol 136 (H) <130 mg/dL   Hemoglobin A1c     Status: None   Result Value Ref Range    Hemoglobin A1C 5.5 0 - 5.6 %   *UA reflex to Microscopic and Culture ([place] [facility] (except [place] and [place])     Status: Abnormal    Specimen: Midstream Urine   Result Value Ref Range    Color Urine Yellow     Appearance Urine Clear     Glucose Urine Negative NEG^Negative mg/dL    Bilirubin Urine Negative NEG^Negative    Ketones Urine Negative NEG^Negative mg/dL    Specific Gravity Urine 1.025 1.003 - 1.035    Blood Urine Negative NEG^Negative    pH Urine 8.0 (H) 5.0 - 7.0 pH    Protein Albumin Urine Negative NEG^Negative mg/dL    Urobilinogen Urine 2.0 (H) 0.2 - 1.0 EU/dL    Nitrite Urine Negative NEG^Negative    Leukocyte Esterase Urine Negative NEG^Negative    Source Midstream Urine          Assessment & Plan   Acute pain of left knee  Pt has hx of a torn meniscus in the L knee in 2018 that was never repairs. Had fall 4 days ago and now with recurrent pain in the left knee. He is ambulatory on it. Exam with generalized tenderness throughout the L knee. Unable to appreciate an effusion. MRI ordered to reevaluate the L knee given his history. Rx for Meloxicam and Flexeril for pain. Encouraged rest, ice, and elevation as well. RTC prn for any new, changing or worsening symptoms.   - MR Knee Left w/o Contrast; Future  - meloxicam (MOBIC) 15 MG tablet; Take 1 tablet (15 mg) by mouth daily  - cyclobenzaprine (FLEXERIL) 10 MG tablet; Take 1 tablet (10 mg) by mouth 3 times daily as needed for muscle spasms  - Cane Order    Elevated blood pressure reading without diagnosis of hypertension  No hx of HTN, but BP elevated quite high here today. Pt will follow-up in 2 weeks  for BP check, and if elevated again, will start on HTN regimen.   - COMPREHENSIVE WEIGHT MANAGEMENT    Morbid obesity (H)  Body mass index is 69.4 kg/m . Lab work up was unremarkable. [facility] for discussion about bariatric surgery. Has comorbid MAYDA, and likely now HTN.    - TSH with free T4 reflex  - Hemoglobin A1c  - COMPREHENSIVE WEIGHT MANAGEMENT    CARDIOVASCULAR SCREENING; LDL GOAL LESS THAN 100  Ordered lipid panel.  - Lipid panel reflex to direct LDL Fasting    Abnormal urine odor  UA was unremarkable. Pt denies any rashes. Continue to monitor for symptoms.   - *UA reflex to Microscopic and Culture ([place] and [facility] (except [place] and [place])    Return in about 4 weeks (around 10/13/2020), or if symptoms worsen or fail to improve, for Physical Exam.    Gareth Orozco PA-C  [facility]

## 2020-09-15 NOTE — LETTER
September 15, 2020      Ge Hansen  [address] [place] [id number]        To Whom It May Concern:    Ge Hansen was seen in our clinic. Please excuse Ge from work from 9/15/2020-9/16/2020. He may return to work without restrictions.      Sincerely,        Gareth Orozco PA-C          
Normal for race

## 2020-11-29 ENCOUNTER — HEALTH MAINTENANCE LETTER (OUTPATIENT)
Age: 35
End: 2020-11-29

## 2021-01-15 ENCOUNTER — OFFICE VISIT (OUTPATIENT)
Dept: FAMILY MEDICINE | Facility: CLINIC | Age: 36
End: 2021-01-15
Payer: COMMERCIAL

## 2021-01-15 VITALS
TEMPERATURE: 98.2 F | WEIGHT: 315 LBS | RESPIRATION RATE: 20 BRPM | DIASTOLIC BLOOD PRESSURE: 84 MMHG | HEART RATE: 96 BPM | HEIGHT: 73 IN | BODY MASS INDEX: 41.75 KG/M2 | OXYGEN SATURATION: 98 % | SYSTOLIC BLOOD PRESSURE: 128 MMHG

## 2021-01-15 DIAGNOSIS — G47.00 INSOMNIA, UNSPECIFIED TYPE: ICD-10-CM

## 2021-01-15 DIAGNOSIS — M72.2 PLANTAR FASCIITIS: ICD-10-CM

## 2021-01-15 DIAGNOSIS — E66.01 MORBID OBESITY (H): Primary | ICD-10-CM

## 2021-01-15 DIAGNOSIS — M77.12 LATERAL EPICONDYLITIS OF BOTH ELBOWS: ICD-10-CM

## 2021-01-15 DIAGNOSIS — M77.11 LATERAL EPICONDYLITIS OF BOTH ELBOWS: ICD-10-CM

## 2021-01-15 DIAGNOSIS — L84 CORN OR CALLUS: ICD-10-CM

## 2021-01-15 PROCEDURE — 99214 OFFICE O/P EST MOD 30 MIN: CPT | Mod: 25 | Performed by: NURSE PRACTITIONER

## 2021-01-15 PROCEDURE — 11055 PARING/CUTG B9 HYPRKER LES 1: CPT | Performed by: NURSE PRACTITIONER

## 2021-01-15 RX ORDER — TOPIRAMATE 25 MG/1
TABLET, FILM COATED ORAL
Qty: 57 TABLET | Refills: 0 | Status: SHIPPED | OUTPATIENT
Start: 2021-01-15 | End: 2021-01-28 | Stop reason: DRUGHIGH

## 2021-01-15 RX ORDER — NAPROXEN 500 MG/1
500 TABLET ORAL 2 TIMES DAILY WITH MEALS
Qty: 60 TABLET | Refills: 1 | Status: SHIPPED | OUTPATIENT
Start: 2021-01-15 | End: 2021-03-16

## 2021-01-15 RX ORDER — HYDROXYZINE HYDROCHLORIDE 25 MG/1
25-50 TABLET, FILM COATED ORAL
Qty: 60 TABLET | Refills: 0 | Status: SHIPPED | OUTPATIENT
Start: 2021-01-15 | End: 2021-08-04

## 2021-01-15 RX ORDER — PHENTERMINE HYDROCHLORIDE 15 MG/1
15 CAPSULE ORAL EVERY MORNING
Qty: 30 CAPSULE | Refills: 0 | Status: SHIPPED | OUTPATIENT
Start: 2021-01-15 | End: 2021-03-16 | Stop reason: SINTOL

## 2021-01-15 ASSESSMENT — MIFFLIN-ST. JEOR
SCORE: 3380.26
SCORE: 3384.8

## 2021-01-15 NOTE — LETTER
[facility]  [address] [place] [id number]  [phone]          January 15, 2021    Ge Hansen                                                                                                                     [address] [place] [address]        To Whom it May Concern,    Ge was seen in clinic today 1/15/2021. It is requested he is to do light duty job duties for the next 2 weeks if this can be accommodated. If this request is unable to be met, he may be able to complete usual duties, however is not recommended.         Sincerely,       Dr. Kat Wiggins, DNP, APRN-CNP

## 2021-01-15 NOTE — PROGRESS NOTES
Assessment & Plan     Morbid obesity (H)  Chronic, has always been interested in losing weight.  Looking into bariatric surgery.  Discussed at length nutrition and exercise.  Nutrition referral placed, discussed ideas in the interim for good nutrition habits.  Also discussed medication management and exercise.  Will start on phentermine and Topamax to help with weight loss as well as provided good alternatives for exercise.  Bariatric surgery referral placed as well.  Recheck in 1 month for weight and blood pressure check.  - NUTRITION REFERRAL  - COMPREHENSIVE WEIGHT MANAGEMENT  - phentermine (ADIPEX-P) 15 MG capsule; Take 1 capsule (15 mg) by mouth every morning  - topiramate (TOPAMAX) 25 MG tablet; Take 1 tablet (25 mg) by mouth daily for 5 days, THEN 2 tablets (50 mg) daily for 26 days.    Insomnia, unspecified type  Chronic, intermittent.  Also discussed this as potentially inhibiting weight loss.  Will have patient trial hydroxyzine at night as needed for sleep.  We will check in at 1 month visit to see how this is going.  - hydrOXYzine (ATARAX) 25 MG tablet; Take 1-2 tablets (25-50 mg) by mouth nightly as needed for other (Insomnia)    Lateral epicondylitis of both elbows  Acute, likely exacerbated by using manual auger a couple of weeks ago.  Discussed with patient treatment of this including rest of offending activity including work and using ice other.  Patient is unable to take off work, so letter is written for accommodations if able.  Advised on counterforce braces to both arms and naproxen twice daily.  Patient advised not to take any Advil, ibuprofen or Aleve while taking naproxen.  May take Tylenol as needed in between.  Follow other instructions for treatment of tennis elbow as an instructions.  - naproxen (NAPROSYN) 500 MG tablet; Take 1 tablet (500 mg) by mouth 2 times daily (with meals)    Plantar fasciitis  Chronic, has done conservative management in the past and has had orthotics.  Orthotics  "are old and needs new pair.  Orthotics referral placed.  - ORTHOTICS REFERRAL    [place] or callus  Corn on right foot, causing much pain.  Discussed with patient proper footwear as well as weight management as working on above.  Corn pared down in office today, feeling much better per patient.  Advised on wearing a corn pad and will reevaluate at next office visit if need be.  - DESTRUCT BENIGN LESION, UP TO 14           Tobacco Cessation:   reports that he has been smoking other. He started smoking about 23 years ago. He has a 10.00 pack-year smoking history. His smokeless tobacco use includes chew and snuff.  Tobacco Cessation Action Plan: Information offered: Patient not interested at this time    BMI:   Estimated body mass index is 69.27 kg/m  as calculated from the following:    Height as of this encounter: 1.854 m (6' 1\").    Weight as of this encounter: 238.1 kg (525 lb).   Weight management plan: Including Nutrition consult, initiation of medication management and discussed diet and exercise      See Patient Instructions    Return in about 1 month (around 2/15/2021) for Recheck.    SONIA Quintana CNP  [facility]    Luis Carolina is a 35 year old who presents to clinic today for the following health issues:    HPI     Musculoskeletal problem/pain  Onset/Duration: 2 weeks  Description  Location: arm - left and right  Joint Swelling: no  Redness: no  Pain: YES  Warmth: no  Intensity: at worst  10/10  Progression of Symptoms:  worsening and waxing and waning  Accompanying signs and symptoms:   Fevers: no  Numbness/tingling/weakness: YES- weakness  History  Trauma to the area: YES  Recent illness:  no  Previous similar problem: no  Previous evaluation:  no  Precipitating or alleviating factors:  Aggravating factors include: worsens at night   Therapies tried and outcome: heat, ice and Ibuprofen - takes a lot. 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   Hurts worse when arms aren't moving     Concern - Foot Pain  Onset: Years   Description: patient reports having plantar fasciitis - bilateral    Intensity: moderate, severe  Progression of Symptoms:  worsening  Accompanying Signs & Symptoms:   Previous history of similar problem: yes  Precipitating factors:        Worsened by: lack of activity  Alleviating factors:        Improved by: wearing shoes  Therapies tried and outcome: shoe inserts helped - special orthotics     Corn on right foot   Big and painful   Corn pads not helpful due to weight     Patient also has concerns -regarding his weight and would like help with weight loss.  When patient was younger - [age]; considering bariatric surgery. Had life event happen which put it off   Having difficulty sleeping as well, even with CPAP machine   Has had Lunesta before- helped sleep better         Review of Systems   Constitutional, HEENT, cardiovascular, pulmonary, GI, , musculoskeletal, neuro, skin, endocrine and psych systems are negative, except as otherwise noted.      Objective    /84   Pulse 96   Temp 98.2  F (36.8  C) (Tympanic)   Resp 20   Ht 1.854 m (6' 1\")   Wt (!) 238.1 kg (525 lb)   SpO2 98%   BMI 69.27 kg/m    Body mass index is 69.27 kg/m .  Physical Exam   GENERAL APPEARANCE: healthy, alert and no distress  RESP: lungs clear to auscultation - no rales, rhonchi or wheezes  CV: regular rates and rhythm, normal S1 S2, no S3 or S4 and no murmur, click or rub  ABDOMEN: soft, nontender, without hepatosplenomegaly or masses, bowel sounds normal and obese   MS: extremities normal- no gross deformities noted and corn noted on pad of right foot - pared down with blade in office, tenderness of bilateral plantar fascia   ORTHO: Elbow Exam: Bilateral: Inspection: no swelling, no ecchymosis  Tender: lateral epicondyle  Non-tender: remainder of elbow non-tender "   Range of Motion: all normal  Strength: elbow strength full, flexion: 5/5, grossly intact, extension: 5/5, grossly intact, supination: 5/5, painful - bilateral and pronation: 5/5, painful - bilateral  NEURO: Normal strength and tone, mentation intact and speech normal  PSYCH: mentation appears normal and affect normal/bright    Diagnostic Test Results:  none            DME (Durable Medical Equipment) Orders and Documentation  Orders Placed This Encounter   Procedures     Wrist/Arm/Hand Supplies Order      The patient was assessed and it was determined the patient is in need of the following listed DME Supplies/Equipment. Please complete supporting documentation below to demonstrate medical necessity.      Wrist/Arm/Hand Bracing Supplies Documentation  Patient requires the use of the ordered bracing device due to following medical need/condition: Lateral Epicondylitis

## 2021-01-15 NOTE — PATIENT INSTRUCTIONS
Morbid obesity (H)  Chronic, has always been interested in losing weight.  Looking into bariatric surgery.  Discussed at length nutrition and exercise.  Nutrition referral placed, discussed ideas in the interim for good nutrition habits.  Also discussed medication management and exercise.  Will start on phentermine and Topamax to help with weight loss as well as provided good alternatives for exercise.  Bariatric surgery referral placed as well.  Recheck in 1 month for weight and blood pressure check.  - NUTRITION REFERRAL  - COMPREHENSIVE WEIGHT MANAGEMENT  - phentermine (ADIPEX-P) 15 MG capsule; Take 1 capsule (15 mg) by mouth every morning  - topiramate (TOPAMAX) 25 MG tablet; Take 1 tablet (25 mg) by mouth daily for 5 days, THEN 2 tablets (50 mg) daily for 26 days.    Insomnia, unspecified type  Chronic, intermittent.  Also discussed this as potentially inhibiting weight loss.  Will have patient trial hydroxyzine at night as needed for sleep.  We will check in at 1 month visit to see how this is going.  - hydrOXYzine (ATARAX) 25 MG tablet; Take 1-2 tablets (25-50 mg) by mouth nightly as needed for other (Insomnia)    Lateral epicondylitis of both elbows  Acute, likely exacerbated by using manual auger a couple of weeks ago.  Discussed with patient treatment of this including rest of offending activity including work and using ice other.  Patient is unable to take off work, so letter is written for accommodations if able.  Advised on counterforce braces to both arms and naproxen twice daily.  Patient advised not to take any Advil, ibuprofen or Aleve while taking naproxen.  May take Tylenol as needed in between.  Follow other instructions for treatment of tennis elbow as an instructions.  - naproxen (NAPROSYN) 500 MG tablet; Take 1 tablet (500 mg) by mouth 2 times daily (with meals)    Plantar fasciitis  Chronic, has done conservative management in the past and has had orthotics.  Orthotics are old and needs new  pair.  Orthotics referral placed.  - ORTHOTICS REFERRAL    [place] or callus  Corn on right foot, causing much pain.  Discussed with patient proper footwear as well as weight management as working on above.  Corn pared down in office today, feeling much better per patient.  Advised on wearing a corn pad and will reevaluate at next office visit if need be.  - DESTRUCT BENIGN LESION, UP TO 14      Patient Education     Understanding Lateral Epicondylitis     Tendons are strong bands of tissue that connect muscles to bones. Lateral epicondylitis affects the tendons that connect muscles in the forearm to the lateral epicondyle. This is the bony knob on the outer side of the elbow. The condition occurs if the extensor tendons of the wrist become painful and swollen (irritated). This can cause pain in the elbow, forearm, and wrist. Because the condition is sometimes caused by playing tennis, it's also known as  tennis elbow.     How to say it  [id number]   What causes lateral epicondylitis?  The condition most often occurs because of overuse. This can be from any activity that repeatedly puts stress on the forearm extensor muscles or tendons and wrist. For instance, playing tennis, lifting weights, cutting meat, painting, and typing can all cause the condition. Wear and tear of the tendons from aging or an injury to the tendons can also cause the condition.   Symptoms of lateral epicondylitis  The most common symptom is pain. You may feel it on the outer side of the elbow and down the back of the forearm. It may be worse when moving or using the elbow, forearm, or wrist. You may also feel pain when gripping or lifting things.   Treatment for lateral epicondylitis  Treatments may include:    Resting the elbow, forearm, and wrist. You ll need to avoid movements that can make your symptoms worse. You also may need to avoid certain sports and types of work for a time. This helps relieve symptoms and prevent  further damage to the tendons.    Changing the action that caused the problem. For instance, if the tendons were damaged from playing tennis, it may help to change your playing technique or use different equipment. This helps prevent further damage to the tendons.    Using cold packs. Putting an ice pack on the injured area can help reduce pain and swelling.    Taking pain medicines. Taking prescription or over-the-counter pain medicines may help reduce pain and swelling.      Wearing a brace. This helps reduce strain on the muscles and tendons in the forearm, which may relieve symptoms. It's very important to wear the brace properly.    Doing exercises and physical therapy. These help improve strength and range of motion in the elbow, forearm, and wrist.    Getting shots of medicine into the injured area. These may help relieve symptoms for a time.    Having surgery. This may be an option if other treatments fail to relieve symptoms. In many cases, the surgeon removes the damaged tissue.  Possible complications of lateral epicondylitis  If the tendons involved don t heal properly, symptoms may return or get worse. To help prevent this, follow your treatment plan as directed.   When to call your healthcare provider  Call your healthcare provider right away if you have any of these:    Fever of 100.4 F (38 C) or higher, or as directed by your provider    Chills    Redness, swelling, or warmth in the elbow or forearm that gets worse    Symptoms that don t get better with treatment, or get worse    New symptoms  Alicia last reviewed this educational content on 6/1/2019 2000-2020 [employer]. [address] [place] [address], [place] [address]. All rights reserved. This information is not intended as a substitute for professional medical care. Always follow your healthcare professional's instructions.

## 2021-01-18 ENCOUNTER — TELEPHONE (OUTPATIENT)
Dept: FAMILY MEDICINE | Facility: CLINIC | Age: 36
End: 2021-01-18

## 2021-01-18 NOTE — TELEPHONE ENCOUNTER
Reason for Call:    1)Pt said his employer does not have light duties for this pt at this time. So his employer is asking for a redo letter saying off two weeks ( what VELMA Chun wanted him to do) and Light duties.  2) Pt has concerns about his brace he was given     Phone Number Patient can be reached at: Home number on file [phone] (home)    Best Time: Any Time      Can we leave a detailed message on this number? YES    Call taken on 1/18/2021 at 9:27 AM by Opal Hidalgo      
See note below.  Answered questions about his brace.  Wilma Hays RN      
Yes

## 2021-01-18 NOTE — LETTER
[facility]  [address] [place] [id number]  [phone]          January 18, 2021    Ge Hansen                                                                                                                     [address] [place] [id number]        To whom it may concern,    Ge will need to be off of work for the next 2 weeks starting 1/18/2021 -2/1/2021 due to injury.        Sincerely,       Dr. Kat Wiggins, DNP, APRN-CNP

## 2021-01-18 NOTE — LETTER
[facility]  [address] [place] [id number]  [phone]          January 18, 2021    Ge Hansen                                                                                                                     [address] [place] [phone]        To whom it may concern,    Ge will need to be off of work for the next 2 weeks starting 1/18/2021 -2/1/2021 due to injury.        Sincerely,       Dr. Kat Wiggins, DNP, APRN-CNP

## 2021-01-20 NOTE — TELEPHONE ENCOUNTER
REFERRAL INFORMATION:    Referring Provider:  SONIA Kelley CNP     Referring Clinic:  [facility]     Reason for Visit/Diagnosis: New Surg, BMI 69       FUTURE VISIT INFORMATION:    Appointment Date: 1/26/2021    Appointment Time: 2:30 PM      NOTES RECORD STATUS  DETAILS   OFFICE NOTE from Referring Provider Internal 1/15/2021 Office visit with JARED Kelley CNP      OFFICE NOTE from Other Specialists Internal 10/3/18 Therapy Visit ([facility])     9/18/19 Office visit with LISSY Fink ([facility])    HOSPITAL DISCHARGE SUMMARY/ ED VISITS  N/A    OPERATIVE REPORT N/A    ENDOSCOPY (EGD)  N/A    PERTINENT LABS Internal/ Care Everywhere    PATHOLOGY REPORTS (RELATED) N/A    IMAGING (CT, MRI, US, XR)  N/A

## 2021-01-25 ENCOUNTER — TELEPHONE (OUTPATIENT)
Dept: ENDOCRINOLOGY | Facility: CLINIC | Age: 36
End: 2021-01-25

## 2021-01-25 NOTE — TELEPHONE ENCOUNTER
Patient interested in weight loss surgery    Ge Hansen      Scheduled to see CNP or GAY at 1430, followed by an appt with a dietitian at 1500.    Spoke to Patient: yes    Left message:         Instructed to view seminar and complete pre-visit questionnaire prior to visit at [email or web address].    [phone] contact center phone number      Maranda Swanson, EMT

## 2021-01-26 ENCOUNTER — VIRTUAL VISIT (OUTPATIENT)
Dept: ENDOCRINOLOGY | Facility: CLINIC | Age: 36
End: 2021-01-26
Attending: NURSE PRACTITIONER
Payer: COMMERCIAL

## 2021-01-26 ENCOUNTER — PRE VISIT (OUTPATIENT)
Dept: ENDOCRINOLOGY | Facility: CLINIC | Age: 36
End: 2021-01-26

## 2021-01-26 ENCOUNTER — VIRTUAL VISIT (OUTPATIENT)
Dept: ENDOCRINOLOGY | Facility: CLINIC | Age: 36
End: 2021-01-26
Payer: COMMERCIAL

## 2021-01-26 VITALS — BODY MASS INDEX: 41.75 KG/M2 | WEIGHT: 315 LBS | HEIGHT: 73 IN

## 2021-01-26 DIAGNOSIS — E66.1 CLASS 3 DRUG-INDUCED OBESITY WITH SERIOUS COMORBIDITY AND BODY MASS INDEX (BMI) GREATER THAN OR EQUAL TO 70 IN ADULT (H): Primary | ICD-10-CM

## 2021-01-26 DIAGNOSIS — Z71.3 NUTRITIONAL COUNSELING: ICD-10-CM

## 2021-01-26 DIAGNOSIS — E66.813 CLASS 3 DRUG-INDUCED OBESITY WITH SERIOUS COMORBIDITY AND BODY MASS INDEX (BMI) GREATER THAN OR EQUAL TO 70 IN ADULT (H): Primary | ICD-10-CM

## 2021-01-26 DIAGNOSIS — E66.2 OBESITY HYPOVENTILATION SYNDROME (H): ICD-10-CM

## 2021-01-26 DIAGNOSIS — Z72.0 TOBACCO USE: Chronic | ICD-10-CM

## 2021-01-26 DIAGNOSIS — G47.00 INSOMNIA, UNSPECIFIED TYPE: ICD-10-CM

## 2021-01-26 DIAGNOSIS — G47.33 OSA (OBSTRUCTIVE SLEEP APNEA): ICD-10-CM

## 2021-01-26 PROCEDURE — 99215 OFFICE O/P EST HI 40 MIN: CPT | Mod: 95 | Performed by: NURSE PRACTITIONER

## 2021-01-26 PROCEDURE — 97802 MEDICAL NUTRITION INDIV IN: CPT | Mod: 95 | Performed by: DIETITIAN, REGISTERED

## 2021-01-26 ASSESSMENT — MIFFLIN-ST. JEOR: SCORE: 3380.26

## 2021-01-26 ASSESSMENT — PAIN SCALES - GENERAL: PAINLEVEL: SEVERE PAIN (7)

## 2021-01-26 NOTE — PROGRESS NOTES
"Ge Augustin is a 35 year old who is being evaluated via a billable video visit.      The patient has been notified of following:     \"This video visit will be conducted via a call between you and your physician/provider. We have found that certain health care needs can be provided without the need for an in-person physical exam.  This service lets us provide the care you need with a video conversation.  If a prescription is necessary we can send it directly to your pharmacy.  If lab work is needed we can place an order for that and you can then stop by our lab to have the test done at a later time.    Video visits are billed at different rates depending on your insurance coverage.  Please reach out to your insurance provider with any questions.    If during the course of the call the physician/provider feels a video visit is not appropriate, you will not be charged for this service.\"    Patient has given verbal consent for Video visit? Yes  How would you like to obtain your AVS? MyChart  If you are dropped from the video visit, the video invite should be resent to: Text to cell phone: [phone]   Will anyone else be joining your video visit? No        Video-Visit Details    Type of service:  Video Visit    Video Start Time: 3:03 PM   Video End Time: 3:35 PM    Originating Location (pt. Location): Home    Distant Location (provider location):  [facility] [place]     Platform used for Video Visit: [employer]     [facility] ([facility] Bariatric Nutrition Consultation Note    Reason For Visit: Nutrition Assessment    Ge Hansen is a 35 year old presenting today for follow-up bariatric nutrition consult.   Pt is interested in laparoscopic sleeve gastrectomy with Dr. Manuel expected surgery in [facility].  Patient is accompanied by self.  This is pt's first of 6 required nutrition visits prior to surgery.   - Pt previous saw RD in 12/2018    Pt referred by Marcela Ewing NP  on January 26, 2021.  Patient with " "Co-morbidities of obesity including:  Type II DM no  Renal Failure no  Sleep apnea yes  Hypertension no   Dyslipidemia no  Joint pain no  Back pain no  GERD no     Support System Reviewed With Patient 10/16/2018   Who do you have in your support network that can be available to help you for the first 2 weeks after surgery? Girl friend   Who can you count on for support throughout your weight loss surgery journey? Girl friend       ANTHROPOMETRICS:  Estimated body mass index is 69.27 kg/m  as calculated from the following:    Height as of 1/15/21: 1.854 m (6' 1\").    Weight as of 1/15/21: 238.1 kg (525 lb).  Initial weight: 522 lb     Required weight loss goal pre-op: 78 lbs from initial consult weight (goal weight 444 lbs or less before surgery)       12/31/2018   I have tried the following methods to lose weight Watching portions or calories, Exercise, Slimfast       Weight Loss Questions Reviewed With Patient 12/31/2018   How long have you been overweight? Since puberty       SUPPLEMENT INFORMATION:  Did not discuss today     NUTRITION HISTORY:  Currently consume 3 meals daily, grabs items from [employer]. Like quick and convenient food.     Breakfast- eggs, marina, pancakes  Lunch- spagettio, sandwiches, burgers  Dinner- soup - like stew   Snacks - occasional   Energy Drinks- 6-8 energy drinks daily - full calorie      Recall Diet Questions Reviewed With Patient 12/31/2018   Describe what you typically consume for breakfast (typical or most recent): none   Describe what you typically consume for lunch (typical or most recent): sandwhiches   Describe what you typically consume for supper (typical or most recent): veggies stir mcgowan or home cooking   Describe what you typically consume as snacks (typical or most recent): bottle of pop   How many ounces of water, or other low calorie drinks, do you drink daily (8 oz=1 glass)? 8 oz   How many ounces of caffeine (coffee, tea, pop) do you drink daily (8 oz=1 glass)? 48 " oz   How many ounces of carbonated (pop, beer, sparkling water) drinks do you drinky daily (8 oz=1 glass)? 48 oz   How many ounces of juice, pop, sweet tea, sports drinks, protein drinks, other sweetened drinks, do you drink daily (8 oz=1 glass)? 48 oz   How many ounces of milk do you drink daily (8 oz=1 glass) 8 oz   Please indicate the type of milk: 1%   How often do you drink alcohol? -   If you do drink alcohol, how many drinks might you have in a day? (one drink = 5 oz. wine, 1 can/bottle of beer, 1 shot liquor) -       Eating Habits 12/31/2018   Do you have any dietary restrictions? -   Do you currently binge eat (eat a large amount of food in a short time)? -   Are you an emotional eater? -   Do you get up to eat after falling asleep? -   What foods do you crave? none       ADDITIONAL INFORMATION:    Dining Out History Reviewed With Patient 12/31/2018   How often do you dine out? Rarely.   Where do you dine out? (select all that apply) fast food chains   What types of food do you order when you dine out? de       Physical Activity Reviewed With Patient 10/16/2018   How often do you exercise? 1 to 2 times per week   What is the duration of your exercise (in minutes)? 30 Minutes   What types of exercise do you do? walking   What keeps you from being more active?  Pain, My ability to walk or move around is limited, Shortness of breath         NUTRITION DIAGNOSIS:  Obesity r/t long history of self-monitoring deficit and excessive energy intake aeb BMI >30 kg/m2.-Continues    INTERVENTION:  Intervention Provided/Education Provided on post-op diet guidelines, vitamins/minerals essential post-operatively, GI anatomy of bariatric surgeries, ways to help prepare for post-op diet guidelines pre-operatively, portion/calorie-control, mindful eating and sources of protein.  Patient demonstrates understanding. Provided pt with list of goals RD contact information.      Questions Reviewed With Patient 10/16/2018   How ready  "are you to make changes regarding your weight? Number 1 = Not ready at all to make changes up to 10 = very ready. 10   How confident are you that you can change? 1 = Not confident that you will be successful making changes up to 10 = very confident. 10       Expected Engagement: good    GOALS:  Relating To Eating:  Eat slowly (20-30 minutes per meal), chewing foods well (25 chews per bite/applesauce consistency)  9\" Plate method (1/2 plate non-starchy vegetables/fruit, 1/4 plate lean protein, 1/4 plate whole grain starch - no more than 1/2 cup carb/meal)  -Start with a fruit of vegetable at each meal  Eat 3 meals per day  Avoid snacking    Relating to beverages:  Reduce caffeine/carbonation/calorie containing beverages  Separate fluids from meals by 30 minutes before, during, and after eating   Decreasing energy drinks     Initial Consult / Weight Loss    My Plate Planner_English - Pt Education  [email or web address]    Protein Sources for Weight Loss  [email or web address]     Carbohydrates  [email or web address]     Time spent with patient: 32 minutes.  Leti Del Cid, MS, RD, LD    "

## 2021-01-26 NOTE — ASSESSMENT & PLAN NOTE
Phentermine 15mg and topiramate ramp to 75mg started 1/18/21. Will follow up in 6 weeks to re-evaluate.   Patient drinks 6-8 energy drinks daily. Working on tapering off of these and increasing water intake.

## 2021-01-26 NOTE — ASSESSMENT & PLAN NOTE
6-8 energy drinks daily. Needs to wake up to void at least once nightly. Then, takes a couple of hours to fall back asleep can't shut mind off.  -decrease caffeine in energy drinks   -hydroxizine as needed for sleep   -topiramate at bedtime may also be beneficial, currently ramping up to 75mg. Consider increasing in the future.

## 2021-01-26 NOTE — LETTER
"1/26/2021       RE: Ge Hansen  [address] [place] [id number]     Dear Colleague,    Thank you for referring your patient, Ge Hansen, to the [facility] [place] at [facility]. Please see a copy of my visit note below.    Ge Augustin is a 35 year old who is being evaluated via a billable video visit.      The patient has been notified of following:     \"This video visit will be conducted via a call between you and your physician/provider. We have found that certain health care needs can be provided without the need for an in-person physical exam.  This service lets us provide the care you need with a video conversation.  If a prescription is necessary we can send it directly to your pharmacy.  If lab work is needed we can place an order for that and you can then stop by our lab to have the test done at a later time.    Video visits are billed at different rates depending on your insurance coverage.  Please reach out to your insurance provider with any questions.    If during the course of the call the physician/provider feels a video visit is not appropriate, you will not be charged for this service.\"    Patient has given verbal consent for Video visit? Yes  How would you like to obtain your AVS? MyChart  If you are dropped from the video visit, the video invite should be resent to: Text to cell phone: [phone]   Will anyone else be joining your video visit? No        Video-Visit Details    Type of service:  Video Visit    Video Start Time: 3:03 PM   Video End Time: 3:35 PM    Originating Location (pt. Location): Home    Distant Location (provider location):  [facility] [place]     Platform used for Video Visit: [employer]     Return Bariatric Nutrition Consultation Note    Reason For Visit: Nutrition Assessment    Ge Hansen is a 35 year old presenting today for follow-up bariatric " "nutrition consult.   Pt is interested in laparoscopic sleeve gastrectomy with Dr. Manuel expected surgery in [place].  Patient is accompanied by self.  This is pt's first of 6 required nutrition visits prior to surgery.   - Pt previous saw RD in 12/2018    Pt referred by Marcela Ewing NP  on January 26, 2021.  Patient with Co-morbidities of obesity including:  Type II DM no  Renal Failure no  Sleep apnea yes  Hypertension no   Dyslipidemia no  Joint pain no  Back pain no  GERD no     Support System Reviewed With Patient 10/16/2018   Who do you have in your support network that can be available to help you for the first 2 weeks after surgery? Girl friend   Who can you count on for support throughout your weight loss surgery journey? Girl friend       ANTHROPOMETRICS:  Estimated body mass index is 69.27 kg/m  as calculated from the following:    Height as of 1/15/21: 1.854 m (6' 1\").    Weight as of 1/15/21: 238.1 kg (525 lb).  Initial weight: 522 lb     Required weight loss goal pre-op: 78 lbs from initial consult weight (goal weight 444 lbs or less before surgery)       12/31/2018   I have tried the following methods to lose weight Watching portions or calories, Exercise, Slimfast       Weight Loss Questions Reviewed With Patient 12/31/2018   How long have you been overweight? Since puberty       SUPPLEMENT INFORMATION:  Did not discuss today     NUTRITION HISTORY:  Currently consume 3 meals daily, grabs items from gas station. Like quick and convenient food.     Breakfast- eggs, marina, pancakes  Lunch- spagettio, sandwiches, burgers  Dinner- soup - like stew   Snacks - occasional   Energy Drinks- 6-8 energy drinks daily - full calorie      Recall Diet Questions Reviewed With Patient 12/31/2018   Describe what you typically consume for breakfast (typical or most recent): none   Describe what you typically consume for lunch (typical or most recent): xochilt   Describe what you typically consume for supper (typical or " most recent): veggies stir mcgowan or home cooking   Describe what you typically consume as snacks (typical or most recent): bottle of pop   How many ounces of water, or other low calorie drinks, do you drink daily (8 oz=1 glass)? 8 oz   How many ounces of caffeine (coffee, tea, pop) do you drink daily (8 oz=1 glass)? 48 oz   How many ounces of carbonated (pop, beer, sparkling water) drinks do you drinky daily (8 oz=1 glass)? 48 oz   How many ounces of juice, pop, sweet tea, sports drinks, protein drinks, other sweetened drinks, do you drink daily (8 oz=1 glass)? 48 oz   How many ounces of milk do you drink daily (8 oz=1 glass) 8 oz   Please indicate the type of milk: 1%   How often do you drink alcohol? -   If you do drink alcohol, how many drinks might you have in a day? (one drink = 5 oz. wine, 1 can/bottle of beer, 1 shot liquor) -       Eating Habits 12/31/2018   Do you have any dietary restrictions? -   Do you currently binge eat (eat a large amount of food in a short time)? -   Are you an emotional eater? -   Do you get up to eat after falling asleep? -   What foods do you crave? none       ADDITIONAL INFORMATION:    Dining Out History Reviewed With Patient 12/31/2018   How often do you dine out? Rarely.   Where do you dine out? (select all that apply) fast food chains   What types of food do you order when you dine out? de       Physical Activity Reviewed With Patient 10/16/2018   How often do you exercise? 1 to 2 times per week   What is the duration of your exercise (in minutes)? 30 Minutes   What types of exercise do you do? walking   What keeps you from being more active?  Pain, My ability to walk or move around is limited, Shortness of breath         NUTRITION DIAGNOSIS:  Obesity r/t long history of self-monitoring deficit and excessive energy intake aeb BMI >30 kg/m2.-Continues    INTERVENTION:  Intervention Provided/Education Provided on post-op diet guidelines, vitamins/minerals essential  "post-operatively, GI anatomy of bariatric surgeries, ways to help prepare for post-op diet guidelines pre-operatively, portion/calorie-control, mindful eating and sources of protein.  Patient demonstrates understanding. Provided pt with list of goals RD contact information.      Questions Reviewed With Patient 10/16/2018   How ready are you to make changes regarding your weight? Number 1 = Not ready at all to make changes up to 10 = very ready. 10   How confident are you that you can change? 1 = Not confident that you will be successful making changes up to 10 = very confident. 10       Expected Engagement: good    GOALS:  Relating To Eating:  Eat slowly (20-30 minutes per meal), chewing foods well (25 chews per bite/applesauce consistency)  9\" Plate method (1/2 plate non-starchy vegetables/fruit, 1/4 plate lean protein, 1/4 plate whole grain starch - no more than 1/2 cup carb/meal)  -Start with a fruit of vegetable at each meal  Eat 3 meals per day  Avoid snacking    Relating to beverages:  Reduce caffeine/carbonation/calorie containing beverages  Separate fluids from meals by 30 minutes before, during, and after eating   Decreasing energy drinks     Initial Consult / Weight Loss    My Plate Planner_English - Pt Education  [email or web address]    Protein Sources for Weight Loss  [email or web address]     Carbohydrates  [email or web address]     Time spent with patient: 32 minutes.  Leti Del Cid, MS, RD, LD    "

## 2021-01-26 NOTE — PROGRESS NOTES
Ge is a 35 year old who is being evaluated via a billable video visit.      How would you like to obtain your AVS? MyChart  If the video visit is dropped, the invitation should be resent by: Text to cell phone: [phone]   Will anyone else be joining your video visit? No      Video Start Time: 1433  Video-Visit Details    Type of service:  Video Visit    Video End Time:1458    Originating Location (pt. Location): Home    Distant Location (provider location):  [facility] [place]     Platform used for Video Visit: Doximity       Pre-Bariatric Surgery Note    Wesley Davis    Date: 2021     RE: Ge Hansen    MR#: [id number]   : 1985   Date of Visit: 2021    REASON FOR VISIT: Preoperative evaluation for possible weight loss surgery    Dear Wesley Lentz,    I had the pleasure of seeing your patient, Ge Hansen, in my preoperative bariatric clinic.    As you know, he is morbidly obese and considering weight loss surgery to treat obesity in association with his medical conditions of obesity.  His consult weight was 522lb.  He has gained 3 pounds since his consult weight. He has not met his required pre-surgery weight. Please refer to initial consult note from date 2018 for patient's weight history and co-morbidities.    Had a lot of family issues and had just lost his job. Now has a new job.     78lb weight loss goal prior to surgery - 444 day of surgery      started phentermine 15mg and topiramate taper to 75mg last week with primary care provider. Hasn't noticed much benefit from the medication.     Dietitian x 6 per insurance     Psych eval - not done     Primary care     Sleep-MAYDA, insomnia (pcp added hydroxyzine PRN), melatonin as needed, uses Bipap wakes up to void over night and can't shut mind off again. Takes a couple hours to get back to sleep. Tries to sleep from 9 to 5. Takes a long time to wake up and is exhausted all day.     Smoking  cessation- still smoking, using it to curb appetite      Eating patterns:   Breakfast- eggs, marina, pancakes  Lunch- spagettio   Dinner- soup - like stew   Snacks - occasional   Energy Drinks- 6-8 energy drinks daily - full calorie       Assessment & Plan   Problem List Items Addressed This Visit        Respiratory    Obesity hypoventilation syndrome (H) (Chronic)    MAYDA (obstructive sleep apnea) (Chronic)     Uses bipap. Helps with sleep. Struggles to stay asleep and fall back to sleep once awake.             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 Will follow up in 6 weeks to re-evaluate.   Patient drinks 6-8 energy drinks daily. Working on tapering off of these and increasing water intake.          Relevant Orders    CBC with platelets    Comprehensive metabolic panel    Hemoglobin A1c    Parathyroid Hormone Intact    Vitamin D Deficiency       Behavioral    Tobacco use (Chronic)     Referral to MTM for smoking cessation          Relevant Orders    MED THERAPY MANAGE REFERRAL       Other    Insomnia, unspecified type     6-8 energy drinks daily. Needs to wake up to void at least once nightly. Then, takes a couple of hours to fall back asleep can't shut mind off.  -decrease caffeine in energy drinks   -hydroxizine as needed for sleep   -topiramate at bedtime may also be beneficial, currently ramping up to 75mg. Consider increasing in the future.                  Review of external notes as documented above     48 minutes spent on the date of the encounter doing chart review, history and exam, documentation and further activities as noted above  0956}  MEDICATIONS:  Continue current medications without change  CONSULTATION/REFERRAL to - psych consult, [facility]TM pharmacist   FUTURE LABS:       - Schedule a fasting blood draw - screening   FUTURE  APPOINTMENTS:       - Follow-up visit in 6 weeks  SELF MONITORING:       - work on decreasing energy drinks       - increase water intake           Reviewed tasklist with patient     Most recent weights:  Wt Readings from Last 4 Encounters:   01/26/21 (!) 238.1 kg (525 lb)   01/15/21 (!) 238.1 kg (525 lb)   09/15/20 (!) 238.6 kg (526 lb)   04/24/20 (!) 235 kg (518 lb)         ROS    Past Medical History:   Diagnosis Date     ADHD      Asthma      Lactose intolerance      Obesity hypoventilation syndrome (H)      Sleep apnea      Tobacco use        Past Surgical History:   Procedure Laterality Date     ROTATOR CUFF REPAIR RT/LT         Current Outpatient Medications   Medication     albuterol (PROAIR HFA/PROVENTIL HFA/VENTOLIN HFA) 108 (90 Base) MCG/ACT inhaler     albuterol (PROVENTIL) (2.5 MG/3ML) 0.083% neb solution     cyclobenzaprine (FLEXERIL) 10 MG tablet     hydrOXYzine (ATARAX) 25 MG tablet     ibuprofen (ADVIL/MOTRIN) 800 MG tablet     meloxicam (MOBIC) 15 MG tablet     naproxen (NAPROSYN) 500 MG tablet     phentermine (ADIPEX-P) 15 MG capsule     pimecrolimus (ELIDEL) 1 % external cream     topiramate (TOPAMAX) 25 MG tablet     No current facility-administered medications for this visit.        No Known Allergies    Office Visit on 09/15/2020   Component Date Value Ref Range Status     TSH 09/15/2020 1.23  0.40 - 4.00 mU/L Final     Cholesterol 09/15/2020 179  <200 mg/dL Final     Triglycerides 09/15/2020 145  <150 mg/dL Final    Non Fasting     HDL Cholesterol 09/15/2020 43  >39 mg/dL Final     LDL Cholesterol Calculated 09/15/2020 107* <100 mg/dL Final    Comment: Above desirable:  100-129 mg/dl  Borderline High:  130-159 mg/dL  High:             160-189 mg/dL  Very high:       >189 mg/dl       Non HDL Cholesterol 09/15/2020 136* <130 mg/dL Final    Comment: Above Desirable:  130-159 mg/dl  Borderline high:  160-189 mg/dl  High:             190-219 mg/dl  Very high:       >219 mg/dl       Hemoglobin A1C  "09/15/2020 5.5  0 - 5.6 % Final    Comment: Normal <5.7% Prediabetes 5.7-6.4%  Diabetes 6.5% or higher - adopted from ADA   consensus guidelines.       Color Urine 09/15/2020 Yellow   Final     Appearance Urine 09/15/2020 Clear   Final     Glucose Urine 09/15/2020 Negative  NEG^Negative mg/dL Final     Bilirubin Urine 09/15/2020 Negative  NEG^Negative Final     Ketones Urine 09/15/2020 Negative  NEG^Negative mg/dL Final     Specific Gravity Urine 09/15/2020 1.025  1.003 - 1.035 Final     Blood Urine 09/15/2020 Negative  NEG^Negative Final     pH Urine 09/15/2020 8.0* 5.0 - 7.0 pH Final     Protein Albumin Urine 09/15/2020 Negative  NEG^Negative mg/dL Final     Urobilinogen Urine 09/15/2020 2.0* 0.2 - 1.0 EU/dL Final     Nitrite Urine 09/15/2020 Negative  NEG^Negative Final     Leukocyte Esterase Urine 09/15/2020 Negative  NEG^Negative Final     Source 09/15/2020 Midstream Urine   Final       PHYSICAL EXAM:  Objective    Ht 1.854 m (6' 1\")   Wt (!) 238.1 kg (525 lb)   BMI 69.27 kg/m    Vitals - Patient Reported  Pain Score: Severe Pain (7)  Pain Loc: Low Back(joint pain)        Physical Exam   GENERAL: Healthy, alert and no distress  EYES: Eyes grossly normal to inspection.  No discharge or erythema, or obvious scleral/conjunctival abnormalities.  RESP: No audible wheeze, cough, or visible cyanosis.  No visible retractions or increased work of breathing.    SKIN: Visible skin clear. No significant rash, abnormal pigmentation or lesions.  NEURO: Cranial nerves grossly intact.  Mentation and speech appropriate for age.  PSYCH: Mentation appears normal, affect normal/bright, judgement and insight intact, normal speech and appearance well-groomed.    Sleeve Gastrectomy: Risks and Side Effects    The complications or risks of surgery include but are not limited to: death, heart attack, infection in the surgical site (wound infection), abdomen (abscess), bladder (urinary tract infection), lungs (pneumonia), clots in legs " (deep vein thrombosis) or lungs (pulmonary emboli),  injury to the bowels or other organs, bowel obstruction, hernia at the incision and gastrointestional bleeding.    More specific risks related to vertical sleeve gastrectomy were detailed at the bariatric informational seminar and include the following: leak at the vertical sleeve staple line, leak at the anastomoses,  nausea, vomiting, and dehydration for several months,  adhesions causing bowel obstruction, rapid weight loss causing a higher rate of gallstone formation during the first 6 months after surgery, decreased absorption of vitamins and protein because of the reduced stomach size, weight regain if inappropriate food intake occurs, stricture, injury to other organs, hernia,  and ulcers.       Side effects of bariatric surgery include but are not limited to: abdominal pain, cramping, bloating, constipation, nausea, vomiting, diarrhea, difficulty swallowing,  dehydration, hair loss, excess skin, protein, iron and vitamin deficiencies, heartburn, transfer of addictions, increased anxiety and worsening depression.       I emphasized exercise and activity behavior along with appropriate food choice as the main foundation for weight loss with surgery providing surgical reinforcement of the appropriate behavior set.        Review of general surgery weight loss process    1. Complete preoperative requirements, including weight loss.  Final weight check to confirm MANDATORY weight loss requirement must be documented on a clinic scale.    2. Discuss prior authorization with .    3. History and physical evaluation by PCP of PAC clinic within 30 days of surgery date, preoperative class, and weight check (weigh-in visit) to be scheduled by patient.  Pre-anesthesia clinic for risk evaluation to be scheduled by anesthesia clinic.    4. We cannot guarantee that patient will qualify for surgery unless all preoperative requirements are met, prior  authorization from primary insurance company is granted, and insurance changes do not occur.    5. It is possible for patients to regain all weight after weight loss surgery unless they follow guidelines prescribed by our bariatric center.    6. All patients with gastrointestinal complaints after weight loss surgery must have complaints conveyed to the bariatric team for appropriate treatment.    7. Vitamin deficiencies may develop post-bariatric surgery and annual laboratory testing should be performed.    8. Persistent nausea/vomiting after bariatric surgery entails risk of thiamine deficiency and should be treated early.  Vitamin B12 deficiency may develop, especially after gastric bypass surgery and must be recognized.        If you have any questions about our plans please don't hesitate to contact me.    Sincerely,    Marcela Ewing NP

## 2021-01-26 NOTE — NURSING NOTE
"Chief Complaint   Patient presents with     Consult     New joselyn.       Vitals:    01/26/21 1415   Weight: (!) 238.1 kg (525 lb)   Height: 1.854 m (6' 1\")       Body mass index is 69.27 kg/m .                            ELIER CALDERON, EMT    "

## 2021-01-26 NOTE — Clinical Note
PBS. Consult 12/2018. Never really seen after consult. So, starting fresh. Lots of weight loss (78lbs). Lots of goals (caffiene, carbonation, nicotine). I'll send surgery packet and psych consult information. Thanks

## 2021-01-26 NOTE — LETTER
2021       RE: Ge Hansen  [address] [place] [id number]     Dear Colleague,    Thank you for referring your patient, Ge Hansen, to the [facility] [place] at [facility]. Please see a copy of my visit note below.    Ge is a 35 year old who is being evaluated via a billable video visit.      How would you like to obtain your AVS? MyChart  If the video visit is dropped, the invitation should be resent by: Text to cell phone: [phone]   Will anyone else be joining your video visit? No      Video Start Time: 1433  Video-Visit Details    Type of service:  Video Visit    Video End Time:    Originating Location (pt. Location): Home    Distant Location (provider location):  [facility]     Platform used for Video Visit: Watchsend       Pre-Bariatric Surgery Note    Wesley Davis    Date: 2021     RE: Ge Hansen    MR#: [id number]   : 1985   Date of Visit: 2021    REASON FOR VISIT: Preoperative evaluation for possible weight loss surgery    Dear Wesley Lentz,    I had the pleasure of seeing your patient, Ge Hansen, in my preoperative bariatric clinic.    As you know, he is morbidly obese and considering weight loss surgery to treat obesity in association with his medical conditions of obesity.  His consult weight was 522lb.  He has gained 3 pounds since his consult weight. He has not met his required pre-surgery weight. Please refer to initial consult note from date 2018 for patient's weight history and co-morbidities.    Had a lot of family issues and had just lost his job. Now has a new job.     78lb weight loss goal prior to surgery - 444 day of surgery      started phentermine 15mg and topiramate taper to 75mg last week with primary care provider. Hasn't noticed much benefit from the medication.     Dietitian x 6 per insurance     Psych eval -  not done     Primary care     Sleep-MAYDA, insomnia (pcp added hydroxyzine PRN), melatonin as needed, uses Bipap wakes up to void over night and can't shut mind off again. Takes a couple hours to get back to sleep. Tries to sleep from 9 to 5. Takes a long time to wake up and is exhausted all day.     Smoking cessation- still smoking, using it to curb appetite      Eating patterns:   Breakfast- eggs, marina, pancakes  Lunch- spagettio   Dinner- soup - like stew   Snacks - occasional   Energy Drinks- 6-8 energy drinks daily - full calorie       Assessment & Plan   Problem List Items Addressed This Visit        Respiratory    Obesity hypoventilation syndrome (H) (Chronic)    MAYDA (obstructive sleep apnea) (Chronic)     Uses bipap. Helps with sleep. Struggles to stay asleep and fall back to sleep once awake.             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 Will follow up in 6 weeks to re-evaluate.   Patient drinks 6-8 energy drinks daily. Working on tapering off of these and increasing water intake.          Relevant Orders    CBC with platelets    Comprehensive metabolic panel    Hemoglobin A1c    Parathyroid Hormone Intact    Vitamin D Deficiency       Behavioral    Tobacco use (Chronic)     Referral to MTM for smoking cessation          Relevant Orders    MED THERAPY MANAGE REFERRAL       Other    Insomnia, unspecified type     6-8 energy drinks daily. Needs to wake up to void at least once nightly. Then, takes a couple of hours to fall back asleep can't shut mind off.  -decrease caffeine in energy drinks   -hydroxizine as needed for sleep   -topiramate at bedtime may also be beneficial, currently ramping up to 75mg. Consider increasing in the future.                  Review of external notes as documented above     48 minutes spent on the date of the  encounter doing chart review, history and exam, documentation and further activities as noted above  0956}  MEDICATIONS:  Continue current medications without change  CONSULTATION/REFERRAL to - psych consult, MTM pharmacist   FUTURE LABS:       - Schedule a fasting blood draw - screening   FUTURE APPOINTMENTS:       - Follow-up visit in 6 weeks  SELF MONITORING:       - work on decreasing energy drinks       - increase water intake           Reviewed tasklist with patient     Most recent weights:  Wt Readings from Last 4 Encounters:   01/26/21 (!) 238.1 kg (525 lb)   01/15/21 (!) 238.1 kg (525 lb)   09/15/20 (!) 238.6 kg (526 lb)   04/24/20 (!) 235 kg (518 lb)         ROS    Past Medical History:   Diagnosis Date     ADHD      Asthma      Lactose intolerance      Obesity hypoventilation syndrome (H)      Sleep apnea      Tobacco use        Past Surgical History:   Procedure Laterality Date     ROTATOR CUFF REPAIR RT/LT         Current Outpatient Medications   Medication     albuterol (PROAIR HFA/PROVENTIL HFA/VENTOLIN HFA) 108 (90 Base) MCG/ACT inhaler     albuterol (PROVENTIL) (2.5 MG/3ML) 0.083% neb solution     cyclobenzaprine (FLEXERIL) 10 MG tablet     hydrOXYzine (ATARAX) 25 MG tablet     ibuprofen (ADVIL/MOTRIN) 800 MG tablet     meloxicam (MOBIC) 15 MG tablet     naproxen (NAPROSYN) 500 MG tablet     phentermine (ADIPEX-P) 15 MG capsule     pimecrolimus (ELIDEL) 1 % external cream     topiramate (TOPAMAX) 25 MG tablet     No current facility-administered medications for this visit.        No Known Allergies    Office Visit on 09/15/2020   Component Date Value Ref Range Status     TSH 09/15/2020 1.23  0.40 - 4.00 mU/L Final     Cholesterol 09/15/2020 179  <200 mg/dL Final     Triglycerides 09/15/2020 145  <150 mg/dL Final    Non Fasting     HDL Cholesterol 09/15/2020 43  >39 mg/dL Final     LDL Cholesterol Calculated 09/15/2020 107* <100 mg/dL Final    Comment: Above desirable:  100-129 mg/dl  Borderline  "High:  130-159 mg/dL  High:             160-189 mg/dL  Very high:       >189 mg/dl       Non HDL Cholesterol 09/15/2020 136* <130 mg/dL Final    Comment: Above Desirable:  130-159 mg/dl  Borderline high:  160-189 mg/dl  High:             190-219 mg/dl  Very high:       >219 mg/dl       Hemoglobin A1C 09/15/2020 5.5  0 - 5.6 % Final    Comment: Normal <5.7% Prediabetes 5.7-6.4%  Diabetes 6.5% or higher - adopted from ADA   consensus guidelines.       Color Urine 09/15/2020 Yellow   Final     Appearance Urine 09/15/2020 Clear   Final     Glucose Urine 09/15/2020 Negative  NEG^Negative mg/dL Final     Bilirubin Urine 09/15/2020 Negative  NEG^Negative Final     Ketones Urine 09/15/2020 Negative  NEG^Negative mg/dL Final     Specific Gravity Urine 09/15/2020 1.025  1.003 - 1.035 Final     Blood Urine 09/15/2020 Negative  NEG^Negative Final     pH Urine 09/15/2020 8.0* 5.0 - 7.0 pH Final     Protein Albumin Urine 09/15/2020 Negative  NEG^Negative mg/dL Final     Urobilinogen Urine 09/15/2020 2.0* 0.2 - 1.0 EU/dL Final     Nitrite Urine 09/15/2020 Negative  NEG^Negative Final     Leukocyte Esterase Urine 09/15/2020 Negative  NEG^Negative Final     Source 09/15/2020 Midstream Urine   Final       PHYSICAL EXAM:  Objective    Ht 1.854 m (6' 1\")   Wt (!) 238.1 kg (525 lb)   BMI 69.27 kg/m    Vitals - Patient Reported  Pain Score: Severe Pain (7)  Pain Loc: Low Back(joint pain)        Physical Exam   GENERAL: Healthy, alert and no distress  EYES: Eyes grossly normal to inspection.  No discharge or erythema, or obvious scleral/conjunctival abnormalities.  RESP: No audible wheeze, cough, or visible cyanosis.  No visible retractions or increased work of breathing.    SKIN: Visible skin clear. No significant rash, abnormal pigmentation or lesions.  NEURO: Cranial nerves grossly intact.  Mentation and speech appropriate for age.  PSYCH: Mentation appears normal, affect normal/bright, judgement and insight intact, normal speech and " appearance well-groomed.    Sleeve Gastrectomy: Risks and Side Effects    The complications or risks of surgery include but are not limited to: death, heart attack, infection in the surgical site (wound infection), abdomen (abscess), bladder (urinary tract infection), lungs (pneumonia), clots in legs (deep vein thrombosis) or lungs (pulmonary emboli),  injury to the bowels or other organs, bowel obstruction, hernia at the incision and gastrointestional bleeding.    More specific risks related to vertical sleeve gastrectomy were detailed at the bariatric informational seminar and include the following: leak at the vertical sleeve staple line, leak at the anastomoses,  nausea, vomiting, and dehydration for several months,  adhesions causing bowel obstruction, rapid weight loss causing a higher rate of gallstone formation during the first 6 months after surgery, decreased absorption of vitamins and protein because of the reduced stomach size, weight regain if inappropriate food intake occurs, stricture, injury to other organs, hernia,  and ulcers.       Side effects of bariatric surgery include but are not limited to: abdominal pain, cramping, bloating, constipation, nausea, vomiting, diarrhea, difficulty swallowing,  dehydration, hair loss, excess skin, protein, iron and vitamin deficiencies, heartburn, transfer of addictions, increased anxiety and worsening depression.       I emphasized exercise and activity behavior along with appropriate food choice as the main foundation for weight loss with surgery providing surgical reinforcement of the appropriate behavior set.        Review of general surgery weight loss process    1. Complete preoperative requirements, including weight loss.  Final weight check to confirm MANDATORY weight loss requirement must be documented on a clinic scale.    2. Discuss prior authorization with .    3. History and physical evaluation by PCP of PAC clinic within 30 days  of surgery date, preoperative class, and weight check (weigh-in visit) to be scheduled by patient.  Pre-anesthesia clinic for risk evaluation to be scheduled by anesthesia clinic.    4. We cannot guarantee that patient will qualify for surgery unless all preoperative requirements are met, prior authorization from primary insurance company is granted, and insurance changes do not occur.    5. It is possible for patients to regain all weight after weight loss surgery unless they follow guidelines prescribed by our bariatric center.    6. All patients with gastrointestinal complaints after weight loss surgery must have complaints conveyed to the bariatric team for appropriate treatment.    7. Vitamin deficiencies may develop post-bariatric surgery and annual laboratory testing should be performed.    8. Persistent nausea/vomiting after bariatric surgery entails risk of thiamine deficiency and should be treated early.  Vitamin B12 deficiency may develop, especially after gastric bypass surgery and must be recognized.        If you have any questions about our plans please don't hesitate to contact me.    Sincerely,    Marcela Ewing NP

## 2021-01-26 NOTE — PATIENT INSTRUCTIONS
"It was a pleasure meeting with you today.    Thank you for allowing us the privilege of caring for you. We hope we provided you with the excellent service you deserve.   Please let us know if there is anything else we can do for you so that we can be sure you are leaving completely satisfied with your care experience.    To ensure the quality of our services you may be receiving a patient satisfaction survey from an independent patient satisfaction monitoring company.    The greatest compliment you can give is a \"Likely to Recommend\"      Your visit was with Marcela Ewing NP today.     Instructions per today's visit:   -Dietitian visit monthly  -work on quitting smoking  -work on decreasing energy drinks   -work on increase water intake  -continue phentermine and topiramate as prescribed   -follow up in 6 weeks with me   -schedule psych consult   -labs when able NOT Fasting       To schedule appointments with our team, please call [phone] option #1    Please call during clinic hours Monday through Friday 8:00a - 4:00p if you have questions or you can contact us via [employer] at anytime.      Nurses: [phone]  Fax: [phone]  Surgery Scheduler: [phone]    Please call the hospital at [phone] to speak with our on call MDs if you have urgent needs after hours, during weekends, or holidays.    **Please note if you need to go to the Emergency Room for any reason in the first 90 days after surgery it is important to go to the [facility] ER on the [place] on [facility] off of the [place] exit off [address]    *For patients who are currently enrolled in our program and have not yet had weight loss surgery please note*:    You must be at your required goal weight at the time of your Anesthesia clinic visit as well as the day of surgery or your surgery will be canceled. You will not be able to obtain a new surgery date until you have met your goal weight.    Lab results will be communicated " through My Chart or letter (if My Chart not used). Please call the clinic if you have not received communication after 1 week or if you have any questions.?    Main follow-up parameters for all bariatric patients     Patients who have undergone Bariatric surgery:    1. Follow-up interval during the first year: ~1 week, 1 month, 3 month, 6 month,12 months.  Every 6 months until 5 years; and then annually thereafter.  The goal of follow-up sessions is to ensure that food intake behavior (choice of food and eating speed) and exercise/activity level are set appropriately.    2. Yearly lab tests ordered by our clinic.      3. The bariatric team should be aware and potentially evaluate all adverse gastrointestinal symptoms (dysphagia, abdominal pain, nausea, vomiting, diarrhea, heartburn, reflux, etc), which can be a sign of complication.  The bariatric surgeons perform general surgery procedures in addition to bariatric surgery (laparoscopic cholecystectomy, etc.)    4. Inability to tolerate textured food (chicken, steak, fish, eggs, beans) is NOT normal and may need to be evaluated by endoscopy performed by the bariatric surgeon.    _____________________________________________________________________________________________________________________________    Meal Replacement Products:    Here is the link to our new e-store where you can purchase our meal replacement products    [facility] E-Store  [email or web address]    The one week starter kit is a great way to sample a variety of products and see what works for you.    If you want more information about the product go to: [employer]    Free Shipping for orders over $75     Benefits of meal replacements products:    Portion and calorie control  Improved nutrition  Structured eating  Simplified food choices  Avoid contact with trigger  foods  ______________________________________________________________________________________________________________________________    Healthy Lifestyle Support Group   [facility] Weight Management Program  Virtual Support Group Now Available    60 minutes of small group guided discussion, support and resources    Led by Health , Almaz Hudson    For questions, and to receive the invite information, contact Almaz at [email or web address]    All our welcome!    One Friday per month, 12:30pm to 1:30pm  SELF COMPASSION: July 31st  CREATING MY WELLNESS VISION: August 28th  MINDFULNESS PRACTICES FOR A CALM BODY & MIND: September 25th  MINDFUL EATING TOOLS: October 30th  HEALTHY& HAPPY HOLIDAYS: November 20th  OPEN FORUM: December 18th  _______________________________________________________________________________________________________________________________    Connections: Bariatric Care support group  Due to the Covid-19 restrictions, we will be doing our support group virtually using Microsoft Teams. You will need to get an invitation to the group from Dennis Frausto, Ph.D., LP at [email or web address] and to learn about using Microsoft Teams. The next meeting is on Tuesday, July 14 between 6:30 and 8 PM and there will be no formal speaker.    This group meets the second Tuesday of each month from 6:30 to 8 PM and will be held again at [facility] in the  [facility] [place] (A-B room) when the Covid-19 restrictions are lifted. The group is led by Dennis Frausto, Ph.D., Licensed Psychologist, [facility] Comprehensive Weight Management Program. There is no cost for group participation and is open to all [facility] (and those external to this program) pre- and post-operative bariatric surgery patients as well as their support system.    _________________________________________________________________________________________________________________________________      Interested in working with a health ?  Health coaches work with you to improve your overall health and wellbeing.  They look at the whole person, and may involve discussion of different areas of life, including, but not limited to the four pillars of health (sleep, exercise, nutrition, and stress management). Discuss with your care team if you would like to start working a health .     Health Coaching-3 Pack:      $99 for three health coaching visits    Visits may be done in person or via phone    Coaching is a partnership between the  and the client; Coaches do not prescribe or diagnose    Coaching helps inspire the client to reach his/her personal goals   __________________________________________________________________________________________________________________________________    [place] [facility] Get Moving Program    Our team of physical therapists is trained to help you understand and take control of your condition. They will perform a thorough evaluation to determine your ability for activity and develop a customized plan to fit your goals and physical ability.  Scheduling: Unsure if the Get Moving program is right for you? Discuss the program with your medical provider or diabetes educator. You can also call us at [phone] to ask questions or schedule an appointment.   [facility] Get Moving Program  ______________________________________________________________________________________________________________________  Bluetooth Scale:    We hope to provide you with high quality telephone and virtual healthcare visits while social distancing for COVID-19 is necessary, as well as in the future when virtual visits may be more convenient for you.     Our technology team made it possible for ChromaDex scales to send weight measurements to our  electronic medical record. This allows weights from you weighing at home to securely flow into the medical record, which will improve telephone and virtual visits.   Additionally, studies have shown that adults actually lose more weight when their weights are automatically sent to someone else, and also that this process is not stressful for those adults.    Below is a link for purchasing the scale, with a discount code for our patients. You may call your insurance company to see if they will reimburse you for the cost of the scale, as a piece of durable medical equipment. The scales only go up to a weight of 400 pounds. This is an issue and we are working with the developer on increasing this. We found no scales that go over 400lb that have blue-tooth for connecting to [employer]    Scale to purchase: the [employer]  Body  Scale: [email or web address]STIE)/us/en/body/shop?[id number]=aw.ds    Discount Code: We have a discount code for our patients to bring the cost down to $50, the code is: [place]_Scale_20%off    Steps to link the scale to [employer] via an Android Phone (you can always disconnect at any point in the future):  1. The order must be placed first before the patient can access Track My Health within [employer]  2. Download Google Fit aye from the Google Play Store   a. Log in or register using your Google account   3. Download the [employer] aye from Google Play Store  a. Select add organization   b. Search for [facility] and select it   c. Log into [employer]  d. Select Track My Health   e. Select the green connect my account button   f. When prompted log into your Google account   g. Select okay to confirm the account   4. Download the [employer] Health Mate aye from Google Play Store  a. [place] for [employer]   b. Go to profile   c. Tap google fit under the Apps section  d. Select the option to activate Google Fit integration   e. Select the same Google  account   f. Select okay to confirm the account  g.   Steps to link the scale to [employer] via an iPhone (you can always disconnect at any point in the future):  **Note [email or web address] is not available for download on an iPad**  1. The order must be placed first before the patient can access Track My Health within [employer].  2. Locate the Health teddy on your iPhone.  a. Set up your Apple Health account as prompted  b. The Sources page will show Apps that communicate with your Health teddy. Once all steps are completed, you should see J[employer]ox and Appeart listed under the Apps section and your iPhone under the devices section.  i. Select Health Mate  1. Under 'ALLOW  HEALTH MATE  TO WRITE DATA ensure the toggle is on for Weight.  2. This will allow the scale to add your weight to the Apple Health  ii. Select MyChart  1. Under 'ALLOW  [employer]  TO READ DATA ensure the toggle is on for Weight.  2. This allows [employer] to grab the weight from [employer] so your provider can see your weights.  3. Download the [employer] teddy from the Teddy Store   a. Select add organization                                                  b. Search for eyetok and select it  c. Log into [employer]  d. Select Track My Health   e. Select the green connect my account button   f. Follow prompts to link your device to [employer].  4. Download the [employer] health mate teddy in the Teddy Store   a. [place] for [employer]   b. Go to profile   c. Tap Health under the Apps section  d. If prompted to allow access with the Health Teddy, toggle weight on for read and write access.       Bariatric Task List  Status:  Is patient a candidate for bariatric surgery?:  patient is a candidate for bariatric surgery -     Cleared to schedule surgeon consult?:    -     Status:    -     Surgeon: Dr. Manuel -     Tentative surgery month/year: July 2019 -        Insurance: Insurance:  Blue Plus ([facility]) -     Cigna: PCP Recommendation and Medical Clearance:    -     HP Referral:    -    "  Other:    -        Patient Info: Initial Weight:  522lb -     Date of Initial Weight/Height:  12/31/2018 -     Goal Weight (lbs):  444 -     Required Weight Loss:  78lb -     Surgery Type:  sleeve gastrectomy -     Multidisciplinary Meeting:    -        Dietician Visits: Structured weight loss required by insurance?:  structured weight loss required -     Dietician Visit 1:  Needed -     Dietician Visit 2:  Needed -     Dietician Visit 3:  Needed -     Dietician Visit 4:  Needed -     Dietician Visit 5:  Needed -     Dietician Visit 6:  Needed -     Dietician Visit additional:    -     Clearance from dietician to see surgeon?:    -     Dietician Notes:    -        Psychological Evaluation: Psych eval:  Needed -     Therapist letter of support:    -     Psychiatrist letter of support:    -     Establish care with therapist:    -     Complete eating disorder evaluation:    -     Letter of clearance from therapist/eating disorder program:    -     Other:    -        Lab Work: Complete Blood Count:  Needed -     Comprehensive Metabolic Panel:  Needed -     Vitamin D:  Needed -     Hgb A1c:  Needed -     PTH:  Needed -     H. pylori:    -     TSH:    -     Nicotine Testing:    -     Other:    -        Consults/ Clearance: Sleep Medicine:  Needed -     Cardiac:    -     Pain:   -     Dental:    -     Endocrine:    -     Gastroenterology:    -     Vascular Medicine:    -     Hematology:    -     Medical Weight Management:   -     Physical Therapy/Exercise:    -     Nephrology:    -     Neurology:    -     Pulmonology:    -     Rheumatology:    -     Other    -     Other    -     Other    -           Testing: UGI:    -     EGD:    -     Other:    -        PCP: Establish care with PCP:    -     Follow up with PCP:    -     PCP letter of support:  Needed -        Smoking: Quit tobacco use (3 months smoke free)?:  Needed -     Quit date:    -        Patient Education:  Information Session:  Completed -     Given \"Making your " "decision\" handout?:  Given \"\"Making your decision\"\" handout? -     Given support group information?:  support group contact information provided -     Attended support group?:  Needed -     Support plan in place?:  Needed -     Research consents signed?:  research consent signed -        Additional Surgery Requirements: Review Coag plan:    -     HgA1c <8:    -     Inpatient pain consult:    -     Final nicotine screen:  Needed -     Dental work complete:    -     Birth control plan:    -     Other Requirements:    -     Other Requirements:    -        Final Tasks:  Before surgery online class:  Completed -     Before surgery online class website link:  [email or web address]   After surgery online class:  Needed -     After surgery online class website link:  [email or web address]   Nurse visit for weigh-in and information:    -     Pre-assessment clinic visit with anesthesia team for H&P:    -     Final labs (Hgb, plt, T&S, UA):    -        Notes:   -          "

## 2021-01-28 ENCOUNTER — VIRTUAL VISIT (OUTPATIENT)
Dept: PHARMACY | Facility: CLINIC | Age: 36
End: 2021-01-28
Attending: NURSE PRACTITIONER
Payer: COMMERCIAL

## 2021-01-28 DIAGNOSIS — J45.909 UNCOMPLICATED ASTHMA, UNSPECIFIED ASTHMA SEVERITY, UNSPECIFIED WHETHER PERSISTENT: ICD-10-CM

## 2021-01-28 DIAGNOSIS — E66.813 CLASS 3 SEVERE OBESITY DUE TO EXCESS CALORIES IN ADULT, UNSPECIFIED BMI, UNSPECIFIED WHETHER SERIOUS COMORBIDITY PRESENT (H): Primary | ICD-10-CM

## 2021-01-28 DIAGNOSIS — L40.9 PSORIASIS: ICD-10-CM

## 2021-01-28 DIAGNOSIS — Z72.0 TOBACCO USE: ICD-10-CM

## 2021-01-28 DIAGNOSIS — E66.01 CLASS 3 SEVERE OBESITY DUE TO EXCESS CALORIES IN ADULT, UNSPECIFIED BMI, UNSPECIFIED WHETHER SERIOUS COMORBIDITY PRESENT (H): Primary | ICD-10-CM

## 2021-01-28 DIAGNOSIS — M77.00 EPICONDYLITIS ELBOW, MEDIAL, UNSPECIFIED LATERALITY: ICD-10-CM

## 2021-01-28 DIAGNOSIS — G47.33 OSA (OBSTRUCTIVE SLEEP APNEA): ICD-10-CM

## 2021-01-28 DIAGNOSIS — G47.00 INSOMNIA, UNSPECIFIED TYPE: ICD-10-CM

## 2021-01-28 PROCEDURE — 99607 MTMS BY PHARM ADDL 15 MIN: CPT | Mod: TEL | Performed by: PHARMACIST

## 2021-01-28 PROCEDURE — 99605 MTMS BY PHARM NP 15 MIN: CPT | Mod: TEL | Performed by: PHARMACIST

## 2021-01-28 RX ORDER — TOPIRAMATE 25 MG/1
75 TABLET, FILM COATED ORAL AT BEDTIME
Qty: 90 TABLET | Refills: 1 | Status: SHIPPED | OUTPATIENT
Start: 2021-01-28 | End: 2021-03-16 | Stop reason: SINTOL

## 2021-01-28 NOTE — PROGRESS NOTES
Medication Therapy Management (MTM) Encounter    ASSESSMENT:                            Medication Adherence/Access: Discussed utilizing a pill box to help with remembering pills. May benefit from fridge list to be aware of how to fill pill box.    Smoking cessation: Patient continues to use tobacco. Patient is not ready to quit using tobacco. Will revisit in the future. Discussed at great length the necessity of quitting smoking long term is going to undergo bariatric surgery. Discussed risk of smoking should patient restart smoking after bariatric surgery.      Obesity: needs improvement would benefit from increasing topiramate to 75 mg daily bedtime.     Insomnia/MAYDA: Would benefit from sleep medicine referral for new bipap materials and re-establish as previous sleep medicine clinic closed. Discussed decreasing melatonin as high doses are not found to me largely more effective than low doses. Could consider topiramate over hydroxyzine for assistance with sleep. Discussed decreasing caffeine intake as this could be largely contributing to insomnia issues.     Epicondylitis of both elbows: Unimproved. Referral to Ortho may be in order due to patient's history of lack of success with previous parameters.     Asthma: Patient would benefit from ACT and follow up with PCP regarding current asthma status. May consider anti-inflammatory inhaler: low potency ICS such as Flovent HFA as patient did not prefer DPI.     Inverse Psoriasis: Stable.      PLAN:                            Medication Adherence.   1. Get a pill box. Set an alarm for your AM and PM pill times. Refill the pill box once weekly.   2. Pharmacist to make medication fridge list to help to remember when to take each medication (will help with filling pill box)  - sent via [employer].     Obesity:   5. Increase topiramate to 75 mg daily at bedtime    Insomnia/MAYDA:   1. Pharmacist to reach out to Marcela Ewing CNP to see her thoughts about placing Sleep Medicine  "Referral for new Bipap materials  2. Decrease caffeine use - this could be a large contributor to issues of sleep.   3. Topiarmate increased dose may help with sleep, but could consider utilizing trazodone in future. Patient should decrease melatonin use.     Epicondylitis of both elbows:   1.Patient could consider asking PCP for ortho referral in future     Asthma:   1. Patient to follow up with PCP about every day albuterol use - may want to consider low potency ICS daily inhaler to minimize use of rescue inhaler.     Future: Status on readiness to quit?     Follow-up: 1 month, scheduled today.     SUBJECTIVE/OBJECTIVE:                          Ge Hansen is a 35 year old male called for an initial visit. He was referred to me from Marcela Ewing CNP.      Reason for visit: smoking cessation.    Allergies/ADRs: Reviewed in chart  Tobacco: He reports that he has been smoking other. He started smoking about 23 years ago. He has a 10.00 pack-year smoking history. His smokeless tobacco use includes chew and snuff.Tobacco Cessation Action Plan:   Information offered: Patient not interested at this time - see below for more information.   Alcohol: Less than 1 beverage / month  Caffeine: 6-10 energy drinks/day  Past Medical History: Reviewed in chart    Medication Adherence/Access:   Patient takes medications directly from bottles. He is struggling to remember his medications. He is feeling overwhelmed by the number of medications that have been started recently.   Patient takes medications 2 time(s) per day.   Per patient, misses medication 4-5 times per week.     Smoking Cessation:  Smoking 1/2 pack to 2 packs per day    He \"loves and enjoys smoking\". \"I do not want to quit\". He is aware that he needs to quit smoking for 3 months before surgery, but he is not ready to quit at this time. He feels that his opinion may change, but unsure. Amount he smokes is related to stress level or boredom - if more stress or bored will " smoke more.   What is the most patient ever smoked:  2 packs  Tried quitting in the past: Yes.  How many times:  Once.  For how lon month.  What worked/didn t work in the past: He took Chantix last time he quit smoking, was helpful to quit smoking. Doesn't want to take again due to vivid dream and suicidal tendencies. Reports that patches didn't work, high doses made him sick. Doesn't chew gum due to teeth issues. Lozenges didn't care for them.      Is patient currently pregnant: NA  History of seizures/bipolar disease: No  Occupation: [employer], previously was a  but had a more recent occupation change with COVID.      Obesity:   Phentermine 15 mg once daily  Topiramate 50 mg daily      Followed by Marcela Ewing NP, seen 2021 for Pre-Bariatric Surgery Consult, started phentermine and topiramate at that time. Patient is experiencing the follow side effects: None. He does find that he is eating a little less than baseline. Eating 3 meals per day. Snacking is minimal. He will also have soda or apple or orange juice.   Diet/Eating Habits: Patient reports  He does find that he is eating a little less than baseline. Eating 3 meals per day. Snacking is minimal. He will also have soda or apple or orange juice. 6-8 energy drinks per day - full calorie. Reports gets paid tomorrow so is planning on going to the grocery store to buy Jamal to drink more water, he is going to try to start limiting juice, soda and energy drinks - doing this slowly.   Exercise/Activity: Patient reports during work day he is constantly standing and walking. When he not working he is sedentary. He has been off work for a couple weeks and reports being sedentary for majority of it.     Initial Consult Weight: 522 lb  Goal Weight Prior to Surgery: 444 (-78 lb)     Wt Readings from Last 4 Encounters:   21 (!) 525 lb (238.1 kg)   01/15/21 (!) 525 lb (238.1 kg)   09/15/20 (!) 526 lb (238.6 kg)   20 (!) 518 lb (235 kg)  "    Estimated body mass index is 69.27 kg/m  as calculated from the following:    Height as of 1/26/21: 6' 1\" (1.854 m).    Weight as of 1/26/21: 525 lb (238.1 kg).    Insomnia/MAYDA:   Bipap  Hydroxyzine 25-50 nightly PRN  Melatonin 4-6 gummies PRN    Recently started on hydroxyzine and keeps forgetting and not sure if helpful as inconsistent. Does use the Bipap when going to bed but wakes up to go to bathroom in middle of the night and can't go back to sleep. Takes a couple hours to get back to sleep. He needs new materials for bipap but previously sleep medicine clinic closed.  Tries to sleep from 9 am to 5 am. Takes a lot time to wake up and exhausted all day. Drinks 6-8 energy drinks per day, was encouraged by Marcela Ewing CNP to decrease caffeine intake as this may be playing role in sleep issues - hasn't started yet. Reports he was previously on Lunesta and found helpful, stopped because lost insurance and couldn't afford. Wants a \"sleeping pill\".     Epicondylitis of both elbows:   Naproxen 500 mg BID     Cyclobenzaprine 10 mg PRN     Reports naproxen has not been really helpful for pain/inflammation. He has also tried other NSAIDs (meloxicam, ibuprofen) and not helpful. Has tried braces, also not helpful. Was recently ordered braces but \"barely fit so uncomfortable\". Tried PT and aware that helps with strengthening but was not helpful for the extent of his pain. The pain is constant. His job forces the pain all day, makes bows. He has been off work for a couple weeks and not better. He has tried steroid injections, not helpful. He is frustrated by the pain, \"I wish I just couldn't feel anything\".     Asthma:    Short-Acting Bronchodilator: Albuterol MDI and nebulizing solution     He has to use his inhaler every day. SOB and wheezing. Worse especially when getting off bipap, feels like lungs are full of mucus. He was previously on the Diskus inhaler but didn't like it due to dry powder. Triggers include: " exercise or sports.  Patient reports the following symptoms: increased need of albuterol, upon exertion.   Asthma Action Plan on file: YES  ACT Total Scores 4/8/2019   ACT TOTAL SCORE (Goal Greater than or Equal to 20) 25   In the past 12 months, how many times did you visit the emergency room for your asthma without being admitted to the hospital? 0   In the past 12 months, how many times were you hospitalized overnight because of your asthma? 0     Inverse Psoriasis:   Pimecrolimus 1% cream BID     Somewhat helpful. Plaques in groin and hips.   ----------------    I spent 60 minutes with this patient today. All changes were made via collaborative practice agreement with Marcela Ewing CNP. A copy of the visit note was provided to the patient's primary care and referring provider.    The patient was sent via [employer] a summary of these recommendations.     Lauren Bloch, PharmD  Medication Therapy Management Pharmacist   [facility]    Telemedicine Visit Details  Type of service:  Telephone visit  Start Time: 3:01 PM  End Time: 4:01 PM  Originating Location (patient location): Home  Distant Location (provider location):  [facility]      Medication Therapy Recommendations  Asthma    Current Medication: albuterol (PROAIR HFA/PROVENTIL HFA/VENTOLIN HFA) 108 (90 Base) MCG/ACT inhaler   Rationale: More effective medication available - Ineffective medication - Effectiveness   Recommendation: Start Medication - fluticasone 110 MCG/ACT inhaler - 1 puff BID   Status: Contact Provider - Awaiting Response   Note: Patient to follow up with provider         Class 3 drug-induced obesity with serious comorbidity and body mass index (BMI) greater than or equal to 70 in adult (H)    Current Medication: topiramate (TOPAMAX) 25 MG tablet   Rationale: Dose too low - Dosage too low - Effectiveness   Recommendation: Increase Dose - topiramate 25 MG tablet - 75 mg daily bedtime   Status:  Accepted per CPA         Class 3 severe obesity due to excess calories in adult, unspecified BMI, unspecified whether serious comorbidity present (H)    Current Medication: phentermine (ADIPEX-P) 15 MG capsule   Rationale: Patient forgets to take - Adherence - Adherence   Recommendation: Provide Adherence Intervention - Encouraged buying pill box or setting alarm on phone   Status: Patient Agreed - Adherence/Education   Note: Created fridge list and sent to patient.

## 2021-01-28 NOTE — Clinical Note
He is not intersted in quitting smoking at this time and aware no quitting smoking means no surgery. Will follow up with him in 1 month. Raven AYALA

## 2021-01-28 NOTE — Clinical Note
Derick Stephens - had an MTM visit with patient, was referred by weight management clinic for smoking cessation. He was not interested at this time. We talked about his medications and he had a lot of different items to discuss and his thoughts. I am just sending you an FYI on what we discussed and he said he would bring up items he wished to at a follow up appointment with you. Thank you for your time. Raven MTM Pharmacist

## 2021-02-01 ENCOUNTER — TELEPHONE (OUTPATIENT)
Dept: FAMILY MEDICINE | Facility: CLINIC | Age: 36
End: 2021-02-01

## 2021-02-01 NOTE — LETTER
[facility]  [address] [place] [id number]  [phone]          2021    Ge Hansen            1985                                                                                                            [address] [place] [id number]      To Whom it May Concern,     Ge was evaluated in clinic on 1/15/2021.  He was out of work for 2 weeks due to acute injury from 1/15/2021 -2021.        Sincerely,     Kat Wiggins, DNP, APRN-CNP

## 2021-02-01 NOTE — LETTER
[facility]  [address] [place] [id number]  [phone]          February 2, 2021    Ge Hansen                                                                                                                     [address] [place] [id number]        To whom it may concern,    Joe was evaluated in clinic on 1/15/2021.  He was out of work for 2 weeks due to acute injury from 1/15/2021 -2/1/2021.        Sincerely,       Kat Wiggins, DNP, APRN-CNP

## 2021-02-01 NOTE — TELEPHONE ENCOUNTER
Reason for Call:  Other Note/Letter    Detailed comments: Patient needs a note/letter that says he was out of work for 2 weeks due to tennis elbows in both elbows for unemployment    Phone Number Patient can be reached at: Home number on file [phone] (home)    Best Time: Any    Can we leave a detailed message on this number? YES    Call taken on 2/1/2021 at 5:58 PM by Dari Pena

## 2021-02-02 NOTE — TELEPHONE ENCOUNTER
Letter written for patient, please advise him and be found in my chart letters.    Thanks,  Kat Wiggins, DNP, APRN-CNP

## 2021-02-02 NOTE — TELEPHONE ENCOUNTER
Pt did see the letter in My Chart. He asked if Kat could please do it again as his name was spelled wrong in the body of the letter and it would be helpful to have his  on the letter because this will go to Unemployment.  He will watch his My Chart for update of the letter.

## 2021-02-04 DIAGNOSIS — G47.33 OSA (OBSTRUCTIVE SLEEP APNEA): ICD-10-CM

## 2021-02-04 DIAGNOSIS — E66.813 CLASS 3 DRUG-INDUCED OBESITY WITH SERIOUS COMORBIDITY AND BODY MASS INDEX (BMI) GREATER THAN OR EQUAL TO 70 IN ADULT (H): Primary | ICD-10-CM

## 2021-02-04 DIAGNOSIS — E66.1 CLASS 3 DRUG-INDUCED OBESITY WITH SERIOUS COMORBIDITY AND BODY MASS INDEX (BMI) GREATER THAN OR EQUAL TO 70 IN ADULT (H): Primary | ICD-10-CM

## 2021-03-09 ENCOUNTER — VIRTUAL VISIT (OUTPATIENT)
Dept: ENDOCRINOLOGY | Facility: CLINIC | Age: 36
End: 2021-03-09
Payer: COMMERCIAL

## 2021-03-09 DIAGNOSIS — E66.1 CLASS 3 DRUG-INDUCED OBESITY WITH SERIOUS COMORBIDITY AND BODY MASS INDEX (BMI) GREATER THAN OR EQUAL TO 70 IN ADULT (H): Primary | ICD-10-CM

## 2021-03-09 DIAGNOSIS — Z71.3 NUTRITIONAL COUNSELING: ICD-10-CM

## 2021-03-09 DIAGNOSIS — E66.813 CLASS 3 DRUG-INDUCED OBESITY WITH SERIOUS COMORBIDITY AND BODY MASS INDEX (BMI) GREATER THAN OR EQUAL TO 70 IN ADULT (H): Primary | ICD-10-CM

## 2021-03-09 PROCEDURE — 97803 MED NUTRITION INDIV SUBSEQ: CPT | Mod: 95 | Performed by: DIETITIAN, REGISTERED

## 2021-03-09 NOTE — LETTER
"3/9/2021       RE: Ge Hansen  [address] [place] [id number]     Dear Colleague,    Thank you for referring your patient, Ge Hansen, to the [facility] [place]. Please see a copy of my visit note below.    Ge Augustin is a 35 year old who is being evaluated via a billable video visit.      The patient has been notified of following:     \"This video visit will be conducted via a call between you and your physician/provider. We have found that certain health care needs can be provided without the need for an in-person physical exam.  This service lets us provide the care you need with a video conversation.  If a prescription is necessary we can send it directly to your pharmacy.  If lab work is needed we can place an order for that and you can then stop by our lab to have the test done at a later time.    Video visits are billed at different rates depending on your insurance coverage.  Please reach out to your insurance provider with any questions.    If during the course of the call the physician/provider feels a video visit is not appropriate, you will not be charged for this service.\"    Patient has given verbal consent for Video visit? Yes  How would you like to obtain your AVS? MyChart  If you are dropped from the video visit, the video invite should be resent to: Text to cell phone: [phone]   Will anyone else be joining your video visit? No        Video-Visit Details    Type of service:  Video Visit    Video Start Time: 4:17 PM   Video End Time: 4:37 PM    Originating Location (pt. Location): Home    Distant Location (provider location):  [facility]     Platform used for Video Visit: AmWell     Return Bariatric Nutrition Consultation Note    Reason For Visit: Nutrition Assessment    Ge Hansen is a 35 year old presenting today for follow-up bariatric " "nutrition consult.   Pt is interested in laparoscopic sleeve gastrectomy with Dr. Manuel expected surgery in TBD.  Patient is accompanied by self.  This is pt's 2nd of 6 required nutrition visits prior to surgery.   - Pt previous saw RD in 12/2018    Pt referred by Marcela Ewing NP  on January 26, 2021.  Patient with Co-morbidities of obesity including:  Type II DM no  Renal Failure no  Sleep apnea yes  Hypertension no   Dyslipidemia no  Joint pain no  Back pain no  GERD no     Support System Reviewed With Patient 10/16/2018   Who do you have in your support network that can be available to help you for the first 2 weeks after surgery? Girl friend   Who can you count on for support throughout your weight loss surgery journey? Girl friend       ANTHROPOMETRICS:  Estimated body mass index is 69.27 kg/m  as calculated from the following:    Height as of 1/26/21: 1.854 m (6' 1\").    Weight as of 1/26/21: 238.1 kg (525 lb). - This is his recent weight.  Initial weight: 522 lb     Required weight loss goal pre-op: 78 lbs from initial consult weight (goal weight 444 lbs or less before surgery)       12/31/2018   I have tried the following methods to lose weight Watching portions or calories, Exercise, Slimfast       Weight Loss Questions Reviewed With Patient 12/31/2018   How long have you been overweight? Since puberty       SUPPLEMENT INFORMATION:  Did not discuss today     NUTRITION HISTORY:  Over the past month he has decreased the amount of energy drinks, decreased his number of cigarettes per day and has started more movement (walking and virtual reality games).     He is cooking more at home, ~50% of the time. Making chicken and vegetables.   He has not taken a weight, but notices his pants are loser.     Progress Towards Previous Goals:  Relating To Eating:  Eat slowly (20-30 minutes per meal), chewing foods well (25 chews per bite/applesauce consistency)- No Change   9\" Plate method (1/2 plate non-starchy " vegetables/fruit, 1/4 plate lean protein, 1/4 plate whole grain starch - no more than 1/2 cup carb/meal)-Improving, cooking more at home.   -Start with a fruit of vegetable at each meal  Eat 3 meals per day-2-3 meals daily   Avoid snacking-Improving     Relating to beverages:  Reduce caffeine/carbonation/calorie containing beverages-No Change  Separate fluids from meals by 30 minutes before, during, and after eating -No change  Decreasing energy drinks -Met, lola energy     Recall Diet Questions Reviewed With Patient 12/31/2018   Describe what you typically consume for breakfast (typical or most recent): none   Describe what you typically consume for lunch (typical or most recent): sandwhiches   Describe what you typically consume for supper (typical or most recent): veggies stir mcgowan or home cooking   Describe what you typically consume as snacks (typical or most recent): bottle of pop   How many ounces of water, or other low calorie drinks, do you drink daily (8 oz=1 glass)? 8 oz   How many ounces of caffeine (coffee, tea, pop) do you drink daily (8 oz=1 glass)? 48 oz   How many ounces of carbonated (pop, beer, sparkling water) drinks do you drinky daily (8 oz=1 glass)? 48 oz   How many ounces of juice, pop, sweet tea, sports drinks, protein drinks, other sweetened drinks, do you drink daily (8 oz=1 glass)? 48 oz   How many ounces of milk do you drink daily (8 oz=1 glass) 8 oz   Please indicate the type of milk: 1%   How often do you drink alcohol? -   If you do drink alcohol, how many drinks might you have in a day? (one drink = 5 oz. wine, 1 can/bottle of beer, 1 shot liquor) -       Eating Habits 12/31/2018   Do you have any dietary restrictions? -   Do you currently binge eat (eat a large amount of food in a short time)? -   Are you an emotional eater? -   Do you get up to eat after falling asleep? -   What foods do you crave? none       ADDITIONAL INFORMATION:    Dining Out History Reviewed With Patient  "12/31/2018   How often do you dine out? Rarely.   Where do you dine out? (select all that apply) fast food chains   What types of food do you order when you dine out? de       Physical Activity Reviewed With Patient 10/16/2018   How often do you exercise? 1 to 2 times per week   What is the duration of your exercise (in minutes)? 30 Minutes   What types of exercise do you do? walking   What keeps you from being more active?  Pain, My ability to walk or move around is limited, Shortness of breath         NUTRITION DIAGNOSIS:  Obesity r/t long history of self-monitoring deficit and excessive energy intake aeb BMI >30 kg/m2.-Continues    INTERVENTION:  Intervention Provided/Education:   1. Reviewed and modified goals  2.  Reviewed  post-op diet guidelines, vitamins/minerals essential post-operatively, GI anatomy of bariatric surgeries, ways to help prepare for post-op diet guidelines pre-operatively, portion/calorie-control, mindful eating and sources of protein.    3. Dicussed smoking sensation   4. Patient demonstrates understanding.  5. AVS via [employer]     Questions Reviewed With Patient 10/16/2018   How ready are you to make changes regarding your weight? Number 1 = Not ready at all to make changes up to 10 = very ready. 10   How confident are you that you can change? 1 = Not confident that you will be successful making changes up to 10 = very confident. 10       Expected Engagement: good    GOALS:  Relating To Eating:  Eat slowly (20-30 minutes per meal), chewing foods well (25 chews per bite/applesauce consistency)  9\" Plate method (1/2 plate non-starchy vegetables/fruit, 1/4 plate lean protein, 1/4 plate whole grain starch - no more than 1/2 cup carb/meal)  -Start with a fruit of vegetable at each meal  - Try to make meal at home 60-65 % of the time.   Eat 3 meals per day  Avoid snacking    Relating to beverages:  Reduce caffeine/carbonation/calorie containing beverages  Separate fluids from meals by 30 minutes " before, during, and after eating   Decreasing energy drinks     Continue to work on smoking cessation     Initial Consult / Weight Loss    My Plate Planner_English - Pt Education  [email or web address]    Protein Sources for Weight Loss  [email or web address]     Carbohydrates  [email or web address]     Time spent with patient: 20 minutes.  Leti Del Cid, MS, RD, LD

## 2021-03-09 NOTE — PROGRESS NOTES
"Ge Augustin is a 35 year old who is being evaluated via a billable video visit.      The patient has been notified of following:     \"This video visit will be conducted via a call between you and your physician/provider. We have found that certain health care needs can be provided without the need for an in-person physical exam.  This service lets us provide the care you need with a video conversation.  If a prescription is necessary we can send it directly to your pharmacy.  If lab work is needed we can place an order for that and you can then stop by our lab to have the test done at a later time.    Video visits are billed at different rates depending on your insurance coverage.  Please reach out to your insurance provider with any questions.    If during the course of the call the physician/provider feels a video visit is not appropriate, you will not be charged for this service.\"    Patient has given verbal consent for Video visit? Yes  How would you like to obtain your AVS? MyChart  If you are dropped from the video visit, the video invite should be resent to: Text to cell phone: [phone]   Will anyone else be joining your video visit? No        Video-Visit Details    Type of service:  Video Visit    Video Start Time: 4:17 PM   Video End Time: 4:37 PM    Originating Location (pt. Location): Home    Distant Location (provider location):  [facility] [place]     Platform used for Video Visit: [employer]     [facility] Bariatric Nutrition Consultation Note    Reason For Visit: Nutrition Assessment    Ge Hansen is a 35 year old presenting today for follow-up bariatric nutrition consult.   Pt is interested in laparoscopic sleeve gastrectomy with Dr. Manuel expected surgery in [facility].  Patient is accompanied by self.  This is pt's 2nd of 6 required nutrition visits prior to surgery.   - Pt previous saw RD in 12/2018    Pt referred by Marcela Ewing NP  on January 26, 2021.  Patient with " "Co-morbidities of obesity including:  Type II DM no  Renal Failure no  Sleep apnea yes  Hypertension no   Dyslipidemia no  Joint pain no  Back pain no  GERD no     Support System Reviewed With Patient 10/16/2018   Who do you have in your support network that can be available to help you for the first 2 weeks after surgery? Girl friend   Who can you count on for support throughout your weight loss surgery journey? Girl friend       ANTHROPOMETRICS:  Estimated body mass index is 69.27 kg/m  as calculated from the following:    Height as of 1/26/21: 1.854 m (6' 1\").    Weight as of 1/26/21: 238.1 kg (525 lb). - This is his recent weight.  Initial weight: 522 lb     Required weight loss goal pre-op: 78 lbs from initial consult weight (goal weight 444 lbs or less before surgery)       12/31/2018   I have tried the following methods to lose weight Watching portions or calories, Exercise, Slimfast       Weight Loss Questions Reviewed With Patient 12/31/2018   How long have you been overweight? Since puberty       SUPPLEMENT INFORMATION:  Did not discuss today     NUTRITION HISTORY:  Over the past month he has decreased the amount of energy drinks, decreased his number of cigarettes per day and has started more movement (walking and virtual reality games).     He is cooking more at home, ~50% of the time. Making chicken and vegetables.   He has not taken a weight, but notices his pants are loser.     Progress Towards Previous Goals:  Relating To Eating:  Eat slowly (20-30 minutes per meal), chewing foods well (25 chews per bite/applesauce consistency)- No Change   9\" Plate method (1/2 plate non-starchy vegetables/fruit, 1/4 plate lean protein, 1/4 plate whole grain starch - no more than 1/2 cup carb/meal)-Improving, cooking more at home.   -Start with a fruit of vegetable at each meal  Eat 3 meals per day-2-3 meals daily   Avoid snacking-Improving     Relating to beverages:  Reduce caffeine/carbonation/calorie containing " beverages-No Change  Separate fluids from meals by 30 minutes before, during, and after eating -No change  Decreasing energy drinks -Met, lola energy     Recall Diet Questions Reviewed With Patient 12/31/2018   Describe what you typically consume for breakfast (typical or most recent): none   Describe what you typically consume for lunch (typical or most recent): sandwhiches   Describe what you typically consume for supper (typical or most recent): veggies stir mcgowan or home cooking   Describe what you typically consume as snacks (typical or most recent): bottle of pop   How many ounces of water, or other low calorie drinks, do you drink daily (8 oz=1 glass)? 8 oz   How many ounces of caffeine (coffee, tea, pop) do you drink daily (8 oz=1 glass)? 48 oz   How many ounces of carbonated (pop, beer, sparkling water) drinks do you drinky daily (8 oz=1 glass)? 48 oz   How many ounces of juice, pop, sweet tea, sports drinks, protein drinks, other sweetened drinks, do you drink daily (8 oz=1 glass)? 48 oz   How many ounces of milk do you drink daily (8 oz=1 glass) 8 oz   Please indicate the type of milk: 1%   How often do you drink alcohol? -   If you do drink alcohol, how many drinks might you have in a day? (one drink = 5 oz. wine, 1 can/bottle of beer, 1 shot liquor) -       Eating Habits 12/31/2018   Do you have any dietary restrictions? -   Do you currently binge eat (eat a large amount of food in a short time)? -   Are you an emotional eater? -   Do you get up to eat after falling asleep? -   What foods do you crave? none       ADDITIONAL INFORMATION:    Dining Out History Reviewed With Patient 12/31/2018   How often do you dine out? Rarely.   Where do you dine out? (select all that apply) fast food chains   What types of food do you order when you dine out? de       Physical Activity Reviewed With Patient 10/16/2018   How often do you exercise? 1 to 2 times per week   What is the duration of your exercise (in minutes)?  "30 Minutes   What types of exercise do you do? walking   What keeps you from being more active?  Pain, My ability to walk or move around is limited, Shortness of breath         NUTRITION DIAGNOSIS:  Obesity r/t long history of self-monitoring deficit and excessive energy intake aeb BMI >30 kg/m2.-Continues    INTERVENTION:  Intervention Provided/Education:   1. Reviewed and modified goals  2.  Reviewed  post-op diet guidelines, vitamins/minerals essential post-operatively, GI anatomy of bariatric surgeries, ways to help prepare for post-op diet guidelines pre-operatively, portion/calorie-control, mindful eating and sources of protein.    3. Dicussed smoking sensation   4. Patient demonstrates understanding.  5. AVS via [employer]     Questions Reviewed With Patient 10/16/2018   How ready are you to make changes regarding your weight? Number 1 = Not ready at all to make changes up to 10 = very ready. 10   How confident are you that you can change? 1 = Not confident that you will be successful making changes up to 10 = very confident. 10       Expected Engagement: good    GOALS:  Relating To Eating:  Eat slowly (20-30 minutes per meal), chewing foods well (25 chews per bite/applesauce consistency)  9\" Plate method (1/2 plate non-starchy vegetables/fruit, 1/4 plate lean protein, 1/4 plate whole grain starch - no more than 1/2 cup carb/meal)  -Start with a fruit of vegetable at each meal  - Try to make meal at home 60-65 % of the time.   Eat 3 meals per day  Avoid snacking    Relating to beverages:  Reduce caffeine/carbonation/calorie containing beverages  Separate fluids from meals by 30 minutes before, during, and after eating   Decreasing energy drinks     Continue to work on smoking cessation     Initial Consult / Weight Loss    My Plate Planner_English - Pt Education  [email or web address]    Protein Sources for Weight Loss  [email or web address] "     Carbohydrates  [email or web address]     Time spent with patient: 20 minutes.  Leti Del Cid MS, RD, LD

## 2021-03-10 NOTE — PATIENT INSTRUCTIONS
"GOALS:  Relating To Eating:  Eat slowly (20-30 minutes per meal), chewing foods well (25 chews per bite/applesauce consistency)  9\" Plate method (1/2 plate non-starchy vegetables/fruit, 1/4 plate lean protein, 1/4 plate whole grain starch - no more than 1/2 cup carb/meal)  -Start with a fruit of vegetable at each meal  - Try to make meal at home 60-65 % of the time.   Eat 3 meals per day  Avoid snacking    Relating to beverages:  Reduce caffeine/carbonation/calorie containing beverages  Separate fluids from meals by 30 minutes before, during, and after eating   Decreasing energy drinks     Continue to work on smoking cessation     Initial Consult / Weight Loss    My Plate Planner_English - Pt Education  [email or web address]    Protein Sources for Weight Loss  [email or web address]     Carbohydrates  [email or web address]   "

## 2021-03-11 ENCOUNTER — ALLIED HEALTH/NURSE VISIT (OUTPATIENT)
Dept: FAMILY MEDICINE | Facility: CLINIC | Age: 36
End: 2021-03-11
Payer: COMMERCIAL

## 2021-03-11 DIAGNOSIS — J45.41 MODERATE PERSISTENT ASTHMA WITH EXACERBATION: Primary | ICD-10-CM

## 2021-03-11 PROCEDURE — 99207 PR NO CHARGE NURSE ONLY: CPT | Performed by: PHYSICIAN ASSISTANT

## 2021-03-11 NOTE — PROGRESS NOTES
Called patient to complete ACT as he has not replied to the my chart message that was sent.     Patient states that he is using his rescue inhaler daily and sometimes multiple times a day.     Says that his nebulizer machine broke but that he would like to discuss the use of a daily controller inhaler.     Video visit scheduled with Kat Chun at his request to discuss further asthma treatment.   Jessica Gunn, [facility]

## 2021-03-12 ASSESSMENT — ASTHMA QUESTIONNAIRES: ACT_TOTALSCORE: 13

## 2021-03-16 ENCOUNTER — VIRTUAL VISIT (OUTPATIENT)
Dept: FAMILY MEDICINE | Facility: CLINIC | Age: 36
End: 2021-03-16
Payer: COMMERCIAL

## 2021-03-16 DIAGNOSIS — J31.0 CHRONIC RHINITIS: ICD-10-CM

## 2021-03-16 DIAGNOSIS — J45.909 MODERATE ASTHMA WITHOUT COMPLICATION, UNSPECIFIED WHETHER PERSISTENT: Primary | Chronic | ICD-10-CM

## 2021-03-16 DIAGNOSIS — E66.813 CLASS 3 DRUG-INDUCED OBESITY WITH SERIOUS COMORBIDITY AND BODY MASS INDEX (BMI) GREATER THAN OR EQUAL TO 70 IN ADULT (H): ICD-10-CM

## 2021-03-16 DIAGNOSIS — G47.00 INSOMNIA, UNSPECIFIED TYPE: ICD-10-CM

## 2021-03-16 DIAGNOSIS — E66.1 CLASS 3 DRUG-INDUCED OBESITY WITH SERIOUS COMORBIDITY AND BODY MASS INDEX (BMI) GREATER THAN OR EQUAL TO 70 IN ADULT (H): ICD-10-CM

## 2021-03-16 DIAGNOSIS — Z72.0 TOBACCO USE: Chronic | ICD-10-CM

## 2021-03-16 DIAGNOSIS — Z71.6 ENCOUNTER FOR SMOKING CESSATION COUNSELING: ICD-10-CM

## 2021-03-16 PROCEDURE — 99214 OFFICE O/P EST MOD 30 MIN: CPT | Mod: 95 | Performed by: NURSE PRACTITIONER

## 2021-03-16 RX ORDER — MONTELUKAST SODIUM 10 MG/1
10 TABLET ORAL AT BEDTIME
Qty: 90 TABLET | Refills: 0 | Status: SHIPPED | OUTPATIENT
Start: 2021-03-16 | End: 2021-07-30

## 2021-03-16 RX ORDER — BUDESONIDE AND FORMOTEROL FUMARATE DIHYDRATE 160; 4.5 UG/1; UG/1
2 AEROSOL RESPIRATORY (INHALATION) 2 TIMES DAILY
Qty: 10.2 G | Refills: 3 | Status: SHIPPED | OUTPATIENT
Start: 2021-03-16 | End: 2022-11-16

## 2021-03-16 RX ORDER — TRAZODONE HYDROCHLORIDE 50 MG/1
50 TABLET, FILM COATED ORAL AT BEDTIME
Qty: 60 TABLET | Refills: 0 | Status: SHIPPED | OUTPATIENT
Start: 2021-03-16 | End: 2022-04-12 | Stop reason: ALTCHOICE

## 2021-03-16 RX ORDER — BUPROPION HYDROCHLORIDE 150 MG/1
TABLET, EXTENDED RELEASE ORAL
Qty: 59 TABLET | Refills: 0 | Status: SHIPPED | OUTPATIENT
Start: 2021-03-16 | End: 2023-11-26

## 2021-03-16 NOTE — PROGRESS NOTES
Ge is a 35 year old who is being evaluated via a billable video visit.       How would you like to obtain your AVS? MyChart  If the video visit is dropped, the invitation should be resent by: Text to cell phone: [phone]  Will anyone else be joining your video visit? No      Video Start Time: 5:15 PM    Assessment & Plan     Moderate asthma without complication, unspecified whether persistent  Chronic, worsening.  Using albuterol inhaler 3-4 times a day.  Also has underlying history of allergies.  Had been on Advair in the past, did not tolerate due to chalky taste in mouth, so stopped taking.  Patient is also significantly overweight, which we discussed also complicates asthma and breathing.  See weight last discussion below.  Discussed with patient starting a daily maintenance inhaler, will trial Symbicort.  We will also start on Singulair daily at bedtime.  Patient to follow-up in 3 months for recheck on breathing.  - budesonide-formoterol (SYMBICORT) 160-4.5 MCG/ACT Inhaler; Inhale 2 puffs into the lungs 2 times daily  - montelukast (SINGULAIR) 10 MG tablet; Take 1 tablet (10 mg) by mouth At Bedtime    Class 3 drug-induced obesity with serious comorbidity and body mass index (BMI) greater than or equal to 70 in adult (H)  chronic, recently started on phentermine and topiramate.  Patient reported significant diarrhea with both medications, trial doing individually at home and had side effects with each.  Patient has been starting to eat better as well as increase activity.  Has had first meeting with bariatrics to pursue surgery.  Recommended patient to discuss alternate medications with bariatrics to help lose some weight.  Encouraged to continue eating well and increasing any type of physical activity he is able to tolerate.    Insomnia, unspecified type  chronic, has tried melatonin and hydroxyzine in the past without any improvement.  Discussed good bedtime hygiene.  Recommendations per patient  instructions.  Will trial a course of trazodone to see if this will also help.  Follow-up in 1 month if not improving.  - traZODone (DESYREL) 50 MG tablet; Take 1 tablet (50 mg) by mouth At Bedtime May increase to 2 tablets after 3 days if not effective    Chronic rhinitis  chronic, start Singulair.  Follow-up in 3 months.  - montelukast (SINGULAIR) 10 MG tablet; Take 1 tablet (10 mg) by mouth At Bedtime    Tobacco use  chronic, patient interested in quitting and also recommended by bariatrics to quit smoking.  Has trialed nicotine patches in the past which have been not effective and had side effects from Chantix as well.  Discussed trial of Wellbutrin, patient is agreeable.  Patient to follow-up in 1 month for recheck on progress.  This can be virtual.  - buPROPion (WELLBUTRIN SR) 150 MG 12 hr tablet; Take 1 tablet (150 mg) by mouth daily for 3 days, THEN 1 tablet (150 mg) 2 times daily for 28 days.    Encounter for smoking cessation counseling  see above.  - buPROPion (WELLBUTRIN SR) 150 MG 12 hr tablet; Take 1 tablet (150 mg) by mouth daily for 3 days, THEN 1 tablet (150 mg) 2 times daily for 28 days.             See Patient Instructions    Return in about 1 month (around 4/16/2021) for Recheck.    Kat Chun, [facility]    Luis Carolina is a 35 year old who presents for the following health issues:    HPI     Asthma Follow-Up    Was ACT completed today?  03/11/21  Would like to discuss the use of controller medication.     How many servings of fruits and vegetables do you eat daily?  3-4     On average, how many sweetened beverages do you drink each day (Examples: soda, juice, sweet tea, etc.  Do NOT count diet or artificially sweetened beverages)?   3-4 per day    How many days per week do you exercise enough to make your heart beat faster?  7 days    How many minutes a day do you exercise enough to make your heart beat faster? Just started 2-3 hours    How many  days per week do you miss taking your medication? LOBITO    Has had asthma all of his life  Using rescue inhaler more often in the past - usually uses on a daily basis, increased use since snow started melting   Nebulizers not working   Has been on diskus inhaler and didn't like it - felt gritty going down   Has had a pulmonary function tests  - has been quite a while         Review of Systems   Constitutional, HEENT, cardiovascular, pulmonary, gi and gu systems are negative, except as otherwise noted.      Objective           Vitals:  No vitals were obtained today due to virtual visit.    Physical Exam   GENERAL: Healthy, alert and no distress  EYES: Eyes grossly normal to inspection.  No discharge or erythema, or obvious scleral/conjunctival abnormalities.  RESP: No audible wheeze, cough, or visible cyanosis.  No visible retractions or increased work of breathing.    SKIN: Visible skin clear. No significant rash, abnormal pigmentation or lesions.  NEURO: Cranial nerves grossly intact.  Mentation and speech appropriate for age.  PSYCH: Mentation appears normal, affect normal/bright, judgement and insight intact, normal speech and appearance well-groomed.    Diagnostic Test Results:  none             Video-Visit Details    Type of service:  Video Visit    Video End Time:5:49 PM    Originating Location (pt. Location): Home    Distant Location (provider location):  [facility]     Platform used for Video Visit: [employer]

## 2021-03-17 ENCOUNTER — VIRTUAL VISIT (OUTPATIENT)
Dept: PHARMACY | Facility: CLINIC | Age: 36
End: 2021-03-17
Payer: COMMERCIAL

## 2021-03-17 DIAGNOSIS — J45.909 UNCOMPLICATED ASTHMA, UNSPECIFIED ASTHMA SEVERITY, UNSPECIFIED WHETHER PERSISTENT: ICD-10-CM

## 2021-03-17 DIAGNOSIS — E66.813 CLASS 3 SEVERE OBESITY DUE TO EXCESS CALORIES IN ADULT, UNSPECIFIED BMI, UNSPECIFIED WHETHER SERIOUS COMORBIDITY PRESENT (H): ICD-10-CM

## 2021-03-17 DIAGNOSIS — Z72.0 TOBACCO USE: Primary | ICD-10-CM

## 2021-03-17 DIAGNOSIS — E66.01 CLASS 3 SEVERE OBESITY DUE TO EXCESS CALORIES IN ADULT, UNSPECIFIED BMI, UNSPECIFIED WHETHER SERIOUS COMORBIDITY PRESENT (H): ICD-10-CM

## 2021-03-17 PROCEDURE — 99606 MTMS BY PHARM EST 15 MIN: CPT | Mod: TEL | Performed by: PHARMACIST

## 2021-03-17 PROCEDURE — 99607 MTMS BY PHARM ADDL 15 MIN: CPT | Mod: TEL | Performed by: PHARMACIST

## 2021-03-17 NOTE — Clinical Note
He prefers to try to quit smoking solely with bupropion. Was prescribed by PCP. Just started on inhaler for his uncontrolled asthma. He is decreasing his high amount of caffeinated beverages. He had s/e from phentermine/topiramate and didn't want to restart different med so he is going to work with dietitian.   Raven AYALA

## 2021-03-17 NOTE — PROGRESS NOTES
Medication Therapy Management (MTM) Encounter    ASSESSMENT:                            Medication Adherence/Access: No issues identified    Smoking cessation: Patient continues to use tobacco. Patient is not ready to quit using tobacco, but did discuss continuing to decrease the number of cigarettes he uses, then when feels ready to quit smoking to completely abstain from tobacco/nicotine. Discussed NRT options and prefers not to and options are limited for him. Discussed e-cigarette that is nicotine free is a potential to help to transition off smoking, but do still want to quit e-cigarette (even if nicotine free) by at least 1 month before surgery.  Based on patient preferences and history, patient would benefit from bupropion, starting at least two weeks prior to quit date.  We discussed: risks of smoking, ways to cope with cravings and manage triggers, ways to prepare for a quit date and total abstinence.    Asthma:  Unimproved. ACT not at goal of at least 20, but symbicort and montelukast recently prescribed so can reassess in the next 1-3 months. Discussed ensuring to rinse mouth after using.     Obesity: Weight loss progressing.  Would benefit from continuing behavioral modifications he has currently worked on and decreasing energy drinks, decreasing by 1 per week.     PLAN:                            1. Patient to remain off weight loss medications due to side effects. No alternatives prescribed per patient preference.     2. Patient to follow up with dietitian. Discussed continuing to focus on behavioral modifications/nutrition at this time to assist with weight loss.     3. Patient to start bupropion for assistance with smoking cessation as prescribed. Patient to work on decreasing # cigarettes per day. When ready to quit smoking, patient to completely quit tobacco/nicotine use. Discussed ways to get through cravings. Discussed NRT options, although limited for this patient. Patient prefers to use  "bupropion for now alone to assist with quitting smoking.     4. Patient to start Montelukast 10 mg daily and Symbicort 2 puffs twice daily (AM and PM) - make sure to rinse mouth after using Symbicort.       Follow-up: 1 month     SUBJECTIVE/OBJECTIVE:                          Ge Hansen is a 35 year old male called for a follow-up visit. He was referred to me from Marcela Ewing CNP.  Today's visit is a follow-up MT visit from 1/28/21     Reason for visit: Would like to discuss option(s) for smoking cessation, he feels he is more ready now.    Allergies/ADRs: Reviewed in chart  Tobacco: He reports that he has been smoking other. He started smoking about 23 years ago. He has a 10.00 pack-year smoking history. His smokeless tobacco use includes chew and snuff.Tobacco Cessation Action Plan:   see above.  Alcohol: Less than 1 beverage / month  Caffeine: 3 energy drinks/day  Activity: see below  Past Medical History: Reviewed in chart      Medication Adherence/Access: no issues reported    Smoking Cessation:  Bupropion  mg daily in AM in 3 days then increase to 150 mg BID - prescribed yesterday and hasn't started yet    Smoking 1/2 pack to 1 packs per day. He self decreased, pushed himself longer and longer between cigarettes, finds that it is difficult. He feels that he is now ready to consider quitting smoking. He doesn't feel he wants to use NRT and wishes to try decreasing on his own with use of bupropion. He is planning on using e cigarette where juice is nicotine/tobacco free.   He \"loves and enjoys smoking\". \"I do not want to quit\". He is aware that he needs to quit smoking for 3 months before surgery, but he is not ready to quit at this time. He feels that his opinion may change, but unsure. Amount he smokes is related to stress level or boredom - if more stress or bored will smoke more.   What is the most patient ever smoked:  2 packs  Tried quitting in the past: Yes.  How many times:  Once.  For how long: " "6 month.  What worked/didn t work in the past: He took Chantix last time he quit smoking, was helpful to quit smoking. Doesn't want to take again due to vivid dream and suicidal tendencies. Reports that patches didn't work, high doses made him sick. Doesn't chew gum due to teeth issues. Lozenges didn't care for them.      Is patient currently pregnant: NA  History of seizures/bipolar disease: No  Occupation: [employer], previously was a  but had a more recent occupation change with COVID.     Asthma:    Symbicort 2 puffs twice daily - just prescribed.   Montelukast 10 mg daily  Short-Acting Bronchodilator: Albuterol MDI and nebulizing solution     Symbicort and montelukast prescribed recently. Hasn't picked up yet. Continues to have SOB and wheezing. He is going to be picking up new inhaler and montelukast today. He was previously on the Diskus inhaler but didn't like it due to dry powder. Triggers include: exercise or sports, change of weather, humidity.   Patient reports the following symptoms: increased need of albuterol, upon exertion.    ACT Total Scores 4/8/2019 3/11/2021   ACT TOTAL SCORE (Goal Greater than or Equal to 20) 25 13   In the past 12 months, how many times did you visit the emergency room for your asthma without being admitted to the hospital? 0 0   In the past 12 months, how many times were you hospitalized overnight because of your asthma? 0 0     Obesity:       Followed by Marcela Ewing NP, seen 1/26/2021 for Pre-Bariatric Surgery Consult, started phentermine and topiramate at that time. He stopped the phentermine and topiramate because he was having issues of side effects -diarrhea, upset stomach. Resolved when stopping prescriptions. Noticing pants are looser. He went down a notch in belt. He reports feeling \"sluggish\" since decreasing his caffeinated beverages. He prefers not to start an alternative medication for weight loss. He is unsure of his current weight.   Diet/Eating Habits: " Patient reports  eating 3 meals per day. He has changed eating habits since last visit. Has cut down on energy drinks and soda, drinking 3 energy drinks per day. How drinking low calorie energy drinks per day. Has been eating more lean meats and vegetables.   Exercise/Activity: Patient reports during work day he is constantly standing and walking. When he not working he is sedentary. He has been off work for a couple weeks and reports being sedentary for majority of it.     Initial Consult Weight: 522 lb  Goal Weight Prior to Surgery: 444 (-78 lb)     Wt Readings from Last 4 Encounters:   01/26/21 (!) 525 lb (238.1 kg)   01/15/21 (!) 525 lb (238.1 kg)   09/15/20 (!) 526 lb (238.6 kg)   04/24/20 (!) 518 lb (235 kg)     I spent 22 minutes with this patient today. All changes were made via collaborative practice agreement with Marcela Ewing CNP. A copy of the visit note was provided to the patient's referring provider.    The patient was sent via [employer] a summary of these recommendations.     Lauren Bloch, PharmD  Medication Therapy Management Pharmacist   [facility]    Telemedicine Visit Details  Type of service:  Telephone visit  Start Time: 5:31 PM  End Time: 5:53 PM  Originating Location (patient location): Home  Distant Location (provider location):  [facility]      Medication Therapy Recommendations  Asthma    Current Medication: albuterol (PROAIR HFA/PROVENTIL HFA/VENTOLIN HFA) 108 (90 Base) MCG/ACT inhaler   Rationale: More effective medication available - Ineffective medication - Effectiveness   Recommendation: Start Medication - fluticasone 110 MCG/ACT inhaler - 1 puff BID   Status: Accepted with Changes per Provider   Note: Patient to follow up with provider          Current Medication: budesonide-formoterol (SYMBICORT) 160-4.5 MCG/ACT Inhaler   Rationale: Incorrect administration - Adverse medication event - Safety   Recommendation: Provide Education - Rinse  mouth after using   Status: Patient Agreed - Adherence/Education

## 2021-03-19 NOTE — PATIENT INSTRUCTIONS
Moderate asthma without complication, unspecified whether persistent  Chronic, worsening.  Using albuterol inhaler 3-4 times a day.  Also has underlying history of allergies.  Had been on Advair in the past, did not tolerate due to chalky taste in mouth, so stopped taking.  Patient is also significantly overweight, which we discussed also complicates asthma and breathing.  See weight last discussion below.  Discussed with patient starting a daily maintenance inhaler, will trial Symbicort.  We will also start on Singulair daily at bedtime.  Patient to follow-up in 3 months for recheck on breathing.  - budesonide-formoterol (SYMBICORT) 160-4.5 MCG/ACT Inhaler; Inhale 2 puffs into the lungs 2 times daily  - montelukast (SINGULAIR) 10 MG tablet; Take 1 tablet (10 mg) by mouth At Bedtime    Class 3 drug-induced obesity with serious comorbidity and body mass index (BMI) greater than or equal to 70 in adult (H)  chronic, recently started on phentermine and topiramate.  Patient reported significant diarrhea with both medications, trial doing individually at home and had side effects with each.  Patient has been starting to eat better as well as increase activity.  Has had first meeting with bariatrics to pursue surgery.  Recommended patient to discuss alternate medications with bariatrics to help lose some weight.  Encouraged to continue eating well and increasing any type of physical activity he is able to tolerate.    Insomnia, unspecified type  chronic, has tried melatonin and hydroxyzine in the past without any improvement.  Discussed good bedtime hygiene.  Recommendations per patient instructions.  Will trial a course of trazodone to see if this will also help.  Follow-up in 1 month if not improving.  - traZODone (DESYREL) 50 MG tablet; Take 1 tablet (50 mg) by mouth At Bedtime May increase to 2 tablets after 3 days if not effective    Chronic rhinitis  chronic, start Singulair.  Follow-up in 3 months.  - montelukast  (SINGULAIR) 10 MG tablet; Take 1 tablet (10 mg) by mouth At Bedtime    Tobacco use  chronic, patient interested in quitting and also recommended by bariatrics to quit smoking.  Has trialed nicotine patches in the past which have been not effective and had side effects from Chantix as well.  Discussed trial of Wellbutrin, patient is agreeable.  Patient to follow-up in 1 month for recheck on progress.  This can be virtual.  - buPROPion (WELLBUTRIN SR) 150 MG 12 hr tablet; Take 1 tablet (150 mg) by mouth daily for 3 days, THEN 1 tablet (150 mg) 2 times daily for 28 days.    Encounter for smoking cessation counseling  see above.  - buPROPion (WELLBUTRIN SR) 150 MG 12 hr tablet; Take 1 tablet (150 mg) by mouth daily for 3 days, THEN 1 tablet (150 mg) 2 times daily for 28 days.    Encounter for smoking cessation counseling  see above.  - buPROPion (WELLBUTRIN SR) 150 MG 12 hr tablet; Take 1 tablet (150 mg) by mouth daily for 3 days, THEN 1 tablet (150 mg) 2 times daily for 28 days.    Would like you to work on bedtime hygiene    - Shutting screens off 1 hour or 1/2 hour prior to bed  - journal 3 positive things from your day  - meditation or yoga  - deep breathing exercises   - reading a light book   - sleepy time tea  - sleep only as much as you need to feel rested and then get out of bed  - keep a regular sleep schedule  - avoid forcing sleep  - avoid caffeinated beverages after lunch  - avoid alcohol near bedtime  - avoid smoking, especially in the evening  - do not go to bed hungry  - adjust bedroom environment

## 2021-03-24 NOTE — PATIENT INSTRUCTIONS
Recommendations from today's MTM visit:                                                    MTM (medication therapy management) is a service provided by a clinical pharmacist designed to help you get the most of out of your medicines.      1. Patient to remain off weight loss medications due to side effects. No alternatives prescribed per patient preference.     2. Patient to follow up with dietitian. Discussed continuing to focus on behavioral modifications/nutrition at this time to assist with weight loss.     3. Patient to start bupropion for assistance with smoking cessation as prescribed. Patient to work on decreasing # cigarettes per day. When ready to quit smoking, patient to completely quit tobacco/nicotine use. Discussed ways to get through cravings. Discussed NRT options, although limited for this patient. Patient prefers to use bupropion for now alone to assist with quitting smoking.     4. Patient to start Montelukast 10 mg daily and Symbicort 2 puffs twice daily (AM and PM) - make sure to rinse mouth after using Symbicort.       Follow-up: 1 month     It was great to speak with you today.  I value your experience and would be very thankful for your time with providing feedback on our clinic survey. You may receive a survey via email or text message in the next few days.     My Clinical Pharmacist's contact information:                                                      It was a pleasure talking with you today!  Please feel free to contact me with any questions or concerns you have.      Lauren Bloch, PharmD  Medication Therapy Management Pharmacist   [facility]

## 2021-04-12 ENCOUNTER — VIRTUAL VISIT (OUTPATIENT)
Dept: ENDOCRINOLOGY | Facility: CLINIC | Age: 36
End: 2021-04-12
Payer: COMMERCIAL

## 2021-04-12 DIAGNOSIS — Z71.3 NUTRITIONAL COUNSELING: ICD-10-CM

## 2021-04-12 DIAGNOSIS — E66.1 CLASS 3 DRUG-INDUCED OBESITY WITH SERIOUS COMORBIDITY AND BODY MASS INDEX (BMI) GREATER THAN OR EQUAL TO 70 IN ADULT (H): Primary | ICD-10-CM

## 2021-04-12 DIAGNOSIS — E66.813 CLASS 3 DRUG-INDUCED OBESITY WITH SERIOUS COMORBIDITY AND BODY MASS INDEX (BMI) GREATER THAN OR EQUAL TO 70 IN ADULT (H): Primary | ICD-10-CM

## 2021-04-12 DIAGNOSIS — Z72.0 TOBACCO USE: ICD-10-CM

## 2021-04-12 PROCEDURE — 97803 MED NUTRITION INDIV SUBSEQ: CPT | Mod: 95 | Performed by: DIETITIAN, REGISTERED

## 2021-04-12 NOTE — PROGRESS NOTES
"Ge Augustin is a 35 year old who is being evaluated via a billable video visit.      The patient has been notified of following:     \"This video visit will be conducted via a call between you and your physician/provider. We have found that certain health care needs can be provided without the need for an in-person physical exam.  This service lets us provide the care you need with a video conversation.  If a prescription is necessary we can send it directly to your pharmacy.  If lab work is needed we can place an order for that and you can then stop by our lab to have the test done at a later time.    Video visits are billed at different rates depending on your insurance coverage.  Please reach out to your insurance provider with any questions.    If during the course of the call the physician/provider feels a video visit is not appropriate, you will not be charged for this service.\"    Patient has given verbal consent for Video visit? Yes  How would you like to obtain your AVS? MyChart  If you are dropped from the video visit, the video invite should be resent to: Text to cell phone: [phone]   Will anyone else be joining your video visit? No        Video-Visit Details    Type of service:  Video Visit    Video Start Time:1:13 PM   Video End Time: 1:24 PM    Originating Location (pt. Location): Home    Distant Location (provider location):  [facility] [place]     Platform used for Video Visit: Fish Nature     [facility] Bariatric Nutrition Consultation Note    Reason For Visit: Nutrition Assessment    Ge Hansen is a 35 year old presenting today for follow-up bariatric nutrition consult.   Pt is interested in laparoscopic sleeve gastrectomy with Dr. Manuel expected surgery in [facility].  Patient is accompanied by self.  This is pt's 3rd of 6 required nutrition visits prior to surgery.   - Pt previous saw RD in 12/2018    Pt referred by Marcela Ewing NP  on January 26, 2021.  Patient with " "Co-morbidities of obesity including:  Type II DM no  Renal Failure no  Sleep apnea yes  Hypertension no   Dyslipidemia no  Joint pain no  Back pain no  GERD no     Support System Reviewed With Patient 10/16/2018   Who do you have in your support network that can be available to help you for the first 2 weeks after surgery? Girl friend   Who can you count on for support throughout your weight loss surgery journey? Girl friend       ANTHROPOMETRICS:  Estimated body mass index is 69.27 kg/m  as calculated from the following:    Height as of 1/26/21: 1.854 m (6' 1\").    Weight as of 1/26/21: 238.1 kg (525 lb). - This is his most recent weight.  Initial weight: 522 lb     Required weight loss goal pre-op: 78 lbs from initial consult weight (goal weight 444 lbs or less before surgery)       12/31/2018   I have tried the following methods to lose weight Watching portions or calories, Exercise, Slimfast       Weight Loss Questions Reviewed With Patient 12/31/2018   How long have you been overweight? Since puberty       SUPPLEMENT INFORMATION:  Did not discuss today     NUTRITION HISTORY:  Cooking even more at home, only ate out twice over the past month. He is unsure of his weigh at this time, but notice another 1/2 inch loser on his belt. Since starting the process he reports decreasing his belt line by 8.5 inches. He understand that his is losing weight and making changes to his body but is feel discouraged because he cannot see the changes in his body more.       Most meals at home are chicken or steak with potatoes or rice and vegetables.        Progress Towards Previous Goals:  Relating To Eating:  Eat slowly (20-30 minutes per meal), chewing foods well (25 chews per bite/applesauce consistency)-Improving, he is chewing better   9\" Plate method (1/2 plate non-starchy vegetables/fruit, 1/4 plate lean protein, 1/4 plate whole grain starch - no more than 1/2 cup carb/meal) -Improving, trying to cut portion down does not " feel satisfied with a smaller portion, wait to see if still hungry a 30 mintues.   -Start with a fruit of vegetable at each meal  - Try to make meal at home 60-65 % of the time. - Met, only fast food only 2 times over the month  Eat 3 meals per day -Continues 2-3 meals daily   Avoid snacking-Continues     Relating to beverages:  Reduce caffeine/carbonation/calorie containing beverages-Continues, miguel D and water, cut pop down 2-3 cans per day, engery drink (Monster tea 25 kcal and non carbonated)   Separate fluids from meals by 30 minutes before, during, and after eating -Improving.   Decreasing energy drinks -Met     Continue to work on smoking cessation -Decrease number of cigarette, give it another month not ready to quit smoking yet.       Recall Diet Questions Reviewed With Patient 12/31/2018   Describe what you typically consume for breakfast (typical or most recent): none   Describe what you typically consume for lunch (typical or most recent): sandwhiches   Describe what you typically consume for supper (typical or most recent): veggies stir mcgowan or home cooking   Describe what you typically consume as snacks (typical or most recent): bottle of pop   How many ounces of water, or other low calorie drinks, do you drink daily (8 oz=1 glass)? 8 oz   How many ounces of caffeine (coffee, tea, pop) do you drink daily (8 oz=1 glass)? 48 oz   How many ounces of carbonated (pop, beer, sparkling water) drinks do you drinky daily (8 oz=1 glass)? 48 oz   How many ounces of juice, pop, sweet tea, sports drinks, protein drinks, other sweetened drinks, do you drink daily (8 oz=1 glass)? 48 oz   How many ounces of milk do you drink daily (8 oz=1 glass) 8 oz   Please indicate the type of milk: 1%   How often do you drink alcohol? -   If you do drink alcohol, how many drinks might you have in a day? (one drink = 5 oz. wine, 1 can/bottle of beer, 1 shot liquor) -       Eating Habits 12/31/2018   Do you have any dietary  restrictions? -   Do you currently binge eat (eat a large amount of food in a short time)? -   Are you an emotional eater? -   Do you get up to eat after falling asleep? -   What foods do you crave? none       ADDITIONAL INFORMATION:    Dining Out History Reviewed With Patient 12/31/2018   How often do you dine out? Rarely.   Where do you dine out? (select all that apply) fast food chains   What types of food do you order when you dine out? de       Physical Activity Reviewed With Patient 10/16/2018   How often do you exercise? 1 to 2 times per week   What is the duration of your exercise (in minutes)? 30 Minutes   What types of exercise do you do? walking   What keeps you from being more active?  Pain, My ability to walk or move around is limited, Shortness of breath         NUTRITION DIAGNOSIS:  Obesity r/t long history of self-monitoring deficit and excessive energy intake aeb BMI >30 kg/m2.-Continues    INTERVENTION:  Intervention Provided/Education:   1. Reviewed and modified goals  2.  Reviewed  post-op diet guidelines, vitamins/minerals essential post-operatively, GI anatomy of bariatric surgeries, ways to help prepare for post-op diet guidelines pre-operatively, portion/calorie-control, mindful eating and sources of protein.    3. Dicussed smoking sensation   4. Encouraged patient to decrease liquid calories   5. Discussed weight loss and rate of weight loss, provided encouragement and support. Patient demonstrates understanding.  6. AVS via Timbret     Questions Reviewed With Patient 10/16/2018   How ready are you to make changes regarding your weight? Number 1 = Not ready at all to make changes up to 10 = very ready. 10   How confident are you that you can change? 1 = Not confident that you will be successful making changes up to 10 = very confident. 10       Expected Engagement: good    GOALS:  Relating To Eating:  Eat slowly (20-30 minutes per meal), chewing foods well (25 chews per bite/applesauce  "consistency)  9\" Plate method (1/2 plate non-starchy vegetables/fruit, 1/4 plate lean protein, 1/4 plate whole grain starch - no more than 1/2 cup carb/meal)  -Continues making meals at home    Eat 3 meals per day  Avoid snacking    Relating to beverages:  Reduce caffeine/carbonation/calorie containing beverages  Separate fluids from meals by 30 minutes before, during, and after eating   Decreasing energy drinks     Continue to work on smoking cessation.     Initial Consult / Weight Loss    My Plate Planner_English - Pt Education  [email or web address]    Protein Sources for Weight Loss  [email or web address]     Carbohydrates  [email or web address]     Time spent with patient: 11 minutes.  Leti Del Cid, MS, RD, LD  "

## 2021-04-12 NOTE — PATIENT INSTRUCTIONS
"GOALS:  Relating To Eating:  Eat slowly (20-30 minutes per meal), chewing foods well (25 chews per bite/applesauce consistency)  9\" Plate method (1/2 plate non-starchy vegetables/fruit, 1/4 plate lean protein, 1/4 plate whole grain starch - no more than 1/2 cup carb/meal)  -Continues making meals at home    Eat 3 meals per day  Avoid snacking    Relating to beverages:  Reduce caffeine/carbonation/calorie containing beverages  Separate fluids from meals by 30 minutes before, during, and after eating   Decreasing energy drinks     Continue to work on smoking cessation.     Initial Consult / Weight Loss    My Plate Planner_English - Pt Education  [email or web address]    Protein Sources for Weight Loss  [email or web address]     Carbohydrates  [email or web address]     "

## 2021-04-12 NOTE — LETTER
"4/12/2021       RE: Ge Hansen  [address] [place] [id number]     Dear Colleague,    Thank you for referring your patient, Ge Hansen, to the [facility] [place] at [facility]. Please see a copy of my visit note below.    Ge Augustin is a 35 year old who is being evaluated via a billable video visit.      The patient has been notified of following:     \"This video visit will be conducted via a call between you and your physician/provider. We have found that certain health care needs can be provided without the need for an in-person physical exam.  This service lets us provide the care you need with a video conversation.  If a prescription is necessary we can send it directly to your pharmacy.  If lab work is needed we can place an order for that and you can then stop by our lab to have the test done at a later time.    Video visits are billed at different rates depending on your insurance coverage.  Please reach out to your insurance provider with any questions.    If during the course of the call the physician/provider feels a video visit is not appropriate, you will not be charged for this service.\"    Patient has given verbal consent for Video visit? Yes  How would you like to obtain your AVS? MyChart  If you are dropped from the video visit, the video invite should be resent to: Text to cell phone: [phone]   Will anyone else be joining your video visit? No        Video-Visit Details    Type of service:  Video Visit    Video Start Time:1:13 PM   Video End Time: 1:24 PM    Originating Location (pt. Location): Home    Distant Location (provider location):  [facility] [place]     Platform used for Video Visit: AmWell     Return Bariatric Nutrition Consultation Note    Reason For Visit: Nutrition Assessment    Ge Hansen is a 35 year old presenting today for follow-up bariatric " "nutrition consult.   Pt is interested in laparoscopic sleeve gastrectomy with Dr. Manuel expected surgery in [place].  Patient is accompanied by self.  This is pt's 3rd of 6 required nutrition visits prior to surgery.   - Pt previous saw RD in 12/2018    Pt referred by Marcela Ewing NP  on January 26, 2021.  Patient with Co-morbidities of obesity including:  Type II DM no  Renal Failure no  Sleep apnea yes  Hypertension no   Dyslipidemia no  Joint pain no  Back pain no  GERD no     Support System Reviewed With Patient 10/16/2018   Who do you have in your support network that can be available to help you for the first 2 weeks after surgery? Girl friend   Who can you count on for support throughout your weight loss surgery journey? Girl friend       ANTHROPOMETRICS:  Estimated body mass index is 69.27 kg/m  as calculated from the following:    Height as of 1/26/21: 1.854 m (6' 1\").    Weight as of 1/26/21: 238.1 kg (525 lb). - This is his most recent weight.  Initial weight: 522 lb     Required weight loss goal pre-op: 78 lbs from initial consult weight (goal weight 444 lbs or less before surgery)       12/31/2018   I have tried the following methods to lose weight Watching portions or calories, Exercise, Slimfast       Weight Loss Questions Reviewed With Patient 12/31/2018   How long have you been overweight? Since puberty       SUPPLEMENT INFORMATION:  Did not discuss today     NUTRITION HISTORY:  Cooking even more at home, only ate out twice over the past month. He is unsure of his weigh at this time, but notice another 1/2 inch loser on his belt. Since starting the process he reports decreasing his belt line by 8.5 inches. He understand that his is losing weight and making changes to his body but is feel discouraged because he cannot see the changes in his body more.       Most meals at home are chicken or steak with potatoes or rice and vegetables.        Progress Towards Previous Goals:  Relating To Eating:  Eat " "slowly (20-30 minutes per meal), chewing foods well (25 chews per bite/applesauce consistency)-Improving, he is chewing better   9\" Plate method (1/2 plate non-starchy vegetables/fruit, 1/4 plate lean protein, 1/4 plate whole grain starch - no more than 1/2 cup carb/meal) -Improving, trying to cut portion down does not feel satisfied with a smaller portion, wait to see if still hungry a 30 mintues.   -Start with a fruit of vegetable at each meal  - Try to make meal at home 60-65 % of the time. - Met, only fast food only 2 times over the month  Eat 3 meals per day -Continues 2-3 meals daily   Avoid snacking-Continues     Relating to beverages:  Reduce caffeine/carbonation/calorie containing beverages-Continues, miguel D and water, cut pop down 2-3 cans per day, engery drink (Monster tea 25 kcal and non carbonated)   Separate fluids from meals by 30 minutes before, during, and after eating -Improving.   Decreasing energy drinks -Met     Continue to work on smoking cessation -Decrease number of cigarette, give it another month not ready to quit smoking yet.       Recall Diet Questions Reviewed With Patient 12/31/2018   Describe what you typically consume for breakfast (typical or most recent): none   Describe what you typically consume for lunch (typical or most recent): sandwhiches   Describe what you typically consume for supper (typical or most recent): veggies stir mcgowan or home cooking   Describe what you typically consume as snacks (typical or most recent): bottle of pop   How many ounces of water, or other low calorie drinks, do you drink daily (8 oz=1 glass)? 8 oz   How many ounces of caffeine (coffee, tea, pop) do you drink daily (8 oz=1 glass)? 48 oz   How many ounces of carbonated (pop, beer, sparkling water) drinks do you drinky daily (8 oz=1 glass)? 48 oz   How many ounces of juice, pop, sweet tea, sports drinks, protein drinks, other sweetened drinks, do you drink daily (8 oz=1 glass)? 48 oz   How many " ounces of milk do you drink daily (8 oz=1 glass) 8 oz   Please indicate the type of milk: 1%   How often do you drink alcohol? -   If you do drink alcohol, how many drinks might you have in a day? (one drink = 5 oz. wine, 1 can/bottle of beer, 1 shot liquor) -       Eating Habits 12/31/2018   Do you have any dietary restrictions? -   Do you currently binge eat (eat a large amount of food in a short time)? -   Are you an emotional eater? -   Do you get up to eat after falling asleep? -   What foods do you crave? none       ADDITIONAL INFORMATION:    Dining Out History Reviewed With Patient 12/31/2018   How often do you dine out? Rarely.   Where do you dine out? (select all that apply) fast food chains   What types of food do you order when you dine out? de       Physical Activity Reviewed With Patient 10/16/2018   How often do you exercise? 1 to 2 times per week   What is the duration of your exercise (in minutes)? 30 Minutes   What types of exercise do you do? walking   What keeps you from being more active?  Pain, My ability to walk or move around is limited, Shortness of breath         NUTRITION DIAGNOSIS:  Obesity r/t long history of self-monitoring deficit and excessive energy intake aeb BMI >30 kg/m2.-Continues    INTERVENTION:  Intervention Provided/Education:   1. Reviewed and modified goals  2.  Reviewed  post-op diet guidelines, vitamins/minerals essential post-operatively, GI anatomy of bariatric surgeries, ways to help prepare for post-op diet guidelines pre-operatively, portion/calorie-control, mindful eating and sources of protein.    3. Dicussed smoking sensation   4. Encouraged patient to decrease liquid calories   5. Discussed weight loss and rate of weight loss, provided encouragement and support. Patient demonstrates understanding.  6. AVS via Grow Mobilet     Questions Reviewed With Patient 10/16/2018   How ready are you to make changes regarding your weight? Number 1 = Not ready at all to make changes  "up to 10 = very ready. 10   How confident are you that you can change? 1 = Not confident that you will be successful making changes up to 10 = very confident. 10       Expected Engagement: good    GOALS:  Relating To Eating:  Eat slowly (20-30 minutes per meal), chewing foods well (25 chews per bite/applesauce consistency)  9\" Plate method (1/2 plate non-starchy vegetables/fruit, 1/4 plate lean protein, 1/4 plate whole grain starch - no more than 1/2 cup carb/meal)  -Continues making meals at home    Eat 3 meals per day  Avoid snacking    Relating to beverages:  Reduce caffeine/carbonation/calorie containing beverages  Separate fluids from meals by 30 minutes before, during, and after eating   Decreasing energy drinks     Continue to work on smoking cessation.     Initial Consult / Weight Loss    My Plate Planner_English - Pt Education  [email or web address]    Protein Sources for Weight Loss  [email or web address]     Carbohydrates  [email or web address]     Time spent with patient: 11 minutes.  Leti Del Cid, MS, RD, LD      "

## 2021-04-28 ENCOUNTER — NURSE TRIAGE (OUTPATIENT)
Dept: NURSING | Facility: CLINIC | Age: 36
End: 2021-04-28

## 2021-04-28 ENCOUNTER — HOSPITAL ENCOUNTER (EMERGENCY)
Facility: CLINIC | Age: 36
Discharge: HOME | End: 2021-04-28
Attending: PHYSICIAN ASSISTANT | Admitting: PHYSICIAN ASSISTANT
Payer: COMMERCIAL

## 2021-04-28 VITALS
DIASTOLIC BLOOD PRESSURE: 93 MMHG | BODY MASS INDEX: 69.93 KG/M2 | SYSTOLIC BLOOD PRESSURE: 178 MMHG | TEMPERATURE: 98.4 F | RESPIRATION RATE: 18 BRPM | HEART RATE: 88 BPM | OXYGEN SATURATION: 98 % | WEIGHT: 315 LBS

## 2021-04-28 DIAGNOSIS — Z20.822 EXPOSURE TO COVID-19 VIRUS: ICD-10-CM

## 2021-04-28 LAB
FLUAV RNA RESP QL NAA+PROBE: NEGATIVE
FLUBV RNA RESP QL NAA+PROBE: NEGATIVE
LABORATORY COMMENT REPORT: NORMAL
RSV RNA SPEC QL NAA+PROBE: NORMAL
SARS-COV-2 RNA RESP QL NAA+PROBE: NEGATIVE
SPECIMEN SOURCE: NORMAL

## 2021-04-28 PROCEDURE — 99213 OFFICE O/P EST LOW 20 MIN: CPT | Performed by: PHYSICIAN ASSISTANT

## 2021-04-28 PROCEDURE — 87636 SARSCOV2 & INF A&B AMP PRB: CPT | Performed by: PHYSICIAN ASSISTANT

## 2021-04-28 PROCEDURE — C9803 HOPD COVID-19 SPEC COLLECT: HCPCS | Performed by: PHYSICIAN ASSISTANT

## 2021-04-28 PROCEDURE — G0463 HOSPITAL OUTPT CLINIC VISIT: HCPCS | Performed by: PHYSICIAN ASSISTANT

## 2021-04-28 NOTE — ED TRIAGE NOTES
Lives with sister that tested positive for Covid today. Sx for sister started yesterday. Desires covid testing. Ambulatory in [facility].

## 2021-04-28 NOTE — TELEPHONE ENCOUNTER
FNA triage call : NEEDS Covid Vaccine.   Presenting problem : Pt called. Exposure to covid by live- in sister , and worsening shortness of breath and using inhaler more in last week and usual smoker's cough . Currently : voided last 11am and drinking fluids , eating and activity are unchanged , runny nose and can't   taste food or  smell food aswell as normal .    Guideline used : Covid suspected A AH.   Disposition and recommendations : COVID 19 Nurse Triage Plan/Patient Instructions    Please be aware that novel coronavirus (COVID-19) may be circulating in the community. If you develop symptoms such as fever, cough, or SOB or if you have concerns about the presence of another infection including coronavirus (COVID-19), please contact your health care provider or visit [email or web address].     Disposition/Instructions/  Pt want to go to [place] ED , instead of waiting for PCP's opinion .     ED Visit recommended. Follow protocol based instructions.     Bring Your Own Device:  Please also bring your smart device(s) (smart phones, tablets, laptops) and their charging cables for your personal use and to communicate with your care team during your visit.    Thank you for taking steps to prevent the spread of this virus.  o Limit your contact with others.  o Wear a simple mask to cover your cough.  o Wash your hands well and often.  Caller verbalizes understanding and denies further questions and will call back if further symptoms to triage or questions  . Chrissy Fleming RN  - [place] Nurse Advisor     Reason for Disposition    MILD difficulty breathing (e.g., minimal/no SOB at rest, SOB with walking, pulse <100)    Additional Information    Negative: SEVERE difficulty breathing (e.g., struggling for each breath, speaks in single words)    Negative: Difficult to awaken or acting confused (e.g., disoriented, slurred speech)    Negative: Bluish (or gray) lips or face now    Negative: Shock suspected (e.g.,  cold/pale/clammy skin, too weak to stand, low BP, rapid pulse)    Negative: Sounds like a life-threatening emergency to the triager    Negative: [1] COVID-19 exposure AND [2] no symptoms    Negative: [1] Lives with someone known to have influenza (flu test positive) AND [2] flu-like symptoms (e.g., cough, runny nose, sore throat, SOB; with or without fever)    Negative: [1] Adult with possible COVID-19 symptoms AND [2] triager concerned about severity of symptoms or other causes    Negative: COVID-19 vaccine reaction suspected (e.g., fever, headache, muscle aches) occurring during days 1-3 after getting vaccine    Negative: COVID-19 vaccine, questions about    Negative: COVID-19 and breastfeeding, questions about    Negative: SEVERE or constant chest pain or pressure (Exception: mild central chest pain, present only when coughing)    Negative: MODERATE difficulty breathing (e.g., speaks in phrases, SOB even at rest, pulse 100-120)    Negative: [1] Headache AND [2] stiff neck (can't touch chin to chest)    Protocols used: CORONAVIRUS (COVID-19) DIAGNOSED OR [id number]-IX 1.3

## 2021-04-28 NOTE — ED PROVIDER NOTES
History     Chief Complaint   Patient presents with     Covid 19 Testing     shortness of breath, Phlehm-smoker, Diarrhea off and on. Desires covid testing.      HPI  Ge Hansen is a 35 year old male who presents to the urgent care with concern over possible COVID-19.  Patient reports that he sister that he lives with tested positive yesterday.  He complains of chronic cough secondary to smoking and asthma.  In the last week cough is slightly worse and accompanied by increasing shortness of breath, nasal congestion, intermittent diarrhea, dyspnea on exertion.  He has not had any  Objective fever, chills, myalgias, loss of taste or smell or abdominal pain.  He has not attempted any OTC treatments.     Allergies:  No Known Allergies    Problem List:    Patient Active Problem List    Diagnosis Date Noted     Class 3 drug-induced obesity with serious comorbidity and body mass index (BMI) greater than or equal to 70 in adult (H) 01/26/2021     Priority: Medium     Starting bariatric surgery process. Needs to lose to 444lb day of surgery        Insomnia, unspecified type 01/26/2021     Priority: Medium     Plantar fasciitis 01/15/2021     Priority: Medium     Morbid obesity (H) BMI 68.34 09/13/2018     Priority: Medium     Elevated glucose 09/13/2018     Priority: Medium     Obesity hypoventilation syndrome (H) 09/13/2018     Priority: Medium     Some asthma hx too.         Tobacco use 09/13/2018     Priority: Medium     MAYDA (obstructive sleep apnea) 09/13/2018     Priority: Medium     Study Date: 10/3/2018( 520.0 lbs).  The combined apnea/hypopnea index was 134.8 events per hour (central apnea/hypopnea index was 4.1 events per hour). The REM AHI was - events per hour. The supine AHI was 128.9 events per hour. The RERA index was - events per hour. The RDI was 134.8 events per hour. Transcutaneous carbon dioxide monitoring was used, and significant hypoventilation was present with a maximum change from ~11 mmHg and  46.0 minutes at or greater than 55 mmHg. VBG was consistent with chronic compensated respiratory acidosis (pH 7.36, pCO2 62, HCO3 35). Baseline oxygen saturation was 89.4%. Lowest oxygen saturation was 52.0%. Time spent less than or equal to 88% was 59.5 minutes. Time spent less than or equal to 89% was 65.5 minutes. The final pressure was BiLevel 23/13/0 cmH2O with an AHI of 1.7 events per hour. Time in REM supine on final pressure was 49.0 minutes.          Elevated blood pressure reading without diagnosis of hypertension 09/13/2018     Priority: Medium     Asthma 06/01/2006     Priority: Medium        Past Medical History:    Past Medical History:   Diagnosis Date     ADHD      Asthma      Lactose intolerance      Obesity hypoventilation syndrome (H)      Sleep apnea      Tobacco use        Past Surgical History:    Past Surgical History:   Procedure Laterality Date     ROTATOR CUFF REPAIR RT/LT         Family History:    Family History   Problem Relation Age of Onset     Obesity Sister        Social History:  Marital Status:  Single [1]  Social History     Tobacco Use     Smoking status: Current Every Day Smoker     Packs/day: 1.00     Years: 10.00     Pack years: 10.00     Types: Other     Start date: 7/7/1997     Smokeless tobacco: Current User     Types: Chew, Snuff     Tobacco comment: Vaping    Substance Use Topics     Alcohol use: Yes     Drug use: No        Medications:    albuterol (PROAIR HFA/PROVENTIL HFA/VENTOLIN HFA) 108 (90 Base) MCG/ACT inhaler  budesonide-formoterol (SYMBICORT) 160-4.5 MCG/ACT Inhaler  buPROPion (WELLBUTRIN SR) 150 MG 12 hr tablet  cyclobenzaprine (FLEXERIL) 10 MG tablet  hydrOXYzine (ATARAX) 25 MG tablet  montelukast (SINGULAIR) 10 MG tablet  pimecrolimus (ELIDEL) 1 % external cream  traZODone (DESYREL) 50 MG tablet      Review of Systems  CONSTITUTIONAL:POSITIVE  For chills, myalgias and NEGATIVE  for fever   INTEGUMENTARY/SKIN: NEGATIVE for worrisome rashes, moles or  lesions  EYES: NEGATIVE for vision changes or irritation  ENT/MOUTH: POSITIVE for nasal congestion  and NEGATIVE for ear pain  RESP:POSITIVE for cough SOB/dyspnea and wheezing  GI: POSITIVE for diarrhea NEGATIVE for vomiting,  or abdominal pain  Physical Exam   BP: (!) 178/93  Pulse: 88  Temp: 98.4  F (36.9  C)  Resp: 18  Weight: (!) 240.4 kg (530 lb)  SpO2: 98 %  Physical Exam  GENERAL APPEARANCE:obese, alert and no distress  EYES: EOMI,  PERRL, conjunctiva clear  HENT: ear canals and TM's normal.  Nose and mouth without ulcers, erythema or lesions  NECK: supple, nontender, no lymphadenopathy  RESP: lungs clear to auscultation - no rales, rhonchi or wheezes  CV: regular rates and rhythm, normal S1 S2, no murmur noted  ABDOMEN:  soft, nontender, no HSM or masses and bowel sounds normal  SKIN: no suspicious lesions or rashes  ED Course        Procedures        Critical Care time:  none          Results for orders placed or performed during the hospital encounter of 04/28/21 (from the past 24 hour(s))   Symptomatic Influenza A/B & SARS-CoV2 (COVID-19) Virus PCR Multiplex    Specimen: Nasopharyngeal   Result Value Ref Range    Flu A/B & SARS-COV-2 PCR Source Nasopharyngeal     SARS-CoV-2 PCR Result NEGATIVE     Influenza A PCR Negative NEG^Negative    Influenza B PCR Negative NEG^Negative    Respiratory Syncytial Virus PCR (Note)     Flu A/B & SARS-CoV-2 PCR Comment (Note)      Medications - No data to display    Assessments & Plan (with Medical Decision Making)     I have reviewed the nursing notes.  I have reviewed the findings, diagnosis, plan and need for follow up with the patient.       Discharge Medication List as of 4/28/2021  4:57 PM        Final diagnoses:   Exposure to COVID-19 virus     35-year-old male presents to the urgent care requesting testing for COVID-19 after his living sibling tested positive yesterday and he has had URI symptoms for the last week.  He had elevated blood pressure upon arrival,  remainder of vital signs stable.  physical exam findings as described above were benign.  He was negative for COVID-19 and influenza.  Symptoms consistent with viral URI, would consider asthma exacerbation, allergies.  Is not suspect pneumonia, pertussis, PE.  Continue symptomatic treatment as needed.  Follow-up if no improvement within the next 1 to 2 weeks.  Worrisome reasons to return to the ER/UC sooner discussed.    Disclaimer: This note consists of symbols derived from keyboarding, dictation, and/or voice recognition software. As a result, there may be errors in the script that have gone undetected.  Please consider this when interpreting information found in the chart.      4/28/2021   [facility] EMERGENCY DEPT     Sloane Venegas PA-C  05/02/21 1038

## 2021-05-02 ENCOUNTER — E-VISIT (OUTPATIENT)
Dept: FAMILY MEDICINE | Facility: CLINIC | Age: 36
End: 2021-05-02
Payer: COMMERCIAL

## 2021-05-02 DIAGNOSIS — Z20.822 CLOSE EXPOSURE TO 2019 NOVEL CORONAVIRUS: Primary | ICD-10-CM

## 2021-05-02 PROCEDURE — 99421 OL DIG E/M SVC 5-10 MIN: CPT | Performed by: NURSE PRACTITIONER

## 2021-05-03 NOTE — PATIENT INSTRUCTIONS
"  Dear Ge Hnasen,    Based on your exposure to COVID-19 (coronavirus), we would like to test you for this virus. I have placed an order for this test.The best time for testing is 5-7 days after the exposure.    How to schedule:  Go to your [employer] home page and scroll down to the section that says  You have an appointment that needs to be scheduled  and click the large green button that says  Schedule Now  and follow the steps to find the next available opening.     If you are unable to complete these [employer] scheduling steps, please call [phone] to schedule your testing.     Return to work/school/ guidance:   For people with high risk exposures outside the home    Please let your workplace manager and staffing office know when your quarantine ends.     We can not give you an exact date as it depends on the information below. You can calculate this on your own or work with your manager/staffing office to calculate this. (For example if you were exposed on 10/4, you would have to quarantine for 14 full days. That would be through 10/18. You could return on 10/19.)    Quarantine Guidelines:  Patients (\"contacts\") who have been in close prolonged contact of an infected person(s) (within six feet for at least 15 minutes within a 24 hour period), and remain asymptomatic should enter quarantine based on the following options:    14-day quarantine period (this remains the CDC recommendation for the greatest protection against spread of COVID-19) OR    Minimum 7-day quarantine with negative RT-PCR test collected on day 5 or later OR    10-day quarantine with no test  Quarantine Guideline exceptions are as follows:    People who have been fully vaccinated do not need to quarantine if the exposure was at least 2 weeks after the last vaccination. This includes vaccinated health care workers.    Not fully vaccinated and unvaccinated Individuals who work in health care, congregate care, or congregate living should " be off work for 14 days from their last date of exposure. Community activities for this group can be resumed based on options above. Fully vaccinated individuals in this group do not need to quarantine from work after exposure.    Not fully vaccinated and unvaccinated people whose high-risk exposure was a household member should always quarantine for 14 days from their last date of exposure. Fully vaccinated people in this category do not need to quarantine.    Not fully vaccinated or unvaccinated residents of congregate care and congregate living settings should always quarantine for 14 days from their last date of exposure. Fully vaccinated residents do not need to quarantine.  Note: If you have ongoing exposure to the covid positive person, this quarantine period may be more than 14 days. (For example, if you are continued to be exposed to your child who tested positive and cannot isolate from them, then the quarantine of 7-14 days can't start until your child is no longer contagious. This is typically 10 days from onset of the child's symptoms. So the total duration may be 17-24 days in this case.)    You should continue symptom monitoring until day 14 post-exposure. If you develop signs or symptoms of COVID-19, isolate and get tested (even if you have been tested already).    How to quarantine:   Stay home and away from others. Don't go to school or anywhere else. Generally quarantine means staying home from work but there are some exceptions to this. Please contact your workplace.  No hugging, kissing or shaking hands.  Don't let anyone visit.  Cover your mouth and nose with a mask, tissue or washcloth to avoid spreading germs.  Wash your hands and face often. Use soap and water.    What are the symptoms of COVID-19?  The most common symptoms are cough, fever and trouble breathing. Less common symptoms include headache, body aches, fatigue (feeling very tired), chills, sore throat, stuffy or runny nose,  diarrhea (loose poop), loss of taste or smell, belly pain, and nausea or vomiting (feeling sick to your stomach or throwing up).  After 14 days, if you have still don't have symptoms, you likely don't have this virus.  If you develop symptoms, follow these guidelines.  If you're normally healthy: Please start another eVisit.  If you have a serious health problem (like cancer, heart failure, an organ transplant or kidney disease): Call your specialty clinic. Let them know that you might have COVID-19.    Where can I get more information?  [facility] - About COVID-19: [email or web address]  CDC - What to Do If You're Sick: www.cdc.gov/coronavirus/2019-ncov/about/steps-when-sick.html  CDC - Ending Home Isolation: www.cdc.gov/coronavirus/2019-ncov/hcp/disposition-in-home-patients.html  CDC - Caring for Someone: www.cdc.gov/coronavirus/2019-ncov/if-you-are-sick/care-for-someone.html  [facility] clinical trials (COVID-19 research studies): [email or web address]-trials  Below are the COVID-19 hotlines at the [place] [facility] ([facility]). Interpreters are available.  For health questions: Call 035-506-2997 or 1-132.900.4300 (7 a.m. to 7 p.m.)  For questions about schools and childcare: Call 407-747-6648 or 1-257.369.9644 (7 a.m. to 7 p.m.)

## 2021-05-06 DIAGNOSIS — Z20.822 CLOSE EXPOSURE TO 2019 NOVEL CORONAVIRUS: ICD-10-CM

## 2021-05-06 LAB
SARS-COV-2 RNA RESP QL NAA+PROBE: NORMAL
SPECIMEN SOURCE: NORMAL

## 2021-05-06 PROCEDURE — U0005 INFEC AGEN DETEC AMPLI PROBE: HCPCS | Performed by: NURSE PRACTITIONER

## 2021-05-06 PROCEDURE — U0003 INFECTIOUS AGENT DETECTION BY NUCLEIC ACID (DNA OR RNA); SEVERE ACUTE RESPIRATORY SYNDROME CORONAVIRUS 2 (SARS-COV-2) (CORONAVIRUS DISEASE [COVID-19]), AMPLIFIED PROBE TECHNIQUE, MAKING USE OF HIGH THROUGHPUT TECHNOLOGIES AS DESCRIBED BY CMS-2020-01-R: HCPCS | Performed by: NURSE PRACTITIONER

## 2021-05-07 ENCOUNTER — MYC MEDICAL ADVICE (OUTPATIENT)
Dept: FAMILY MEDICINE | Facility: CLINIC | Age: 36
End: 2021-05-07

## 2021-05-07 LAB
LABORATORY COMMENT REPORT: NORMAL
SARS-COV-2 RNA RESP QL NAA+PROBE: NEGATIVE
SPECIMEN SOURCE: NORMAL

## 2021-07-21 ENCOUNTER — MYC REFILL (OUTPATIENT)
Dept: FAMILY MEDICINE | Facility: CLINIC | Age: 36
End: 2021-07-21

## 2021-07-21 DIAGNOSIS — J45.41 MODERATE PERSISTENT ASTHMA WITH EXACERBATION: ICD-10-CM

## 2021-07-21 DIAGNOSIS — M25.562 ACUTE PAIN OF LEFT KNEE: ICD-10-CM

## 2021-07-21 RX ORDER — CYCLOBENZAPRINE HCL 10 MG
10 TABLET ORAL 3 TIMES DAILY PRN
Qty: 30 TABLET | Refills: 0 | Status: CANCELLED | OUTPATIENT
Start: 2021-07-21

## 2021-07-21 RX ORDER — ALBUTEROL SULFATE 90 UG/1
2 AEROSOL, METERED RESPIRATORY (INHALATION) EVERY 6 HOURS PRN
Status: CANCELLED | OUTPATIENT
Start: 2021-07-21

## 2021-07-23 ENCOUNTER — MYC REFILL (OUTPATIENT)
Dept: FAMILY MEDICINE | Facility: CLINIC | Age: 36
End: 2021-07-23

## 2021-07-23 DIAGNOSIS — M25.562 ACUTE PAIN OF LEFT KNEE: ICD-10-CM

## 2021-07-23 RX ORDER — CYCLOBENZAPRINE HCL 10 MG
TABLET ORAL
Qty: 30 TABLET | Refills: 0 | OUTPATIENT
Start: 2021-07-23

## 2021-07-23 RX ORDER — ALBUTEROL SULFATE 90 UG/1
2 AEROSOL, METERED RESPIRATORY (INHALATION) EVERY 6 HOURS PRN
Qty: 18 G | Refills: 0 | Status: SHIPPED | OUTPATIENT
Start: 2021-07-23 | End: 2021-07-30

## 2021-07-23 NOTE — TELEPHONE ENCOUNTER
Pending Prescriptions:                       Disp   Refills    albuterol (PROAIR HFA/PROVENTIL HFA/VENTOL*       0        Sig: INHALE TWO PUFFS BY MOUTH EVERY 6 HOURS AS NEEDED FOR           SHORTNESS OF BREATH DYSPNEA OR WHEEZING    cyclobenzaprine (FLEXERIL) 10 MG tablet [P*30 tab*0        Sig: TAKE ONE TABLET BY MOUTH THREE TIMES A DAY AS NEEDED           FOR MUSCLE SPASMS  Routing refill request to provider for review/approval because:  Drug not on the [facility] refill protocol   Has appt scheduled for  07/30/21  ACT Total Scores 3/11/2021   ACT TOTAL SCORE (Goal Greater than or Equal to 20) 13   In the past 12 months, how many times did you visit the emergency room for your asthma without being admitted to the hospital? 0   In the past 12 months, how many times were you hospitalized overnight because of your asthma? 0   Jaymie BROWN RN

## 2021-07-26 NOTE — TELEPHONE ENCOUNTER
Routing refill request to provider for review/approval because: Drug not on the [facility] refill protocol   Last ordered by GAY Orozco  Has Video Visit 7/30/21    Va BROWN RN, BSN

## 2021-07-28 RX ORDER — CYCLOBENZAPRINE HCL 10 MG
10 TABLET ORAL 3 TIMES DAILY PRN
Qty: 30 TABLET | Refills: 0 | OUTPATIENT
Start: 2021-07-28

## 2021-07-30 ENCOUNTER — VIRTUAL VISIT (OUTPATIENT)
Dept: FAMILY MEDICINE | Facility: CLINIC | Age: 36
End: 2021-07-30
Payer: COMMERCIAL

## 2021-07-30 DIAGNOSIS — E66.813 CLASS 3 DRUG-INDUCED OBESITY WITH SERIOUS COMORBIDITY AND BODY MASS INDEX (BMI) GREATER THAN OR EQUAL TO 70 IN ADULT (H): Primary | ICD-10-CM

## 2021-07-30 DIAGNOSIS — J45.909 MODERATE ASTHMA WITHOUT COMPLICATION, UNSPECIFIED WHETHER PERSISTENT: Chronic | ICD-10-CM

## 2021-07-30 DIAGNOSIS — G47.33 OSA (OBSTRUCTIVE SLEEP APNEA): Chronic | ICD-10-CM

## 2021-07-30 DIAGNOSIS — E66.1 CLASS 3 DRUG-INDUCED OBESITY WITH SERIOUS COMORBIDITY AND BODY MASS INDEX (BMI) GREATER THAN OR EQUAL TO 70 IN ADULT (H): Primary | ICD-10-CM

## 2021-07-30 DIAGNOSIS — G89.29 CHRONIC PAIN OF LEFT KNEE: ICD-10-CM

## 2021-07-30 DIAGNOSIS — S83.242S TEAR OF MEDIAL MENISCUS OF LEFT KNEE, CURRENT, UNSPECIFIED TEAR TYPE, SEQUELA: ICD-10-CM

## 2021-07-30 DIAGNOSIS — M25.562 CHRONIC PAIN OF LEFT KNEE: ICD-10-CM

## 2021-07-30 DIAGNOSIS — J31.0 CHRONIC RHINITIS: ICD-10-CM

## 2021-07-30 PROCEDURE — 99213 OFFICE O/P EST LOW 20 MIN: CPT | Mod: 95 | Performed by: NURSE PRACTITIONER

## 2021-07-30 RX ORDER — MONTELUKAST SODIUM 10 MG/1
10 TABLET ORAL AT BEDTIME
Qty: 90 TABLET | Refills: 1 | Status: SHIPPED | OUTPATIENT
Start: 2021-07-30 | End: 2022-04-12

## 2021-07-30 RX ORDER — CYCLOBENZAPRINE HCL 10 MG
10 TABLET ORAL 3 TIMES DAILY PRN
Qty: 30 TABLET | Refills: 0 | Status: SHIPPED | OUTPATIENT
Start: 2021-07-30 | End: 2022-11-16

## 2021-07-30 RX ORDER — ALBUTEROL SULFATE 90 UG/1
2 AEROSOL, METERED RESPIRATORY (INHALATION) EVERY 6 HOURS PRN
Qty: 18 G | Refills: 1 | Status: SHIPPED | OUTPATIENT
Start: 2021-07-30 | End: 2022-11-16

## 2021-07-30 RX ORDER — FLUTICASONE PROPIONATE 50 MCG
2 SPRAY, SUSPENSION (ML) NASAL DAILY
Qty: 16 G | Refills: 1 | Status: SHIPPED | OUTPATIENT
Start: 2021-07-30 | End: 2022-11-16

## 2021-07-30 NOTE — PROGRESS NOTES
Ge is a 36 year old who is being evaluated via a billable video visit.      How would you like to obtain your AVS? MyChart  If the video visit is dropped, the invitation should be resent by: Text to cell phone: [phone]  Will anyone else be joining your video visit? No      Video Start Time: 4:49 PM/no answer / 5:15 PM - 5:37 PM     Assessment & Plan     Class 3 drug-induced obesity with serious comorbidity and body mass index (BMI) greater than or equal to 70 in adult (H)  Chronic, has worsened.  Changed jobs, weight increasing.  Having a difficult time.  Did establish with comprehensive weight management, however has not had any recent follow-up.  Has trialed phentermine and topiramate in the past, had diarrhea side effects from both.  Currently not on any weight loss medications.  Last nutrition visit was in April and last provider visit was in January.  Has been eating more fast food, drinking energy drinks.  Discussed improving nutrition, getting back in with nutritionist.  Patient to call [phone] to set up another visit with Leti Del Cid.  Patient also needs to get back in to see provider as was supposed to follow-up a month after last office visit.  Advised patient to call and schedule soon as possible with Marcela Ewing at [phone].     Moderate asthma without complication, unspecified whether persistent  Chronic, worsening.  Patient was started on Symbicort at last office visit and never picked up or started, has also not taken Singulair.  Patient using albuterol inhaler several times a day.  Highly recommended patient to get started back on Symbicort, he just picked this up from the pharmacy as well as start taking Singulair at night.  Continue to use albuterol as needed until symptoms start improving from maintenance medications.  Follow-up in office in 3 months.  - montelukast (SINGULAIR) 10 MG tablet; Take 1 tablet (10 mg) by mouth At Bedtime  - albuterol (PROAIR HFA/PROVENTIL  "HFA/VENTOLIN HFA) 108 (90 Base) MCG/ACT inhaler; Inhale 2 puffs into the lungs every 6 hours as needed for shortness of breath / dyspnea or wheezing    Chronic rhinitis  Chronic, worsening.  Not currently taking Flonase or Singulair.  Highly advised patient to restart Flonase, reviewed proper administration instructions, pointing out towards eyes when completing.  Also advised to restart Singulair as above.  Follow-up in 3 months or sooner if needed.  - fluticasone (FLONASE) 50 MCG/ACT nasal spray; Spray 2 sprays into both nostrils daily  - montelukast (SINGULAIR) 10 MG tablet; Take 1 tablet (10 mg) by mouth At Bedtime    MAYDA (obstructive sleep apnea)  Chronic, feels settings are not right.  Needs readjustment.  Sleep management referral has been placed earlier this year, highly advised patient to call [phone] to schedule this.    Chronic pain of left knee  Chronic left knee pain.  Has had x-rays and MRI and has also seen orthopedics.  Orthopedics referred to orthopedic surgery.  Referral is out of date, will place a new one.  Patient should receiving a phone call to schedule.  Discussed the effects of weight on knees as well, encouraged to continue to work on weight loss.  Recommend using brace received from orthopedics as well as icing and ibuprofen and/or Tylenol as needed.  - cyclobenzaprine (FLEXERIL) 10 MG tablet; Take 1 tablet (10 mg) by mouth 3 times daily as needed for muscle spasms  - Orthopedic  Referral; Future     Tear of medial meniscus of left knee, current, unspecified tear type, sequela  Tear of medial meniscus likely contributing to chronic knee pain.  Orthopedic  referral as above.  - Orthopedic  Referral; Future              BMI:   Estimated body mass index is 69.93 kg/m  as calculated from the following:    Height as of 1/26/21: 1.854 m (6' 1\").    Weight as of 4/28/21: 240.4 kg (530 lb).   Weight management plan: Advised patient to reconnect with weight " management, numbers provided in patient instructions    See Patient Instructions    Return in about 3 months (around 10/30/2021) for Recheck.    Kat Chun, SONIA CNP  [facility]    Luis Carolina is a 36 year old who presents for the following health issues:    HPI     Medication Followup of  Flexeril     Taking Medication as prescribed: yes    Side Effects:  None    Medication Helping Symptoms:  yes     Chronic knee pain, on left  X-ray as below, normal knee MRI ordered, never scheduled  Has seen orthopedics -orthopedics recommended orthopedic surgery for concern of meniscal flap tear.  Has done course of physical therapy    LEFT KNEE THREE VIEWS   8/11/2018 6:34 PM      HISTORY: Stepping up and knee gave out.      COMPARISON: None.                                                                      IMPRESSION: Normal.     HAROLDO STEVENS MD     Your provider has referred you to: [facility]: [facility] (on call location) - [place] [phone]   [email or web address]        Your provider has referred you to: [facility]: [facility] - [place] [phone]  [email or web address]  Location per patient's preference     Asthma  Never started the Symbicort at all, just picked it up. Has not been on the Singulair either  Using inhaler several times a day  Has gained weight, difficulty breathing  Allergies not helpful      Sleep Apnea  Got a CPAP machine through [facility] about 2 years ago   Had a recall, needs adjustment     Review of Systems   Constitutional, HEENT, cardiovascular, pulmonary, gi and gu systems are negative, except as otherwise noted.       Objective           Vitals:  No vitals were obtained today due to virtual visit.    Physical Exam   No exam completed due to telephone visit    Diagnostic Test Results:  none            Telephone time: 22  minutes

## 2021-07-31 NOTE — PATIENT INSTRUCTIONS
Class 3 drug-induced obesity with serious comorbidity and body mass index (BMI) greater than or equal to 70 in adult (H)  Chronic, has worsened.  Changed jobs, weight increasing.  Having a difficult time.  Did establish with comprehensive weight management, however has not had any recent follow-up.  Has trialed phentermine and topiramate in the past, had diarrhea side effects from both.  Currently not on any weight loss medications.  Last nutrition visit was in April and last provider visit was in January.  Has been eating more fast food, drinking energy drinks.  Discussed improving nutrition, getting back in with nutritionist.  Patient to call [phone] to set up another visit with Leti Del Cid.  Patient also needs to get back in to see provider as was supposed to follow-up a month after last office visit.  Advised patient to call and schedule soon as possible with Marcela Ewing at [phone].     Moderate asthma without complication, unspecified whether persistent  Chronic, worsening.  Patient was started on Symbicort at last office visit and never picked up or started, has also not taken Singulair.  Patient using albuterol inhaler several times a day.  Highly recommended patient to get started back on Symbicort, he just picked this up from the pharmacy as well as start taking Singulair at night.  Continue to use albuterol as needed until symptoms start improving from maintenance medications.  Follow-up in office in 3 months.  - montelukast (SINGULAIR) 10 MG tablet; Take 1 tablet (10 mg) by mouth At Bedtime  - albuterol (PROAIR HFA/PROVENTIL HFA/VENTOLIN HFA) 108 (90 Base) MCG/ACT inhaler; Inhale 2 puffs into the lungs every 6 hours as needed for shortness of breath / dyspnea or wheezing    Chronic rhinitis  Chronic, worsening.  Not currently taking Flonase or Singulair.  Highly advised patient to restart Flonase, reviewed proper administration instructions, pointing out towards eyes when completing.  Also  advised to restart Singulair as above.  Follow-up in 3 months or sooner if needed.  - fluticasone (FLONASE) 50 MCG/ACT nasal spray; Spray 2 sprays into both nostrils daily  - montelukast (SINGULAIR) 10 MG tablet; Take 1 tablet (10 mg) by mouth At Bedtime    MAYDA (obstructive sleep apnea)  Chronic, feels settings are not right.  Needs readjustment.  Sleep management referral has been placed earlier this year, highly advised patient to call [phone] to schedule this.    Chronic pain of left knee  Chronic left knee pain.  Has had x-rays and MRI and has also seen orthopedics.  Orthopedics referred to orthopedic surgery.  Referral is out of date, will place a new one.  Patient should receiving a phone call to schedule.  Discussed the effects of weight on knees as well, encouraged to continue to work on weight loss.  Recommend using brace received from orthopedics as well as icing and ibuprofen and/or Tylenol as needed.  - cyclobenzaprine (FLEXERIL) 10 MG tablet; Take 1 tablet (10 mg) by mouth 3 times daily as needed for muscle spasms  - Orthopedic  Referral; Future     Tear of medial meniscus of left knee, current, unspecified tear type, sequela  Tear of medial meniscus likely contributing to chronic knee pain.  Orthopedic  referral as above.  - Orthopedic  Referral; Future

## 2021-08-04 ENCOUNTER — TELEPHONE (OUTPATIENT)
Dept: FAMILY MEDICINE | Facility: CLINIC | Age: 36
End: 2021-08-04

## 2021-08-04 ENCOUNTER — OFFICE VISIT (OUTPATIENT)
Dept: URGENT CARE | Facility: URGENT CARE | Age: 36
End: 2021-08-04
Payer: COMMERCIAL

## 2021-08-04 VITALS
DIASTOLIC BLOOD PRESSURE: 83 MMHG | SYSTOLIC BLOOD PRESSURE: 148 MMHG | HEART RATE: 103 BPM | OXYGEN SATURATION: 96 % | TEMPERATURE: 99.5 F | RESPIRATION RATE: 22 BRPM

## 2021-08-04 DIAGNOSIS — K62.5 BRBPR (BRIGHT RED BLOOD PER RECTUM): Primary | ICD-10-CM

## 2021-08-04 PROCEDURE — 99213 OFFICE O/P EST LOW 20 MIN: CPT | Performed by: FAMILY MEDICINE

## 2021-08-04 NOTE — PROGRESS NOTES
"S: Very pleasant 36-year-old male presents today with 2 days of bright red blood per rectum.  He notes that after he had an exceedingly large bowel movement that was \"rock hard\" he had some bright red rectal bleeding.  Since that time he has had several other episodes of bright red rectal bleeding with defecation.  He is currently undergoing evaluation for bariatric surgery and he notes his GI tract is changing frequently.  ROS: No fever.  No abdominal pain.    Meds: Symbicort, Ventolin, bupropion, Flexeril, Flonase, Desyrel    O: Blood pressure 148/83, temperature 90.5, pulse 1 3 respiration 22  Alert conversant no acute distress  Skin Pink and dry    A: BRBPR    P: Most likely hemorrhoidal.  Recommended further evaluation if bleeding continues to include colonoscopy.  Recommended keeping his stool soft with fiber and we discussed fiber therapy  Recommended sitz bath twice daily  Patient agreed to follow-up with primary care physician if not improving.  "

## 2021-08-04 NOTE — TELEPHONE ENCOUNTER
"Patient called reporting passing blood with stool a few days ago. He described his stool as being \"rock solid\". He has a history of hemorrhoids. BM consistency is better. He is eating and drinking per usual. He has an upset stomach, no emesis. No abdominal distension. He said he had a small amount of blood per rectum needing to change his underwear several times yesterday. Today he is passing some gas with small amount of blood every couple of hours. Blood in underwear and approximately 3 swipes with toilet paper to clear every time. No dizziness, chest pain or shortness of breath. No available appointments this afternoon in [place] at patient's preferred time. Advised to go to Er for evaluation as amount of blood has increased and associated with upset stomach.  Jaymie BROWN RN    "

## 2021-08-22 ENCOUNTER — TELEPHONE (OUTPATIENT)
Dept: ENDOCRINOLOGY | Facility: CLINIC | Age: 36
End: 2021-08-22

## 2021-08-24 ENCOUNTER — TELEPHONE (OUTPATIENT)
Dept: FAMILY MEDICINE | Facility: CLINIC | Age: 36
End: 2021-08-24

## 2021-08-24 ENCOUNTER — VIRTUAL VISIT (OUTPATIENT)
Dept: ENDOCRINOLOGY | Facility: CLINIC | Age: 36
End: 2021-08-24
Payer: COMMERCIAL

## 2021-08-24 DIAGNOSIS — E66.813 CLASS 3 DRUG-INDUCED OBESITY WITH SERIOUS COMORBIDITY AND BODY MASS INDEX (BMI) GREATER THAN OR EQUAL TO 70 IN ADULT (H): ICD-10-CM

## 2021-08-24 DIAGNOSIS — E66.1 CLASS 3 DRUG-INDUCED OBESITY WITH SERIOUS COMORBIDITY AND BODY MASS INDEX (BMI) GREATER THAN OR EQUAL TO 70 IN ADULT (H): ICD-10-CM

## 2021-08-24 DIAGNOSIS — Z71.3 NUTRITIONAL COUNSELING: Primary | ICD-10-CM

## 2021-08-24 PROCEDURE — 97803 MED NUTRITION INDIV SUBSEQ: CPT | Mod: 95 | Performed by: DIETITIAN, REGISTERED

## 2021-08-24 NOTE — PATIENT INSTRUCTIONS
"  GOALS:  Relating To Eating:  Eat slowly (20-30 minutes per meal), chewing foods well (25 chews per bite/applesauce consistency)  9\" Plate method (1/2 plate non-starchy vegetables/fruit, 1/4 plate lean protein, 1/4 plate whole grain starch - no more than 1/2 cup carb/meal)  -Continues making meals at home    Eat 3 meals per day  Avoid snacking    Relating to beverages:  Reduce caffeine/carbonation/calorie containing beverages  Separate fluids from meals by 30 minutes before, during, and after eating   Decreasing energy drinks     Continue to work on smoking cessation.     Initial Consult / Weight Loss    My Plate Planner_English - Pt Education  [email or web address]    Protein Sources for Weight Loss  [email or web address]     Carbohydrates  [email or web address]     "

## 2021-08-24 NOTE — TELEPHONE ENCOUNTER
Kat is not able to do a telephone visit for IBS, he needs an in-person visit and patient does not feel he needs UC. If he gets scheduled on Kat's schedule. she would like 40 minutes. Thank You

## 2021-08-24 NOTE — PROGRESS NOTES
"Ge Hansen is a 36 year old male who is being evaluated via a billable telephone visit.     The patient has been notified of following:     \"This telephone visit will be conducted via a call between you and your physician/provider. We have found that certain health care needs can be provided without the need for a physical exam.  This service lets us provide the care you need with a short phone conversation.  If a prescription is necessary we can send it directly to your pharmacy.  If lab work is needed we can place an order for that and you can then stop by our lab to have the test done at a later time.    Telephone visits are billed at different rates depending on your insurance coverage. During this emergency period, for some insurers they may be billed the same as an in-person visit.  Please reach out to your insurance provider with any questions.    If during the course of the call the physician/provider feels a telephone visit is not appropriate, you will not be charged for this service.\"    Patient has given verbal consent for Telephone visit?  Yes    How would you like to obtain your AVS? MyChart - does not check regularly per pt.     Phone call duration: 33 minutes    During this virtual visit the patient is located in [place], patient verifies this as the location during the entirety of this visit.       Return Bariatric Nutrition Consultation Note    Reason For Visit: Nutrition Assessment    Ge Hansen is a 36 year old presenting today for follow-up bariatric nutrition consult.   Pt is interested in laparoscopic sleeve gastrectomy with Dr. Manuel expected surgery in [facility].  Patient is accompanied by self.      This is pt's 1st of 6 required nutrition visits prior to surgery. However, only pt's 1st consecutive appointment.    Pt has seen an RD in 2018 and most recently in 2021 but has not had consecutive appts.     Pt referred by Marcela Ewing NP  on January 26, 2021.    Patient with Co-morbidities of obesity " "including:  Type II DM no  Renal Failure no  Sleep apnea yes  Hypertension no   Dyslipidemia no  Joint pain no  Back pain no  GERD no     Support System Reviewed With Patient 10/16/2018   Who do you have in your support network that can be available to help you for the first 2 weeks after surgery? Girl friend   Who can you count on for support throughout your weight loss surgery journey? Girl friend       ANTHROPOMETRICS:  Initial weight: 522 lb     Estimated body mass index is 69.93 kg/m  as calculated from the following:    Height as of 1/26/21: 1.854 m (6' 1\").    Weight as of 4/28/21: 240.4 kg (530 lb).     Current weight: pt not sure    Required weight loss goal pre-op: 78 lbs from initial consult weight (goal weight 444 lbs or less before surgery)       12/31/2018   I have tried the following methods to lose weight Watching portions or calories, Exercise, Slimfast       Weight Loss Questions Reviewed With Patient 12/31/2018   How long have you been overweight? Since puberty       SUPPLEMENT INFORMATION:  None    MEDICATIONS FOR WEIGHT LOSS:  None currently - thought he was getting diarrhea from it but still getting even off of it. Does not remember what he took.     Per Chart review, pt was prescribed phentermine and topiramate by his PCP in January    NUTRITION HISTORY:  Per Previous RD note:   Cooking even more at home, only ate out twice over the past month. He is unsure of his weight at this time, but notice another 1/2 inch loser on his belt. Since starting the process he reports decreasing his belt line by 8.5 inches. He understand that his is losing weight and making changes to his body but is feel discouraged because he cannot see the changes in his body more.       Most meals at home are chicken or steak with potatoes or rice and vegetables.      August 24th 2021:  Pt has not seen RD since 4/12/21 - was having Leti help set up appointments. Was not aware that Leti left clinic and no longer works " here. Does not use MyChart and does not know clinic number.     Last saw NP 21 - was recommended 6 week follow up with her. Has not seen her since. Needs help setting up appts.     Smokin-1.5 packs/day. Was down to 1/2 pack, now up.  Was at 3 packs/day a fews month ago.   Aware of need to be tobacco free for 3 months before surgery.     Lots of stress  Started a new job in , turned out to be a temporary job. Now looking for a new one.     Was eating better but hard with current stress.     Getting checked out for IBS on Monday. Also struggling with hemorrhoids.   Pretty bad diarrhea in the morning.  Trying to stay away from bread/pasta - makes it worse.   Gets really gasy with milk. Cheese is only dairy he can really tolerate. Still getting gasy from lactose-free milk per pt.     Having lots of pain. Interested in medical marijuana if it will help - let pt know that I am not able to recommend or prescribe that. Would need to talk with his doctor or we could place a referral for pain clinic. Has been using CBD oil it sounds like.    Water: he thinks he is drinking too much water. Getting about ~2 liters (8 cups) per day, more if hot   Reviewed recommendations    Physical Activity   Limited, torn meniscus per pt.   Hard to walk due to weight and pain.    Got motorcycle - will be tired after riding. Lots of work to balance    Progress Towards Previous Goals:  Did not review today. Working on re-establishing care at this time.   See above for information discussed.     Recall Diet Questions Reviewed With Patient 2018   Describe what you typically consume for breakfast (typical or most recent): none   Describe what you typically consume for lunch (typical or most recent): sandwhiches   Describe what you typically consume for supper (typical or most recent): veggies stir mcgowan or home cooking   Describe what you typically consume as snacks (typical or most recent): bottle of pop   How many ounces of  water, or other low calorie drinks, do you drink daily (8 oz=1 glass)? 8 oz   How many ounces of caffeine (coffee, tea, pop) do you drink daily (8 oz=1 glass)? 48 oz   How many ounces of carbonated (pop, beer, sparkling water) drinks do you drinky daily (8 oz=1 glass)? 48 oz   How many ounces of juice, pop, sweet tea, sports drinks, protein drinks, other sweetened drinks, do you drink daily (8 oz=1 glass)? 48 oz   How many ounces of milk do you drink daily (8 oz=1 glass) 8 oz   Please indicate the type of milk: 1%   How often do you drink alcohol? -   If you do drink alcohol, how many drinks might you have in a day? (one drink = 5 oz. wine, 1 can/bottle of beer, 1 shot liquor) -       Eating Habits 12/31/2018   Do you have any dietary restrictions? -   Do you currently binge eat (eat a large amount of food in a short time)? -   Are you an emotional eater? -   Do you get up to eat after falling asleep? -   What foods do you crave? none       Dining Out History Reviewed With Patient 12/31/2018   How often do you dine out? Rarely.   Where do you dine out? (select all that apply) fast food chains   What types of food do you order when you dine out? de       Physical Activity Reviewed With Patient 10/16/2018   How often do you exercise? 1 to 2 times per week   What is the duration of your exercise (in minutes)? 30 Minutes   What types of exercise do you do? walking   What keeps you from being more active?  Pain, My ability to walk or move around is limited, Shortness of breath       NUTRITION DIAGNOSIS:  Obesity r/t long history of self-monitoring deficit and excessive energy intake aeb BMI >30 kg/m2.-Continues    INTERVENTION:  Reviewed previous goals - did not discuss all of them at this time as pt is working on re-establishing care.   Answered pt questions  Coordination of care   Nutrition education   AVS and handouts via [employer]    Questions Reviewed With Patient 10/16/2018   How ready are you to make changes  "regarding your weight? Number 1 = Not ready at all to make changes up to 10 = very ready. 10   How confident are you that you can change? 1 = Not confident that you will be successful making changes up to 10 = very confident. 10       Expected Engagement: fair    GOALS:  Relating To Eating:  Eat slowly (20-30 minutes per meal), chewing foods well (25 chews per bite/applesauce consistency)  9\" Plate method (1/2 plate non-starchy vegetables/fruit, 1/4 plate lean protein, 1/4 plate whole grain starch - no more than 1/2 cup carb/meal)  -Continues making meals at home    Eat 3 meals per day  Avoid snacking    Relating to beverages:  Reduce caffeine/carbonation/calorie containing beverages  Separate fluids from meals by 30 minutes before, during, and after eating   Decreasing energy drinks     Continue to work on smoking cessation.     Initial Consult / Weight Loss    My Plate Planner_English - Pt Education  [email or web address]    Protein Sources for Weight Loss  [email or web address]     Carbohydrates  [email or web address]     Time spent with patient: 33 minutes.  GER Bruce, RD, LD            "

## 2021-08-24 NOTE — Clinical Note
Please see my note when able - no rush. Pt appears to really be struggling. I helped him get set up with me for next month. He still needs appt with you - I sent him the number to call himself if needed.

## 2021-08-24 NOTE — LETTER
"8/24/2021       RE: Ge Hansen  [address] [place] [id number]     Dear Colleague,    Thank you for referring your patient, Ge Hansen, to the [facility] [place] at [facility]. Please see a copy of my visit note below.    Ge Hansen is a 36 year old male who is being evaluated via a billable telephone visit.     The patient has been notified of following:     \"This telephone visit will be conducted via a call between you and your physician/provider. We have found that certain health care needs can be provided without the need for a physical exam.  This service lets us provide the care you need with a short phone conversation.  If a prescription is necessary we can send it directly to your pharmacy.  If lab work is needed we can place an order for that and you can then stop by our lab to have the test done at a later time.    Telephone visits are billed at different rates depending on your insurance coverage. During this emergency period, for some insurers they may be billed the same as an in-person visit.  Please reach out to your insurance provider with any questions.    If during the course of the call the physician/provider feels a telephone visit is not appropriate, you will not be charged for this service.\"    Patient has given verbal consent for Telephone visit?  Yes    How would you like to obtain your AVS? MyChart - does not check regularly per pt.     Phone call duration: 33 minutes    During this virtual visit the patient is located in [place], patient verifies this as the location during the entirety of this visit.       Return Bariatric Nutrition Consultation Note    Reason For Visit: Nutrition Assessment    Ge Hansen is a 36 year old presenting today for follow-up bariatric nutrition consult.   Pt is interested in laparoscopic sleeve gastrectomy with Dr. Manuel expected surgery in [place].  Patient is accompanied " "by self.      This is pt's 1st of 6 required nutrition visits prior to surgery. However, only pt's 1st consecutive appointment.    Pt has seen an RD in 2018 and most recently in 2021 but has not had consecutive appts.     Pt referred by Marcela Ewing NP  on January 26, 2021.    Patient with Co-morbidities of obesity including:  Type II DM no  Renal Failure no  Sleep apnea yes  Hypertension no   Dyslipidemia no  Joint pain no  Back pain no  GERD no     Support System Reviewed With Patient 10/16/2018   Who do you have in your support network that can be available to help you for the first 2 weeks after surgery? Girl friend   Who can you count on for support throughout your weight loss surgery journey? Girl friend       ANTHROPOMETRICS:  Initial weight: 522 lb     Estimated body mass index is 69.93 kg/m  as calculated from the following:    Height as of 1/26/21: 1.854 m (6' 1\").    Weight as of 4/28/21: 240.4 kg (530 lb).     Current weight: pt not sure    Required weight loss goal pre-op: 78 lbs from initial consult weight (goal weight 444 lbs or less before surgery)       12/31/2018   I have tried the following methods to lose weight Watching portions or calories, Exercise, Slimfast       Weight Loss Questions Reviewed With Patient 12/31/2018   How long have you been overweight? Since puberty       SUPPLEMENT INFORMATION:  None    MEDICATIONS FOR WEIGHT LOSS:  None currently - thought he was getting diarrhea from it but still getting even off of it. Does not remember what he took.     Per Chart review, pt was prescribed phentermine and topiramate by his PCP in January    NUTRITION HISTORY:  Per Previous RD note:   Cooking even more at home, only ate out twice over the past month. He is unsure of his weight at this time, but notice another 1/2 inch loser on his belt. Since starting the process he reports decreasing his belt line by 8.5 inches. He understand that his is losing weight and making changes to his body but is " feel discouraged because he cannot see the changes in his body more.       Most meals at home are chicken or steak with potatoes or rice and vegetables.      2021:  Pt has not seen RD since 21 - was having Leti help set up appointments. Was not aware that Leti left clinic and no longer works here. Does not use MyChart and does not know clinic number.     Last saw NP 21 - was recommended 6 week follow up with her. Has not seen her since. Needs help setting up appts.     Smokin-1.5 packs/day. Was down to 1/2 pack, now up.  Was at 3 packs/day a fews month ago.   Aware of need to be tobacco free for 3 months before surgery.     Lots of stress  Started a new job in , turned out to be a temporary job. Now looking for a new one.     Was eating better but hard with current stress.     Getting checked out for IBS on Monday. Also struggling with hemorrhoids.   Pretty bad diarrhea in the morning.  Trying to stay away from bread/pasta - makes it worse.   Gets really gasy with milk. Cheese is only dairy he can really tolerate. Still getting gasy from lactose-free milk per pt.     Having lots of pain. Interested in medical marijuana if it will help - let pt know that I am not able to recommend or prescribe that. Would need to talk with his doctor or we could place a referral for pain clinic. Has been using CBD oil it sounds like.    Water: he thinks he is drinking too much water. Getting about ~2 liters (8 cups) per day, more if hot   Reviewed recommendations    Physical Activity   Limited, torn meniscus per pt.   Hard to walk due to weight and pain.    Got motorcycle - will be tired after riding. Lots of work to balance    Progress Towards Previous Goals:  Did not review today. Working on re-establishing care at this time.   See above for information discussed.     Recall Diet Questions Reviewed With Patient 2018   Describe what you typically consume for breakfast (typical or most recent):  none   Describe what you typically consume for lunch (typical or most recent): sandwhiches   Describe what you typically consume for supper (typical or most recent): veggies stir mcgowan or home cooking   Describe what you typically consume as snacks (typical or most recent): bottle of pop   How many ounces of water, or other low calorie drinks, do you drink daily (8 oz=1 glass)? 8 oz   How many ounces of caffeine (coffee, tea, pop) do you drink daily (8 oz=1 glass)? 48 oz   How many ounces of carbonated (pop, beer, sparkling water) drinks do you drinky daily (8 oz=1 glass)? 48 oz   How many ounces of juice, pop, sweet tea, sports drinks, protein drinks, other sweetened drinks, do you drink daily (8 oz=1 glass)? 48 oz   How many ounces of milk do you drink daily (8 oz=1 glass) 8 oz   Please indicate the type of milk: 1%   How often do you drink alcohol? -   If you do drink alcohol, how many drinks might you have in a day? (one drink = 5 oz. wine, 1 can/bottle of beer, 1 shot liquor) -       Eating Habits 12/31/2018   Do you have any dietary restrictions? -   Do you currently binge eat (eat a large amount of food in a short time)? -   Are you an emotional eater? -   Do you get up to eat after falling asleep? -   What foods do you crave? none       Dining Out History Reviewed With Patient 12/31/2018   How often do you dine out? Rarely.   Where do you dine out? (select all that apply) fast food chains   What types of food do you order when you dine out? de       Physical Activity Reviewed With Patient 10/16/2018   How often do you exercise? 1 to 2 times per week   What is the duration of your exercise (in minutes)? 30 Minutes   What types of exercise do you do? walking   What keeps you from being more active?  Pain, My ability to walk or move around is limited, Shortness of breath       NUTRITION DIAGNOSIS:  Obesity r/t long history of self-monitoring deficit and excessive energy intake aeb BMI >30  "kg/m2.-Continues    INTERVENTION:  Reviewed previous goals - did not discuss all of them at this time as pt is working on re-establishing care.   Answered pt questions  Coordination of care   Nutrition education   AVS and handouts via LinkCloudt    Questions Reviewed With Patient 10/16/2018   How ready are you to make changes regarding your weight? Number 1 = Not ready at all to make changes up to 10 = very ready. 10   How confident are you that you can change? 1 = Not confident that you will be successful making changes up to 10 = very confident. 10       Expected Engagement: fair    GOALS:  Relating To Eating:  Eat slowly (20-30 minutes per meal), chewing foods well (25 chews per bite/applesauce consistency)  9\" Plate method (1/2 plate non-starchy vegetables/fruit, 1/4 plate lean protein, 1/4 plate whole grain starch - no more than 1/2 cup carb/meal)  -Continues making meals at home    Eat 3 meals per day  Avoid snacking    Relating to beverages:  Reduce caffeine/carbonation/calorie containing beverages  Separate fluids from meals by 30 minutes before, during, and after eating   Decreasing energy drinks     Continue to work on smoking cessation.     Initial Consult / Weight Loss    My Plate Planner_English - Pt Education  [email or web address]    Protein Sources for Weight Loss  [email or web address]     Carbohydrates  [email or web address]     Time spent with patient: 33 minutes.  GER Bruce, RD, LD      "

## 2021-08-30 ENCOUNTER — OFFICE VISIT (OUTPATIENT)
Dept: FAMILY MEDICINE | Facility: CLINIC | Age: 36
End: 2021-08-30
Payer: COMMERCIAL

## 2021-08-30 VITALS
BODY MASS INDEX: 41.75 KG/M2 | TEMPERATURE: 98.8 F | RESPIRATION RATE: 18 BRPM | HEIGHT: 73 IN | SYSTOLIC BLOOD PRESSURE: 154 MMHG | WEIGHT: 315 LBS | HEART RATE: 80 BPM | DIASTOLIC BLOOD PRESSURE: 90 MMHG

## 2021-08-30 DIAGNOSIS — Z72.0 TOBACCO ABUSE: ICD-10-CM

## 2021-08-30 DIAGNOSIS — R03.0 ELEVATED BLOOD PRESSURE READING WITHOUT DIAGNOSIS OF HYPERTENSION: ICD-10-CM

## 2021-08-30 DIAGNOSIS — K52.9 CHRONIC DIARRHEA: Primary | ICD-10-CM

## 2021-08-30 LAB
ALBUMIN SERPL-MCNC: 3.2 G/DL (ref 3.4–5)
ALP SERPL-CCNC: 64 U/L (ref 40–150)
ALT SERPL W P-5'-P-CCNC: 103 U/L (ref 0–70)
ANION GAP SERPL CALCULATED.3IONS-SCNC: 3 MMOL/L (ref 3–14)
AST SERPL W P-5'-P-CCNC: 52 U/L (ref 0–45)
BILIRUB SERPL-MCNC: 0.3 MG/DL (ref 0.2–1.3)
BUN SERPL-MCNC: 8 MG/DL (ref 7–30)
CALCIUM SERPL-MCNC: 7.8 MG/DL (ref 8.5–10.1)
CHLORIDE BLD-SCNC: 108 MMOL/L (ref 94–109)
CO2 SERPL-SCNC: 27 MMOL/L (ref 20–32)
CREAT SERPL-MCNC: 0.75 MG/DL (ref 0.66–1.25)
CRP SERPL-MCNC: 22 MG/L (ref 0–8)
ERYTHROCYTE [DISTWIDTH] IN BLOOD BY AUTOMATED COUNT: 13 % (ref 10–15)
GFR SERPL CREATININE-BSD FRML MDRD: >90 ML/MIN/1.73M2
GLUCOSE BLD-MCNC: 134 MG/DL (ref 70–99)
HCT VFR BLD AUTO: 41.2 % (ref 40–53)
HGB BLD-MCNC: 13.7 G/DL (ref 13.3–17.7)
MCH RBC QN AUTO: 30.8 PG (ref 26.5–33)
MCHC RBC AUTO-ENTMCNC: 33.3 G/DL (ref 31.5–36.5)
MCV RBC AUTO: 93 FL (ref 78–100)
PLATELET # BLD AUTO: 191 10E3/UL (ref 150–450)
POTASSIUM BLD-SCNC: 4.2 MMOL/L (ref 3.4–5.3)
PROT SERPL-MCNC: 7.3 G/DL (ref 6.8–8.8)
RBC # BLD AUTO: 4.45 10E6/UL (ref 4.4–5.9)
SODIUM SERPL-SCNC: 138 MMOL/L (ref 133–144)
TSH SERPL DL<=0.005 MIU/L-ACNC: 1.56 MU/L (ref 0.4–4)
WBC # BLD AUTO: 5.9 10E3/UL (ref 4–11)

## 2021-08-30 PROCEDURE — 36415 COLL VENOUS BLD VENIPUNCTURE: CPT | Performed by: FAMILY MEDICINE

## 2021-08-30 PROCEDURE — 83516 IMMUNOASSAY NONANTIBODY: CPT | Performed by: FAMILY MEDICINE

## 2021-08-30 PROCEDURE — 84443 ASSAY THYROID STIM HORMONE: CPT | Performed by: FAMILY MEDICINE

## 2021-08-30 PROCEDURE — 99214 OFFICE O/P EST MOD 30 MIN: CPT | Performed by: FAMILY MEDICINE

## 2021-08-30 PROCEDURE — 86140 C-REACTIVE PROTEIN: CPT | Performed by: FAMILY MEDICINE

## 2021-08-30 PROCEDURE — 85027 COMPLETE CBC AUTOMATED: CPT | Performed by: FAMILY MEDICINE

## 2021-08-30 PROCEDURE — 80053 COMPREHEN METABOLIC PANEL: CPT | Performed by: FAMILY MEDICINE

## 2021-08-30 ASSESSMENT — MIFFLIN-ST. JEOR: SCORE: 3465.98

## 2021-08-30 NOTE — NURSING NOTE
"Chief Complaint   Patient presents with     Diarrhea     BP (!) 154/90 (Cuff Size: Adult Large)   Pulse 80   Temp 98.8  F (37.1  C) (Tympanic)   Resp 18   Ht 1.854 m (6' 1\")   Wt (!) 247.2 kg (545 lb)   BMI 71.90 kg/m   Estimated body mass index is 71.9 kg/m  as calculated from the following:    Height as of this encounter: 1.854 m (6' 1\").    Weight as of this encounter: 247.2 kg (545 lb).  Patient presents to the clinic using No DME      Health Maintenance that is potentially due pending provider review:    Health Maintenance Due   Topic Date Due     ANNUAL REVIEW OF HM ORDERS  Never done     ADVANCE CARE PLANNING  Never done     Pneumococcal Vaccine: Pediatrics (0 to 5 Years) and At-Risk Patients (6 to 64 Years) (1 of 2 - PPSV23) Never done     HEPATITIS C SCREENING  Never done     DTAP/TDAP/TD IMMUNIZATION (1 - Tdap) Never done     PREVENTIVE CARE VISIT  09/13/2019     INFLUENZA VACCINE (1) 09/01/2021     ASTHMA CONTROL TEST  09/11/2021                "

## 2021-08-30 NOTE — LETTER
September 8, 2021      Ge Hansen  [address] [place] [id number]        Dear ,    We are writing to inform you of your test results.    Lab results consistent with elevated liver enzymes, CRP (marker of inflammation) and low calcium.  Other lab results came back unremarkable including normal thyroid function and negative celiac antibody test.     Stool study samples are not submitted.  Please do so at your earliest convenience.     Will recommend to have ultrasound abdomen for further evaluation, order placed.  Please call [phone] to schedule an appointment.     Also follow-up with GI as discussed previously.  Follow-up with me in 4 weeks or earlier if needed.      Resulted Orders   TSH with free T4 reflex   Result Value Ref Range    TSH 1.56 0.40 - 4.00 mU/L   CRP, inflammation   Result Value Ref Range    CRP Inflammation 22.0 (H) 0.0 - 8.0 mg/L   Tissue transglutaminase ezequiel IgA and IgG   Result Value Ref Range    Tissue Transglutaminase Antibody IgA 1.0 <7.0 U/mL      Comment:      Negative- The tTG-IgA assay has limited utility for patients with decreased levels of IgA. Screening for celiac disease should include IgA testing to rule out selective IgA deficiency and to guide selection and interpretation of serological testing. tTG-IgG testing may be positive in celiac disease patients with IgA deficiency.    Tissue Transglutaminase Antibody IgG <0.6 <7.0 U/mL      Comment:      Negative   Comprehensive metabolic panel (BMP + Alb, Alk Phos, ALT, AST, Total. Bili, TP)   Result Value Ref Range    Sodium 138 133 - 144 mmol/L    Potassium 4.2 3.4 - 5.3 mmol/L    Chloride 108 94 - 109 mmol/L    Carbon Dioxide (CO2) 27 20 - 32 mmol/L    Anion Gap 3 3 - 14 mmol/L    Urea Nitrogen 8 7 - 30 mg/dL    Creatinine 0.75 0.66 - 1.25 mg/dL    Calcium 7.8 (L) 8.5 - 10.1 mg/dL    Glucose 134 (H) 70 - 99 mg/dL    Alkaline Phosphatase 64 40 - 150 U/L    AST 52 (H) 0 - 45 U/L     (H) 0 - 70 U/L    Protein  Total 7.3 6.8 - 8.8 g/dL    Albumin 3.2 (L) 3.4 - 5.0 g/dL    Bilirubin Total 0.3 0.2 - 1.3 mg/dL    GFR Estimate >90 >60 mL/min/1.73m2      Comment:      As of July 11, 2021, eGFR is calculated by the CKD-EPI creatinine equation, without race adjustment. eGFR can be influenced by muscle mass, exercise, and diet. The reported eGFR is an estimation only and is only applicable if the renal function is stable.   CBC with platelets   Result Value Ref Range    WBC Count 5.9 4.0 - 11.0 10e3/uL    RBC Count 4.45 4.40 - 5.90 10e6/uL    Hemoglobin 13.7 13.3 - 17.7 g/dL    Hematocrit 41.2 40.0 - 53.0 %    MCV 93 78 - 100 fL    MCH 30.8 26.5 - 33.0 pg    MCHC 33.3 31.5 - 36.5 g/dL    RDW 13.0 10.0 - 15.0 %    Platelet Count 191 150 - 450 10e3/uL       If you have any questions or concerns, please call the clinic at the number listed above.       Sincerely,      Cuong Martines MD

## 2021-08-30 NOTE — PROGRESS NOTES
Assessment & Plan     Chronic diarrhea  Experiencing diarrhea for several months with some bloating, no fever, chills, abdominal pain, constipation, melena, hematochezia or other relevant systemic symptoms.  Physical examination unremarkable.  Differentials discussed in detail.  CBC, CMP, tissue transglutaminase antibody, fecal elastase, CRP, TSH and stool studies ordered for further evaluation.  Recommended to continue on hydration, healthy diet and GI referral placed for further review and recommendations  - Adult Gastro Ref - Consult Only; Future  - CBC with platelets; Future  - Comprehensive metabolic panel (BMP + Alb, Alk Phos, ALT, AST, Total. Bili, TP); Future  - Tissue transglutaminase ezequiel IgA and IgG; Future  - Elastase Fecal; Future  - CRP, inflammation; Future  - TSH with free T4 reflex; Future    Elevated blood pressure reading without diagnosis of hypertension  Blood pressure above target goal of less than 140/90.  Recommended to continue monitoring blood pressure at home regularly.  Healthy lifestyle modifications stressed including regular exercise, balanced diet, weight loss and limiting salt/caffeine/pop intake    Tobacco abuse  Smoking about 1-1/2 pack of cigarettes per day, associated health hazards explained in detail.  Patient would like to try hypnotic therapy for smoking cessation, other modalities has not worked well in the past      Cuong Martines MD  [facility]    Luis Carolina is a 36 year old who presents for the following health issues     HPI     Diarrhea  Onset/Duration: little over 6 months   Description:       Consistency of stool: loose- usually in the mornings        Blood in stool: YES- about a month ago- had a super hard stool that he passed. Was seen at that time.        Number of loose stools past 24 hours: 3-4 times in the mornings   Progression of Symptoms: same  Accompanying signs and symptoms: has cut out dairy and has been cutting out  "bread        Fever: no       Nausea/Vomiting: no       Abdominal pain: YES- tight and uncomfortable- gas pains        Weight loss: no       Episodes of constipation: no  History   Ill contacts: no  Recent use of antibiotics: no  Recent travels: no  Recent medication-new or changes(Rx or OTC): YES- has been trying different weight loss medications. Wants to get gastric surgery soon   Precipitating or alleviating factors: None  Therapies tried and outcome: PeptoBismol- daily- but makes his stool black         Review of Systems   Constitutional, HEENT, cardiovascular, pulmonary, GI, , musculoskeletal, neuro, skin, endocrine and psych systems are negative, except as otherwise noted.       Objective    BP (!) 154/90 (Cuff Size: Adult Large)   Pulse 80   Temp 98.8  F (37.1  C) (Tympanic)   Resp 18   Ht 1.854 m (6' 1\")   Wt (!) 247.2 kg (545 lb)   BMI 71.90 kg/m    Body mass index is 71.9 kg/m .  Physical Exam   GENERAL: alert, no distress and obese  EYES: Eyes grossly normal to inspection, PERRL and conjunctivae and sclerae normal  NECK: no adenopathy, no asymmetry, masses, or scars and thyroid normal to palpation  RESP: lungs clear to auscultation - no rales, rhonchi or wheezes  CV: regular rate and rhythm, normal S1 S2, no S3 or S4, no murmur, click or rub, no peripheral edema and peripheral pulses strong  ABDOMEN: soft, nontender, no hepatosplenomegaly, no masses   MS: no gross musculoskeletal defects noted, no edema  NEURO: Normal strength and tone, mentation intact and speech normal  PSYCH: mentation appears normal, affect normal/bright            "

## 2021-08-31 LAB
TTG IGA SER-ACNC: 1 U/ML
TTG IGG SER-ACNC: <0.6 U/ML

## 2021-09-03 DIAGNOSIS — R94.5 ABNORMAL RESULTS OF LIVER FUNCTION STUDIES: Primary | ICD-10-CM

## 2021-09-12 ENCOUNTER — NURSE TRIAGE (OUTPATIENT)
Dept: NURSING | Facility: CLINIC | Age: 36
End: 2021-09-12

## 2021-09-12 NOTE — TELEPHONE ENCOUNTER
Patient calling reporting he received the second Pfizer vaccine 9/11 and is now has quarter sized swelling to the injection site with redness present as well with overall fatigue and flu-like symptoms. Denies fever, difficulty breathing or swallowing. Home care advice provided per protocol. Call back instructions provided. Patient agreeable to plan.     Reason for Disposition    COVID-19 vaccine, injection site reaction (e.g., pain, redness, swelling), question about    Additional Information    Negative: [1] Difficulty breathing or swallowing AND [2] starts within 2 hours after injection    Negative: Sounds like a life-threatening emergency to the triager    Negative: [1] Has NOT completed COVID-19 vaccine series AND [2] COVID-19 exposure AND [2] AND [3] typical COVID-19 symptoms    Negative: [1] Has NOT completed COVID-19 vaccine series AND [2]  COVID-19 exposure AND [3] no symptoms    Negative: [1] COVID-19 vaccine series completed (fully vaccinated) in past 3 months AND [2] new-onset of COVID-19 symptoms BUT [3] no known exposure    Negative: [1] Typical COVID-19 symptoms AND [2] symptoms that are NOT expected from vaccine (e.g., cough, difficulty breathing, loss of taste or smell, runny nose, sore throat)    Negative: [1] Typical COVID-19 symptoms AND [2] started > 3 days after getting vaccine    Negative: Fever > 104 F (40 C)    Negative: Sounds like a severe, unusual reaction to the triager    Negative: [1] Redness or red streak around the injection site AND [2] started > 48 hours after getting vaccine AND [3] fever    Negative: [1] Fever > 101 F (38.3 C) AND [2] age > 60 years AND [3] started > 48 hours after getting vaccine    Negative: [1] Fever > 100.0 F (37.8 C) AND [2] bedridden (e.g., nursing home patient, CVA, chronic illness, recovering from surgery) AND [3] started > 48 hours after getting vaccine    Negative: [1] Fever > 100.0 F (37.8 C) AND [2] diabetes mellitus or weak immune system (e.g., HIV  positive, cancer chemo, splenectomy, organ transplant, chronic steroids) AND [3] started > 48 hours after getting vaccine    Negative: [1] Redness or red streak around the injection site AND [2] started > 48 hours after getting vaccine AND [3] no fever  (Exception: red area < 1 inch or 2.5 cm wide)    Negative: [1] Pain, tenderness, or swelling at the injection site AND [2] over 3 days (72 hours) since vaccine AND [3] getting worse    Negative: Fever > 100.0 F (37.8 C) present > 3 days (72 hours)    Negative: [1] Fever > 100.0 F (37.8 C) AND [2] healthcare worker    Protocols used: CORONAVIRUS (COVID-19) VACCINE QUESTIONS AND [id number]-RB 3.25    COVID 19 Nurse Triage Plan/Patient Instructions    Please be aware that novel coronavirus (COVID-19) may be circulating in the community. If you develop symptoms such as fever, cough, or SOB or if you have concerns about the presence of another infection including coronavirus (COVID-19), please contact your health care provider or visit [email or web address].     Disposition/Instructions    Home care recommended. Follow home care protocol based instructions.    Thank you for taking steps to prevent the spread of this virus.  o Limit your contact with others.  o Wear a simple mask to cover your cough.  o Wash your hands well and often.    Resources    [facility]: About COVID-19: [email or web address]    CDC: What to Do If You're Sick: www.cdc.gov/coronavirus/2019-ncov/about/steps-when-sick.html    CDC: Ending Home Isolation: www.cdc.gov/coronavirus/2019-ncov/hcp/disposition-in-home-patients.html     CDC: Caring for Someone: www.cdc.gov/coronavirus/2019-ncov/if-you-are-sick/care-for-someone.html     [facility]: Interim Guidance for Hospital Discharge to Home: www.health.[email or web address].[place]/diseases/coronavirus/hcp/hospdischarge.pdf    [facility] clinical trials (COVID-19 research studies): [email or web address].[facility]-clinical-trials     Below are the  COVID-19 hotlines at the [place] Department of [facility] ([facility]). Interpreters are available.   o For health questions: Call 304-239-6246 or 1-799.980.2132 (7 a.m. to 7 p.m.)  o For questions about schools and childcare: Call 061-858-2408 or 1-236.301.3417 (7 a.m. to 7 p.m.)

## 2021-09-19 ENCOUNTER — HEALTH MAINTENANCE LETTER (OUTPATIENT)
Age: 36
End: 2021-09-19

## 2021-09-22 ENCOUNTER — TELEPHONE (OUTPATIENT)
Dept: ENDOCRINOLOGY | Facility: CLINIC | Age: 36
End: 2021-09-22

## 2021-09-22 NOTE — TELEPHONE ENCOUNTER
LVM and sent mychart, that 9/23/21 appt with Marcela Ewing is cancelled due to provider unavailable. R/s to next available

## 2021-10-22 ENCOUNTER — HOSPITAL ENCOUNTER (EMERGENCY)
Facility: CLINIC | Age: 36
Discharge: HOME | End: 2021-10-22
Attending: NURSE PRACTITIONER | Admitting: NURSE PRACTITIONER
Payer: COMMERCIAL

## 2021-10-22 VITALS
BODY MASS INDEX: 71.9 KG/M2 | RESPIRATION RATE: 18 BRPM | DIASTOLIC BLOOD PRESSURE: 94 MMHG | OXYGEN SATURATION: 97 % | SYSTOLIC BLOOD PRESSURE: 179 MMHG | TEMPERATURE: 98.9 F | WEIGHT: 315 LBS

## 2021-10-22 DIAGNOSIS — J45.901 ASTHMA EXACERBATION: ICD-10-CM

## 2021-10-22 DIAGNOSIS — H65.91 MIDDLE EAR EFFUSION, RIGHT: ICD-10-CM

## 2021-10-22 DIAGNOSIS — J01.10 ACUTE NON-RECURRENT FRONTAL SINUSITIS: ICD-10-CM

## 2021-10-22 PROCEDURE — 99282 EMERGENCY DEPT VISIT SF MDM: CPT

## 2021-10-22 PROCEDURE — 99282 EMERGENCY DEPT VISIT SF MDM: CPT | Performed by: PHYSICIAN ASSISTANT

## 2021-10-22 RX ORDER — PREDNISONE 20 MG/1
TABLET ORAL
Qty: 10 TABLET | Refills: 0 | Status: SHIPPED | OUTPATIENT
Start: 2021-10-22 | End: 2022-04-12

## 2021-10-22 ASSESSMENT — ENCOUNTER SYMPTOMS
CARDIOVASCULAR NEGATIVE: 1
TROUBLE SWALLOWING: 0
SHORTNESS OF BREATH: 0
RHINORRHEA: 1
FACIAL SWELLING: 0
SINUS PRESSURE: 1
UNEXPECTED WEIGHT CHANGE: 0
SINUS PAIN: 1
WHEEZING: 0
FEVER: 1
DIAPHORESIS: 0
GASTROINTESTINAL NEGATIVE: 1
FATIGUE: 0
APNEA: 0
STRIDOR: 0
CHILLS: 0
CHOKING: 0
VOICE CHANGE: 0
SORE THROAT: 0
APPETITE CHANGE: 0
ACTIVITY CHANGE: 0
COUGH: 1
CHEST TIGHTNESS: 0

## 2021-10-22 NOTE — Clinical Note
Ge Hansen was seen and treated in our emergency department on 10/22/2021.  He may return to work on 10/28/2021.  Ge was seen and evaluated in my clinic today.  Due to medical reasons I recommend that he remain out of work until symptoms resolve (no use of pain, fever, or other OTC medications for acute symptom relief) (for a negative COVID-19 test).     If you have any questions or concerns, please don't hesitate to call.      Jabier Espana PA-C

## 2021-10-22 NOTE — Clinical Note
Ge Tyrone was seen and treated in our emergency department on 10/22/2021.  He may return to work on 10/24/2021.       If you have any questions or concerns, please don't hesitate to call.      Jabier Espana PA-C

## 2021-10-22 NOTE — Clinical Note
Ge Hansen was seen and treated in our emergency department on 10/22/2021.  He may return to work on 10/28/2021.  eG was seen and evaluated in my clinic today.  Due to medical reasons I recommend that he remain out of work until symptoms resolve (no use of pain, fever, or other OTC medications for acute symptom relief) (for a negative COVID-19 test).     If you have any questions or concerns, please don't hesitate to call.      Jabier Espana PA-C

## 2021-10-23 NOTE — ED TRIAGE NOTES
One week of nasal congestion not relieved by OTC sinus meds. Unknown name, gets at the gas station. There is now a cough. Reports productive with yellow/green phlegm. The sinus pain across his face goes into the left ear. No further complaints.

## 2021-10-23 NOTE — DISCHARGE INSTRUCTIONS
Symptomatic cares discussed including: pushing fluids, rest, OTC cold medication such as Afrin x 3 days and flonase. Use a humidifier or run a hot shower to create steam 3-4 times a day for [id number] minutes at a time. Apply a warm, moist washcloth to your face 3-4 times a day. May use a Neti pot 3X per day. Follow up with PCP if no improvement in 1 week. Seek urgent medical evaluation if there are new or worsening symptoms such as fever of 104 degrees F or greater, chest tightness, wheezing, facial pressure, pain/redness/tenderness/swelling around the eyes or ears, headaches, trouble breathing, or trouble swallowing  Pt/guardian verbalized understanding and agrees with the treatment plan.      Recommend jaw maneuvers, and warm compresses (warm pack heated to body temperature, warm wash cloth), humidified air for ear pain/congestion.

## 2021-10-23 NOTE — ED PROVIDER NOTES
History     Chief Complaint   Patient presents with     Sinusitis     nasal congestion, non prod cough     Otalgia     right ear pain,     HPI  Ge Hansen is a 36 year old male with a history of MAYDA, asthma, and IBS who presents with complaints of sinus congestion and ear pain which began 10 days ago.  Associated symptoms include cough productive of phlegm, fevers, sore throat, sinus pressure, nasal congestion, left ear pain.. He has not measured the fever but feels the fever is well controlled with ibuprofen and Tylenol.  The patient has been taking flonase and OTC decongestant with no relief of sinus congestion and ear pain.  He has been using his rescue inhaler 4-5 times a day recently.  No concerns for swallowing, chest pain, shortness of breath, abdominal pain, joint pain or rashes, headaches, acute vision changes, fever or chills, nausea or vomiting, constipation or diarrhea, or leg pain/swelling. Normal bowel and bladder function. Normal food and fluid intake. Immunizations are up to date.  The patient has been fully vaccinated for COVID-19.      Allergies:  No Known Allergies    Problem List:    Patient Active Problem List    Diagnosis Date Noted     Class 3 drug-induced obesity with serious comorbidity and body mass index (BMI) greater than or equal to 70 in adult (H) 01/26/2021     Priority: Medium     Starting bariatric surgery process. Needs to lose to 444lb day of surgery        Insomnia, unspecified type 01/26/2021     Priority: Medium     Plantar fasciitis 01/15/2021     Priority: Medium     Morbid obesity (H) BMI 68.34 09/13/2018     Priority: Medium     Elevated glucose 09/13/2018     Priority: Medium     Obesity hypoventilation syndrome (H) 09/13/2018     Priority: Medium     Some asthma hx too.         Tobacco use 09/13/2018     Priority: Medium     MAYDA (obstructive sleep apnea) 09/13/2018     Priority: Medium     Study Date: 10/3/2018( 520.0 lbs).  The combined apnea/hypopnea index was 134.8  events per hour (central apnea/hypopnea index was 4.1 events per hour). The REM AHI was - events per hour. The supine AHI was 128.9 events per hour. The RERA index was - events per hour. The RDI was 134.8 events per hour. Transcutaneous carbon dioxide monitoring was used, and significant hypoventilation was present with a maximum change from ~11 mmHg and 46.0 minutes at or greater than 55 mmHg. VBG was consistent with chronic compensated respiratory acidosis (pH 7.36, pCO2 62, HCO3 35). Baseline oxygen saturation was 89.4%. Lowest oxygen saturation was 52.0%. Time spent less than or equal to 88% was 59.5 minutes. Time spent less than or equal to 89% was 65.5 minutes. The final pressure was BiLevel 23/13/0 cmH2O with an AHI of 1.7 events per hour. Time in REM supine on final pressure was 49.0 minutes.          Elevated blood pressure reading without diagnosis of hypertension 09/13/2018     Priority: Medium     Asthma 06/01/2006     Priority: Medium        Past Medical History:    Past Medical History:   Diagnosis Date     ADHD      Asthma      Lactose intolerance      Obesity hypoventilation syndrome (H)      Sleep apnea      Tobacco use        Past Surgical History:    Past Surgical History:   Procedure Laterality Date     ROTATOR CUFF REPAIR RT/LT         Family History:    Family History   Problem Relation Age of Onset     Obesity Sister      Depression Sister      Anxiety Disorder Sister      Mental Illness Sister      Diabetes Father         Lost a leg     Hypertension Father      Hypertension Mother      Mental Illness Mother      Hypertension Maternal Grandmother      Cerebrovascular Disease Maternal Grandmother      Osteoporosis Sister        Social History:  Marital Status:  Single [1]  Social History     Tobacco Use     Smoking status: Current Every Day Smoker     Packs/day: 1.00     Years: 10.00     Pack years: 10.00     Types: Cigarettes     Start date: 7/7/1997     Smokeless tobacco: Current User      Types: Chew, Snuff     Tobacco comment: Vaping    Substance Use Topics     Alcohol use: Yes     Drug use: No        Medications:    amoxicillin-clavulanate (AUGMENTIN) 875-125 MG tablet  predniSONE (DELTASONE) 20 MG tablet  albuterol (PROAIR HFA/PROVENTIL HFA/VENTOLIN HFA) 108 (90 Base) MCG/ACT inhaler  budesonide-formoterol (SYMBICORT) 160-4.5 MCG/ACT Inhaler  buPROPion (WELLBUTRIN SR) 150 MG 12 hr tablet  cyclobenzaprine (FLEXERIL) 10 MG tablet  fluticasone (FLONASE) 50 MCG/ACT nasal spray  montelukast (SINGULAIR) 10 MG tablet  traZODone (DESYREL) 50 MG tablet          Review of Systems   Constitutional: Positive for fever. Negative for activity change, appetite change, chills, diaphoresis, fatigue and unexpected weight change.   HENT: Positive for congestion, ear pain, postnasal drip, rhinorrhea, sinus pressure and sinus pain. Negative for dental problem, drooling, ear discharge, facial swelling, hearing loss, mouth sores, nosebleeds, sneezing, sore throat, tinnitus, trouble swallowing and voice change.    Respiratory: Positive for cough. Negative for apnea, choking, chest tightness, shortness of breath, wheezing and stridor.    Cardiovascular: Negative.    Gastrointestinal: Negative.    Genitourinary: Negative.        Physical Exam   BP: (!) 179/94  Temp: 98.9  F (37.2  C)  Resp: 18  Weight: (!) 247.2 kg (545 lb)  SpO2: 97 %      Physical Exam  Constitutional:       General: He is not in acute distress.     Appearance: Normal appearance. He is not ill-appearing, toxic-appearing or diaphoretic.   HENT:      Head: Normocephalic and atraumatic.      Right Ear: Ear canal and external ear normal. No drainage or swelling. A middle ear effusion is present. There is no impacted cerumen. No foreign body. No mastoid tenderness. Tympanic membrane is not injected, perforated, erythematous, retracted or bulging.      Left Ear: Tympanic membrane, ear canal and external ear normal. No drainage or swelling.  No middle ear  effusion. There is no impacted cerumen. No foreign body. No mastoid tenderness. Tympanic membrane is not injected, perforated, erythematous, retracted or bulging.      Nose: Mucosal edema, congestion and rhinorrhea present. Rhinorrhea is purulent.      Right Turbinates: Enlarged.      Left Turbinates: Not enlarged.      Right Sinus: Frontal sinus tenderness present. No maxillary sinus tenderness.      Left Sinus: Frontal sinus tenderness present. No maxillary sinus tenderness.      Mouth/Throat:      Mouth: Mucous membranes are moist.      Pharynx: Oropharynx is clear. Posterior oropharyngeal erythema present. No oropharyngeal exudate.   Eyes:      General:         Right eye: No discharge.         Left eye: No discharge.      Extraocular Movements: Extraocular movements intact.      Conjunctiva/sclera: Conjunctivae normal.   Cardiovascular:      Rate and Rhythm: Normal rate and regular rhythm.      Pulses: Normal pulses.      Heart sounds: Normal heart sounds. No murmur heard.     Pulmonary:      Effort: Pulmonary effort is normal. No respiratory distress.      Breath sounds: Normal breath sounds. No stridor. No wheezing, rhonchi or rales.   Chest:      Chest wall: No tenderness.   Abdominal:      General: Abdomen is flat. Bowel sounds are normal.      Palpations: Abdomen is soft.   Musculoskeletal:         General: No swelling or tenderness. Normal range of motion.      Cervical back: Normal range of motion and neck supple. No rigidity or tenderness. No muscular tenderness.   Lymphadenopathy:      Cervical: No cervical adenopathy.   Skin:     General: Skin is warm and dry.      Findings: No erythema or rash.   Neurological:      General: No focal deficit present.      Mental Status: He is alert and oriented to person, place, and time.      Sensory: No sensory deficit.      Motor: No weakness.      Coordination: Coordination normal.      Gait: Gait normal.   Psychiatric:         Mood and Affect: Mood normal.          Behavior: Behavior normal.         Thought Content: Thought content normal.         Judgment: Judgment normal.         ED Course        Procedures                No results found for this or any previous visit (from the past 24 hour(s)).    Medications - No data to display    Assessments & Plan (with Medical Decision Making)   The patient is a 36 year old male with a history of MAYDA and IBS who presents with complaints of sinus congestion and ear pain which began 10 days ago.  Associated symptoms include cough productive of phlegm, fevers, sore throat, sinus pressure, nasal congestion, left ear pain.    History and physical exam is consistent with acute bacterial rhinosinusitis.  I am also concerned about asthma exacerbation today given the patient has coarse lung sounds and increased use of his albuterol inhaler.    Symptomatic cares discussed including: pushing fluids, rest, OTC cold medication such as Afrin x 3 days and flonase. Use a humidifier or run a hot shower to create steam 3-4 times a day for [id number] minutes at a time. Apply a warm, moist washcloth to your face 3-4 times a day. May use a Neti pot 3X per day. Follow up with PCP if no improvement in 1 week. Seek urgent medical evaluation if there are new or worsening symptoms such as fever of 104 degrees F or greater, chest tightness, wheezing, facial pressure, pain/redness/tenderness/swelling around the eyes or ears, headaches, trouble breathing, or trouble swallowing  Pt/guardian verbalized understanding and agrees with the treatment plan.      Recommend jaw maneuvers, and warm compresses (warm pack heated to body temperature, warm wash cloth), humidified air for ear pain/congestion.    I am starting the patient on a 5-day course of prednisone with concern for acute asthma exacerbation.  I am treating the patient with a 7-day course of Augmentin as well which will cover for sinus infection as well as the possibility of community-acquired pneumonia.   I feel community-acquired pneumonia is less likely given the patient does not have a fever today, has a mild cough, has no shortness of breath.  Chest x-ray is not needed today since the patient is taking Augmentin, which would cover for community-acquired pneumonia.    Recommend testing for COVID-19 given the patient has sinus congestion. The patient declined testing for COVID-19. If the COVID test is negative, recommend remaining out of work until 24 hours after symptoms resolve (no use of pain, fever, or other OTC medications for acute symptom relief). If the COVID test is positive, remain out of work for 10 days since symptom onset and for 24 additional hours after your fever and all other symptoms completely resolve (no use of pain, fever, or other OTC medications for acute symptom relief). If symptoms persist or worsen over the next few days, I recommend a follow up test    Return to clinic for further evaluation if you develop fever of 104 degrees F or greater, chest pain, difficulty breathing, shortness of breath, difficulty swallowing, severe headache, numbness or tingling, dizziness or lightheadedness, acute vision changes, acutely worsening rash, or severe abdominal pain.    I have reviewed the nursing notes.    I have reviewed the findings, diagnosis, plan and need for follow up with the patient.    Discharge Medication List as of 10/22/2021  8:15 PM      START taking these medications    Details   amoxicillin-clavulanate (AUGMENTIN) 875-125 MG tablet Take 1 tablet by mouth 2 times daily for 7 days, Disp-14 tablet, R-0, E-Prescribe      predniSONE (DELTASONE) 20 MG tablet Take two tablets (= 40mg) each day for 5 (five) days, Disp-10 tablet, R-0, E-Prescribe             Final diagnoses:   Acute non-recurrent frontal sinusitis   Middle ear effusion, right   Asthma exacerbation       10/22/2021   [facility] EMERGENCY DEPT     Jabier Espana PA-C  10/22/21 2032       Jabier Espana  GAY [place]  10/22/21 2032

## 2022-01-09 ENCOUNTER — HEALTH MAINTENANCE LETTER (OUTPATIENT)
Age: 37
End: 2022-01-09

## 2022-04-06 ENCOUNTER — ANCILLARY PROCEDURE (OUTPATIENT)
Dept: GENERAL RADIOLOGY | Facility: CLINIC | Age: 37
End: 2022-04-06
Attending: NURSE PRACTITIONER
Payer: COMMERCIAL

## 2022-04-06 ENCOUNTER — OFFICE VISIT (OUTPATIENT)
Dept: FAMILY MEDICINE | Facility: CLINIC | Age: 37
End: 2022-04-06
Payer: COMMERCIAL

## 2022-04-06 VITALS
RESPIRATION RATE: 24 BRPM | BODY MASS INDEX: 41.75 KG/M2 | WEIGHT: 315 LBS | SYSTOLIC BLOOD PRESSURE: 137 MMHG | HEART RATE: 98 BPM | HEIGHT: 73 IN | DIASTOLIC BLOOD PRESSURE: 89 MMHG | OXYGEN SATURATION: 95 % | TEMPERATURE: 99.1 F

## 2022-04-06 DIAGNOSIS — M25.511 ACUTE PAIN OF RIGHT SHOULDER: ICD-10-CM

## 2022-04-06 DIAGNOSIS — Z01.00 EXAMINATION OF EYES AND VISION: ICD-10-CM

## 2022-04-06 DIAGNOSIS — M79.672 BILATERAL FOOT PAIN: ICD-10-CM

## 2022-04-06 DIAGNOSIS — E66.1 CLASS 3 DRUG-INDUCED OBESITY WITH SERIOUS COMORBIDITY AND BODY MASS INDEX (BMI) GREATER THAN OR EQUAL TO 70 IN ADULT (H): Primary | ICD-10-CM

## 2022-04-06 DIAGNOSIS — G47.00 INSOMNIA, UNSPECIFIED TYPE: ICD-10-CM

## 2022-04-06 DIAGNOSIS — M79.671 BILATERAL FOOT PAIN: ICD-10-CM

## 2022-04-06 DIAGNOSIS — E66.813 CLASS 3 DRUG-INDUCED OBESITY WITH SERIOUS COMORBIDITY AND BODY MASS INDEX (BMI) GREATER THAN OR EQUAL TO 70 IN ADULT (H): Primary | ICD-10-CM

## 2022-04-06 DIAGNOSIS — B35.1 ONYCHOMYCOSIS: ICD-10-CM

## 2022-04-06 LAB
ALBUMIN SERPL-MCNC: 3.4 G/DL (ref 3.4–5)
ALP SERPL-CCNC: 81 U/L (ref 40–150)
ALT SERPL W P-5'-P-CCNC: 111 U/L (ref 0–70)
ANION GAP SERPL CALCULATED.3IONS-SCNC: 5 MMOL/L (ref 3–14)
AST SERPL W P-5'-P-CCNC: 55 U/L (ref 0–45)
BILIRUB SERPL-MCNC: 0.4 MG/DL (ref 0.2–1.3)
BUN SERPL-MCNC: 10 MG/DL (ref 7–30)
CALCIUM SERPL-MCNC: 9.2 MG/DL (ref 8.5–10.1)
CHLORIDE BLD-SCNC: 108 MMOL/L (ref 94–109)
CO2 SERPL-SCNC: 28 MMOL/L (ref 20–32)
CREAT SERPL-MCNC: 0.71 MG/DL (ref 0.66–1.25)
GFR SERPL CREATININE-BSD FRML MDRD: >90 ML/MIN/1.73M2
GLUCOSE BLD-MCNC: 83 MG/DL (ref 70–99)
HBA1C MFR BLD: 5.3 % (ref 0–5.6)
POTASSIUM BLD-SCNC: 4.6 MMOL/L (ref 3.4–5.3)
PROT SERPL-MCNC: 7.8 G/DL (ref 6.8–8.8)
SODIUM SERPL-SCNC: 141 MMOL/L (ref 133–144)

## 2022-04-06 PROCEDURE — 73630 X-RAY EXAM OF FOOT: CPT | Mod: LT | Performed by: RADIOLOGY

## 2022-04-06 PROCEDURE — 80053 COMPREHEN METABOLIC PANEL: CPT | Performed by: NURSE PRACTITIONER

## 2022-04-06 PROCEDURE — 83036 HEMOGLOBIN GLYCOSYLATED A1C: CPT | Performed by: NURSE PRACTITIONER

## 2022-04-06 PROCEDURE — 36415 COLL VENOUS BLD VENIPUNCTURE: CPT | Performed by: NURSE PRACTITIONER

## 2022-04-06 PROCEDURE — 99214 OFFICE O/P EST MOD 30 MIN: CPT | Performed by: NURSE PRACTITIONER

## 2022-04-06 RX ORDER — PHENTERMINE HYDROCHLORIDE 15 MG/1
15 CAPSULE ORAL EVERY MORNING
Qty: 30 CAPSULE | Refills: 0 | Status: SHIPPED | OUTPATIENT
Start: 2022-04-06 | End: 2022-11-16

## 2022-04-06 RX ORDER — ZOLPIDEM TARTRATE 5 MG/1
5 TABLET ORAL
Qty: 30 TABLET | Refills: 0 | Status: SHIPPED | OUTPATIENT
Start: 2022-04-06 | End: 2022-11-16

## 2022-04-06 ASSESSMENT — PAIN SCALES - GENERAL: PAINLEVEL: SEVERE PAIN (7)

## 2022-04-06 ASSESSMENT — ASTHMA QUESTIONNAIRES: ACT_TOTALSCORE: 20

## 2022-04-06 NOTE — PROGRESS NOTES
Assessment & Plan     Class 3 drug-induced obesity with serious comorbidity and body mass index (BMI) greater than or equal to 70 in adult (H)  Chronic, uncontrolled.  He is back up in late again.  Has gone through weight management x2, however has had setbacks due to missing appointments.  Knows he needs to lose weight and would really like to.  Has trialed phentermine and topiramate in the past, however had side effects, unsure from which medication.  Discussed at great length getting back in weight management, however patient is willing to trial phentermine again.  Will try phentermine first with follow-up in clinic in 1 month for weight check.  We will also get labs today notify patient results and any further recommendations.  - Comprehensive metabolic panel (BMP + Alb, Alk Phos, ALT, AST, Total. Bili, TP); Future  - Hemoglobin A1c; Future  - phentermine (ADIPEX-P) 15 MG capsule; Take 1 capsule (15 mg) by mouth every morning  - Comprehensive metabolic panel (BMP + Alb, Alk Phos, ALT, AST, Total. Bili, TP)  - Hemoglobin A1c    Bilateral foot pain  Has had significant bilateral foot discomfort due to peeling and cracking of skin.  Patient notes bleeding at times.  Worse when wearing socks and tennis shoes has feels his feet sweat more.  Has tried some lotions and soaking feet without any improvement.  Does note feet are better when have a couple days off and just wears his crocs.  Also having mild pain of left great toe with some mild bruising.  Some excess peeling skin was removed in office today, recommend x-ray of left foot to evaluate total and recommend patient soak feet once daily in warm Epson salt and apply Aquaphor emollient generously to bilateral feet.  Wrote letter to bertin for socks and crocs until feet are improving and also advised patient would like him to follow-up with podiatry for further evaluation and management.  - XR Foot Left G/E 3 Views; Future  - Orthopedic  Referral;  "Future    Onychomycosis  Significant toenail fungus on bilateral feet.  Advised patient to start over-the-counter treatment ProXclears initially and if not improving would consider oral antifungal.  Can follow-up with podiatry as well.    Acute pain of right shoulder  Acute pain of right shoulder, x1 week.  Notes full range of motion, pain feels like nerve pain from front of the shoulder to approximately mid forearm.  No history of trauma or injury, does sleep on the side at night.  Recommend x-ray of shoulder to further evaluate and start Voltaren gel up to 4 times daily as needed along with ice and/or heat and range of motion exercises.  If symptoms are not improving or worsening in the next 1 to 2 weeks, would recommend physical therapy.  Patient to update provider if not improving.  - diclofenac (VOLTAREN) 1 % topical gel; Apply 4 g topically 4 times daily as needed for moderate pain  - XR Shoulder Right 2 Views; Future    Insomnia, unspecified type  Significant difficulty sleeping, has tried over-the-counter remedies which have not been helpful.  Trazodone not helpful.  Will trial low-dose Ambien and will follow up in 1 month as above.  - zolpidem (AMBIEN) 5 MG tablet; Take 1 tablet (5 mg) by mouth nightly as needed for sleep    Examination of eyes and vision  Due for routine eye exam, referral placed.    - Adult Eye Referral; Future                 Tobacco Cessation:   reports that he has been smoking cigarettes. He started smoking about 24 years ago. He has a 10.00 pack-year smoking history. His smokeless tobacco use includes chew and snuff.  Tobacco Cessation Action Plan: Information offered: Patient not interested at this time    BMI:   Estimated body mass index is 72.63 kg/m  as calculated from the following:    Height as of this encounter: 1.854 m (6' 1\").    Weight as of this encounter: 249.7 kg (550 lb 8 oz).   Weight management plan: Refer to above    See Patient Instructions    No follow-ups on " file.    Kat Wiggins, DNP, APRN-CNP   [facility]    Subjective     Ge Hansen is a 36 year old male who presents today for the following   health issues:    HPI    Derm problem      Duration: 2 months    Description (location/character/radiation): feet are cracking and bleeding    Intensity:  moderate    Accompanying signs and symptoms: pain    History (similar episodes/previous evaluation): None    Precipitating or alleviating factors: None    Therapies tried and outcome: tried hand lotion, tried epsom salt soaks and tried athlete foot spray, nothing has helped.      Bleeding a lot when they do   Normally wears socks and shoes   On feet many hours a day  If wears crocs feet feel better - not as hot/sweaty, more pungent smelling   Does improve when wears crocs for a while - like on his 2 days off   Has significant toenail fungus     Corn on right foot - pared down in office at one time. Used corn pads - have came back in the last month       Arm pain      Duration: 1 week    Description (location/character/radiation): right arm pain shoulder to hand    Intensity:  moderate    Accompanying signs and symptoms: 0    History (similar episodes/previous evaluation): None    Precipitating or alleviating factors: None    Therapies tried and outcome: None     From front of shoulder to mid forearm   Feels like Nerve type pain   Full range of motion   Always sleeps on right side  No trauma or injury     Needs to lose weight  Has been through weight loss management x2 and has had setbacks due to clinician cancellation and/or changes  Has trialed phentermine topiramate in the past, however remembers having side effects from both.      Difficulty sleeping   Has tried over-the-counter remedies to no avail    Review of Systems    Constitutional, HEENT, cardiovascular, pulmonary, gi and gu systems are negative, except as otherwise noted.    Objective   /89   Pulse 98   Temp 99.1  F (37.3  C)   " Resp 24   Ht 1.854 m (6' 1\")   Wt (!) 249.7 kg (550 lb 8 oz)   SpO2 95%   BMI 72.63 kg/m    Body mass index is 72.63 kg/m .    Physical Exam  GENERAL APPEARANCE: healthy, alert and no distress  RESP: lungs clear to auscultation - no rales, rhonchi or wheezes  CV: regular rates and rhythm, normal S1 S2, no S3 or S4 and no murmur, click or rub  ABDOMEN: soft, nontender, without hepatosplenomegaly or masses, bowel sounds normal and obese  MS: extremities normal- no gross deformities noted and peripheral pulses normal, normal range of motion of right shoulder with good strength, normal  strength  SKIN: significant scaling and peeling of skin on bilateral feet with areas of erythema without signs and symptoms of infection, mild bruising on top and between left great toe and 2nd toe,  nail discoloration and subungual keratosis of toenails on bilateral feet   NEURO: Normal strength and tone, mentation intact and speech normal  PSYCH: mentation appears normal and affect normal/bright    Diagnostic Test Results:  Results for orders placed or performed in visit on 04/06/22   XR Foot Left G/E 3 Views     Status: None    Narrative    XR FOOT LEFT G/E 3 VIEWS 4/6/2022 4:16 PM     HISTORY: Bilateral foot pain; Bilateral foot pain    COMPARISON: None.      Impression    IMPRESSION: No fractures are evident. Hypertrophic changes in the  talonavicular joint. No joint space narrowing. Plantar and Achilles  calcaneal spurs.     KINGSLEY FUENTES MD         SYSTEM ID:  [id number]   Results for orders placed or performed in visit on 04/06/22   Comprehensive metabolic panel (BMP + Alb, Alk Phos, ALT, AST, Total. Bili, TP)     Status: Abnormal   Result Value Ref Range    Sodium 141 133 - 144 mmol/L    Potassium 4.6 3.4 - 5.3 mmol/L    Chloride 108 94 - 109 mmol/L    Carbon Dioxide (CO2) 28 20 - 32 mmol/L    Anion Gap 5 3 - 14 mmol/L    Urea Nitrogen 10 7 - 30 mg/dL    Creatinine 0.71 0.66 - 1.25 mg/dL    Calcium 9.2 8.5 - 10.1 " mg/dL    Glucose 83 70 - 99 mg/dL    Alkaline Phosphatase 81 40 - 150 U/L    AST 55 (H) 0 - 45 U/L     (H) 0 - 70 U/L    Protein Total 7.8 6.8 - 8.8 g/dL    Albumin 3.4 3.4 - 5.0 g/dL    Bilirubin Total 0.4 0.2 - 1.3 mg/dL    GFR Estimate >90 >60 mL/min/1.73m2   Hemoglobin A1c     Status: Normal   Result Value Ref Range    Hemoglobin A1C 5.3 0.0 - 5.6 %        Foot and shoulder xray - pending     Chart documentation with Dragon Voice recognition Software. Although reviewed after completion, some words and grammatical errors may remain.

## 2022-04-06 NOTE — LETTER
[facility]  [address] [place] [id number]  [phone]          April 6, 2022    Ge WATERMAN Tyrone                                                                                                                     [address] [place] [id number]        To Whom it May Concern,    Ge was seen in clinic today and is advised patient wear socks with crocs for work until further notice.         Sincerely,     Kat Parnell, MARY, APRN-CNP

## 2022-04-12 ENCOUNTER — ANCILLARY PROCEDURE (OUTPATIENT)
Dept: GENERAL RADIOLOGY | Facility: CLINIC | Age: 37
End: 2022-04-12
Attending: NURSE PRACTITIONER
Payer: COMMERCIAL

## 2022-04-12 DIAGNOSIS — M25.511 ACUTE PAIN OF RIGHT SHOULDER: ICD-10-CM

## 2022-04-12 PROCEDURE — 73030 X-RAY EXAM OF SHOULDER: CPT | Mod: RT | Performed by: RADIOLOGY

## 2022-04-12 NOTE — PATIENT INSTRUCTIONS
Class 3 drug-induced obesity with serious comorbidity and body mass index (BMI) greater than or equal to 70 in adult (H)  Chronic, uncontrolled.  He is back up in late again.  Has gone through weight management x2, however has had setbacks due to missing appointments.  Knows he needs to lose weight and would really like to.  Has trialed phentermine and topiramate in the past, however had side effects, unsure from which medication.  Discussed at great length getting back in weight management, however patient is willing to trial phentermine again.  Will try phentermine first with follow-up in clinic in 1 month for weight check.  We will also get labs today notify patient results and any further recommendations.  - Comprehensive metabolic panel (BMP + Alb, Alk Phos, ALT, AST, Total. Bili, TP); Future  - Hemoglobin A1c; Future  - phentermine (ADIPEX-P) 15 MG capsule; Take 1 capsule (15 mg) by mouth every morning  - Comprehensive metabolic panel (BMP + Alb, Alk Phos, ALT, AST, Total. Bili, TP)  - Hemoglobin A1c    Bilateral foot pain  Has had significant bilateral foot discomfort due to peeling and cracking of skin.  Patient notes bleeding at times.  Worse when wearing socks and tennis shoes has feels his feet sweat more.  Has tried some lotions and soaking feet without any improvement.  Does note feet are better when have a couple days off and just wears his crocs.  Also having mild pain of left great toe with some mild bruising.  Some excess peeling skin was removed in office today, recommend x-ray of left foot to evaluate total and recommend patient soak feet once daily in warm Epson salt and apply Aquaphor emollient generously to bilateral feet.  Wrote letter to bertin for socks and crocs until feet are improving and also advised patient would like him to follow-up with podiatry for further evaluation and management.  - XR Foot Left G/E 3 Views; Future  - Orthopedic  Referral;  Future    Onychomycosis  Significant toenail fungus on bilateral feet.  Advised patient to start over-the-counter treatment ProXclears initially and if not improving would consider oral antifungal.  Can follow-up with podiatry as well.    Acute pain of right shoulder  Acute pain of right shoulder, x1 week.  Notes full range of motion, pain feels like nerve pain from front of the shoulder to approximately mid forearm.  No history of trauma or injury, does sleep on the side at night.  Recommend x-ray of shoulder to further evaluate and start Voltaren gel up to 4 times daily as needed along with ice and/or heat and range of motion exercises.  If symptoms are not improving or worsening in the next 1 to 2 weeks, would recommend physical therapy.  Patient to update provider if not improving.  - diclofenac (VOLTAREN) 1 % topical gel; Apply 4 g topically 4 times daily as needed for moderate pain  - XR Shoulder Right 2 Views; Future    Insomnia, unspecified type  Significant difficulty sleeping, has tried over-the-counter remedies which have not been helpful.  Trazodone not helpful.  Will trial low-dose Ambien and will follow up in 1 month as above.  - zolpidem (AMBIEN) 5 MG tablet; Take 1 tablet (5 mg) by mouth nightly as needed for sleep    Examination of eyes and vision  Due for routine eye exam, referral placed.    - Adult Eye Referral; Future

## 2022-04-13 ENCOUNTER — OFFICE VISIT (OUTPATIENT)
Dept: PODIATRY | Facility: CLINIC | Age: 37
End: 2022-04-13
Payer: COMMERCIAL

## 2022-04-13 VITALS — SYSTOLIC BLOOD PRESSURE: 124 MMHG | HEART RATE: 102 BPM | DIASTOLIC BLOOD PRESSURE: 72 MMHG | OXYGEN SATURATION: 94 %

## 2022-04-13 DIAGNOSIS — M79.671 BILATERAL FOOT PAIN: Primary | ICD-10-CM

## 2022-04-13 DIAGNOSIS — M25.511 ACUTE PAIN OF RIGHT SHOULDER: Primary | ICD-10-CM

## 2022-04-13 DIAGNOSIS — M79.672 BILATERAL FOOT PAIN: Primary | ICD-10-CM

## 2022-04-13 DIAGNOSIS — L40.3 PUSTULAR PSORIASIS OF SOLE OF FOOT: ICD-10-CM

## 2022-04-13 PROCEDURE — 99203 OFFICE O/P NEW LOW 30 MIN: CPT | Performed by: PODIATRIST

## 2022-04-13 RX ORDER — FLUTICASONE PROPIONATE 0.05 MG/G
OINTMENT TOPICAL 2 TIMES DAILY
Qty: 30 G | Refills: 0 | Status: SHIPPED | OUTPATIENT
Start: 2022-04-13 | End: 2022-04-27

## 2022-04-13 ASSESSMENT — PAIN SCALES - GENERAL: PAINLEVEL: NO PAIN (0)

## 2022-04-13 NOTE — PROGRESS NOTES
PATIENT HISTORY:  Ge Hansen is a 36 year old male who presents to clinic in consultation at the request of  Kat Parnell C.N.P. with a chief complaint of painful feet.  The patient is seen by themselves.  The patient relates the pain is primarily located around the bottom of both feet.  Reports insidious onset without acute precipitating event. that has been going on for several week(s).  The patient has previously tried athlete's foot cream with little relief.  Any previous notes and studies that pertain to the patient's condition were reviewed.    Pertinent medical, surgical and family history was reviewed in the Epic chart and include morbid obesity, Planter fasciitis, tobacco use    Past Medical History:   Past Medical History:   Diagnosis Date     ADHD      Asthma      Lactose intolerance      Obesity hypoventilation syndrome (H)      Sleep apnea      Tobacco use        Medications:   Current Outpatient Medications:      albuterol (PROAIR HFA/PROVENTIL HFA/VENTOLIN HFA) 108 (90 Base) MCG/ACT inhaler, Inhale 2 puffs into the lungs every 6 hours as needed for shortness of breath / dyspnea or wheezing, Disp: 18 g, Rfl: 1     budesonide-formoterol (SYMBICORT) 160-4.5 MCG/ACT Inhaler, Inhale 2 puffs into the lungs 2 times daily, Disp: 10.2 g, Rfl: 3     cyclobenzaprine (FLEXERIL) 10 MG tablet, Take 1 tablet (10 mg) by mouth 3 times daily as needed for muscle spasms, Disp: 30 tablet, Rfl: 0     diclofenac (VOLTAREN) 1 % topical gel, Apply 4 g topically 4 times daily as needed for moderate pain, Disp: 350 g, Rfl: 0     fluticasone (FLONASE) 50 MCG/ACT nasal spray, Spray 2 sprays into both nostrils daily, Disp: 16 g, Rfl: 1     fluticasone propionate (CUTIVATE) 0.005 % external ointment, Apply topically 2 times daily for 14 days, Disp: 30 g, Rfl: 0     phentermine (ADIPEX-P) 15 MG capsule, Take 1 capsule (15 mg) by mouth every morning, Disp: 30 capsule, Rfl: 0     zolpidem (AMBIEN) 5 MG tablet, Take 1 tablet  (5 mg) by mouth nightly as needed for sleep, Disp: 30 tablet, Rfl: 0     buPROPion (WELLBUTRIN SR) 150 MG 12 hr tablet, Take 1 tablet (150 mg) by mouth daily for 3 days, THEN 1 tablet (150 mg) 2 times daily for 28 days., Disp: 59 tablet, Rfl: 0     Allergies:  No Known Allergies    Vitals: /72   Pulse 102   SpO2 94%   BMI= There is no height or weight on file to calculate BMI.    LOWER EXTREMITY PHYSICAL EXAM    Dermatologic: Noted erythematous patches of skin on the plantar medial aspect of the arches of both feet.  Otherwise the skin is intact to right and left lower extremity without significant lesions, rash or abrasion.   The toenails are hypertrophic and discolored bilaterally.     Vascular: DP & PT pulses are intact & regular on the right and left.   CFT and skin temperature is normal to the right and left lower extremity.     Neurologic: Lower extremity sensation is intact to light touch.  No evidence of weakness in the right and left lower extremity.        Musculoskeletal: Patient is ambulatory without assistive device or brace.  No gross ankle deformity noted.  No foot or ankle joint effusion is noted.  Noted collapse of medial longitudinal arch bilaterally.         ASSESSMENT / PLAN:     ICD-10-CM    1. Bilateral foot pain  M79.671 Orthopedic  Referral    M79.672    2. Pustular psoriasis of sole of foot  L40.3 fluticasone propionate (CUTIVATE) 0.005 % external ointment       I have explained to Ge about the conditions.  We discussed the underlying contributing factors to the condition as well as treatment options along with expected length of recovery.  At this time, the patient was prescribed fluticasone propionate 0.005% external ointment to be applied to the raised red patchy areas on both feet twice daily for 2 weeks.  Patient will return in 2 weeks for reevaluation.    Ge verbalized agreement with and understanding of the rational for the diagnosis and treatment plan.  All  questions were answered to best of my ability and the patient's satisfaction. The patient was advised to contact the clinic with any questions that may arise after the clinic visit.      Disclaimer: This note consists of symbols derived from keyboarding, dictation and/or voice recognition software. As a result, there may be errors in the script that have gone undetected. Please consider this when interpreting information found in this chart.       VELMA Monet D.P.M., [email or web address].

## 2022-04-13 NOTE — PATIENT INSTRUCTIONS
[facility]                    Kika Connor RN                                [phone]                                                Serving [place] and [facility]  In Home Foot [facility]    [facility] Deysi Lebron [phone] [email or web address]  [place]/ [place]/ [place] on [employer]     [facility] Lyssa Sequeira         RN, CFCN, [facility] [phone] [email or web address] [place]:  [place], [facility], [place], [place]     [facility] Zenaida Hilliard         RN, CWOCN, [facility] [phone] [email or web address]  Clinic: [facility]     Home visits: [facility]     [facility] Kaylin ARNDT, RN, [facility] [phone] [email or web address] Clinic:  [facility]  Home visits:  [facility]      [facility] Zaria Amezquita RN, [facility] [phone] [email or web address] [facility]     [facility] Radha Durham RN ,BSN,[facility] [phone]    [email or web address] [place], [facility], [place], [place], [place], [place], [place]     Susana Snow   [phone]  [email or web address]  [facility]  Home visits: [place] / [facility]

## 2022-04-13 NOTE — LETTER
4/13/2022         RE: Ge Hansen  [address] [place] [id number]        Dear Colleague,    Thank you for referring your patient, Ge Hansen, to the [facility] [place]. Please see a copy of my visit note below.    PATIENT HISTORY:  Ge Hansen is a 36 year old male who presents to clinic in consultation at the request of  Kat Parnell C.N.P. with a chief complaint of painful feet.  The patient is seen by themselves.  The patient relates the pain is primarily located around the bottom of both feet.  Reports insidious onset without acute precipitating event. that has been going on for several week(s).  The patient has previously tried athlete's foot cream with little relief.  Any previous notes and studies that pertain to the patient's condition were reviewed.    Pertinent medical, surgical and family history was reviewed in the Epic chart and include morbid obesity, Planter fasciitis, tobacco use    Past Medical History:   Past Medical History:   Diagnosis Date     ADHD      Asthma      Lactose intolerance      Obesity hypoventilation syndrome (H)      Sleep apnea      Tobacco use        Medications:   Current Outpatient Medications:      albuterol (PROAIR HFA/PROVENTIL HFA/VENTOLIN HFA) 108 (90 Base) MCG/ACT inhaler, Inhale 2 puffs into the lungs every 6 hours as needed for shortness of breath / dyspnea or wheezing, Disp: 18 g, Rfl: 1     budesonide-formoterol (SYMBICORT) 160-4.5 MCG/ACT Inhaler, Inhale 2 puffs into the lungs 2 times daily, Disp: 10.2 g, Rfl: 3     cyclobenzaprine (FLEXERIL) 10 MG tablet, Take 1 tablet (10 mg) by mouth 3 times daily as needed for muscle spasms, Disp: 30 tablet, Rfl: 0     diclofenac (VOLTAREN) 1 % topical gel, Apply 4 g topically 4 times daily as needed for moderate pain, Disp: 350 g, Rfl: 0     fluticasone (FLONASE) 50 MCG/ACT nasal spray, Spray 2 sprays into both nostrils daily, Disp: 16 g, Rfl: 1     fluticasone propionate  (CUTIVATE) 0.005 % external ointment, Apply topically 2 times daily for 14 days, Disp: 30 g, Rfl: 0     phentermine (ADIPEX-P) 15 MG capsule, Take 1 capsule (15 mg) by mouth every morning, Disp: 30 capsule, Rfl: 0     zolpidem (AMBIEN) 5 MG tablet, Take 1 tablet (5 mg) by mouth nightly as needed for sleep, Disp: 30 tablet, Rfl: 0     buPROPion (WELLBUTRIN SR) 150 MG 12 hr tablet, Take 1 tablet (150 mg) by mouth daily for 3 days, THEN 1 tablet (150 mg) 2 times daily for 28 days., Disp: 59 tablet, Rfl: 0     Allergies:  No Known Allergies    Vitals: /72   Pulse 102   SpO2 94%   BMI= There is no height or weight on file to calculate BMI.    LOWER EXTREMITY PHYSICAL EXAM    Dermatologic: Noted erythematous patches of skin on the plantar medial aspect of the arches of both feet.  Otherwise the skin is intact to right and left lower extremity without significant lesions, rash or abrasion.   The toenails are hypertrophic and discolored bilaterally.     Vascular: DP & PT pulses are intact & regular on the right and left.   CFT and skin temperature is normal to the right and left lower extremity.     Neurologic: Lower extremity sensation is intact to light touch.  No evidence of weakness in the right and left lower extremity.        Musculoskeletal: Patient is ambulatory without assistive device or brace.  No gross ankle deformity noted.  No foot or ankle joint effusion is noted.  Noted collapse of medial longitudinal arch bilaterally.         ASSESSMENT / PLAN:     ICD-10-CM    1. Bilateral foot pain  M79.671 Orthopedic  Referral    M79.672    2. Pustular psoriasis of sole of foot  L40.3 fluticasone propionate (CUTIVATE) 0.005 % external ointment       I have explained to Ge about the conditions.  We discussed the underlying contributing factors to the condition as well as treatment options along with expected length of recovery.  At this time, the patient was prescribed fluticasone propionate 0.005%  external ointment to be applied to the raised red patchy areas on both feet twice daily for 2 weeks.  Patient will return in 2 weeks for reevaluation.    Ge verbalized agreement with and understanding of the rational for the diagnosis and treatment plan.  All questions were answered to best of my ability and the patient's satisfaction. The patient was advised to contact the clinic with any questions that may arise after the clinic visit.      Disclaimer: This note consists of symbols derived from keyboarding, dictation and/or voice recognition software. As a result, there may be errors in the script that have gone undetected. Please consider this when interpreting information found in this chart.       VELMA Monet D.P.M., F.A.C.F.A.S.        Again, thank you for allowing me to participate in the care of your patient.        Sincerely,        Jabier Monet DPM

## 2022-05-25 ENCOUNTER — HOSPITAL ENCOUNTER (OUTPATIENT)
Dept: ULTRASOUND IMAGING | Facility: CLINIC | Age: 37
Discharge: HOME | End: 2022-05-25
Attending: FAMILY MEDICINE | Admitting: FAMILY MEDICINE
Payer: COMMERCIAL

## 2022-05-25 DIAGNOSIS — R16.0 HEPATOMEGALY: ICD-10-CM

## 2022-05-25 DIAGNOSIS — R94.5 ABNORMAL RESULTS OF LIVER FUNCTION STUDIES: Primary | ICD-10-CM

## 2022-05-25 DIAGNOSIS — K76.0 FATTY LIVER: ICD-10-CM

## 2022-05-25 DIAGNOSIS — R94.5 ABNORMAL RESULTS OF LIVER FUNCTION STUDIES: ICD-10-CM

## 2022-05-25 PROCEDURE — 76705 ECHO EXAM OF ABDOMEN: CPT

## 2022-11-16 ENCOUNTER — NURSE TRIAGE (OUTPATIENT)
Dept: NURSING | Facility: CLINIC | Age: 37
End: 2022-11-16

## 2022-11-16 ENCOUNTER — OFFICE VISIT (OUTPATIENT)
Dept: URGENT CARE | Facility: URGENT CARE | Age: 37
End: 2022-11-16
Payer: COMMERCIAL

## 2022-11-16 VITALS
DIASTOLIC BLOOD PRESSURE: 87 MMHG | OXYGEN SATURATION: 97 % | HEART RATE: 87 BPM | SYSTOLIC BLOOD PRESSURE: 121 MMHG | TEMPERATURE: 97.5 F

## 2022-11-16 DIAGNOSIS — R05.1 ACUTE COUGH: ICD-10-CM

## 2022-11-16 DIAGNOSIS — J06.9 VIRAL UPPER RESPIRATORY TRACT INFECTION WITH COUGH: ICD-10-CM

## 2022-11-16 DIAGNOSIS — J45.41 MODERATE PERSISTENT ASTHMA WITH ACUTE EXACERBATION: Primary | ICD-10-CM

## 2022-11-16 PROCEDURE — U0005 INFEC AGEN DETEC AMPLI PROBE: HCPCS | Performed by: PHYSICIAN ASSISTANT

## 2022-11-16 PROCEDURE — 99214 OFFICE O/P EST MOD 30 MIN: CPT | Mod: CS | Performed by: PHYSICIAN ASSISTANT

## 2022-11-16 PROCEDURE — U0003 INFECTIOUS AGENT DETECTION BY NUCLEIC ACID (DNA OR RNA); SEVERE ACUTE RESPIRATORY SYNDROME CORONAVIRUS 2 (SARS-COV-2) (CORONAVIRUS DISEASE [COVID-19]), AMPLIFIED PROBE TECHNIQUE, MAKING USE OF HIGH THROUGHPUT TECHNOLOGIES AS DESCRIBED BY CMS-2020-01-R: HCPCS | Performed by: PHYSICIAN ASSISTANT

## 2022-11-16 RX ORDER — ALBUTEROL SULFATE 90 UG/1
2 AEROSOL, METERED RESPIRATORY (INHALATION) EVERY 6 HOURS PRN
Qty: 18 G | Refills: 1 | Status: SHIPPED | OUTPATIENT
Start: 2022-11-16 | End: 2024-02-05

## 2022-11-16 RX ORDER — PREDNISONE 20 MG/1
40 TABLET ORAL DAILY
Qty: 10 TABLET | Refills: 0 | Status: SHIPPED | OUTPATIENT
Start: 2022-11-16 | End: 2022-11-21

## 2022-11-16 ASSESSMENT — ENCOUNTER SYMPTOMS
PALPITATIONS: 0
MYALGIAS: 0
VOMITING: 0
SINUS PAIN: 0
FREQUENCY: 0
DIARRHEA: 0
GASTROINTESTINAL NEGATIVE: 1
ALLERGIC/IMMUNOLOGIC NEGATIVE: 1
MUSCULOSKELETAL NEGATIVE: 1
CARDIOVASCULAR NEGATIVE: 1
CONSTITUTIONAL NEGATIVE: 1
CHEST TIGHTNESS: 0
DYSURIA: 0
CHILLS: 0
WHEEZING: 1
SINUS PRESSURE: 0
FEVER: 0
SORE THROAT: 0
ABDOMINAL PAIN: 0
HEMATURIA: 0
COUGH: 1
SHORTNESS OF BREATH: 0
HEADACHES: 0
NAUSEA: 0
NEUROLOGICAL NEGATIVE: 1

## 2022-11-16 NOTE — PROGRESS NOTES
Chief Complaint:     Chief Complaint   Patient presents with     Cough     Cough started yesterday, winded when he walks 50 feet. Had a fever yesterday, not today, coughing up mucus, stuffy nose, hard to breath. Tries to take deep breaths it hurts and he starts wheezing. Needs note for work.       No results found for any visits on 11/16/22.    Medical Decision Making:    Vital signs reviewed by Orlin Meredith PA-C  /87   Pulse 87   Temp 97.5  F (36.4  C) (Tympanic)   SpO2 97%     Differential Diagnosis:  URI Adult/Peds:  Asthma exacerbation, Bronchitis-viral, Pneumonia, Viral syndrome and Viral upper respiratory illness        ASSESSMENT    1. Moderate persistent asthma with acute exacerbation    2. Viral upper respiratory tract infection with cough    3. Acute cough        PLAN    Patient is in no acute distress.    Temp is 97.5 in clinic today, lung sounds were clear, and O2 sats at 97% on RA.    Asthma is not well controlled at this time.  Rx for Prednisone sent in. Rx for refill of Albuterol inhaler.     COVID swab collected in clinic today.  Rest, Push fluids, vaporizer, elevation of head of bed.  Ibuprofen and or Tylenol for any fever or body aches.  Over the counter cough suppressant- PRN- as discussed.   If symptoms worsen, recheck immediately otherwise follow up with your PCP in 1 week if symptoms are not improving.  Worrisome symptoms discussed with instructions to go to the ED.  Patient verbalized understanding and agreed with this plan.    Labs:    No results found for any visits on 11/16/22.     Vital signs reviewed by Orlin Meredith PA-C  /87   Pulse 87   Temp 97.5  F (36.4  C) (Tympanic)   SpO2 97%     Current Meds      Current Outpatient Medications:      albuterol (PROAIR HFA/PROVENTIL HFA/VENTOLIN HFA) 108 (90 Base) MCG/ACT inhaler, Inhale 2 puffs into the lungs every 6 hours as needed for shortness of breath / dyspnea or wheezing, Disp: 18 g, Rfl: 1     predniSONE  (DELTASONE) 20 MG tablet, Take 2 tablets (40 mg) by mouth daily for 5 days, Disp: 10 tablet, Rfl: 0     buPROPion (WELLBUTRIN SR) 150 MG 12 hr tablet, Take 1 tablet (150 mg) by mouth daily for 3 days, THEN 1 tablet (150 mg) 2 times daily for 28 days., Disp: 59 tablet, Rfl: 0      Respiratory History    occasional episodes of bronchitis and asthma      SUBJECTIVE    HPI: Ge Hansen is an 37 year old male who presents with chest congestion, cough nonproductive, occasional and wheezing.  Symptoms began 2  days ago and has unchanged.  There is no shortness of breath and chest pain.  Patient is eating and drinking well.  No fever, nausea, vomiting, or diarrhea.    Patient denies any recent travel or exposure to known COVID positive tested individual.      ROS:     Review of Systems   Constitutional: Negative.  Negative for chills and fever.   HENT: Positive for congestion. Negative for ear pain, sinus pressure, sinus pain and sore throat.    Respiratory: Positive for cough and wheezing. Negative for chest tightness and shortness of breath.    Cardiovascular: Negative.  Negative for chest pain and palpitations.   Gastrointestinal: Negative.  Negative for abdominal pain, diarrhea, nausea and vomiting.   Genitourinary: Negative for dysuria, frequency, hematuria and urgency.   Musculoskeletal: Negative.  Negative for myalgias.   Skin: Negative for rash.   Allergic/Immunologic: Negative.  Negative for immunocompromised state.   Neurological: Negative.  Negative for headaches.         Family History   Family History   Problem Relation Age of Onset     Obesity Sister      Depression Sister      Anxiety Disorder Sister      Mental Illness Sister      Diabetes Father         Lost a leg     Hypertension Father      Hypertension Mother      Mental Illness Mother      Hypertension Maternal Grandmother      Cerebrovascular Disease Maternal Grandmother      Osteoporosis Sister         Problem history  Patient Active Problem List    Diagnosis     Morbid obesity (H) BMI 68.34     Elevated glucose     Obesity hypoventilation syndrome (H)     Tobacco use     MAYDA (obstructive sleep apnea)     Elevated blood pressure reading without diagnosis of hypertension     Asthma     Plantar fasciitis     Class 3 drug-induced obesity with serious comorbidity and body mass index (BMI) greater than or equal to 70 in adult (H)     Insomnia, unspecified type        Allergies  No Known Allergies     Social History  Social History     Socioeconomic History     Marital status: Single     Spouse name: Not on file     Number of children: Not on file     Years of education: Not on file     Highest education level: Not on file   Occupational History     Not on file   Tobacco Use     Smoking status: Every Day     Packs/day: 1.00     Years: 10.00     Pack years: 10.00     Types: Cigarettes     Start date: 7/7/1997     Smokeless tobacco: Current     Types: Chew, Snuff     Tobacco comments:     Vaping    Substance and Sexual Activity     Alcohol use: Yes     Drug use: No     Sexual activity: Not Currently     Partners: Female     Birth control/protection: Pill, None   Other Topics Concern     Parent/sibling w/ CABG, MI or angioplasty before 65F 55M? Yes   Social History Narrative     Not on file     Social Determinants of Health     Financial Resource Strain: Not on file   Food Insecurity: Not on file   Transportation Needs: Not on file   Physical Activity: Not on file   Stress: Not on file   Social Connections: Not on file   Intimate Partner Violence: Not on file   Housing Stability: Not on file        OBJECTIVE     Vital signs reviewed by Orlin Meredith PA-C  /87   Pulse 87   Temp 97.5  F (36.4  C) (Tympanic)   SpO2 97%      Physical Exam  Vitals reviewed.   Constitutional:       General: He is not in acute distress.     Appearance: He is well-developed. He is not ill-appearing, toxic-appearing or diaphoretic.   HENT:      Head: Normocephalic and atraumatic.       Right Ear: Hearing, tympanic membrane, ear canal and external ear normal. No drainage, swelling or tenderness. Tympanic membrane is not perforated, erythematous, retracted or bulging.      Left Ear: Hearing, tympanic membrane, ear canal and external ear normal. No drainage, swelling or tenderness. Tympanic membrane is not perforated, erythematous, retracted or bulging.      Nose: Congestion and rhinorrhea present. No nasal tenderness or mucosal edema.      Right Turbinates: Not enlarged or swollen.      Left Turbinates: Not enlarged or swollen.      Right Sinus: No maxillary sinus tenderness or frontal sinus tenderness.      Left Sinus: No maxillary sinus tenderness or frontal sinus tenderness.      Mouth/Throat:      Pharynx: No pharyngeal swelling, oropharyngeal exudate, posterior oropharyngeal erythema or uvula swelling.      Tonsils: No tonsillar exudate. 0 on the right. 0 on the left.   Eyes:      General: Lids are normal.         Right eye: No discharge.         Left eye: No discharge.      Conjunctiva/sclera: Conjunctivae normal.      Right eye: Right conjunctiva is not injected. No exudate.     Left eye: Left conjunctiva is not injected. No exudate.     Pupils: Pupils are equal, round, and reactive to light.   Cardiovascular:      Rate and Rhythm: Normal rate and regular rhythm.      Heart sounds: Normal heart sounds. No murmur heard.    No friction rub. No gallop.   Pulmonary:      Effort: Pulmonary effort is normal. No accessory muscle usage, respiratory distress or retractions.      Breath sounds: Normal breath sounds and air entry. No stridor, decreased air movement or transmitted upper airway sounds. No decreased breath sounds, wheezing, rhonchi or rales.   Chest:      Chest wall: No tenderness.   Abdominal:      General: Bowel sounds are normal. There is no distension.      Palpations: Abdomen is soft. Abdomen is not rigid. There is no mass.      Tenderness: There is no abdominal tenderness. There  is no guarding or rebound.   Musculoskeletal:         General: Normal range of motion.      Cervical back: Normal range of motion and neck supple.   Lymphadenopathy:      Head:      Right side of head: No submental, submandibular, tonsillar, preauricular or posterior auricular adenopathy.      Left side of head: No submental, submandibular, tonsillar, preauricular or posterior auricular adenopathy.      Cervical:      Right cervical: No superficial or posterior cervical adenopathy.     Left cervical: No superficial or posterior cervical adenopathy.   Skin:     General: Skin is warm.      Capillary Refill: Capillary refill takes less than 2 seconds.   Neurological:      Mental Status: He is alert and oriented to person, place, and time.      Cranial Nerves: No cranial nerve deficit.      Sensory: No sensory deficit.      Motor: No abnormal muscle tone.      Coordination: Coordination normal.      Deep Tendon Reflexes: Reflexes normal.   Psychiatric:         Behavior: Behavior normal. Behavior is cooperative.         Thought Content: Thought content normal.         Judgment: Judgment normal.           Orlin Meredith PA-C  11/16/2022, 12:31 PM

## 2022-11-16 NOTE — TELEPHONE ENCOUNTER
Nurse Triage SBAR    Is this a 2nd Level Triage? Yes    Situation/Background: Patient is calling with symptoms of sinus pressure, headache, hot/cold flashes, cough, is able to clear out the nose. Patient states he feels like has grit in the back of his throat.  Patient has been using his rescue inhaler overnight 6 times; does not have a nebulizer.    Patient was able to sleep overnight using his BiPap, but sleeping has been difficult the last two nights. Symptoms started overnight yesterday.    Assessment:   T 98.9 before call  Cough - non-productive  Back of throat feels like sandpaper  Wheezes when he breathes out hard  Patient used inhaler about 4 times this morning with no improvement in symptoms.    Recommendation: Per disposition, Go to ED/[facility] (or to Office with PCP Approval). Patient stated he will go to [facility] if no appointments today. Home care advise provided. Advised patient to call back with any new or worsening symptoms. Patient verbalized understanding and agrees with plan.    Please do not use MyChart to communicate with the patient; patient does not have available at this time.   Patient may be reached at [phone].     Protocol Recommended Disposition: Paged/Called Provider    Jeanne Scruggs RN on 11/16/2022 at 7:57 AM  [facility]    Reason for Disposition    Wheezing (high pitched whistling sound) and previous asthma attacks or use of asthma medicines    MODERATE asthma attack (e.g., SOB at rest, speaks in phrases, audible wheezes) and not resolved after 2 or 3 inhaler or nebulizer treatments given 20 minutes apart    Additional Information    Negative: SEVERE difficulty breathing (e.g., struggling for each breath, speaks in single words, pulse > 120)    Negative: Breathing stopped and hasn't returned    Negative: Choking on something    Negative: Bluish (or gray) lips or face    Negative: Difficult to awaken or acting confused (e.g., disoriented, slurred speech)     Negative: Passed out (i.e., fainted, collapsed and was not responding)    Negative: Wheezing started suddenly after medicine, an allergic food, or bee sting    Negative: Stridor    Negative: Slow, shallow and weak breathing    Negative: Sounds like a life-threatening emergency to the triager    Negative: Chest pain    Negative: SEVERE asthma attack (e.g., struggling for each breath, speaks in single words, loud wheezes, pulse >120) (RED Zone)    Negative: Bluish (or gray) lips or face    Negative: Difficult to awaken or acting confused (e.g., disoriented, slurred speech)    Negative: Passed out (i.e., fainted, collapsed and was not responding)    Negative: Wheezing started suddenly after medicine, an allergic food, or bee sting    Negative: Sounds like a life-threatening emergency to the triager    Negative: MODERATE asthma attack (e.g., SOB at rest, speaks in phrases, audible wheezes) AND doesn't have neb or inhaler available    Negative: Peak flow rate less than 50% of baseline level (RED Zone)    Negative: Oxygen level (e.g., pulse oximetry) 90 percent or lower    Negative: Hospitalized before with asthma; now feels same    Protocols used: BREATHING DIFFICULTY-A-OH, ASTHMA ATTACK-A-OH

## 2022-11-16 NOTE — LETTER
[facility] [place]  [address] [place] [id number]          November 16, 2022    RE:  Ge Hansen                                                                                                                                                       [address] [place] [address]            To whom it may concern:    Ge Hansen is under my professional care for illness.  He  may return to work with no restrictions if COVID results are negative.  Results should be available in 1-3 days.      Sincerely,        Orlin Meredith PA-C

## 2022-11-17 LAB — SARS-COV-2 RNA RESP QL NAA+PROBE: NEGATIVE

## 2022-12-25 ENCOUNTER — HEALTH MAINTENANCE LETTER (OUTPATIENT)
Age: 37
End: 2022-12-25

## 2023-04-16 ENCOUNTER — HEALTH MAINTENANCE LETTER (OUTPATIENT)
Age: 38
End: 2023-04-16

## 2023-11-26 ENCOUNTER — HOSPITAL ENCOUNTER (INPATIENT)
Facility: CLINIC | Age: 38
LOS: 9 days | Discharge: HOME | End: 2023-12-07
Attending: NURSE PRACTITIONER | Admitting: STUDENT IN AN ORGANIZED HEALTH CARE EDUCATION/TRAINING PROGRAM
Payer: COMMERCIAL

## 2023-11-26 ENCOUNTER — NURSE TRIAGE (OUTPATIENT)
Dept: NURSING | Facility: CLINIC | Age: 38
End: 2023-11-26
Payer: COMMERCIAL

## 2023-11-26 DIAGNOSIS — E66.2 OBESITY HYPOVENTILATION SYNDROME (H): Chronic | ICD-10-CM

## 2023-11-26 DIAGNOSIS — A04.72 COLITIS DUE TO CLOSTRIDIUM DIFFICILE: ICD-10-CM

## 2023-11-26 DIAGNOSIS — L03.116 CELLULITIS OF LEFT LOWER EXTREMITY: ICD-10-CM

## 2023-11-26 DIAGNOSIS — G47.33 OSA (OBSTRUCTIVE SLEEP APNEA): Chronic | ICD-10-CM

## 2023-11-26 DIAGNOSIS — R60.9 EDEMA, UNSPECIFIED TYPE: ICD-10-CM

## 2023-11-26 DIAGNOSIS — G47.00 INSOMNIA, UNSPECIFIED TYPE: ICD-10-CM

## 2023-11-26 DIAGNOSIS — E66.01 MORBID OBESITY (H): Primary | Chronic | ICD-10-CM

## 2023-11-26 DIAGNOSIS — L40.9 PSORIASIS: Chronic | ICD-10-CM

## 2023-11-26 LAB
ALBUMIN SERPL BCG-MCNC: 3.6 G/DL (ref 3.5–5.2)
ALP SERPL-CCNC: 62 U/L (ref 40–150)
ALT SERPL W P-5'-P-CCNC: 52 U/L (ref 0–70)
ANION GAP SERPL CALCULATED.3IONS-SCNC: 13 MMOL/L (ref 7–15)
AST SERPL W P-5'-P-CCNC: 53 U/L (ref 0–45)
BASOPHILS # BLD AUTO: 0 10E3/UL (ref 0–0.2)
BASOPHILS NFR BLD AUTO: 0 %
BILIRUB SERPL-MCNC: 0.6 MG/DL
BUN SERPL-MCNC: 11.8 MG/DL (ref 6–20)
CALCIUM SERPL-MCNC: 8.4 MG/DL (ref 8.6–10)
CHLORIDE SERPL-SCNC: 96 MMOL/L (ref 98–107)
CREAT SERPL-MCNC: 0.84 MG/DL (ref 0.67–1.17)
CRP SERPL-MCNC: 263.86 MG/L
DEPRECATED HCO3 PLAS-SCNC: 24 MMOL/L (ref 22–29)
EGFRCR SERPLBLD CKD-EPI 2021: >90 ML/MIN/1.73M2
EOSINOPHIL # BLD AUTO: 0.1 10E3/UL (ref 0–0.7)
EOSINOPHIL NFR BLD AUTO: 0 %
ERYTHROCYTE [DISTWIDTH] IN BLOOD BY AUTOMATED COUNT: 12.6 % (ref 10–15)
GLUCOSE SERPL-MCNC: 91 MG/DL (ref 70–99)
HCT VFR BLD AUTO: 37.7 % (ref 40–53)
HGB BLD-MCNC: 12.5 G/DL (ref 13.3–17.7)
HOLD SPECIMEN: NORMAL
IMM GRANULOCYTES # BLD: 0.1 10E3/UL
IMM GRANULOCYTES NFR BLD: 1 %
LACTATE SERPL-SCNC: 1.5 MMOL/L (ref 0.7–2)
LYMPHOCYTES # BLD AUTO: 1.2 10E3/UL (ref 0.8–5.3)
LYMPHOCYTES NFR BLD AUTO: 11 %
MCH RBC QN AUTO: 31.6 PG (ref 26.5–33)
MCHC RBC AUTO-ENTMCNC: 33.2 G/DL (ref 31.5–36.5)
MCV RBC AUTO: 95 FL (ref 78–100)
MONOCYTES # BLD AUTO: 0.5 10E3/UL (ref 0–1.3)
MONOCYTES NFR BLD AUTO: 4 %
NEUTROPHILS # BLD AUTO: 9.8 10E3/UL (ref 1.6–8.3)
NEUTROPHILS NFR BLD AUTO: 84 %
NRBC # BLD AUTO: 0 10E3/UL
NRBC BLD AUTO-RTO: 0 /100
PLATELET # BLD AUTO: 154 10E3/UL (ref 150–450)
POTASSIUM SERPL-SCNC: 3.8 MMOL/L (ref 3.4–5.3)
PROCALCITONIN SERPL IA-MCNC: 2.76 NG/ML
PROT SERPL-MCNC: 7.5 G/DL (ref 6.4–8.3)
RBC # BLD AUTO: 3.95 10E6/UL (ref 4.4–5.9)
SODIUM SERPL-SCNC: 133 MMOL/L (ref 135–145)
WBC # BLD AUTO: 11.6 10E3/UL (ref 4–11)

## 2023-11-26 PROCEDURE — G0378 HOSPITAL OBSERVATION PER HR: HCPCS

## 2023-11-26 PROCEDURE — 83605 ASSAY OF LACTIC ACID: CPT | Performed by: NURSE PRACTITIONER

## 2023-11-26 PROCEDURE — 250N000011 HC RX IP 250 OP 636: Mod: JZ | Performed by: NURSE PRACTITIONER

## 2023-11-26 PROCEDURE — 99223 1ST HOSP IP/OBS HIGH 75: CPT

## 2023-11-26 PROCEDURE — 85025 COMPLETE CBC W/AUTO DIFF WBC: CPT | Performed by: NURSE PRACTITIONER

## 2023-11-26 PROCEDURE — 80053 COMPREHEN METABOLIC PANEL: CPT | Performed by: NURSE PRACTITIONER

## 2023-11-26 PROCEDURE — 94660 CPAP INITIATION&MGMT: CPT

## 2023-11-26 PROCEDURE — 999N000157 HC STATISTIC RCP TIME EA 10 MIN

## 2023-11-26 PROCEDURE — 99285 EMERGENCY DEPT VISIT HI MDM: CPT | Mod: 25

## 2023-11-26 PROCEDURE — 258N000003 HC RX IP 258 OP 636: Performed by: NURSE PRACTITIONER

## 2023-11-26 PROCEDURE — 36415 COLL VENOUS BLD VENIPUNCTURE: CPT | Performed by: NURSE PRACTITIONER

## 2023-11-26 PROCEDURE — 86140 C-REACTIVE PROTEIN: CPT | Performed by: NURSE PRACTITIONER

## 2023-11-26 PROCEDURE — 87040 BLOOD CULTURE FOR BACTERIA: CPT | Performed by: NURSE PRACTITIONER

## 2023-11-26 PROCEDURE — 84145 PROCALCITONIN (PCT): CPT | Performed by: NURSE PRACTITIONER

## 2023-11-26 PROCEDURE — 96365 THER/PROPH/DIAG IV INF INIT: CPT

## 2023-11-26 PROCEDURE — 96375 TX/PRO/DX INJ NEW DRUG ADDON: CPT

## 2023-11-26 PROCEDURE — 99285 EMERGENCY DEPT VISIT HI MDM: CPT | Mod: 25 | Performed by: EMERGENCY MEDICINE

## 2023-11-26 PROCEDURE — 87641 MR-STAPH DNA AMP PROBE: CPT

## 2023-11-26 PROCEDURE — 250N000013 HC RX MED GY IP 250 OP 250 PS 637: Performed by: NURSE PRACTITIONER

## 2023-11-26 RX ORDER — CEFAZOLIN SODIUM 2 G/100ML
2 INJECTION, SOLUTION INTRAVENOUS EVERY 8 HOURS
Status: DISCONTINUED | OUTPATIENT
Start: 2023-11-26 | End: 2023-11-28

## 2023-11-26 RX ORDER — AMOXICILLIN 250 MG
2 CAPSULE ORAL 2 TIMES DAILY PRN
Status: DISCONTINUED | OUTPATIENT
Start: 2023-11-26 | End: 2023-12-07 | Stop reason: HOSPADM

## 2023-11-26 RX ORDER — ONDANSETRON 4 MG/1
4 TABLET, ORALLY DISINTEGRATING ORAL EVERY 6 HOURS PRN
Status: DISCONTINUED | OUTPATIENT
Start: 2023-11-26 | End: 2023-12-04

## 2023-11-26 RX ORDER — ALBUTEROL SULFATE 90 UG/1
2 AEROSOL, METERED RESPIRATORY (INHALATION) EVERY 4 HOURS PRN
Status: DISCONTINUED | OUTPATIENT
Start: 2023-11-26 | End: 2023-12-07 | Stop reason: HOSPADM

## 2023-11-26 RX ORDER — CALCIUM CARBONATE 500 MG/1
1000 TABLET, CHEWABLE ORAL 4 TIMES DAILY PRN
Status: DISCONTINUED | OUTPATIENT
Start: 2023-11-26 | End: 2023-12-07 | Stop reason: HOSPADM

## 2023-11-26 RX ORDER — ONDANSETRON 2 MG/ML
4 INJECTION INTRAMUSCULAR; INTRAVENOUS EVERY 6 HOURS PRN
Status: DISCONTINUED | OUTPATIENT
Start: 2023-11-26 | End: 2023-12-04

## 2023-11-26 RX ORDER — AMOXICILLIN 250 MG
1 CAPSULE ORAL 2 TIMES DAILY PRN
Status: DISCONTINUED | OUTPATIENT
Start: 2023-11-26 | End: 2023-12-07 | Stop reason: HOSPADM

## 2023-11-26 RX ORDER — KETOROLAC TROMETHAMINE 30 MG/ML
30 INJECTION, SOLUTION INTRAMUSCULAR; INTRAVENOUS ONCE
Status: COMPLETED | OUTPATIENT
Start: 2023-11-26 | End: 2023-11-26

## 2023-11-26 RX ORDER — KETOROLAC TROMETHAMINE 15 MG/ML
15 INJECTION, SOLUTION INTRAMUSCULAR; INTRAVENOUS ONCE
Status: COMPLETED | OUTPATIENT
Start: 2023-11-26 | End: 2023-11-26

## 2023-11-26 RX ORDER — IBUPROFEN 200 MG
600 TABLET ORAL EVERY 6 HOURS PRN
COMMUNITY

## 2023-11-26 RX ORDER — ACETAMINOPHEN 325 MG/1
650 TABLET ORAL EVERY 4 HOURS PRN
Status: DISCONTINUED | OUTPATIENT
Start: 2023-11-26 | End: 2023-12-07 | Stop reason: HOSPADM

## 2023-11-26 RX ORDER — ACETAMINOPHEN 650 MG/1
650 SUPPOSITORY RECTAL EVERY 4 HOURS PRN
Status: DISCONTINUED | OUTPATIENT
Start: 2023-11-26 | End: 2023-12-07 | Stop reason: HOSPADM

## 2023-11-26 RX ORDER — NAPROXEN SODIUM 220 MG
660 TABLET ORAL 2 TIMES DAILY PRN
Status: ON HOLD | COMMUNITY
End: 2023-12-07

## 2023-11-26 RX ORDER — OXYCODONE HYDROCHLORIDE 5 MG/1
5 TABLET ORAL EVERY 4 HOURS PRN
Status: DISCONTINUED | OUTPATIENT
Start: 2023-11-26 | End: 2023-12-07 | Stop reason: HOSPADM

## 2023-11-26 RX ADMIN — KETOROLAC TROMETHAMINE 15 MG: 15 INJECTION, SOLUTION INTRAMUSCULAR; INTRAVENOUS at 15:32

## 2023-11-26 RX ADMIN — SODIUM CHLORIDE 1000 ML: 9 INJECTION, SOLUTION INTRAVENOUS at 15:32

## 2023-11-26 RX ADMIN — CEFAZOLIN SODIUM 2 G: 2 INJECTION, SOLUTION INTRAVENOUS at 16:01

## 2023-11-26 RX ADMIN — OXYCODONE HYDROCHLORIDE 5 MG: 5 TABLET ORAL at 17:04

## 2023-11-26 ASSESSMENT — ENCOUNTER SYMPTOMS
SHORTNESS OF BREATH: 0
FREQUENCY: 0
VOMITING: 0
DIARRHEA: 1
WHEEZING: 0
NAUSEA: 0
HEADACHES: 1
FEVER: 0
DYSURIA: 0
CHILLS: 0
COUGH: 0
ABDOMINAL PAIN: 0

## 2023-11-26 ASSESSMENT — ACTIVITIES OF DAILY LIVING (ADL)
ADLS_ACUITY_SCORE: 18
ADLS_ACUITY_SCORE: 35

## 2023-11-26 NOTE — MEDICATION SCRIBE - ADMISSION MEDICATION HISTORY
Medication Scribe Admission Medication History    Admission medication history is complete. The information provided in this note is only as accurate as the sources available at the time of the update.    Information Source(s): Patient, Clinic records, and CareEverywhere/SureScripts via  in room with patient and finished at desk.    Pertinent Information: Patient's script for Albuterol Inhaler  this month and he just used his last puff last night.  Will need a refill.    Changes made to PTA medication list:  Added: Tylenol PO, Ibuprofen 200 mg, Aleve 220 mg.  Deleted: None  Changed: None    Medication Affordability:  Not including over the counter (OTC) medications, was there a time in the past 3 months when you did not take your medications as prescribed because of cost?: No    Allergies reviewed with patient and updates made in EHR: yes, added milk.    Medication History Completed By: Keisha Knight 2023 4:49 PM    PTA Med List   Medication Sig Last Dose    Acetaminophen (TYLENOL PO) Take 3 tablets by mouth every 6 hours as needed for mild pain or fever 2023 at prn    albuterol (PROAIR HFA/PROVENTIL HFA/VENTOLIN HFA) 108 (90 Base) MCG/ACT inhaler Inhale 2 puffs into the lungs every 6 hours as needed for shortness of breath / dyspnea or wheezing 2023 at pm- ran out now    ibuprofen (ADVIL/MOTRIN) 200 MG tablet Take 600 mg by mouth every 6 hours as needed for pain Past Week at prn    naproxen sodium (ANAPROX) 220 MG tablet Take 660 mg by mouth 2 times daily as needed for moderate pain Aleve Past Week at prn

## 2023-11-26 NOTE — LETTER
[facility] MEDICAL SURGICAL  [address] [address]  Phone: [phone]  Fax: [phone]    November 27, 2023        Ge Hansen  [address] [place] [id number]          To whom it may concern:    RE: Ge Hansen was admitted to [facility] on November 26, 2023.  He is currently under my care.  Please excuse him from work until at least December 1, 2023.  I will write a follow-up letter on the day of discharge better describing any future work limitations.    Please contact me for questions or concerns.      Sincerely,        Henry Muñiz MD

## 2023-11-26 NOTE — PLAN OF CARE
Patient chart accessed with anticipation of patient admission to med/surg unit.  Awaiting arrival of specialty bed for admission.

## 2023-11-26 NOTE — ED TRIAGE NOTES
Pt here with left leg pain and swelling that started when pt woke up this morning. Pt started feeling sick with muscle spasm and chills on Thursday. Pt now also has a headache.      Triage Assessment (Adult)       Row Name 11/26/23 1328          Triage Assessment    Airway WDL WDL        Respiratory WDL    Respiratory WDL WDL        Cardiac WDL    Cardiac WDL WDL        Cognitive/Neuro/Behavioral WDL    Cognitive/Neuro/Behavioral WDL WDL

## 2023-11-26 NOTE — ED PROVIDER NOTES
History     Chief Complaint   Patient presents with    Leg Pain    Leg Swelling     Left leg    Headache     HPI  Ge Hansen is a 38 year old male with history of morbid obesity, obesity hypoventilation syndrome (wears BiPAP at night), and asthma who presents for evaluation of left lower extremity swelling, redness, and pain.  Symptoms started on Thursday, 3 days ago, and has been gradually worsening.  He reports chills and feeling flulike.  Complains of headache.  Denies cough or congestion.  Denies nausea or vomiting.  He has been having some diarrhea.  No bloody stools.  He had not noted any black stools, but did take some Pepto-Bismol which then turned his stool dark.  Current everyday smoker.  Denies drinking alcohol.  Uses marijuana.  Denies any other illicit drug use.    Allergies:  Allergies   Allergen Reactions    Milk (Cow)        Problem List:    Patient Active Problem List    Diagnosis Date Noted    Cellulitis of left lower extremity 11/26/2023     Priority: Medium    Class 3 drug-induced obesity with serious comorbidity and body mass index (BMI) greater than or equal to 70 in adult (H) 01/26/2021     Priority: Medium     Starting bariatric surgery process. Needs to lose to 444lb day of surgery       Insomnia, unspecified type 01/26/2021     Priority: Medium    Plantar fasciitis 01/15/2021     Priority: Medium    Morbid obesity (H) BMI 68.34 09/13/2018     Priority: Medium    Elevated glucose 09/13/2018     Priority: Medium    Obesity hypoventilation syndrome (H) 09/13/2018     Priority: Medium     Some asthma hx too.        Tobacco use 09/13/2018     Priority: Medium    MAYDA (obstructive sleep apnea) 09/13/2018     Priority: Medium     Study Date: 10/3/2018( 520.0 lbs).  The combined apnea/hypopnea index was 134.8 events per hour (central apnea/hypopnea index was 4.1 events per hour). The REM AHI was - events per hour. The supine AHI was 128.9 events per hour. The RERA index was - events per hour.  The RDI was 134.8 events per hour. Transcutaneous carbon dioxide monitoring was used, and significant hypoventilation was present with a maximum change from ~11 mmHg and 46.0 minutes at or greater than 55 mmHg. VBG was consistent with chronic compensated respiratory acidosis (pH 7.36, pCO2 62, HCO3 35). Baseline oxygen saturation was 89.4%. Lowest oxygen saturation was 52.0%. Time spent less than or equal to 88% was 59.5 minutes. Time spent less than or equal to 89% was 65.5 minutes. The final pressure was BiLevel 23/13/0 cmH2O with an AHI of 1.7 events per hour. Time in REM supine on final pressure was 49.0 minutes.         Elevated blood pressure reading without diagnosis of hypertension 09/13/2018     Priority: Medium    Asthma 06/01/2006     Priority: Medium        Past Medical History:    Past Medical History:   Diagnosis Date    ADHD     Asthma     Lactose intolerance     Obesity hypoventilation syndrome (H)     Sleep apnea     Tobacco use        Past Surgical History:    Past Surgical History:   Procedure Laterality Date    ROTATOR CUFF REPAIR RT/LT         Family History:    Family History   Problem Relation Age of Onset    Obesity Sister     Depression Sister     Anxiety Disorder Sister     Mental Illness Sister     Diabetes Father         Lost a leg    Hypertension Father     Hypertension Mother     Mental Illness Mother     Hypertension Maternal Grandmother     Cerebrovascular Disease Maternal Grandmother     Osteoporosis Sister        Social History:  Marital Status:  Single [1]  Social History     Tobacco Use    Smoking status: Every Day     Packs/day: 1.00     Years: 10.00     Additional pack years: 0.00     Total pack years: 10.00     Types: Cigarettes     Start date: 7/7/1997    Smokeless tobacco: Current     Types: Chew, Snuff    Tobacco comments:     Vaping    Substance Use Topics    Alcohol use: Yes    Drug use: No        Medications:    Acetaminophen (TYLENOL PO)  albuterol (PROAIR HFA/PROVENTIL  "HFA/VENTOLIN HFA) 108 (90 Base) MCG/ACT inhaler  ibuprofen (ADVIL/MOTRIN) 200 MG tablet  naproxen sodium (ANAPROX) 220 MG tablet          Review of Systems  As mentioned above in the history present illness. All other systems were reviewed and are negative.    Physical Exam   BP: (!) 163/88  Pulse: 107  Temp: 98.5  F (36.9  C)  Resp: 22  Height: 190.5 cm (6' 3\")  Weight: (!) 249.5 kg (550 lb)  SpO2: 95 %      Physical Exam  Constitutional:       General: He is not in acute distress.     Appearance: He is well-developed. He is obese. He is not ill-appearing.   HENT:      Head: Normocephalic and atraumatic.      Right Ear: External ear normal.      Left Ear: External ear normal.      Nose: Nose normal.      Mouth/Throat:      Mouth: Mucous membranes are moist.   Eyes:      Conjunctiva/sclera: Conjunctivae normal.   Cardiovascular:      Rate and Rhythm: Normal rate and regular rhythm.      Heart sounds: Normal heart sounds. No murmur heard.  Pulmonary:      Effort: Pulmonary effort is normal. No respiratory distress.      Breath sounds: Normal breath sounds.   Abdominal:      General: Bowel sounds are normal. There is no distension.      Palpations: Abdomen is soft.      Tenderness: There is no abdominal tenderness.   Musculoskeletal:         General: Normal range of motion.      Right lower leg: Edema present.      Left lower leg: Tenderness present. Edema present.   Skin:     General: Skin is warm and dry.      Findings: Erythema present.   Neurological:      General: No focal deficit present.      Mental Status: He is alert and oriented to person, place, and time.               ED Course                 Procedures                Results for orders placed or performed during the hospital encounter of 11/26/23 (from the past 24 hour(s))   [place] Draw    Narrative    The following orders were created for panel order [place] Draw.  Procedure                               Abnormality         Status                   "   ---------                               -----------         ------                     Extra Blue Top Tube[[id number]]                              Final result               Extra Red Top Tube[[id number]]                               Final result               Extra Green Top (Lithium...[[id number]]                      Final result               Extra Purple Top Tube[[id number]]                            Final result               Extra Green Top (Lithium...[[id number]]                      Final result                 Please view results for these tests on the individual orders.   Extra Blue Top Tube   Result Value Ref Range    Hold Specimen JIC    Extra Red Top Tube   Result Value Ref Range    Hold Specimen JIC    Extra Green Top (Lithium Heparin) Tube   Result Value Ref Range    Hold Specimen JIC    Extra Purple Top Tube   Result Value Ref Range    Hold Specimen JIC    Extra Green Top (Lithium Heparin) ON ICE   Result Value Ref Range    Hold Specimen JIC    CBC with platelets differential    Narrative    The following orders were created for panel order CBC with platelets differential.  Procedure                               Abnormality         Status                     ---------                               -----------         ------                     CBC with platelets and d...[[id number]]  Abnormal            Final result                 Please view results for these tests on the individual orders.   Comprehensive metabolic panel   Result Value Ref Range    Sodium 133 (L) 135 - 145 mmol/L    Potassium 3.8 3.4 - 5.3 mmol/L    Carbon Dioxide (CO2) 24 22 - 29 mmol/L    Anion Gap 13 7 - 15 mmol/L    Urea Nitrogen 11.8 6.0 - 20.0 mg/dL    Creatinine 0.84 0.67 - 1.17 mg/dL    GFR Estimate >90 >60 mL/min/1.73m2    Calcium 8.4 (L) 8.6 - 10.0 mg/dL    Chloride 96 (L) 98 - 107 mmol/L    Glucose 91 70 - 99 mg/dL    Alkaline Phosphatase 62 40 - 150 U/L    AST 53 (H) 0 - 45 U/L    ALT 52 0 - 70 U/L    Protein Total  7.5 6.4 - 8.3 g/dL    Albumin 3.6 3.5 - 5.2 g/dL    Bilirubin Total 0.6 <=1.2 mg/dL   Lactic acid whole blood   Result Value Ref Range    Lactic Acid 1.5 0.7 - 2.0 mmol/L   Procalcitonin   Result Value Ref Range    Procalcitonin 2.76 (H) <0.50 ng/mL   CRP inflammation   Result Value Ref Range    CRP Inflammation 263.86 (H) <5.00 mg/L   CBC with platelets and differential   Result Value Ref Range    WBC Count 11.6 (H) 4.0 - 11.0 10e3/uL    RBC Count 3.95 (L) 4.40 - 5.90 10e6/uL    Hemoglobin 12.5 (L) 13.3 - 17.7 g/dL    Hematocrit 37.7 (L) 40.0 - 53.0 %    MCV 95 78 - 100 fL    MCH 31.6 26.5 - 33.0 pg    MCHC 33.2 31.5 - 36.5 g/dL    RDW 12.6 10.0 - 15.0 %    Platelet Count 154 150 - 450 10e3/uL    % Neutrophils 84 %    % Lymphocytes 11 %    % Monocytes 4 %    % Eosinophils 0 %    % Basophils 0 %    % Immature Granulocytes 1 %    NRBCs per 100 WBC 0 <1 /100    Absolute Neutrophils 9.8 (H) 1.6 - 8.3 10e3/uL    Absolute Lymphocytes 1.2 0.8 - 5.3 10e3/uL    Absolute Monocytes 0.5 0.0 - 1.3 10e3/uL    Absolute Eosinophils 0.1 0.0 - 0.7 10e3/uL    Absolute Basophils 0.0 0.0 - 0.2 10e3/uL    Absolute Immature Granulocytes 0.1 <=0.4 10e3/uL    Absolute NRBCs 0.0 10e3/uL       Medications   ceFAZolin (ANCEF) 2 g in 100 mL D5W intermittent infusion (2 g Intravenous $New Bag 11/26/23 1601)   oxyCODONE (ROXICODONE) tablet 5 mg (has no administration in time range)   sodium chloride 0.9% BOLUS 1,000 mL (1,000 mLs Intravenous $New Bag 11/26/23 1532)   ketorolac (TORADOL) injection 15 mg (15 mg Intravenous $Given 11/26/23 1532)       Assessments & Plan (with Medical Decision Making)  I consulted with Dr. Chong, emergency physician regarding the workup and management of this patient.   History and exam is consistent with left lower extremity cellulitis.    BP mildly elevated 163/88.  Patient is afebrile.  Mild tachycardia with heart rate 107 bpm.  WBC 11.6, .86.  Normal lactic acid.  Sodium 133.  AST 53, ALT and total  bilirubin are normal.  Given the severity and rapid progression of his symptoms I recommend admission for IV antibiotics.  Patient is agreeable to admission.  I spoke with the on-call hospitalist, LISSY Narvaez, who accepts patient for admission.      Patient was given IV Ancef 2 g, IV Toradol, and p.o. oxycodone here in the emergency department.        New Prescriptions    No medications on file       Final diagnoses:   Cellulitis of left lower extremity       11/26/2023   [facility] EMERGENCY DEPT       Ronny, SONIA Yancey CNP  11/26/23 6850

## 2023-11-26 NOTE — ED NOTES
"[facility]   Admission Handoff    The patient is Ge Hansen, 38 year old who arrived in the ED by CAR from home with a complaint of Leg Pain, Leg Swelling (Left leg), and Headache  . The patient's current symptoms are new and during this time the symptoms have remained the same. In the ED, patient was diagnosed with   Final diagnoses:   Cellulitis of left lower extremity         Needed?: No    Allergies:  No Known Allergies    Past Medical Hx:   Past Medical History:   Diagnosis Date    ADHD     Asthma     Lactose intolerance     Obesity hypoventilation syndrome (H)     Sleep apnea     Tobacco use        Initial vitals were: BP: (!) 163/88  Pulse: 107  Temp: 98.5  F (36.9  C)  Resp: 22  Height: 190.5 cm (6' 3\")  Weight: (!) 249.5 kg (550 lb)  SpO2: 95 %   Recent vital Signs: BP (!) 163/88   Pulse 107   Temp 98.5  F (36.9  C) (Oral)   Resp 22   Ht 1.905 m (6' 3\")   Wt (!) 249.5 kg (550 lb)   SpO2 95%   BMI 68.75 kg/m      Elimination Status: Continent: Yes     Activity Level: Independent    Fall Status: Reason for falls risk: Other- not a falls risk, pt is obese but is mobile and ambulates independently  none    Baseline Mental status: WDL  Current Mental Status changes: at basesline    Infection present or suspected this encounter: yes skin/wound/contact  Sepsis suspected: No    Isolation type: none    Bariatric equipment needed?: Yes    In the ED these meds were given:   Medications   ceFAZolin (ANCEF) 2 g in 100 mL D5W intermittent infusion (2 g Intravenous $New Bag 11/26/23 1601)   sodium chloride 0.9% BOLUS 1,000 mL (1,000 mLs Intravenous $New Bag 11/26/23 1532)   ketorolac (TORADOL) injection 15 mg (15 mg Intravenous $Given 11/26/23 1532)       Drips running?  No    Home pump  No    Current LDAs: Peripheral IV: Site RAC; Gauge 18  none     Results:   Labs/Imaging  Ordered and Resulted from Time of ED Arrival Up to the Time of Departure from the ED  Results for " orders placed or performed during the hospital encounter of 11/26/23 (from the past 24 hour(s))   [facility] Draw    Narrative    The following orders were created for panel order [place] Draw.  Procedure                               Abnormality         Status                     ---------                               -----------         ------                     Extra Blue Top Tube[[id number]]                              Final result               Extra Red Top Tube[[id number]]                               Final result               Extra Green Top (Lithium...[[id number]]                      Final result               Extra Purple Top Tube[[id number]]                            Final result               Extra Green Top (Lithium...[[id number]]                      Final result                 Please view results for these tests on the individual orders.   Extra Blue Top Tube   Result Value Ref Range    Hold Specimen JIC    Extra Red Top Tube   Result Value Ref Range    Hold Specimen JIC    Extra Green Top (Lithium Heparin) Tube   Result Value Ref Range    Hold Specimen JIC    Extra Purple Top Tube   Result Value Ref Range    Hold Specimen JIC    Extra Green Top (Lithium Heparin) ON ICE   Result Value Ref Range    Hold Specimen JIC    CBC with platelets differential    Narrative    The following orders were created for panel order CBC with platelets differential.  Procedure                               Abnormality         Status                     ---------                               -----------         ------                     CBC with platelets and d...[[id number]]  Abnormal            Final result                 Please view results for these tests on the individual orders.   Comprehensive metabolic panel   Result Value Ref Range    Sodium 133 (L) 135 - 145 mmol/L    Potassium 3.8 3.4 - 5.3 mmol/L    Carbon Dioxide (CO2) 24 22 - 29 mmol/L    Anion Gap 13 7 - 15 mmol/L    Urea Nitrogen 11.8 6.0 - 20.0 mg/dL     Creatinine 0.84 0.67 - 1.17 mg/dL    GFR Estimate >90 >60 mL/min/1.73m2    Calcium 8.4 (L) 8.6 - 10.0 mg/dL    Chloride 96 (L) 98 - 107 mmol/L    Glucose 91 70 - 99 mg/dL    Alkaline Phosphatase 62 40 - 150 U/L    AST 53 (H) 0 - 45 U/L    ALT 52 0 - 70 U/L    Protein Total 7.5 6.4 - 8.3 g/dL    Albumin 3.6 3.5 - 5.2 g/dL    Bilirubin Total 0.6 <=1.2 mg/dL   Lactic acid whole blood   Result Value Ref Range    Lactic Acid 1.5 0.7 - 2.0 mmol/L   Procalcitonin   Result Value Ref Range    Procalcitonin 2.76 (H) <0.50 ng/mL   CRP inflammation   Result Value Ref Range    CRP Inflammation 263.86 (H) <5.00 mg/L   CBC with platelets and differential   Result Value Ref Range    WBC Count 11.6 (H) 4.0 - 11.0 10e3/uL    RBC Count 3.95 (L) 4.40 - 5.90 10e6/uL    Hemoglobin 12.5 (L) 13.3 - 17.7 g/dL    Hematocrit 37.7 (L) 40.0 - 53.0 %    MCV 95 78 - 100 fL    MCH 31.6 26.5 - 33.0 pg    MCHC 33.2 31.5 - 36.5 g/dL    RDW 12.6 10.0 - 15.0 %    Platelet Count 154 150 - 450 10e3/uL    % Neutrophils 84 %    % Lymphocytes 11 %    % Monocytes 4 %    % Eosinophils 0 %    % Basophils 0 %    % Immature Granulocytes 1 %    NRBCs per 100 WBC 0 <1 /100    Absolute Neutrophils 9.8 (H) 1.6 - 8.3 10e3/uL    Absolute Lymphocytes 1.2 0.8 - 5.3 10e3/uL    Absolute Monocytes 0.5 0.0 - 1.3 10e3/uL    Absolute Eosinophils 0.1 0.0 - 0.7 10e3/uL    Absolute Basophils 0.0 0.0 - 0.2 10e3/uL    Absolute Immature Granulocytes 0.1 <=0.4 10e3/uL    Absolute NRBCs 0.0 10e3/uL       For the majority of the shift this patient's behavior was Green     Cardiac Rhythm: N/A  Pt needs tele? No  Skin/wound Issues:  cellulitis LLE    Code Status: Full Code    Pain control: pt had none    Nausea control: pt had none    Abnormal labs/tests/findings requiring intervention: none    Patient tested for COVID 19 prior to admission: NO     OBS brochure/video discussed/provided to patient/family: yes     Family present during ED course? No     Family Comments/Social Situation  comments: lives with sister    Tasks needing completion: None    Mariza Parnell RN

## 2023-11-26 NOTE — H&P
[facility]    History and Physical  Hospital Medicine       Date of Admission:  11/26/2023  Date of Service: 11/26/2023     Assessment & Plan   Ge Hansen is a 38 year old male who presents on 11/26/2023 with progressively worsening redness, swelling, and pain of LLE x 4 days. He was found to have cellulitis of LLE and is being admitted for IV antibiotic therapy.    Sepsis secondary to cellulitis of left lower extremity    Meets SIRS criteria with tachycardia and leukocytosis (11.6). Afebrile with mild leukocytosis 11.6. Lactate normal. CRP elevated at 263.86 and procalcitonin elevated at 2.76. Developed redness, swelling, and pain to LLE x 4 days ago with progressively worsening symptoms. On exam there is warmth, erythema, swelling, and serosanguinous weeping fluid to the left lower extremity extending from the foot to above the knee (see picture below). Blood cultures x 2 collected and started on cefazolin 2 g q8hrs. No prior history of cellulitis or MRSA.   - Continue cefazolin 2 g q8hrs  - Bcx x 2 pending  - MRSA swab, pending    Hyponatremia    Clinically insignificant hyponatremia of 133.  - Follow with a.m. BMP    Asthma    Denies shortness of breath. He has a home albuterol inhaler that he utilizes as needed. Patient requesting albuterol inhaler while hospitalized.   - Albuterol inhaler PRN for shortness of breath.    Morbid obesity  Obesity hypoventilation    BMI 68.75. Utilizes BiPAP machine at home nightly. Unable to bring home CPAP and will place order for BiPAP machine here. Will do best to match patients home settings.  - BiPAP nightly    Tobacco abuse    Daily tobacco user. He is denying offer for nicotine patch.   - Encourage cessation    Clinically Significant Risk Factors Present on Admission          # Hypocalcemia: Lowest Ca = 8.4 mg/dL in last 2 days, will monitor and replace as appropriate              # Severe Obesity: Estimated body mass index is 68.75 kg/m  as  "calculated from the following:    Height as of this encounter: 1.905 m (6' 3\").    Weight as of this encounter: 249.5 kg (550 lb).       # Asthma: noted on problem list         Diet: Regular Diet Adult    DVT Prophylaxis: Ambulate every shift  Araya Catheter: Not present  Code Status: Full Code  Lines: PIV    Disposition Plan   Entered: AMENA CHACON PA-C 11/26/2023, 4:19 PM     I have discussed patient and formulated plan with attending hospitalist physician, Dr. Raygoza.    AMENA CHACON PA-C        Primary Care Physician   Kat Parnell [phone]    History is obtained from the patient, emergency room provider, and review of old records via the EMR.    History of Present Illness   Ge Hansen is a 38 year old male with past medical history of asthma, psoriasis, obesity hypoventilation, and morbid obesity now presents on 11/26/2023 with redness, swelling, and pain to RAMONE.      Ge states first noticing swelling and pain x 4 days ago. He denies redness until this morning when he noticed his ankle was red. This progressed rapidly up his leg extending below the knee. He also states that he can not bend his left ankle due to the swelling. He has not had anything like this in the past. Denies fever or chill. Does state that he has been feeling runned down for the last few days. He has been having some loose stools since Thanksgiving but have seemed to improve by this time with Ankit-bismol. He denies hematochezia but does state that his diarrhea has been darker since taking Pepto-bismol. No associated abdominal pain, nausea, or vomiting. He has had a decreased appetite and has stopped drinking pop and substituting for water. His appetite is intact now, as he is eating in the ER. Denies chest pain or shortness of breath. Denies alcohol use. He smoke tobacco and marijuana. He has developed a headache since being in the emergency room. Evaluation in the ER showing cellulitis of LLE. He is afebrile with " mild leukocytosis. He was started on IV antibiotics and admitted for further treatment.    Review of Systems   Review of Systems   Constitutional:  Negative for chills and fever.   Respiratory:  Negative for cough, shortness of breath and wheezing.    Cardiovascular:  Positive for leg swelling. Negative for chest pain.   Gastrointestinal:  Positive for diarrhea. Negative for abdominal pain, nausea and vomiting.   Genitourinary:  Negative for dysuria and frequency.   Neurological:  Positive for headaches.     Past Medical History    Past Medical History:   Diagnosis Date    ADHD     Asthma     Lactose intolerance     Obesity hypoventilation syndrome (H)     Sleep apnea     Tobacco use      Patient Active Problem List    Diagnosis Date Noted    Class 3 drug-induced obesity with serious comorbidity and body mass index (BMI) greater than or equal to 70 in adult (H) 01/26/2021     Priority: Medium     Starting bariatric surgery process. Needs to lose to 444lb day of surgery       Insomnia, unspecified type 01/26/2021     Priority: Medium    Plantar fasciitis 01/15/2021     Priority: Medium    Morbid obesity (H) BMI 68.34 09/13/2018     Priority: Medium    Elevated glucose 09/13/2018     Priority: Medium    Obesity hypoventilation syndrome (H) 09/13/2018     Priority: Medium     Some asthma hx too.        Tobacco use 09/13/2018     Priority: Medium    MAYDA (obstructive sleep apnea) 09/13/2018     Priority: Medium     Study Date: 10/3/2018( 520.0 lbs).  The combined apnea/hypopnea index was 134.8 events per hour (central apnea/hypopnea index was 4.1 events per hour). The REM AHI was - events per hour. The supine AHI was 128.9 events per hour. The RERA index was - events per hour. The RDI was 134.8 events per hour. Transcutaneous carbon dioxide monitoring was used, and significant hypoventilation was present with a maximum change from ~11 mmHg and 46.0 minutes at or greater than 55 mmHg. VBG was consistent with chronic  compensated respiratory acidosis (pH 7.36, pCO2 62, HCO3 35). Baseline oxygen saturation was 89.4%. Lowest oxygen saturation was 52.0%. Time spent less than or equal to 88% was 59.5 minutes. Time spent less than or equal to 89% was 65.5 minutes. The final pressure was BiLevel 23/13/0 cmH2O with an AHI of 1.7 events per hour. Time in REM supine on final pressure was 49.0 minutes.         Elevated blood pressure reading without diagnosis of hypertension 09/13/2018     Priority: Medium    Asthma 06/01/2006     Priority: Medium      Past Surgical History   Past Surgical History:   Procedure Laterality Date    ROTATOR CUFF REPAIR RT/LT        Prior to Admission Medications   Prior to Admission Medications   Prescriptions Last Dose Informant Patient Reported? Taking?   albuterol (PROAIR HFA/PROVENTIL HFA/VENTOLIN HFA) 108 (90 Base) MCG/ACT inhaler   No No   Sig: Inhale 2 puffs into the lungs every 6 hours as needed for shortness of breath / dyspnea or wheezing      Facility-Administered Medications: None     Allergies   No Known Allergies    Family History    Family History   Problem Relation Age of Onset    Obesity Sister     Depression Sister     Anxiety Disorder Sister     Mental Illness Sister     Diabetes Father         Lost a leg    Hypertension Father     Hypertension Mother     Mental Illness Mother     Hypertension Maternal Grandmother     Cerebrovascular Disease Maternal Grandmother     Osteoporosis Sister      Social History   Social History     Socioeconomic History    Marital status: Single     Spouse name: Not on file    Number of children: Not on file    Years of education: Not on file    Highest education level: Not on file   Occupational History    Not on file   Tobacco Use    Smoking status: Every Day     Packs/day: 1.00     Years: 10.00     Additional pack years: 0.00     Total pack years: 10.00     Types: Cigarettes     Start date: 7/7/1997    Smokeless tobacco: Current     Types: Chew, Snuff     "Tobacco comments:     Vaping    Substance and Sexual Activity    Alcohol use: Yes    Drug use: No    Sexual activity: Not Currently     Partners: Female     Birth control/protection: Pill, None   Other Topics Concern    Parent/sibling w/ CABG, MI or angioplasty before 65F 55M? Yes   Social History Narrative    Not on file     Social Determinants of Health     Financial Resource Strain: Not on file   Food Insecurity: Not on file   Transportation Needs: Not on file   Physical Activity: Not on file   Stress: Not on file   Social Connections: Not on file   Interpersonal Safety: Not on file   Housing Stability: Not on file     Physical Exam   BP (!) 163/88   Pulse 107   Temp 98.5  F (36.9  C) (Oral)   Resp 22   Ht 1.905 m (6' 3\")   Wt (!) 249.5 kg (550 lb)   SpO2 95%   BMI 68.75 kg/m       Weight: 550 lbs 0 oz Body mass index is 68.75 kg/m .     Constitutional: Morbidly obese male, sitting upright in bed eating meal, alert, oriented to person/place/time/event, cooperative, no apparent distress, appears nontoxic.  Eyes: Eyes are clear, pupils are reactive.  HENT: Oropharynx is clear and moist. No evidence of cranial trauma.  Lymph/Hematologic: No epitrochlear, axillary, anterior or posterior cervical, or supraclavicular lymphadenopathy is appreciated.  Cardiovascular: Mildly tachycardic and normal rhythm, normal S1 and S2, and no murmur noted. Strong and regular radial pulse.  Bilateral lower extremity edema L> R.  Respiratory: Clear to auscultation bilaterally. No wheezes, rhonchi, or crackles. Normal respiratory effort on RA, speaking in full sentences.  GI: Soft, obese, non-tender to palpation throughout, normal bowel sounds, no hepatomegaly or appreciable intraabdominal masses.   Genitourinary: Deferred  Musculoskeletal: Normal muscle bulk and tone.  Skin: There is erythema, warmth, and swelling to LLE that begins at foot and extends above knee. Tenderness to palpation. There is a faint but present pedal pulse " with appropriate capillary refill. There are scatted psoriatic plaques on bilateral lower extremities, arms, and hands.     Neurologic: Neck supple. Cranial nerves are grossly intact.  is symmetric.     Data   Data reviewed today:   Recent Labs   Lab 11/26/23  1457   WBC 11.6*   HGB 12.5*   MCV 95      *   POTASSIUM 3.8   CHLORIDE 96*   CO2 24   BUN 11.8   CR 0.84   ANIONGAP 13   JOSHUA 8.4*   GLC 91   ALBUMIN 3.6   PROTTOTAL 7.5   BILITOTAL 0.6   ALKPHOS 62   ALT 52   AST 53*     No results found for this or any previous visit (from the past 24 hour(s)).

## 2023-11-26 NOTE — DISCHARGE INSTRUCTIONS
Ge,    Thank you for speaking with me regarding your health issues, financial concerns today.  As we discussed, I am placing some resources for you to contact.      [phone]   Open Enrollment, Nov. 1 - Adán. 15  Monday-Friday: open 8 a.m. to 5 p.m.  Saturdays in November: closed  Saturday, December 2 and 9: open 9 a.m. to 1 p.m.  Sunday: closed      Financial Assistance:    Please contact [facility] to determine if you will qualify for any financial assistance such as food support, or support due to lost wages while hospitalized.    Health & Human Services  [id number] [place] [address]  Ph: [phone]    There are local food shelves and other community resources that are available to assist.  You can call 211 for additional services/supports.      [facility] - Care Transitions   Tele: [phone]

## 2023-11-26 NOTE — LETTER
[facility] INTENSIVE CARE  [address] [address]  Phone: [phone]  Fax: [phone]    December 7, 2023        Ge Hansen  [address] [place] [address]          To whom it may concern:    RE: Ge Hansen    Patient was hospitalized from 11/26/23 to 12/7/23 and missed work.  Patient may return to work 12/11/23 with the following:  minimize prolonged standing as able, needs to take breaks to elevate feet for at least 15 minutes every 3 hours.    OK to return to work without restrictions as of 12/14/23.    Please contact me for questions or concerns.      Sincerely,      Dharmesh Reyes MD

## 2023-11-26 NOTE — LETTER
[facility] INTENSIVE CARE  [address] [address]  Phone: [phone]  Fax: [phone]    November 30, 2023        Ge Hansen  [address] [place] [address]          To whom it may concern:    RE: Ge Hansen    Mr. Hansen was admitted to [facility] on November 26, 2023.  He is currently hospitalized under my care with a serious medical condition.  Due to complications during hospitalization, please extend his absence from work until December 5, 2023.    Please contact me for questions or concerns.      Sincerely,        Henry Muñiz MD

## 2023-11-26 NOTE — TELEPHONE ENCOUNTER
Triage Call:     Pt calling to report the following:     On Thursday patient developed sudden muscle spasms for 10 minutes, chills and cold sweats  He has been sleeping on and off since then  No appetite  Persistent headache    Today he is calling to report that his whole left leg is red, hot, and swollen up to his knee. He is unable to bend his ankle fully   He has a hx of athletes foot and psoriasis in that leg/foot    Disposition: See HCP within four hours. Pt was given care advice and reports that he is either going to the [facility] or ED nearby.     Reason for Disposition   [1] Thigh or calf pain AND [2] only 1 side AND [3] present > 1 hour    Additional Information   Negative: SEVERE difficulty breathing (e.g., struggling for each breath, speaks in single words)   Negative: Chest pain   Negative: Looks like a broken bone or dislocated joint (e.g., crooked or deformed)   Negative: Sounds like a life-threatening emergency to the triager   Negative: Followed a leg injury   Negative: [1] Followed an insect bite AND [2] localized swelling (e.g., small area of puffy or swollen skin)   Negative: Entire foot is cool or blue in comparison to other side   Negative: Difficulty breathing at rest   Negative: [1] Can't walk or can barely walk AND [2] new-onset   Negative: [1] Red area or streak AND [2] fever   Negative: [1] Difficulty breathing with exertion (e.g., walking) AND [2] new-onset or worsening   Negative: [1] Swelling is painful to touch AND [2] fever   Negative: [1] Cast on leg or ankle AND [2] now increased pain   Negative: Patient sounds very sick or weak to the triager   Negative: SEVERE leg swelling (e.g., swelling extends above knee, entire leg is swollen, weeping fluid)   Negative: [1] Red area or streak [2] large (> 2 in. or 5 cm)    Protocols used: Leg Swelling and Edema-NATHANAEL-MONICA Whatley RN  [facility] Nurse Advisor 11:33 AM 11/26/2023

## 2023-11-26 NOTE — ED PROVIDER NOTES
Physician Attestation     I saw and evaluated Ge Hansen as part of a shared APRN/PA visit.     I personally reviewed the vital signs, medications, and labs.    In brief, 38 year old male w/ hx of obesity w/ three days of pain and redness of LLE consistent w/ cellulitis. Subjective fever and chills. Given comorbitities recommend parental treatment and observation in hospital.     Ruperto Chong MD  Date of Service (when I saw the patient): 11/26/23       Ruperto Chong MD  11/26/23 6918

## 2023-11-27 ENCOUNTER — APPOINTMENT (OUTPATIENT)
Dept: ULTRASOUND IMAGING | Facility: CLINIC | Age: 38
End: 2023-11-27
Attending: INTERNAL MEDICINE
Payer: COMMERCIAL

## 2023-11-27 LAB
ANION GAP SERPL CALCULATED.3IONS-SCNC: 11 MMOL/L (ref 7–15)
BUN SERPL-MCNC: 11.9 MG/DL (ref 6–20)
CALCIUM SERPL-MCNC: 8 MG/DL (ref 8.6–10)
CHLORIDE SERPL-SCNC: 98 MMOL/L (ref 98–107)
CREAT SERPL-MCNC: 0.89 MG/DL (ref 0.67–1.17)
DEPRECATED HCO3 PLAS-SCNC: 24 MMOL/L (ref 22–29)
EGFRCR SERPLBLD CKD-EPI 2021: >90 ML/MIN/1.73M2
ERYTHROCYTE [DISTWIDTH] IN BLOOD BY AUTOMATED COUNT: 13 % (ref 10–15)
GLUCOSE SERPL-MCNC: 109 MG/DL (ref 70–99)
HCT VFR BLD AUTO: 35.5 % (ref 40–53)
HGB BLD-MCNC: 11.3 G/DL (ref 13.3–17.7)
MCH RBC QN AUTO: 30.8 PG (ref 26.5–33)
MCHC RBC AUTO-ENTMCNC: 31.8 G/DL (ref 31.5–36.5)
MCV RBC AUTO: 97 FL (ref 78–100)
MRSA DNA SPEC QL NAA+PROBE: NEGATIVE
PLATELET # BLD AUTO: 157 10E3/UL (ref 150–450)
POTASSIUM SERPL-SCNC: 3.9 MMOL/L (ref 3.4–5.3)
RBC # BLD AUTO: 3.67 10E6/UL (ref 4.4–5.9)
SA TARGET DNA: POSITIVE
SODIUM SERPL-SCNC: 133 MMOL/L (ref 135–145)
WBC # BLD AUTO: 11.9 10E3/UL (ref 4–11)

## 2023-11-27 PROCEDURE — 250N000013 HC RX MED GY IP 250 OP 250 PS 637: Performed by: INTERNAL MEDICINE

## 2023-11-27 PROCEDURE — 36415 COLL VENOUS BLD VENIPUNCTURE: CPT

## 2023-11-27 PROCEDURE — 999N000157 HC STATISTIC RCP TIME EA 10 MIN

## 2023-11-27 PROCEDURE — 250N000013 HC RX MED GY IP 250 OP 250 PS 637: Performed by: NURSE PRACTITIONER

## 2023-11-27 PROCEDURE — G0378 HOSPITAL OBSERVATION PER HR: HCPCS

## 2023-11-27 PROCEDURE — 96376 TX/PRO/DX INJ SAME DRUG ADON: CPT

## 2023-11-27 PROCEDURE — 250N000011 HC RX IP 250 OP 636: Mod: JZ | Performed by: NURSE PRACTITIONER

## 2023-11-27 PROCEDURE — 99232 SBSQ HOSP IP/OBS MODERATE 35: CPT | Mod: FS | Performed by: INTERNAL MEDICINE

## 2023-11-27 PROCEDURE — 85027 COMPLETE CBC AUTOMATED: CPT

## 2023-11-27 PROCEDURE — 94660 CPAP INITIATION&MGMT: CPT

## 2023-11-27 PROCEDURE — 82310 ASSAY OF CALCIUM: CPT

## 2023-11-27 PROCEDURE — 250N000013 HC RX MED GY IP 250 OP 250 PS 637

## 2023-11-27 PROCEDURE — 99207 PR APP CREDIT; MD BILLING SHARED VISIT: CPT

## 2023-11-27 PROCEDURE — 93971 EXTREMITY STUDY: CPT | Mod: LT

## 2023-11-27 RX ORDER — NALOXONE HYDROCHLORIDE 0.4 MG/ML
0.4 INJECTION, SOLUTION INTRAMUSCULAR; INTRAVENOUS; SUBCUTANEOUS
Status: DISCONTINUED | OUTPATIENT
Start: 2023-11-27 | End: 2023-12-07 | Stop reason: HOSPADM

## 2023-11-27 RX ORDER — NALOXONE HYDROCHLORIDE 0.4 MG/ML
0.2 INJECTION, SOLUTION INTRAMUSCULAR; INTRAVENOUS; SUBCUTANEOUS
Status: DISCONTINUED | OUTPATIENT
Start: 2023-11-27 | End: 2023-12-07 | Stop reason: HOSPADM

## 2023-11-27 RX ORDER — IBUPROFEN 400 MG/1
400 TABLET, FILM COATED ORAL EVERY 6 HOURS PRN
Status: DISCONTINUED | OUTPATIENT
Start: 2023-11-27 | End: 2023-12-07 | Stop reason: HOSPADM

## 2023-11-27 RX ORDER — NICOTINE 21 MG/24HR
1 PATCH, TRANSDERMAL 24 HOURS TRANSDERMAL DAILY
Status: DISCONTINUED | OUTPATIENT
Start: 2023-11-27 | End: 2023-12-07 | Stop reason: HOSPADM

## 2023-11-27 RX ADMIN — ACETAMINOPHEN 650 MG: 325 TABLET, FILM COATED ORAL at 11:13

## 2023-11-27 RX ADMIN — OXYCODONE HYDROCHLORIDE 5 MG: 5 TABLET ORAL at 01:02

## 2023-11-27 RX ADMIN — CEFAZOLIN SODIUM 2 G: 2 INJECTION, SOLUTION INTRAVENOUS at 01:02

## 2023-11-27 RX ADMIN — IBUPROFEN 400 MG: 400 TABLET ORAL at 22:40

## 2023-11-27 RX ADMIN — CEFAZOLIN SODIUM 2 G: 2 INJECTION, SOLUTION INTRAVENOUS at 23:49

## 2023-11-27 RX ADMIN — ACETAMINOPHEN 650 MG: 325 TABLET, FILM COATED ORAL at 20:53

## 2023-11-27 RX ADMIN — ALBUTEROL SULFATE 2 PUFF: 90 AEROSOL, METERED RESPIRATORY (INHALATION) at 18:20

## 2023-11-27 RX ADMIN — CEFAZOLIN SODIUM 2 G: 2 INJECTION, SOLUTION INTRAVENOUS at 16:40

## 2023-11-27 RX ADMIN — CEFAZOLIN SODIUM 2 G: 2 INJECTION, SOLUTION INTRAVENOUS at 07:40

## 2023-11-27 RX ADMIN — ALBUTEROL SULFATE 2 PUFF: 90 AEROSOL, METERED RESPIRATORY (INHALATION) at 01:50

## 2023-11-27 ASSESSMENT — ACTIVITIES OF DAILY LIVING (ADL)
ADLS_ACUITY_SCORE: 21
ADLS_ACUITY_SCORE: 18
ADLS_ACUITY_SCORE: 18
ADLS_ACUITY_SCORE: 21
ADLS_ACUITY_SCORE: 18
ADLS_ACUITY_SCORE: 18
ADLS_ACUITY_SCORE: 21
ADLS_ACUITY_SCORE: 18
ADLS_ACUITY_SCORE: 21
ADLS_ACUITY_SCORE: 21
ADLS_ACUITY_SCORE: 20
ADLS_ACUITY_SCORE: 21

## 2023-11-27 NOTE — ED NOTES
Pt walked to toilet from room 7 with ERT walking behind pt with wheelchair.  Writer encouraged pt to use wheelchair and pt continued to refuse wheelchair transport to bathroom.   Pt did report feeling lightheaded once in bathroom with ERT but with without incident.  Pt did get help in bathroom with 2 ERT's and wheelchair transport back to room.

## 2023-11-27 NOTE — PROGRESS NOTES
Patient cont on V60 Bipap 18/8, 21%.  Patient appeared to tolerate well when checked on. No changes made. Will encourage patient to make arrangements to have home Bipap brought in.

## 2023-11-27 NOTE — PLAN OF CARE
Goal Outcome Evaluation:       Pt up and ambulating with walker to bathroom. L leg remains red but pt feels and thinks it is better than yesterday. Pt has no other new concerns. Pt transferring down to ICU to use Bipap, is unable to get own machine in until tomorrow.

## 2023-11-27 NOTE — PROGRESS NOTES
"[facility] ADMISSION NOTE    Patient admitted to room 317 at approximately 10pm via bed from emergency room. Patient was accompanied by transport tech.     Verbal SBAR report received from YAAKOV Mcmanus prior to patient arrival.     Patient ambulated to bed with stand-by assist. Patient alert and oriented X 3. Pain is not well controlled.  Medication(s) being used: acetaminophen and narcotic analgesics including oxycodone (Oxycontin, Oxyir).  . Admission vital signs: Blood pressure 119/66, pulse 102, temperature 99.3  F (37.4  C), temperature source Oral, resp. rate 16, height 1.905 m (6' 3\"), weight (!) 249.5 kg (550 lb), SpO2 92%. Patient was oriented to plan of care, call light, bed controls, tv, telephone, bathroom, and visiting hours.     Risk Assessment    The following safety risks were identified during admission: fall and skin. Yellow risk band applied: YES.     Skin Initial Assessment    This writer admitted this patient and completed a full skin assessment and Santy score in the Adult PCS flowsheet. Appropriate interventions initiated as needed.     Patient refused skin check.         Education    Patient has a [place] to Observation order: Yes  Observation education completed and documented: Yes      Janee Pfeiffer RN      "

## 2023-11-27 NOTE — PROGRESS NOTES
Patient unable to have home Bipap brought in.  Patient set up on hospital V60.  Patient said Bipap pressures @ home 25/8.  Patient felt this was too high on V60.  Current settings are 18/8, 21%.  Patient felt this was comfortable for him. Will check Q4 hours or as needed.

## 2023-11-27 NOTE — PROGRESS NOTES
[facility] TRANSPORT NOTE  Data:   Reason for Transport:  Bipap in ICU    Ge Hansen was transported to ICU via wheel chair at 1510.  Patient was accompanied by Registered Nurse. Equipment used for transport: None. Family was aware of reason for transport: yes    Action:  Report: given to Lucille    Response:  Patient's condition when transferred off unit was stable.    Lonnie Kirkpatrick RN

## 2023-11-27 NOTE — PROGRESS NOTES
[facility] Medicine Progress Note  Date of Service: 11/27/2023    Assessment & Plan   Ge Hansen is a 38 year old male who presents on 11/26/2023 with progressively worsening redness, swelling, and pain of LLE x 4 days. He was found to have cellulitis of LLE and is being admitted for IV antibiotic therapy.    Sepsis secondary to cellulitis of left lower extremity    Meets SIRS criteria with tachycardia and leukocytosis (11.6). Afebrile with mild leukocytosis 11.6 ?  11.9. Lactate normal. CRP elevated at 263.86 and procalcitonin elevated at 2.76. Developed redness, swelling, and pain to LLE x 4 days ago with progressively worsening symptoms. On exam there is warmth, erythema, swelling, and serosanguinous weeping fluid to the left lower extremity extending from the foot to above the knee (see picture below). Blood cultures x 2 collected and started on cefazolin 2 g q8hrs. No prior history of cellulitis or MRSA.      Patient is adamant on leaving today, but is willing to stay until tomorrow. He feels the swelling is better, but he hates being in the hospital and using a BiPAP that's not his own. He does not want to see Jovi from RT anymore.  - Continue Cefazolin 2 g q8hrs  - Bcx x 2 NGTD  - MRSA swab negative    Hyponatremia    Clinically insignificant hyponatremia of 133 ?  133.  - Follow with a.m. BMP    Asthma    Denies shortness of breath. He has a home albuterol inhaler that he utilizes as needed. Patient requesting albuterol inhaler while hospitalized.   - Albuterol inhaler PRN for shortness of breath.    Morbid obesity  Obesity hypoventilation    BMI 68.75. Utilizes BiPAP machine at home nightly. Unable to bring home CPAP and will place order for BiPAP machine here. Will do best to match patients home settings.  - BiPAP nightly    Tobacco abuse    Daily tobacco user.   - Encourage cessation  - Nicotine patch 14mg         Diet: Regular Diet Adult    DVT Prophylaxis:  "Ambulate every shift  Araya Catheter: Not present  Lines: None     Code Status: Full Code       Disposition: Anticipate discharge 11/28/23    Attestation:  I have reviewed today's vital signs, notes, medications, labs and imaging.    I have discussed, or will be discussing, the patient with hospitalist physician, Dr. Muñiz.    Mitchell Sosa PA-C     Interval History   NAEO. Patient had difficulty sleeping last night due to an unfamiliar BiPAP machine, and states the \"leoncio who runs it\" made him feel bad about having to use it. He's adamant on leaving today, but agreed to stay until tomorrow.    He feels the swelling is better, the pain is 1/10 when not moving. He's able to move his ankle. He does not want Oxycodone on discharge. He has not had a bowel movement since arriving to the hospital.    He denies chest pain, SOB, n/v/d/c, worsening LLE pain/swelling, headache, cough, wheeze.     Physical Exam   Temp:  [98.5  F (36.9  C)-99.7  F (37.6  C)] 99.2  F (37.3  C)  Pulse:  [] 97  Resp:  [16-22] 20  BP: (119-167)/(66-88) 167/73  SpO2:  [92 %-97 %] 93 %    Weights:   Vitals:    11/26/23 1327 11/27/23 0500   Weight: (!) 249.5 kg (550 lb) (!) 265.4 kg (585 lb)    Body mass index is 73.12 kg/m .    Constitutional: Alert, oriented, cooperative, in no acute distress, appears nontoxic.  HENT: No evidence of cranial trauma. Normocephalic. Eyes anicteric. EOM intact.  Cardiovascular: Regular rate and rhythm, normal S1 and S2, and no murmur noted. Good peripheral pulses in wrists bilaterally.  Respiratory: Clear to auscultation bilaterally with no adventitious breath sounds.   GI: Obese, soft, non-tender, normal bowel sounds, no hepatomegaly, no masses.  Musculoskeletal: Normal muscle bulk and tone. FROM in all extremities   Skin: Erythema and swelling to LLE not extending past previously drawn margins. Erythema appears to be worse from previous image. TTP. Faint but present DP pulse. No drainage. Stasis dermatitis in " right ankle.    Neurologic: Cranial nerves 2-12 are grossly intact. Sensation intact in feet bilaterally.      Data   Recent Labs   Lab 11/27/23  0525 11/26/23  1457   WBC 11.9* 11.6*   HGB 11.3* 12.5*   MCV 97 95    154   * 133*   POTASSIUM 3.9 3.8   CHLORIDE 98 96*   CO2 24 24   BUN 11.9 11.8   CR 0.89 0.84   ANIONGAP 11 13   JOSHUA 8.0* 8.4*   * 91   ALBUMIN  --  3.6   PROTTOTAL  --  7.5   BILITOTAL  --  0.6   ALKPHOS  --  62   ALT  --  52   AST  --  53*       Recent Labs   Lab 11/27/23  0525 11/26/23  1457   * 91        Unresulted Labs Ordered in the Past 30 Days of this Admission       Date and Time Order Name Status Description    11/26/2023  3:00 PM Blood Culture Line, venous In process     11/26/2023  3:00 PM Blood Culture Line, venous In process              Imaging  No results found for this or any previous visit (from the past 24 hour(s)).     I reviewed all new labs and imaging results over the last 24 hours. I personally reviewed no images or EKG's today.    Medications      ceFAZolin  2 g Intravenous Q8H    nicotine  1 patch Transdermal Daily    nicotine   Transdermal Q8H     Mitchell Sosa PA-C

## 2023-11-27 NOTE — PROGRESS NOTES
Dr. Hayward paged for nicotine gum and patches ordered, resent page for nicotine gum instead as pt is a cigarette smoker and chewing tobacco.

## 2023-11-28 LAB
ANION GAP SERPL CALCULATED.3IONS-SCNC: 12 MMOL/L (ref 7–15)
BUN SERPL-MCNC: 10.3 MG/DL (ref 6–20)
CALCIUM SERPL-MCNC: 8.1 MG/DL (ref 8.6–10)
CHLORIDE SERPL-SCNC: 98 MMOL/L (ref 98–107)
CREAT SERPL-MCNC: 0.77 MG/DL (ref 0.67–1.17)
DEPRECATED HCO3 PLAS-SCNC: 27 MMOL/L (ref 22–29)
EGFRCR SERPLBLD CKD-EPI 2021: >90 ML/MIN/1.73M2
ERYTHROCYTE [DISTWIDTH] IN BLOOD BY AUTOMATED COUNT: 12.9 % (ref 10–15)
GLUCOSE SERPL-MCNC: 99 MG/DL (ref 70–99)
HCT VFR BLD AUTO: 34.5 % (ref 40–53)
HGB BLD-MCNC: 11 G/DL (ref 13.3–17.7)
MCH RBC QN AUTO: 30.7 PG (ref 26.5–33)
MCHC RBC AUTO-ENTMCNC: 31.9 G/DL (ref 31.5–36.5)
MCV RBC AUTO: 96 FL (ref 78–100)
PLATELET # BLD AUTO: 144 10E3/UL (ref 150–450)
POTASSIUM SERPL-SCNC: 3.9 MMOL/L (ref 3.4–5.3)
RBC # BLD AUTO: 3.58 10E6/UL (ref 4.4–5.9)
SODIUM SERPL-SCNC: 137 MMOL/L (ref 135–145)
WBC # BLD AUTO: 12.8 10E3/UL (ref 4–11)

## 2023-11-28 PROCEDURE — 250N000011 HC RX IP 250 OP 636: Mod: JZ | Performed by: NURSE PRACTITIONER

## 2023-11-28 PROCEDURE — 250N000013 HC RX MED GY IP 250 OP 250 PS 637

## 2023-11-28 PROCEDURE — G0378 HOSPITAL OBSERVATION PER HR: HCPCS

## 2023-11-28 PROCEDURE — 250N000011 HC RX IP 250 OP 636: Mod: JZ | Performed by: INTERNAL MEDICINE

## 2023-11-28 PROCEDURE — 85027 COMPLETE CBC AUTOMATED: CPT

## 2023-11-28 PROCEDURE — 96376 TX/PRO/DX INJ SAME DRUG ADON: CPT

## 2023-11-28 PROCEDURE — 99232 SBSQ HOSP IP/OBS MODERATE 35: CPT | Mod: FS | Performed by: INTERNAL MEDICINE

## 2023-11-28 PROCEDURE — 250N000011 HC RX IP 250 OP 636: Mod: JZ

## 2023-11-28 PROCEDURE — 36415 COLL VENOUS BLD VENIPUNCTURE: CPT

## 2023-11-28 PROCEDURE — 99207 PR APP CREDIT; MD BILLING SHARED VISIT: CPT

## 2023-11-28 PROCEDURE — 120N000004 HC R&B MS OVERFLOW

## 2023-11-28 PROCEDURE — 80048 BASIC METABOLIC PNL TOTAL CA: CPT

## 2023-11-28 RX ORDER — HYDRALAZINE HYDROCHLORIDE 20 MG/ML
10 INJECTION INTRAMUSCULAR; INTRAVENOUS EVERY 4 HOURS PRN
Status: DISCONTINUED | OUTPATIENT
Start: 2023-11-28 | End: 2023-12-07 | Stop reason: HOSPADM

## 2023-11-28 RX ORDER — AMPICILLIN AND SULBACTAM 1; .5 G/1; G/1
1.5 INJECTION, POWDER, FOR SOLUTION INTRAMUSCULAR; INTRAVENOUS EVERY 6 HOURS
Status: DISCONTINUED | OUTPATIENT
Start: 2023-11-28 | End: 2023-11-28

## 2023-11-28 RX ORDER — AMPICILLIN AND SULBACTAM 2; 1 G/1; G/1
3 INJECTION, POWDER, FOR SOLUTION INTRAMUSCULAR; INTRAVENOUS EVERY 6 HOURS
Status: DISCONTINUED | OUTPATIENT
Start: 2023-11-28 | End: 2023-12-07 | Stop reason: HOSPADM

## 2023-11-28 RX ORDER — LABETALOL HYDROCHLORIDE 5 MG/ML
10 INJECTION, SOLUTION INTRAVENOUS
Status: DISCONTINUED | OUTPATIENT
Start: 2023-11-28 | End: 2023-12-07 | Stop reason: HOSPADM

## 2023-11-28 RX ADMIN — SENNOSIDES AND DOCUSATE SODIUM 1 TABLET: 8.6; 5 TABLET ORAL at 20:22

## 2023-11-28 RX ADMIN — NICOTINE POLACRILEX 2 MG: 2 GUM, CHEWING BUCCAL at 11:50

## 2023-11-28 RX ADMIN — AMPICILLIN SODIUM AND SULBACTAM SODIUM 3 G: 2; 1 INJECTION, POWDER, FOR SOLUTION INTRAMUSCULAR; INTRAVENOUS at 21:39

## 2023-11-28 RX ADMIN — OXYCODONE HYDROCHLORIDE 5 MG: 5 TABLET ORAL at 20:22

## 2023-11-28 RX ADMIN — HYDRALAZINE HYDROCHLORIDE 10 MG: 20 INJECTION, SOLUTION INTRAMUSCULAR; INTRAVENOUS at 12:34

## 2023-11-28 RX ADMIN — ACETAMINOPHEN 650 MG: 325 TABLET, FILM COATED ORAL at 20:22

## 2023-11-28 RX ADMIN — ALBUTEROL SULFATE 2 PUFF: 90 AEROSOL, METERED RESPIRATORY (INHALATION) at 20:34

## 2023-11-28 RX ADMIN — AMPICILLIN SODIUM AND SULBACTAM SODIUM 3 G: 2; 1 INJECTION, POWDER, FOR SOLUTION INTRAMUSCULAR; INTRAVENOUS at 16:13

## 2023-11-28 RX ADMIN — ACETAMINOPHEN 650 MG: 325 TABLET, FILM COATED ORAL at 16:13

## 2023-11-28 RX ADMIN — CEFAZOLIN SODIUM 2 G: 2 INJECTION, SOLUTION INTRAVENOUS at 07:40

## 2023-11-28 RX ADMIN — AMPICILLIN SODIUM AND SULBACTAM SODIUM 3 G: 2; 1 INJECTION, POWDER, FOR SOLUTION INTRAMUSCULAR; INTRAVENOUS at 11:26

## 2023-11-28 RX ADMIN — ALBUTEROL SULFATE 2 PUFF: 90 AEROSOL, METERED RESPIRATORY (INHALATION) at 11:49

## 2023-11-28 ASSESSMENT — ACTIVITIES OF DAILY LIVING (ADL)
ADLS_ACUITY_SCORE: 20
ADLS_ACUITY_SCORE: 21
ADLS_ACUITY_SCORE: 20
ADLS_ACUITY_SCORE: 20
ADLS_ACUITY_SCORE: 21

## 2023-11-28 NOTE — PROGRESS NOTES
[facility] Medicine Progress Note  Date of Service: 11/28/2023    Assessment & Plan   Ge Hansen is a 38 year old male who presents on 11/26/2023 with progressively worsening redness, swelling, and pain of LLE x 4 days. He was found to have cellulitis of LLE and is being admitted for IV antibiotic therapy.    Sepsis secondary to cellulitis of left lower extremity    Meets SIRS criteria with tachycardia and leukocytosis (11.6). Afebrile with mild leukocytosis 11.6 ?  11.9. Lactate normal. CRP elevated at 263.86 and procalcitonin elevated at 2.76. Developed redness, swelling, and pain to LLE x 4 days ago with progressively worsening symptoms. On exam there is warmth, erythema, swelling, and serosanguinous weeping fluid to the left lower extremity extending from the foot to above the knee (see picture below). Blood cultures x 2 collected and started on cefazolin 2 g q8hrs. No prior history of cellulitis or MRSA.      Febrile to 100.9 once last night. He states his leg feels better, but on exam erythema is darker, swelling has caused excoriations, and it's not receding (see pictures). His WBC is also rising to 12.8. Will broaden antibiotics.  - Stop Cefazolin (11/26 - 11/28)  - Start Unasyn 3g Q6h (11/28 - )  - Bcx x 2 NGTD    Hyponatremia, resolved    Clinically insignificant hyponatremia of 133 ?  133 ?  137.  - Follow with a.m. BMP    Asthma    Denies shortness of breath. He has a home albuterol inhaler that he utilizes as needed. Patient requesting albuterol inhaler while hospitalized.   - Albuterol inhaler PRN for shortness of breath.    Morbid obesity  Obesity hypoventilation    BMI 68.75. Utilizes BiPAP machine at home nightly. Unable to bring home CPAP and will place order for BiPAP machine here. Will do best to match patients home settings.  - BiPAP nightly    Tobacco abuse    Daily tobacco user.   - Encourage cessation  - Nicotine patch 14mg         Diet: Regular Diet  Adult    DVT Prophylaxis: Ambulate every shift  Araya Catheter: Not present  Lines: None     Code Status: Full Code             Diet: Regular Diet Adult    DVT Prophylaxis: Ambulate every shift  Araya Catheter: Not present  Lines: Peripheral IV     Code Status: Full Code       Disposition: Anticipate discharge 11/30/23     Attestation:  I have reviewed today's vital signs, notes, medications, labs and imaging.    I have discussed, or will be discussing, the patient with hospitalist physician, Dr. Muñiz.    Mitchell Sosa PA-C     Interval History   Spiked a fever to 100.9F overnight. States he has constant chills/sweats, but he thinks his leg is getting better. Endorses frequent urination Q2h and it's dark and odorous. His BiPAP from home was brought today. He's not sleeping well in the hospital and wants to go home, but the importance of staying was stressed to him. He showered this morning.     Denies chest pain, SOB, abdominal pain, dysuria, anorexia, n/v/d/c, worsening leg pain, worsening leg swelling.    Physical Exam   Temp:  [97.7  F (36.5  C)-100.9  F (38.3  C)] 97.8  F (36.6  C)  Pulse:  [] 95  Resp:  [11-28] 11  BP: (149-174)/(81-95) 149/95  FiO2 (%):  [21 %] 21 %  SpO2:  [94 %-100 %] 96 %    Weights:   Vitals:    11/26/23 1327 11/27/23 0500   Weight: (!) 249.5 kg (550 lb) (!) 265.4 kg (585 lb)    Body mass index is 73.12 kg/m .    Constitutional: Alert, oriented, cooperative, in no acute distress, appears nontoxic.  HENT: No evidence of cranial trauma. Normocephalic. Eyes anicteric. EOM intact.  Cardiovascular: Regular rate and rhythm, normal S1 and S2, and no murmur noted. Good peripheral pulses in wrists bilaterally.   Respiratory: Clear to auscultation bilaterally with no adventitious breath sounds.   GI: Obese, soft, non-tender, normal bowel sounds, no hepatomegaly, no masses.  Musculoskeletal: Normal muscle bulk and tone. FROM in all extremities   Skin: Darker erythema to left leg still not  extending past previously drawn margins. Increased swelling. Excoriations. Blister near lateral knee. TTP. Faint but present DP pulse. Stasis dermatitis in right ankle.           Neurologic: Cranial nerves 2-12 are grossly intact. Sensation in feet intact bilaterally.      Data   Recent Labs   Lab 11/28/23 0634 11/27/23 0525 11/26/23  1457   WBC 12.8* 11.9* 11.6*   HGB 11.0* 11.3* 12.5*   MCV 96 97 95   * 157 154    133* 133*   POTASSIUM 3.9 3.9 3.8   CHLORIDE 98 98 96*   CO2 27 24 24   BUN 10.3 11.9 11.8   CR 0.77 0.89 0.84   ANIONGAP 12 11 13   JOSHUA 8.1* 8.0* 8.4*   GLC 99 109* 91   ALBUMIN  --   --  3.6   PROTTOTAL  --   --  7.5   BILITOTAL  --   --  0.6   ALKPHOS  --   --  62   ALT  --   --  52   AST  --   --  53*       Recent Labs   Lab 11/28/23 0634 11/27/23 0525 11/26/23  1457   GLC 99 109* 91        Unresulted Labs Ordered in the Past 30 Days of this Admission       Date and Time Order Name Status Description    11/26/2023  3:00 PM Blood Culture Line, venous Preliminary     11/26/2023  3:00 PM Blood Culture Line, venous Preliminary              Imaging  Recent Results (from the past 24 hour(s))   US Lower Extremity Venous Duplex Left    Narrative    EXAM: US LOWER EXTREMITY VENOUS DUPLEX LEFT  LOCATION: [facility]  DATE: 11/27/2023    INDICATION: redness and swelling  COMPARISON: None.  TECHNIQUE: Venous Duplex ultrasound of the left lower extremity with and without compression, augmentation and duplex. Color flow and spectral Doppler with waveform analysis performed.    FINDINGS: Exam includes the common femoral, femoral, popliteal, and contralateral common femoral veins as well as segmentally visualized deep calf veins and greater saphenous vein.     LEFT: No deep vein thrombosis. No superficial thrombophlebitis. No popliteal cyst.      Impression    IMPRESSION:  1.  No deep venous thrombosis in the left lower extremity.        I reviewed all new labs and imaging  results over the last 24 hours. I personally reviewed no images or EKG's today.    Medications      ceFAZolin  2 g Intravenous Q8H    nicotine  1 patch Transdermal Daily    nicotine   Transdermal Q8H     Mitchell Sosa PA-C

## 2023-11-28 NOTE — PROGRESS NOTES
Pt stable during shift - eating & drinking well, up independent in bathroom to void (requests privacy).  States LLE redness & discomfort slightly improved from admission.  Hypertensive - order obtained for prn bp meds, improved after administration.

## 2023-11-28 NOTE — PLAN OF CARE
End Of Shift Note    Situation: Cellulitis LLL     Plan: ABX, pain management     Subjective/Objective:    Neuro: A&O, up independent with walker, calls appropriately    Cardiac: SR, fever of 100.6- Tylenol/Ibuprofen given     Resp: RA, BIPAP at night    GI/: Regular, BR voids    Endo: NA    MSK: Weakness due to LLL swelling and tenderness    Skin: LLL Cellulitis outlined, no progression outside of lines, red and teder    LDAs: Right PIV SL - ABX

## 2023-11-28 NOTE — PLAN OF CARE
End Of Shift Note    Plan: IV antibiotics.    Subjective/Objective:    Neuro: pt alert and oriented x3.    Cardiac: pt SR. Blood pressure elevated not requiring prn.    Resp: pt on RA. BIPAP for sleeping. Pt states he can get his home unit tomorrow.    GI/: WNL.    MSK: generalized weakness noted. Pt using cane, refuses gait belt.    Skin: both lower legs are scaly and discolored. Left lower leg is very red, swollen and warm to touch. Pt refused full skin assessment.     LDAs: PIV x1 S/L.        Problem: Infection  Goal: Absence of Infection Signs and Symptoms  Outcome: Not Progressing   Goal Outcome Evaluation:       Pt reports left lower leg is more swollen, red and painful today.

## 2023-11-29 ENCOUNTER — APPOINTMENT (OUTPATIENT)
Dept: GENERAL RADIOLOGY | Facility: CLINIC | Age: 38
End: 2023-11-29
Payer: COMMERCIAL

## 2023-11-29 ENCOUNTER — APPOINTMENT (OUTPATIENT)
Dept: GENERAL RADIOLOGY | Facility: CLINIC | Age: 38
End: 2023-11-29
Attending: PHYSICIAN ASSISTANT
Payer: COMMERCIAL

## 2023-11-29 LAB
ERYTHROCYTE [DISTWIDTH] IN BLOOD BY AUTOMATED COUNT: 13.2 % (ref 10–15)
HCT VFR BLD AUTO: 36.8 % (ref 40–53)
HGB BLD-MCNC: 11.6 G/DL (ref 13.3–17.7)
MCH RBC QN AUTO: 30.5 PG (ref 26.5–33)
MCHC RBC AUTO-ENTMCNC: 31.5 G/DL (ref 31.5–36.5)
MCV RBC AUTO: 97 FL (ref 78–100)
PLATELET # BLD AUTO: 193 10E3/UL (ref 150–450)
RBC # BLD AUTO: 3.8 10E6/UL (ref 4.4–5.9)
WBC # BLD AUTO: 14.4 10E3/UL (ref 4–11)

## 2023-11-29 PROCEDURE — 250N000013 HC RX MED GY IP 250 OP 250 PS 637

## 2023-11-29 PROCEDURE — 85027 COMPLETE CBC AUTOMATED: CPT

## 2023-11-29 PROCEDURE — 250N000011 HC RX IP 250 OP 636: Mod: JZ

## 2023-11-29 PROCEDURE — 36415 COLL VENOUS BLD VENIPUNCTURE: CPT

## 2023-11-29 PROCEDURE — 120N000004 HC R&B MS OVERFLOW

## 2023-11-29 PROCEDURE — 73620 X-RAY EXAM OF FOOT: CPT | Mod: LT

## 2023-11-29 PROCEDURE — 99232 SBSQ HOSP IP/OBS MODERATE 35: CPT | Mod: FS | Performed by: INTERNAL MEDICINE

## 2023-11-29 PROCEDURE — 99207 PR APP CREDIT; MD BILLING SHARED VISIT: CPT

## 2023-11-29 PROCEDURE — 73590 X-RAY EXAM OF LOWER LEG: CPT | Mod: LT

## 2023-11-29 PROCEDURE — 250N000011 HC RX IP 250 OP 636: Performed by: INTERNAL MEDICINE

## 2023-11-29 PROCEDURE — 258N000003 HC RX IP 258 OP 636: Performed by: INTERNAL MEDICINE

## 2023-11-29 PROCEDURE — 73610 X-RAY EXAM OF ANKLE: CPT | Mod: LT

## 2023-11-29 RX ORDER — POLYETHYLENE GLYCOL 3350 17 G/17G
17 POWDER, FOR SOLUTION ORAL DAILY PRN
Status: DISCONTINUED | OUTPATIENT
Start: 2023-11-29 | End: 2023-12-07 | Stop reason: HOSPADM

## 2023-11-29 RX ORDER — CEFAZOLIN SODIUM 1 G/50ML
1250 SOLUTION INTRAVENOUS EVERY 12 HOURS
Status: DISCONTINUED | OUTPATIENT
Start: 2023-11-29 | End: 2023-11-30

## 2023-11-29 RX ADMIN — VANCOMYCIN HYDROCHLORIDE 1250 MG: 5 INJECTION, POWDER, LYOPHILIZED, FOR SOLUTION INTRAVENOUS at 20:30

## 2023-11-29 RX ADMIN — AMPICILLIN SODIUM AND SULBACTAM SODIUM 3 G: 2; 1 INJECTION, POWDER, FOR SOLUTION INTRAMUSCULAR; INTRAVENOUS at 22:32

## 2023-11-29 RX ADMIN — IBUPROFEN 400 MG: 400 TABLET ORAL at 08:47

## 2023-11-29 RX ADMIN — ACETAMINOPHEN 650 MG: 325 TABLET, FILM COATED ORAL at 22:46

## 2023-11-29 RX ADMIN — ALBUTEROL SULFATE 2 PUFF: 90 AEROSOL, METERED RESPIRATORY (INHALATION) at 08:28

## 2023-11-29 RX ADMIN — NICOTINE POLACRILEX 4 MG: 4 GUM, CHEWING BUCCAL at 15:59

## 2023-11-29 RX ADMIN — AMPICILLIN SODIUM AND SULBACTAM SODIUM 3 G: 2; 1 INJECTION, POWDER, FOR SOLUTION INTRAMUSCULAR; INTRAVENOUS at 03:55

## 2023-11-29 RX ADMIN — VANCOMYCIN HYDROCHLORIDE 1250 MG: 5 INJECTION, POWDER, LYOPHILIZED, FOR SOLUTION INTRAVENOUS at 08:47

## 2023-11-29 RX ADMIN — OXYCODONE HYDROCHLORIDE 5 MG: 5 TABLET ORAL at 22:47

## 2023-11-29 RX ADMIN — NICOTINE 1 PATCH: 14 PATCH, EXTENDED RELEASE TRANSDERMAL at 08:27

## 2023-11-29 RX ADMIN — NICOTINE POLACRILEX 2 MG: 2 GUM, CHEWING BUCCAL at 12:55

## 2023-11-29 RX ADMIN — AMPICILLIN SODIUM AND SULBACTAM SODIUM 3 G: 2; 1 INJECTION, POWDER, FOR SOLUTION INTRAMUSCULAR; INTRAVENOUS at 16:02

## 2023-11-29 RX ADMIN — OXYCODONE HYDROCHLORIDE 5 MG: 5 TABLET ORAL at 00:15

## 2023-11-29 RX ADMIN — ACETAMINOPHEN 650 MG: 325 TABLET, FILM COATED ORAL at 08:47

## 2023-11-29 RX ADMIN — SENNOSIDES AND DOCUSATE SODIUM 2 TABLET: 8.6; 5 TABLET ORAL at 08:47

## 2023-11-29 RX ADMIN — IBUPROFEN 400 MG: 400 TABLET ORAL at 22:47

## 2023-11-29 RX ADMIN — ALBUTEROL SULFATE 2 PUFF: 90 AEROSOL, METERED RESPIRATORY (INHALATION) at 00:18

## 2023-11-29 RX ADMIN — AMPICILLIN SODIUM AND SULBACTAM SODIUM 3 G: 2; 1 INJECTION, POWDER, FOR SOLUTION INTRAMUSCULAR; INTRAVENOUS at 11:03

## 2023-11-29 RX ADMIN — OXYCODONE HYDROCHLORIDE 5 MG: 5 TABLET ORAL at 08:47

## 2023-11-29 RX ADMIN — IBUPROFEN 400 MG: 400 TABLET ORAL at 00:15

## 2023-11-29 ASSESSMENT — ACTIVITIES OF DAILY LIVING (ADL)
ADLS_ACUITY_SCORE: 21

## 2023-11-29 NOTE — PHARMACY-VANCOMYCIN DOSING SERVICE
Pharmacy Vancomycin Initial Note  Date of Service 2023  Patient's  1985  38 year old, male    Indication: Skin and Soft Tissue Infection    Current estimated CrCl = Estimated Creatinine Clearance: 294.3 mL/min (based on SCr of 0.77 mg/dL).    Creatinine for last 3 days  2023:  2:57 PM Creatinine 0.84 mg/dL  2023:  5:25 AM Creatinine 0.89 mg/dL  2023:  6:34 AM Creatinine 0.77 mg/dL    Recent Vancomycin Level(s) for last 3 days  No results found for requested labs within last 3 days.      Vancomycin IV Administrations (past 72 hours)        No vancomycin orders with administrations in past 72 hours.                    Nephrotoxins and other renal medications (From now, onward)      Start     Dose/Rate Route Frequency Ordered Stop    23 0900  vancomycin (VANCOCIN) 1,250 mg in sodium chloride 0.9 % 250 mL intermittent infusion         1,250 mg  over 90 Minutes Intravenous EVERY 12 HOURS 23 0832      23 1000  ampicillin-sulbactam (UNASYN) 3 g vial to attach to  mL bag         3 g  over 15-30 Minutes Intravenous EVERY 6 HOURS 23 0921      23 2221  ibuprofen (ADVIL/MOTRIN) tablet 400 mg         400 mg Oral EVERY 6 HOURS PRN 23 2221              Contrast Orders - past 72 hours (72h ago, onward)      None            [employer] Prediction of Planned Initial Vancomycin Regimen  Loading dose: N/A  Regimen: 1250 mg IV every 12 hours.  Start time: 08:31 on 2023  Exposure target: AUC24 (range)400-600 mg/L.hr   AUC24,ss: 448 mg/L.hr  Probability of AUC24 > 400: 58 %  Ctrough,ss: 11.3 mg/L  Probability of Ctrough,ss > 20: 25 %  Probability of nephrotoxicity (Lodise SARA ): 7 %          Plan:  Start vancomycin  1250 mg IV q12h.   Vancomycin monitoring method: AUC  Vancomycin therapeutic monitoring goal: 400-600 mg*h/L  Pharmacy will check vancomycin levels as appropriate in 1-3 Days.    Serum creatinine levels will be ordered daily for the first  week of therapy and at least twice weekly for subsequent weeks.      Freda Raymond, [facility]

## 2023-11-29 NOTE — PROGRESS NOTES
[facility] Medicine Progress Note  Date of Service: 11/29/2023    Assessment & Plan   Ge Hansen is a 38 year old male who presents on 11/26/2023 with progressively worsening redness, swelling, and pain of LLE x 4 days. He was found to have cellulitis of LLE and is being admitted for IV antibiotic therapy.    Sepsis secondary to cellulitis of left lower extremity    Meets SIRS criteria with tachycardia and leukocytosis (11.6). Afebrile with mild leukocytosis 11.6 ?  11.9. Lactate normal. CRP elevated at 263.86 and procalcitonin elevated at 2.76. Developed redness, swelling, and pain to LLE x 4 days ago with progressively worsening symptoms. On exam there is warmth, erythema, swelling, and serosanguinous weeping fluid to the left lower extremity extending from the foot to above the knee (see picture below). Blood cultures x 2 collected and started on cefazolin 2 g q8hrs (11/26-11/28). No prior history of cellulitis or MRSA.      Febrile to 101.2F yesterday. WBC continues to rise up to 14.4. Draining and scaling more (see pictures). Pain and swelling still present. Adding Vancomycin. Obtaining x-ray to evaluate for deeper/more complicated infection.  - Continue Unasyn 3g Q6h (11/28 - )  - Start Vancomycin 1,250mg Q12h (11/29 - )  - XRay pending  - Bcx x 2 NGTD    Hyponatremia, resolved    Clinically insignificant hyponatremia of 133 ?  133 ?  137.  - Follow with a.m. BMP    Asthma    Denies shortness of breath. He has a home albuterol inhaler that he utilizes as needed. Patient requesting albuterol inhaler while hospitalized.   - Albuterol inhaler PRN for shortness of breath.    Morbid obesity  Obesity hypoventilation    BMI 68.75. Utilizes BiPAP machine at home nightly. Unable to bring home CPAP and will place order for BiPAP machine here. Will do best to match patients home settings.  - BiPAP nightly    Tobacco abuse    Daily tobacco user.   - Encourage cessation  - Nicotine  patch 14mg         Diet: Regular Diet Adult    DVT Prophylaxis: Ambulate every shift  Araya Catheter: Not present  Lines: Peripheral IV     Code Status: Full Code       Disposition: Anticipate discharge 12/1/23     Attestation:  I have reviewed today's vital signs, notes, medications, labs and imaging.    I have discussed, or will be discussing, the patient with hospitalist physician, Dr. Muñiz.    Mitchell Sosa PA-C       Interval History   Febrile to 101.2F yesterday. Still feels significant pain on ambulation. Is annoyed with how much it's draining. Wants to discharge so he can work again.     Physical Exam   Temp:  [98.4  F (36.9  C)-101.2  F (38.4  C)] 98.7  F (37.1  C)  Pulse:  [76-99] 76  Resp:  [18-21] 18  BP: (132-211)/(49-89) 143/72  SpO2:  [94 %-100 %] 94 %    Weights:   Vitals:    11/26/23 1327 11/27/23 0500   Weight: (!) 249.5 kg (550 lb) (!) 265.4 kg (585 lb)    Body mass index is 73.12 kg/m .    Constitutional: Alert, oriented, cooperative, in no acute distress, appears nontoxic.  HENT: No evidence of cranial trauma. Normocephalic. Eyes anicteric. EOM intact.  Cardiovascular: Regular rate and rhythm, normal S1 and S2, and no murmur noted. Good peripheral pulses in wrists bilaterally.   Respiratory: Clear to auscultation bilaterally with no adventitious breath sounds.   GI: Obese, soft, non-tender, normal bowel sounds, no hepatomegaly, no masses.  Musculoskeletal: Normal muscle bulk and tone. FROM in all extremities   Skin: Slightly improved erythema on left lower leg still not extending past previously drawn margins. Increased drainage. Scaling. Healing blister near lateral knee. TTP. Faint but present DP pulse. Stasis dermatitis in right ankle.        Neurologic: Cranial nerves 2-12 are grossly intact.       Data   Recent Labs   Lab 11/29/23  0527 11/28/23  0634 11/27/23  0525 11/26/23  1457   WBC 14.4* 12.8* 11.9* 11.6*   HGB 11.6* 11.0* 11.3* 12.5*   MCV 97 96 97 95    144* 157 154   NA   --  137 133* 133*   POTASSIUM  --  3.9 3.9 3.8   CHLORIDE  --  98 98 96*   CO2  --  27 24 24   BUN  --  10.3 11.9 11.8   CR  --  0.77 0.89 0.84   ANIONGAP  --  12 11 13   JOSHUA  --  8.1* 8.0* 8.4*   GLC  --  99 109* 91   ALBUMIN  --   --   --  3.6   PROTTOTAL  --   --   --  7.5   BILITOTAL  --   --   --  0.6   ALKPHOS  --   --   --  62   ALT  --   --   --  52   AST  --   --   --  53*       Recent Labs   Lab 11/28/23  0634 11/27/23  0525 11/26/23  1457   GLC 99 109* 91        Unresulted Labs Ordered in the Past 30 Days of this Admission       Date and Time Order Name Status Description    11/26/2023  3:00 PM Blood Culture Line, venous Preliminary     11/26/2023  3:00 PM Blood Culture Line, venous Preliminary              Imaging  No results found for this or any previous visit (from the past 24 hour(s)).     I reviewed all new labs and imaging results over the last 24 hours. I personally reviewed no images or EKG's today.    Medications    - MEDICATION INSTRUCTIONS -        ampicillin-sulbactam  3 g Intravenous Q6H    nicotine  1 patch Transdermal Daily    nicotine   Transdermal Q8H     Mitchell Sosa PA-C

## 2023-11-29 NOTE — PLAN OF CARE
Goal Outcome Evaluation:      Plan of Care Reviewed With: patient    Overall Patient Progress: no changeOverall Patient Progress: no change    Assumed care of patient at 0115. Alert and oriented. Stand by assist to the bathroom with walker. Uses personal CPAP at night. No c/o pain during night. LLE remains red and swollen and leaking serous fluid. No change from previous markings on leg. Patient voices concerns that antibiotics are not working.

## 2023-11-29 NOTE — PLAN OF CARE
Problem: Infection  Goal: Absence of Infection Signs and Symptoms  Outcome: Not Progressing   Goal Outcome Evaluation:       Goal not progressing. Left lower leg is red, swollen, hot to touch, weeping, and painful. Pt still receiving IV antibiotics. Temp 101.2 today, oral tylenol administered.

## 2023-11-30 ENCOUNTER — APPOINTMENT (OUTPATIENT)
Dept: WOUND CARE | Facility: CLINIC | Age: 38
End: 2023-11-30
Payer: COMMERCIAL

## 2023-11-30 ENCOUNTER — APPOINTMENT (OUTPATIENT)
Dept: PHYSICAL THERAPY | Facility: CLINIC | Age: 38
End: 2023-11-30
Payer: COMMERCIAL

## 2023-11-30 LAB
CREAT SERPL-MCNC: 0.87 MG/DL (ref 0.67–1.17)
EGFRCR SERPLBLD CKD-EPI 2021: >90 ML/MIN/1.73M2
ERYTHROCYTE [DISTWIDTH] IN BLOOD BY AUTOMATED COUNT: 13.3 % (ref 10–15)
HCT VFR BLD AUTO: 34.3 % (ref 40–53)
HGB BLD-MCNC: 10.9 G/DL (ref 13.3–17.7)
MCH RBC QN AUTO: 31.1 PG (ref 26.5–33)
MCHC RBC AUTO-ENTMCNC: 31.8 G/DL (ref 31.5–36.5)
MCV RBC AUTO: 98 FL (ref 78–100)
PLATELET # BLD AUTO: 208 10E3/UL (ref 150–450)
RBC # BLD AUTO: 3.51 10E6/UL (ref 4.4–5.9)
VANCOMYCIN SERPL-MCNC: <4 UG/ML
WBC # BLD AUTO: 14.6 10E3/UL (ref 4–11)

## 2023-11-30 PROCEDURE — 99207 PR APP CREDIT; MD BILLING SHARED VISIT: CPT

## 2023-11-30 PROCEDURE — 82565 ASSAY OF CREATININE: CPT | Performed by: INTERNAL MEDICINE

## 2023-11-30 PROCEDURE — 97161 PT EVAL LOW COMPLEX 20 MIN: CPT | Mod: GP | Performed by: PHYSICAL THERAPIST

## 2023-11-30 PROCEDURE — G0463 HOSPITAL OUTPT CLINIC VISIT: HCPCS

## 2023-11-30 PROCEDURE — 85027 COMPLETE CBC AUTOMATED: CPT

## 2023-11-30 PROCEDURE — 250N000013 HC RX MED GY IP 250 OP 250 PS 637

## 2023-11-30 PROCEDURE — 250N000011 HC RX IP 250 OP 636

## 2023-11-30 PROCEDURE — 36415 COLL VENOUS BLD VENIPUNCTURE: CPT | Performed by: INTERNAL MEDICINE

## 2023-11-30 PROCEDURE — 120N000004 HC R&B MS OVERFLOW

## 2023-11-30 PROCEDURE — 250N000011 HC RX IP 250 OP 636: Performed by: INTERNAL MEDICINE

## 2023-11-30 PROCEDURE — 258N000003 HC RX IP 258 OP 636: Performed by: INTERNAL MEDICINE

## 2023-11-30 PROCEDURE — 99233 SBSQ HOSP IP/OBS HIGH 50: CPT | Mod: FS | Performed by: INTERNAL MEDICINE

## 2023-11-30 PROCEDURE — 80202 ASSAY OF VANCOMYCIN: CPT | Performed by: INTERNAL MEDICINE

## 2023-11-30 PROCEDURE — 36415 COLL VENOUS BLD VENIPUNCTURE: CPT

## 2023-11-30 RX ORDER — CEFAZOLIN SODIUM 1 G/50ML
2000 SOLUTION INTRAVENOUS EVERY 12 HOURS
Status: DISCONTINUED | OUTPATIENT
Start: 2023-11-30 | End: 2023-12-01

## 2023-11-30 RX ADMIN — NICOTINE POLACRILEX 4 MG: 4 GUM, CHEWING BUCCAL at 09:55

## 2023-11-30 RX ADMIN — VANCOMYCIN HYDROCHLORIDE 1250 MG: 5 INJECTION, POWDER, LYOPHILIZED, FOR SOLUTION INTRAVENOUS at 08:56

## 2023-11-30 RX ADMIN — ACETAMINOPHEN 650 MG: 325 TABLET, FILM COATED ORAL at 08:56

## 2023-11-30 RX ADMIN — AMPICILLIN SODIUM AND SULBACTAM SODIUM 3 G: 2; 1 INJECTION, POWDER, FOR SOLUTION INTRAMUSCULAR; INTRAVENOUS at 11:15

## 2023-11-30 RX ADMIN — IBUPROFEN 400 MG: 400 TABLET ORAL at 19:13

## 2023-11-30 RX ADMIN — AMPICILLIN SODIUM AND SULBACTAM SODIUM 3 G: 2; 1 INJECTION, POWDER, FOR SOLUTION INTRAMUSCULAR; INTRAVENOUS at 04:12

## 2023-11-30 RX ADMIN — AMPICILLIN SODIUM AND SULBACTAM SODIUM 3 G: 2; 1 INJECTION, POWDER, FOR SOLUTION INTRAMUSCULAR; INTRAVENOUS at 23:02

## 2023-11-30 RX ADMIN — AMPICILLIN SODIUM AND SULBACTAM SODIUM 3 G: 2; 1 INJECTION, POWDER, FOR SOLUTION INTRAMUSCULAR; INTRAVENOUS at 16:33

## 2023-11-30 RX ADMIN — VANCOMYCIN HYDROCHLORIDE 2000 MG: 5 INJECTION, POWDER, LYOPHILIZED, FOR SOLUTION INTRAVENOUS at 20:31

## 2023-11-30 RX ADMIN — ACETAMINOPHEN 650 MG: 325 TABLET, FILM COATED ORAL at 19:13

## 2023-11-30 ASSESSMENT — ACTIVITIES OF DAILY LIVING (ADL)
FALL_HISTORY_WITHIN_LAST_SIX_MONTHS: NO
ADLS_ACUITY_SCORE: 22
DOING_ERRANDS_INDEPENDENTLY_DIFFICULTY: NO
ADLS_ACUITY_SCORE: 22
ADLS_ACUITY_SCORE: 22
TOILETING_ISSUES: NO
ADLS_ACUITY_SCORE: 22
ADLS_ACUITY_SCORE: 22
CONCENTRATING,_REMEMBERING_OR_MAKING_DECISIONS_DIFFICULTY: NO
WEAR_GLASSES_OR_BLIND: NO
ADLS_ACUITY_SCORE: 22
CHANGE_IN_FUNCTIONAL_STATUS_SINCE_ONSET_OF_CURRENT_ILLNESS/INJURY: NO
DIFFICULTY_EATING/SWALLOWING: NO
ADLS_ACUITY_SCORE: 22
ADLS_ACUITY_SCORE: 22
WALKING_OR_CLIMBING_STAIRS_DIFFICULTY: NO
DIFFICULTY_COMMUNICATING: NO
HEARING_DIFFICULTY_OR_DEAF: NO
DRESSING/BATHING_DIFFICULTY: NO
ADLS_ACUITY_SCORE: 22
ADLS_ACUITY_SCORE: 22

## 2023-11-30 NOTE — PROGRESS NOTES
End Of Shift Note     Situation: 39 yo male here for cellulitis of LLE     Plan: IV abx, pain control     Subjective/Objective:     Neuro: AO x 4, irritable, 3/10 leg pain, nicotine gum     Cardiac: HR 91, HTN, T max 100.2     Resp: LSC on room air, diminished. Wears CPAP at night.     GI/: Up to BR to void wnl, BM today, tolerating diet.      MSK: SBA     Skin: LLE red, warm, weeping. Plaque psoriasis on hands and BLE     LDAs: Left PIV x 1     Xray completed of LLE., no osteomyelitis

## 2023-11-30 NOTE — PROGRESS NOTES
[facility] Medicine Progress Note  Date of Service: 11/30/2023    Assessment & Plan   Ge Hansen is a 38 year old male who presents on 11/26/2023 with progressively worsening redness, swelling, and pain of LLE x 4 days. He was found to have cellulitis of LLE and is being admitted for IV antibiotic therapy.    Sepsis secondary to cellulitis of left lower extremity    Meets SIRS criteria with tachycardia and leukocytosis (11.6). Afebrile with mild leukocytosis 11.6 ?  11.9. Lactate normal. CRP elevated at 263.86 and procalcitonin elevated at 2.76. Developed redness, swelling, and pain to LLE x 4 days ago with progressively worsening symptoms. On exam there is warmth, erythema, swelling, and serosanguinous weeping fluid to the left lower extremity extending from the foot to above the knee (see picture below). Blood cultures x 2 collected and started on cefazolin 2 g q8hrs (11/26-11/28). No prior history of cellulitis or MRSA.     Afebrile since yesterday morning but had an elevated temperature to 100.2F last night. WBC 14.6. X-rays yesterday were negative. Erythema improved but still very painful (see pictures).  - Continue Unasyn 3g Q6h (11/28 - )  - Continue Vancomycin 1,250mg Q12h (11/29 - )  - Wound care and lymphedema consulted  - Bcx x 2 11/26 NGTD    Hyponatremia, resolved    Clinically insignificant hyponatremia of 133 ?  133 ?  137.  - Follow with a.m. BMP    Asthma    Denies shortness of breath. He has a home albuterol inhaler that he utilizes as needed. Patient requesting albuterol inhaler while hospitalized.   - Albuterol inhaler PRN for shortness of breath.    Morbid obesity  Obesity hypoventilation    BMI 68.75. Utilizes BiPAP machine at home nightly. Unable to bring home CPAP and will place order for BiPAP machine here. Will do best to match patients home settings.  - BiPAP nightly    Tobacco abuse    Daily tobacco user.   - Encourage cessation  - Nicotine patch  14mg         Diet: Regular Diet Adult    DVT Prophylaxis: Ambulate every shift  Araya Catheter: Not present  Lines: Peripheral IV     Code Status: Full Code           Disposition: Anticipate discharge 12/2/23     Attestation:  I have reviewed today's vital signs, notes, medications, labs and imaging.    I have discussed, or will be discussing, the patient with hospitalist physician, Dr. Muñiz.    Mitchell Sosa PA-C     Interval History   NAEO. Elevated temperature to 100.2. Patient still endorses chills and night sweats. He states the leg is draining more and is more painful, but he thinks it's improving. He's worried about going back to work next week. He had 2 bowel movements this morning.    Denies chest pain, SOB, abdominal pain, n/v/d/c    Physical Exam   Temp:  [97.5  F (36.4  C)-100.2  F (37.9  C)] 97.7  F (36.5  C)  Pulse:  [78-97] 78  Resp:  [18-20] 18  BP: (104-167)/(43-95) 121/79  SpO2:  [91 %-99 %] 99 %    Weights:   Vitals:    11/26/23 1327 11/27/23 0500   Weight: (!) 249.5 kg (550 lb) (!) 265.4 kg (585 lb)    Body mass index is 73.12 kg/m .    Constitutional: Alert, oriented, cooperative, in no acute distress, appears nontoxic.  HENT: No evidence of cranial trauma. Normocephalic. Eyes anicteric. EOM intact.  Cardiovascular: Regular rate and rhythm, normal S1 and S2, and no murmur noted. Good peripheral pulses in wrists bilaterally. No lower extremity edema.  Respiratory: Clear to auscultation bilaterally with no adventitious breath sounds.   GI: Soft, non-tender, normal bowel sounds, no hepatomegaly, no masses.  Musculoskeletal: Normal muscle bulk and tone. FROM in all extremities   Skin: Slightly improved erythema on left lower leg still not extending past previously drawn margins. Increased drainage and scaling. TTP. Stasis dermatitis on right ankle.          Neurologic: Cranial nerves 2-12 are grossly intact.       Data   Recent Labs   Lab 11/30/23  0521 11/29/23  0527 11/28/23  0634  11/27/23  0525 11/26/23  1457   WBC 14.6* 14.4* 12.8* 11.9* 11.6*   HGB 10.9* 11.6* 11.0* 11.3* 12.5*   MCV 98 97 96 97 95    193 144* 157 154   NA  --   --  137 133* 133*   POTASSIUM  --   --  3.9 3.9 3.8   CHLORIDE  --   --  98 98 96*   CO2  --   --  27 24 24   BUN  --   --  10.3 11.9 11.8   CR 0.87  --  0.77 0.89 0.84   ANIONGAP  --   --  12 11 13   JOSHUA  --   --  8.1* 8.0* 8.4*   GLC  --   --  99 109* 91   ALBUMIN  --   --   --   --  3.6   PROTTOTAL  --   --   --   --  7.5   BILITOTAL  --   --   --   --  0.6   ALKPHOS  --   --   --   --  62   ALT  --   --   --   --  52   AST  --   --   --   --  53*       Recent Labs   Lab 11/28/23  0634 11/27/23  0525 11/26/23  1457   GLC 99 109* 91        Unresulted Labs Ordered in the Past 30 Days of this Admission       Date and Time Order Name Status Description    11/26/2023  3:00 PM Blood Culture Line, venous Preliminary     11/26/2023  3:00 PM Blood Culture Line, venous Preliminary              Imaging  Recent Results (from the past 24 hour(s))   XR Ankle Port Left G/E 3 Views    Narrative    ANKLE PORTABLE LEFT THREE OR MORE VIEWS November 29, 2023 10:42 AM    HISTORY: Cellulitis from left knee to toes. Ankle pain.    COMPARISON: None.      Impression    IMPRESSION: No fracture or osteomyelitis. The ankle mortise is intact.  Marked soft tissue swelling around the ankle. Mild degenerative  changes at the talonavicular and posterior subtalar joints. Scattered  soft tissue calcifications in the distal calf.    HARDEEP LEDESMA MD         SYSTEM ID:  [id number]   XR Foot Left 2 Views    Narrative    FOOT TWO VIEWS LEFT  11/29/2023 10:48 AM     HISTORY: Cellulitis  COMPARISON: 4/6/2022      Impression    IMPRESSION: Diffuse soft tissue swelling. No definite evidence of  acute osteomyelitis or fracture. There is normal joint alignment. No  significant degenerative changes. Moderate plantar and small Achilles  calcaneal enthesophytes.    CRISTIANA SIMON MD          SYSTEM ID:  [id number]   XR Tibia & Fibula Port Left 2 Views    Narrative    XR TIBIA & FIBULA PORT LEFT 2 VIEWS 11/29/2023 10:49 AM    HISTORY: Cellulitis, leg pain.    COMPARISON: None.      Impression    IMPRESSION: No fracture or osteomyelitis. Marked soft tissue swelling  throughout the calf. Scattered small soft tissue calcifications.    HARDEEP LEDESMA MD         SYSTEM ID:  [id number]        I reviewed all new labs and imaging results over the last 24 hours. I personally reviewed the ankle/foot/tibia/fibula image(s) showing no oseomyelitis or fracture, diffuse soft tissue swelling .    Medications    - MEDICATION INSTRUCTIONS -        ampicillin-sulbactam  3 g Intravenous Q6H    nicotine  1 patch Transdermal Daily    nicotine   Transdermal Q8H    vancomycin  1,250 mg Intravenous Q12H     Mitchell Sosa PA-C

## 2023-11-30 NOTE — PROGRESS NOTES
[facility]  WO Nurse Inpatient Assessment     Consulted for: left lower leg wounds    Summary: Left lower leg wounds related to cellulitis, likely stemming from impaired skin integrity related to untreated psoriasis. Wounds are draining heaving with some scaling present. Wounds appear to be partial thickness. In need of wound care plan for protection, debridement of scaling, and absorption. Will need outpatient lymphedema therapy referral for ongoing cares, pt does not have help at home for wound care and would expect that given his body habitus he would not be able to manage cares at home independently. Writer did proactively reach out to lymphedema therapist at [facility], where pt lives, to determine next available outpatient opening. Pt is not currently on any topical therapy for psoriasis, may need to begin this as well, note left for hospitalist service to consider.     Patient History (according to provider note(s):      Ge Hansen is a 38 year old male who presents on 11/26/2023 with progressively worsening redness, swelling, and pain of LLE x 4 days. He was found to have cellulitis of LLE and is being admitted for IV antibiotic therapy.     Sepsis secondary to cellulitis of left lower extremity    Meets SIRS criteria with tachycardia and leukocytosis (11.6). Afebrile with mild leukocytosis 11.6 ?  11.9. Lactate normal. CRP elevated at 263.86 and procalcitonin elevated at 2.76. Developed redness, swelling, and pain to LLE x 4 days ago with progressively worsening symptoms. On exam there is warmth, erythema, swelling, and serosanguinous weeping fluid to the left lower extremity extending from the foot to above the knee (see picture below). Blood cultures x 2 collected and started on cefazolin 2 g q8hrs (11/26-11/28). No prior history of cellulitis or MRSA.     Afebrile since yesterday morning but had an elevated temperature to 100.2F last night. WBC 14.6. X-rays  yesterday were negative. Erythema improved but still very painful (see pictures).  - Continue Unasyn 3g Q6h (11/28 - )  - Continue Vancomycin 1,250mg Q12h (11/29 - )  - Wound care and lymphedema consulted  - Bcx x 2 11/26 NGTD     Hyponatremia, resolved    Clinically insignificant hyponatremia of 133 ?  133 ?  137.  - Follow with a.m. BMP     Asthma    Denies shortness of breath. He has a home albuterol inhaler that he utilizes as needed. Patient requesting albuterol inhaler while hospitalized.   - Albuterol inhaler PRN for shortness of breath.     Morbid obesity  Obesity hypoventilation    BMI 68.75. Utilizes BiPAP machine at home nightly. Unable to bring home CPAP and will place order for BiPAP machine here. Will do best to match patients home settings.  - BiPAP nightly     Tobacco abuse    Daily tobacco user.   - Encourage cessation  - Nicotine patch 14mg         Assessment:      Areas visualized during today's visit: Focused: Left LE    Wound location: Left LE      Photos in order, left dorsal foot, left anterior lower leg, left lateral lower leg and left posterior lower leg proximal to knee  Last photo: 11/30/23  Wound due to:  psoriasis, cellulitis  Wound history/plan of care: See H&P. Skin has been declining this hospital stay prompting wound care referral  Wound base: mixture of scaling and partial thickness open areas located over dorsal foot, anterior to lateral ankle, lateral lower leg and posterior lower leg proximal to knee  Palpation of the wound bed: firm      Drainage: copious     Description of drainage: serous     Measurements (length x width x depth, in cm):   Proximal lateral lower leg with a 8x11x0.1-0.2cm cm area of pink partial thickness   Lateral distal with 11.5x12cm area of macerated scaling and partial thickness open area  Dorsal foot with 2x2cm partial thickness area partly red and partly crusted yellow with in area of scaling  Anterior to lateral ankle with 7.5x2.5cm band of scaling with  some maceration and partial thickness open area  Posterior lower leg proximal to knee with 2.5x3.5cm area of maceration and partial thickness open area under unroofed blister    Periwound skin:  erythema, hot to touch and significant lymphatic edema over all of lower leg and most of foot  Odor: none  Pain:  has some pain with cares, skin is tender  Pain interventions prior to dressing change: slow and gentle cares   Treatment goal: Heal , Drainage control, Infection control/prevention, and Decrease moisture  STATUS: initial assessment  Supplies ordered: supplies stored on unit, Exudry pads brought from clinic, requested more from materials.        Treatment Plan:     Left LE wound wound(s): Daily and PRN for drainage  1.) wet gauze with Vashe wound cleanser and lay over open areas to soak, cleanse intact skin with seema bath wipes then go back to wounds and rub vashe moist gauze gently over open areas to remove scaling as able  2.) Cover any scaling open areas with aquaphor coated xeroform to promote debridement of scale  3.) cover more heavily draining open areas with adaptic that has been coated with Triad paste  4.) apply aquaphor to intact dry/scaling skin  5.) Cover with exudry pads or abd pads if exudry not available  6.) Secure with rolled gauze      Orders: Written    RECOMMEND PRIMARY TEAM ORDER:  consider topical mediation to treat psoriasis  Education provided: plan of care and outpatient need for lymphedema services in [place]. Skin care for psoriasis. Pt reportedly was using steel wool to debride scale, per pt he only did this on callus on his hands. Has been using jason spring soap, recommended Dove at home as more pH balanced/skin friendly. Discussed importance of skin hydration and may need derm follow-up  Discussed plan of care with: Patient and Nurse  WOC nurse follow-up plan: weekly  Notify WOC if wound(s) deteriorate.  Nursing to notify the Provider(s) and re-consult the WOC Nurse if new skin  concern.    DATA:       BMI: Body mass index is 73.12 kg/m .   Active diet order: Orders Placed This Encounter      Regular Diet Adult     Output: I/O last 3 completed shifts:  In: 1900 [P.O.:1900]  Out: -      Labs:   Recent Labs   Lab 11/30/23  0521 11/27/23  0525 11/26/23  1457   ALBUMIN  --   --  3.6   HGB 10.9*   < > 12.5*   WBC 14.6*   < > 11.6*    < > = values in this interval not displayed.     Pressure injury risk assessment:   Sensory Perception: 4-->no impairment  Moisture: 2-->very moist  Activity: 3-->walks occasionally  Mobility: 3-->slightly limited  Nutrition: 3-->adequate  Friction and Shear: 3-->no apparent problem  Santy Score: 18    Zenaida Landis   Pager no longer is use, please contact through [email or web address] group: [facility] Nurse   Dept. Office Number: [phone]

## 2023-11-30 NOTE — PROGRESS NOTES
End Of Shift Note    Situation: 39 yo male here for cellulitis of LLE    Plan: IV abx    Subjective/Objective:    Neuro: AO x 4, irritable, 3/10 leg pain, nicotine gum    Cardiac: HR 91, HTN    Resp: LSC on room air, diminished. Wears CPAP at night.    GI/: BM today, tolerating diet.     MSK: SBA    Skin: LLE red, warm, weeping. Plaque psoriasis on hands and BLE    LDAs: new PIV x 1    Xray completed of LLE.

## 2023-11-30 NOTE — PROGRESS NOTES
11/30/23 1212   Appointment Info   Signing Clinician's Name / Credentials (PT) Gini Francisco, PT, DPT, CLT   Quick Adds   Quick Adds Edema Eval   General Information   Onset of Illness/Injury or Date of Surgery 11/27/23   Referring Physician Mitchell Sosa, GAY   Patient/Family Therapy Goals Statement (PT) to return home and get back to work, to have less leg pain   Pertinent History of Current Problem (include personal factors and/or comorbidities that impact the POC) L LE redness, pain, warmth, admitted for L LE cellulitis, has been on antibiotics greater than 24 hrs and redness is improving, no fever today, Hx of morbid obesity   Cognition   Affect/Mental Status (Cognition) WNL   Orientation Status (Cognition) oriented x 4   Follows Commands (Cognition) WNL   Pain Assessment   Patient Currently in Pain Yes, see Vital Sign flowsheet  (reports everywhere there is redness hurts)   Integumentary/Edema   Onset of Edema   (due to skin changes likely chronic edema, pt unable to state when it started)   Affected Body Part(s) Left LE;Right LE   Edema Etiology Infection   Etiology Comments L LE cellulitis   Edema Precautions Acute infection   Edema Precaution Comments on antibitotics greater than 24 hrs with improvement noted in redness and fever   Edema Examination/Assessment   Skin Condition Non-pitting;Dryness;Hemosiderin deposits;Lipodermatosclerosis  (L LE)   Skin Condition Comments skin cracking where dryness and plaques are at ankle and above on lower leg, areas of open skin where skin appears to look like a skin tear superficially L lateral lower leg below knee, drainage noted throughout yellowish in color, redness to crease at front on ankle   Ulcerations Yes   Bed Mobility   Comment, (Bed Mobility) sit to supine using rails mod I   Clinical Impression   Criteria for Skilled Therapeutic Intervention Yes, treatment indicated   Edema: Patient Presentation Stage 3 Lymphedema;Wounds/Ulcers   Influenced by the  following impairments pain, edema   Functional limitations due to impairments impaired transfers/bed mob from baseline   Clinical Presentation (PT Evaluation Complexity) evolving   Clinical Presentation Rationale clinical judgement   Clinical Decision Making (Complexity) moderate complexity   Planned Therapy Interventions (PT) bed mobility training;progressive activity/exercise;risk factor education;home program guidelines;manual therapy techniques   Edema: Planned Interventions Gradient compression bandaging;Edema exercises;Precautions to prevent infection/exacerbation;Education;Manual therapy   Risk & Benefits of therapy have been explained evaluation/treatment results reviewed;care plan/treatment goals reviewed;risks/benefits reviewed;current/potential barriers reviewed;participants included;patient   PT Total Evaluation Time   PT Eval, Low Complexity Minutes (95662) 10   Physical Therapy Goals   PT Frequency   (daily lymph until decreased drainage noted, RN to do wound cares first)   PT Predicted Duration/Target Date for Goal Attainment 12/07/23   PT Goals Edema   PT: Edema education to increase ability to manage edema after discharge from the hospital Patient;Verbalize;Skin care routine;signs/symptoms of intolerance;wear schedule;limb positioning;garment/bandage care;discharge recommendations   PT: Management of edema bandages Patient;Caregiver;Verbalize;Mod assist;quick wrap   PT: Functional edema exercise program to reduce limb volume, increase activity tolerance and improve independence with ADL Patient;Verbalize;HEP   Interventions   Interventions Quick Adds Manual Therapy   Manual Therapy   Manual Therapy Minutes (35399) 5   Treatment Detail/Skilled Intervention educated pt on: role of lymphedema therapy, role of compression to help with pain, wound healing, reducing drainage, GCB for treatment phase with follow up OP. Assessed skin and supplies gathered and left in room, pt needs to see WOC RN first  before legs wrapped with GCB vs 2 layer overlapping spandagrip.   PT Discharge Planning   PT Plan lymph daily, GCB vs Spandagrip, coordinate with RN for wound cares, daily edema wraps until drainage lessens   PT Discharge Recommendation (DC Rec) home with outpatient physical therapy  (Op lymphedema therapy)   PT Rationale for DC Rec will need ongoing lymphedema therapy and likely into long term compression garments   PT Brief overview of current status awaiting WOC RN to assess wounds before doing compression wraps to L LE

## 2023-11-30 NOTE — CONSULTS
Care Management Note:    SW received referral to assist pt with community resources and financial/insurance issues.    SUSY placed call to pt's room, however, pt requested writer call back in 15-30 minutes.  Sw place new call to pt and we discussed pt's concerns of running out of PTO hours on 11/26/23, therefore, not being paid the past several days.  Susy advised pt to speak with his  and learn about [facility] program, if eligible.    SUSY provided pt with [facility] services information for food support, financial assistance and other community based programs he may be eligible for at this time.  Placed resources in AVS.    Pt then asked writer about his insurance & if I can help him secure a better MA plan.  Pt is current with [facility] MA plan.  SUSY advised that all state funded MA plans are similar in coverage, but he would need to contact [employer] to verify certainty.  SUSY provided [employer] in pt's AVS as pt lacked a pen/paper in his room.    SW to close referral at this time as pt had no other questions/needs for this writer.    PEDRO Mckinley  Care Transitions   Tele: [phone]

## 2023-12-01 ENCOUNTER — APPOINTMENT (OUTPATIENT)
Dept: PHYSICAL THERAPY | Facility: CLINIC | Age: 38
End: 2023-12-01
Payer: COMMERCIAL

## 2023-12-01 PROBLEM — L40.9 PSORIASIS: Chronic | Status: ACTIVE | Noted: 2023-12-01

## 2023-12-01 PROBLEM — R03.0 ELEVATED BLOOD PRESSURE READING WITHOUT DIAGNOSIS OF HYPERTENSION: Status: RESOLVED | Noted: 2018-09-13 | Resolved: 2023-12-01

## 2023-12-01 PROBLEM — D64.9 ANEMIA: Status: ACTIVE | Noted: 2023-12-01

## 2023-12-01 PROBLEM — E66.1 CLASS 3 DRUG-INDUCED OBESITY WITH SERIOUS COMORBIDITY AND BODY MASS INDEX (BMI) GREATER THAN OR EQUAL TO 70 IN ADULT (H): Chronic | Status: ACTIVE | Noted: 2021-01-26

## 2023-12-01 PROBLEM — E66.813 CLASS 3 DRUG-INDUCED OBESITY WITH SERIOUS COMORBIDITY AND BODY MASS INDEX (BMI) GREATER THAN OR EQUAL TO 70 IN ADULT (H): Chronic | Status: ACTIVE | Noted: 2021-01-26

## 2023-12-01 LAB
BACTERIA BLD CULT: NO GROWTH
BACTERIA BLD CULT: NO GROWTH
CREAT SERPL-MCNC: 0.69 MG/DL (ref 0.67–1.17)
EGFRCR SERPLBLD CKD-EPI 2021: >90 ML/MIN/1.73M2
ERYTHROCYTE [DISTWIDTH] IN BLOOD BY AUTOMATED COUNT: 13.2 % (ref 10–15)
HCT VFR BLD AUTO: 33.6 % (ref 40–53)
HGB BLD-MCNC: 10.7 G/DL (ref 13.3–17.7)
MCH RBC QN AUTO: 30.9 PG (ref 26.5–33)
MCHC RBC AUTO-ENTMCNC: 31.8 G/DL (ref 31.5–36.5)
MCV RBC AUTO: 97 FL (ref 78–100)
PLATELET # BLD AUTO: 249 10E3/UL (ref 150–450)
RBC # BLD AUTO: 3.46 10E6/UL (ref 4.4–5.9)
WBC # BLD AUTO: 12.5 10E3/UL (ref 4–11)

## 2023-12-01 PROCEDURE — 97140 MANUAL THERAPY 1/> REGIONS: CPT | Mod: GP | Performed by: REHABILITATION PRACTITIONER

## 2023-12-01 PROCEDURE — 250N000013 HC RX MED GY IP 250 OP 250 PS 637

## 2023-12-01 PROCEDURE — 258N000003 HC RX IP 258 OP 636: Performed by: INTERNAL MEDICINE

## 2023-12-01 PROCEDURE — 99232 SBSQ HOSP IP/OBS MODERATE 35: CPT | Performed by: INTERNAL MEDICINE

## 2023-12-01 PROCEDURE — 250N000011 HC RX IP 250 OP 636: Performed by: INTERNAL MEDICINE

## 2023-12-01 PROCEDURE — 82565 ASSAY OF CREATININE: CPT | Performed by: INTERNAL MEDICINE

## 2023-12-01 PROCEDURE — 36415 COLL VENOUS BLD VENIPUNCTURE: CPT

## 2023-12-01 PROCEDURE — 250N000011 HC RX IP 250 OP 636: Mod: JZ

## 2023-12-01 PROCEDURE — 120N000004 HC R&B MS OVERFLOW

## 2023-12-01 PROCEDURE — 85027 COMPLETE CBC AUTOMATED: CPT

## 2023-12-01 RX ORDER — CLOBETASOL PROPIONATE 0.5 MG/G
CREAM TOPICAL 2 TIMES DAILY
Status: DISCONTINUED | OUTPATIENT
Start: 2023-12-01 | End: 2023-12-07 | Stop reason: HOSPADM

## 2023-12-01 RX ORDER — CALCIPOTRIENE 50 UG/G
CREAM TOPICAL 2 TIMES DAILY
Status: DISCONTINUED | OUTPATIENT
Start: 2023-12-01 | End: 2023-12-07 | Stop reason: HOSPADM

## 2023-12-01 RX ADMIN — NICOTINE POLACRILEX 4 MG: 4 GUM, CHEWING BUCCAL at 10:07

## 2023-12-01 RX ADMIN — IBUPROFEN 400 MG: 400 TABLET ORAL at 08:21

## 2023-12-01 RX ADMIN — HYDRALAZINE HYDROCHLORIDE 10 MG: 20 INJECTION, SOLUTION INTRAMUSCULAR; INTRAVENOUS at 20:26

## 2023-12-01 RX ADMIN — AMPICILLIN SODIUM AND SULBACTAM SODIUM 3 G: 2; 1 INJECTION, POWDER, FOR SOLUTION INTRAMUSCULAR; INTRAVENOUS at 17:56

## 2023-12-01 RX ADMIN — VANCOMYCIN HYDROCHLORIDE 2000 MG: 5 INJECTION, POWDER, LYOPHILIZED, FOR SOLUTION INTRAVENOUS at 08:22

## 2023-12-01 RX ADMIN — AMPICILLIN SODIUM AND SULBACTAM SODIUM 3 G: 2; 1 INJECTION, POWDER, FOR SOLUTION INTRAMUSCULAR; INTRAVENOUS at 12:24

## 2023-12-01 RX ADMIN — AMPICILLIN SODIUM AND SULBACTAM SODIUM 3 G: 2; 1 INJECTION, POWDER, FOR SOLUTION INTRAMUSCULAR; INTRAVENOUS at 04:58

## 2023-12-01 RX ADMIN — ACETAMINOPHEN 650 MG: 325 TABLET, FILM COATED ORAL at 20:26

## 2023-12-01 ASSESSMENT — ACTIVITIES OF DAILY LIVING (ADL)
ADLS_ACUITY_SCORE: 26
ADLS_ACUITY_SCORE: 22
ADLS_ACUITY_SCORE: 22
ADLS_ACUITY_SCORE: 26
ADLS_ACUITY_SCORE: 26
ADLS_ACUITY_SCORE: 22
ADLS_ACUITY_SCORE: 26
ADLS_ACUITY_SCORE: 26
ADLS_ACUITY_SCORE: 22
ADLS_ACUITY_SCORE: 22
ADLS_ACUITY_SCORE: 26
ADLS_ACUITY_SCORE: 22

## 2023-12-01 NOTE — PHARMACY-CONSULT NOTE
Pharmacy Vancomycin Note  Date of Service 2023  Patient's  1985   38 year old, male    Indication: Skin and Soft Tissue Infection  Day of Therapy: 2  Current vancomycin regimen:  1250 mg IV q12h  Current vancomycin monitoring method: AUC  Current vancomycin therapeutic monitoring goal: 400-600 mg*h/L    InsightRX Prediction of Current Vancomycin Regimen  Loading dose: N/A  Regimen: 1250 mg IV every 12 hours.  Start time: 20:56 on 2023  Exposure target: AUC24 (range)400-600 mg/L.hr   AUC24,ss: 278 mg/L.hr  Probability of AUC24 > 400: 7 %  Ctrough,ss: 2 mg/L  Probability of Ctrough,ss > 20: 0 %  Probability of nephrotoxicity (Lodise SARA ): 2 %    Current estimated CrCl = Estimated Creatinine Clearance: 260.5 mL/min (based on SCr of 0.87 mg/dL).    Creatinine for last 3 days  2023:  6:34 AM Creatinine 0.77 mg/dL  2023:  5:21 AM Creatinine 0.87 mg/dL    Recent Vancomycin Levels (past 3 days)  2023:  7:00 PM Vancomycin <4.0 ug/mL    Vancomycin IV Administrations (past 72 hours)                     vancomycin (VANCOCIN) 1,250 mg in sodium chloride 0.9 % 250 mL intermittent infusion (mg) 1,250 mg New Bag 23 0856     1,250 mg New Bag 23 2030     1,250 mg New Bag  0847                    Nephrotoxins and other renal medications (From now, onward)      Start     Dose/Rate Route Frequency Ordered Stop    23 2100  vancomycin (VANCOCIN) 2,000 mg in sodium chloride 0.9 % 500 mL intermittent infusion         2,000 mg  over 2 Hours Intravenous EVERY 12 HOURS 23 1000  ampicillin-sulbactam (UNASYN) 3 g vial to attach to  mL bag         3 g  over 15-30 Minutes Intravenous EVERY 6 HOURS 23 0923 222  ibuprofen (ADVIL/MOTRIN) tablet 400 mg         400 mg Oral EVERY 6 HOURS PRN 23 2221                 Contrast Orders - past 72 hours (72h ago, onward)      None            Interpretation of levels and current  regimen:  Vancomycin level is reflective of -600    Has serum creatinine changed greater than 50% in last 72 hours: No    Urine output:  unable to determine    Renal Function: Stable    InsightRX Prediction of Planned New Vancomycin Regimen  Loading dose: N/A  Regimen: 2000 mg IV every 12 hours.  Start time: 20:56 on 11/30/2023  Exposure target: AUC24 (range)400-600 mg/L.hr   AUC24,ss: 445 mg/L.hr  Probability of AUC24 > 400: 67 %  Ctrough,ss: 3.5 mg/L  Probability of Ctrough,ss > 20: 0 %  Probability of nephrotoxicity (Lodise SARA 2009): 3 %    Plan:  Increase Dose to 2000 mg q12h  Vancomycin monitoring method: AUC  Vancomycin therapeutic monitoring goal: 400-600 mg*h/L  Pharmacy will check vancomycin levels as appropriate in 1-3 Days.  Serum creatinine levels will be ordered a minimum of twice weekly.    Larry Cardona RPH on 11/30/2023 at 8:03 PM

## 2023-12-01 NOTE — PLAN OF CARE
Physical Therapy: Orders received. Chart reviewed and discussed with patient. Per patient and chart review, pt indep in room. Pt denies concerns re: strength, balance, or mobility. Pt inquiring re: equipment for getting socks on, provided with handout for sock aide and reacher. No further PT needs, lymphedema therapy will continue to follow. Will discontinue order.

## 2023-12-01 NOTE — PROGRESS NOTES
Patient has been independent in room, ambulating to bathroom, Patient states wrist band was too tight ,tore off his falls band.Replaced this , reminded  patient to call for help when needing to get out of bed.Bed alarm is on.

## 2023-12-01 NOTE — CONSULTS
This writer spoke with Lili BERMAN from PT lymphedema who saw the pt today. They have a lymphedema therapist on site this weekend who will see him both Saturday and Sunday. Taina BLACKMON, PT lymphedema therapist will plan to see him outpatient on Monday for a referral/initiate manual lymph drainage.     This writer spoke with Dr. Monterroso about the patient not having a safe discharge plan at home this weekend and that recommendation is for him to be wrapped by lymphedema therapy Sunday and then discharged. He will then be seen in the outpatient clinic in [place] on Monday. Lili will have him added to Taina BLACKMNO's schedule on Monday.    Yisel Jara RN, CWOCN  [phone]

## 2023-12-01 NOTE — PLAN OF CARE
Situation: 39 yo male here for cellulitis of LLE     Plan: IV abx, wound cares, PT     Subjective/Objective:     Neuro: AO x 4, 3/10 left leg pain, nicotine gum     Cardiac: HR 90s, HTN     Resp: LSC on room air, diminished. Wears CPAP at night.     GI/: BM x 3 today, tolerating diet.      MSK: SBA     Skin: LLE red, warm, weeping. Plaque psoriasis on hands and BLE, wounds covered by wound nurse. PT unable to complete lymph wrap today.      LDAs: PIV x 1

## 2023-12-01 NOTE — PROGRESS NOTES
[facility]    Medicine Progress Note - Hospitalist Service    Date of Admission:  11/26/2023    Assessment & Plan   Ge Hansen is a 38 year old male who presents on 11/26/2023 with progressively worsening redness, swelling, and pain of LLE x 4 days. He was found to have cellulitis of LLE and is being admitted for IV antibiotic therapy.    Sepsis secondary to cellulitis of left lower extremity    Met SIRS criteria with tachycardia and leukocytosis (11.6). Afebrile with mild leukocytosis 11.6 ?  11.9. Lactate normal. CRP elevated at 263.86 and procalcitonin elevated at 2.76.     Developed redness, swelling, and pain to LLE 4 days prior to admission with progressively worsening symptoms. On exam there is warmth, erythema, swelling, and serosanguinous weeping fluid to the left lower extremity extending from the foot to above the knee (see picture below). Blood cultures x 2 collected and started on cefazolin 2 g q8hrs (11/26-11/28). Changed to vanco, unasyn 11/28/23 due to failure to improve    No prior history of cellulitis or MRSA.   MRSA swab negative   X-rays 11/29/23 were negative.   Wound care and lymphedema consulted 11/30/23      Afebrile. WBC 12   - Continue Unasyn 3g Q6h (11/28 - )  - Stop Vancomycin 1,250mg Q12h (11/29 - )  - Has follow-up with lympedema in clinic Monday, could be discharged Sunday after dressings changed here  - Bcx x 2 11/26 NGTD    Psoriasis  WO implicated psoriasis in etiology of cellulitis  Saw DERM once in 2020, recommended Clobetasol cream 0.05% apply twice daily to affected areas on hands, elbows, knees yo Monday through Thursday. Calcipotriene 0.005% cream Apply twice daily to affected areas on Friday through Sunday. Tacrolimus ointment Apply twice daily to affected areas on groin and  folds of skin  - Follow-up dermatology  - Start clobetasol, caplciporiene to areas unaffected by celluitis, LLE when cellulitis treated     Hyponatremia, resolved     "Clinically insignificant hyponatremia of 133 ?  133 ?  137.  - Follow with a.m. BMP    Asthma    Denies shortness of breath. He has a home albuterol inhaler that he utilizes as needed. Patient requesting albuterol inhaler while hospitalized.   - Albuterol inhaler PRN for shortness of breath.    Morbid obesity  Obesity hypoventilation, severe obstructive sleep apnea     BMI 68.75.   Study Date: 10/3/2018 (520.0 lbs)- apnea/hypopnea index 134 events per hour. Hypoventilation was present with a maximum change from ~11 mmHg and 46.0 minutes at or greater than 55 mmHg. VBG was consistent with chronic compensated respiratory acidosis (pH 7.36, pCO2 62, HCO3 35). Lowest oxygen saturation was 52.0%. Time spent less than or equal to 88% was 59.5 minutes. Time spent less than or equal to 89% was 65.5 minutes. The final pressure was BiLevel 23/13 cmH2O with an AHI of 1.7 events per hour. Time in REM supine on final pressure was 49.0 minutes.    Utilizes BiPAP machine at home nightly. Unable to bring home CPAP and will place order for BiPAP machine here. Will do best to match patients home settings.  Bilevel PAP 23/13 cm H2O in spontaneous mode on room air appeared optimal on sleep study 2018   - BiPAP nightly  - Due for Sleep follow-up, last visit was 2018  - Ordered suppllies for discharge     Tobacco abuse    Daily tobacco user.   - Encourage cessation  - Nicotine patch 14mg             Diet: Regular Diet Adult    DVT Prophylaxis: Low Risk/Ambulatory with no VTE prophylaxis indicated  Araya Catheter: Not present  Lines: None     Cardiac Monitoring: None  Code Status: Full Code      Clinically Significant Risk Factors                         # Severe Obesity: Estimated body mass index is 73.12 kg/m  as calculated from the following:    Height as of this encounter: 1.905 m (6' 3\").    Weight as of this encounter: 265.4 kg (585 lb)., PRESENT ON ADMISSION     # Asthma: noted on problem list        Disposition Plan      Expected " Discharge Date: 12/04/2023                    Fortino Monterroso MD  Hospitalist Service  [facility]  Securely message with [employer] (more info)  Text page via [employer] Paging/Directory   ______________________________________________________________________    Interval History   No events  Concerned about missing pay checks, sister lily help making rent after just having a baby    Physical Exam   Vital Signs: Temp: 97.9  F (36.6  C) Temp src: Oral BP: (!) 178/81 Pulse: 90   Resp: 20 SpO2: 95 % O2 Device: None (Room air)    Weight: 585 lbs 0 oz    Constitutional: awake, alert, cooperative, no apparent distress, and appears stated age    Medical Decision Making       >40 MINUTES SPENT BY ME on the date of service doing chart review, history, exam, documentation & further activities per the note.      Data     CMP  Recent Labs   Lab 12/01/23 0520 11/30/23 0521 11/28/23 0634 11/27/23  0525 11/26/23  1457   NA  --   --  137 133* 133*   POTASSIUM  --   --  3.9 3.9 3.8   CHLORIDE  --   --  98 98 96*   CO2  --   --  27 24 24   ANIONGAP  --   --  12 11 13   GLC  --   --  99 109* 91   BUN  --   --  10.3 11.9 11.8   CR 0.69 0.87 0.77 0.89 0.84   GFRESTIMATED >90 >90 >90 >90 >90   JOSHUA  --   --  8.1* 8.0* 8.4*   PROTTOTAL  --   --   --   --  7.5   ALBUMIN  --   --   --   --  3.6   BILITOTAL  --   --   --   --  0.6   ALKPHOS  --   --   --   --  62   AST  --   --   --   --  53*   ALT  --   --   --   --  52     CBC  Recent Labs   Lab 12/01/23 0520 11/30/23 0521 11/29/23 0527 11/28/23  0634   WBC 12.5* 14.6* 14.4* 12.8*   RBC 3.46* 3.51* 3.80* 3.58*   HGB 10.7* 10.9* 11.6* 11.0*   HCT 33.6* 34.3* 36.8* 34.5*   MCV 97 98 97 96   MCH 30.9 31.1 30.5 30.7   MCHC 31.8 31.8 31.5 31.9   RDW 13.2 13.3 13.2 12.9    208 193 144*

## 2023-12-01 NOTE — PROGRESS NOTES
Assumed care:  1900 to 0700    Neuro:  A/O x 4.  Makes needs known.     Cardiac:  BP elevated , afebrile.     Respiratory:  Lung sounds diminished.  CPAP     GI/:   Up to toilet to void.     Activity:  SBA/Independent.     Skin/wound:  LLE cellulitis.  Dressed per WOC nurse.  Large amount of serous drainage.      LDA's: PIV x 1.

## 2023-12-02 ENCOUNTER — APPOINTMENT (OUTPATIENT)
Dept: PHYSICAL THERAPY | Facility: CLINIC | Age: 38
End: 2023-12-02
Payer: COMMERCIAL

## 2023-12-02 LAB
ALBUMIN SERPL BCG-MCNC: 2.7 G/DL (ref 3.5–5.2)
ALP SERPL-CCNC: 63 U/L (ref 40–150)
ALT SERPL W P-5'-P-CCNC: 50 U/L (ref 0–70)
ANION GAP SERPL CALCULATED.3IONS-SCNC: 10 MMOL/L (ref 7–15)
AST SERPL W P-5'-P-CCNC: 52 U/L (ref 0–45)
BILIRUB SERPL-MCNC: 0.4 MG/DL
BUN SERPL-MCNC: 7.2 MG/DL (ref 6–20)
CALCIUM SERPL-MCNC: 7.9 MG/DL (ref 8.6–10)
CHLORIDE SERPL-SCNC: 100 MMOL/L (ref 98–107)
CREAT SERPL-MCNC: 0.64 MG/DL (ref 0.67–1.17)
CREAT SERPL-MCNC: 0.67 MG/DL (ref 0.67–1.17)
CRP SERPL-MCNC: 164.98 MG/L
DEPRECATED HCO3 PLAS-SCNC: 26 MMOL/L (ref 22–29)
EGFRCR SERPLBLD CKD-EPI 2021: >90 ML/MIN/1.73M2
EGFRCR SERPLBLD CKD-EPI 2021: >90 ML/MIN/1.73M2
ERYTHROCYTE [DISTWIDTH] IN BLOOD BY AUTOMATED COUNT: 13.2 % (ref 10–15)
GLUCOSE SERPL-MCNC: 99 MG/DL (ref 70–99)
HCT VFR BLD AUTO: 35 % (ref 40–53)
HGB BLD-MCNC: 11 G/DL (ref 13.3–17.7)
MCH RBC QN AUTO: 30.4 PG (ref 26.5–33)
MCHC RBC AUTO-ENTMCNC: 31.4 G/DL (ref 31.5–36.5)
MCV RBC AUTO: 97 FL (ref 78–100)
PLATELET # BLD AUTO: 296 10E3/UL (ref 150–450)
POTASSIUM SERPL-SCNC: 3.9 MMOL/L (ref 3.4–5.3)
PROT SERPL-MCNC: 6.7 G/DL (ref 6.4–8.3)
RBC # BLD AUTO: 3.62 10E6/UL (ref 4.4–5.9)
SODIUM SERPL-SCNC: 136 MMOL/L (ref 135–145)
WBC # BLD AUTO: 12.9 10E3/UL (ref 4–11)

## 2023-12-02 PROCEDURE — 120N000004 HC R&B MS OVERFLOW

## 2023-12-02 PROCEDURE — 99232 SBSQ HOSP IP/OBS MODERATE 35: CPT | Performed by: INTERNAL MEDICINE

## 2023-12-02 PROCEDURE — 250N000013 HC RX MED GY IP 250 OP 250 PS 637

## 2023-12-02 PROCEDURE — 36415 COLL VENOUS BLD VENIPUNCTURE: CPT | Performed by: INTERNAL MEDICINE

## 2023-12-02 PROCEDURE — 80053 COMPREHEN METABOLIC PANEL: CPT | Performed by: INTERNAL MEDICINE

## 2023-12-02 PROCEDURE — 97140 MANUAL THERAPY 1/> REGIONS: CPT | Mod: GP | Performed by: PHYSICAL THERAPIST

## 2023-12-02 PROCEDURE — 86140 C-REACTIVE PROTEIN: CPT | Performed by: INTERNAL MEDICINE

## 2023-12-02 PROCEDURE — 85027 COMPLETE CBC AUTOMATED: CPT | Performed by: INTERNAL MEDICINE

## 2023-12-02 PROCEDURE — 82040 ASSAY OF SERUM ALBUMIN: CPT | Performed by: INTERNAL MEDICINE

## 2023-12-02 PROCEDURE — 250N000011 HC RX IP 250 OP 636: Mod: JZ

## 2023-12-02 PROCEDURE — 250N000013 HC RX MED GY IP 250 OP 250 PS 637: Performed by: INTERNAL MEDICINE

## 2023-12-02 RX ADMIN — AMPICILLIN SODIUM AND SULBACTAM SODIUM 3 G: 2; 1 INJECTION, POWDER, FOR SOLUTION INTRAMUSCULAR; INTRAVENOUS at 00:08

## 2023-12-02 RX ADMIN — AMPICILLIN SODIUM AND SULBACTAM SODIUM 3 G: 2; 1 INJECTION, POWDER, FOR SOLUTION INTRAMUSCULAR; INTRAVENOUS at 06:45

## 2023-12-02 RX ADMIN — ACETAMINOPHEN 650 MG: 325 TABLET, FILM COATED ORAL at 11:09

## 2023-12-02 RX ADMIN — OXYCODONE HYDROCHLORIDE 5 MG: 5 TABLET ORAL at 18:11

## 2023-12-02 RX ADMIN — IBUPROFEN 400 MG: 400 TABLET ORAL at 00:15

## 2023-12-02 RX ADMIN — CLOBETASOL PROPIONATE: 0.5 CREAM TOPICAL at 11:11

## 2023-12-02 RX ADMIN — CALCIUM CARBONATE (ANTACID) CHEW TAB 500 MG 1000 MG: 500 CHEW TAB at 08:56

## 2023-12-02 RX ADMIN — AMPICILLIN SODIUM AND SULBACTAM SODIUM 3 G: 2; 1 INJECTION, POWDER, FOR SOLUTION INTRAMUSCULAR; INTRAVENOUS at 11:10

## 2023-12-02 RX ADMIN — AMPICILLIN SODIUM AND SULBACTAM SODIUM 3 G: 2; 1 INJECTION, POWDER, FOR SOLUTION INTRAMUSCULAR; INTRAVENOUS at 18:04

## 2023-12-02 RX ADMIN — CALCIPOTRIENE: 50 CREAM TOPICAL at 11:11

## 2023-12-02 RX ADMIN — OXYCODONE HYDROCHLORIDE 5 MG: 5 TABLET ORAL at 11:09

## 2023-12-02 RX ADMIN — ACETAMINOPHEN 650 MG: 325 TABLET, FILM COATED ORAL at 18:11

## 2023-12-02 ASSESSMENT — ACTIVITIES OF DAILY LIVING (ADL)
ADLS_ACUITY_SCORE: 30
ADLS_ACUITY_SCORE: 27
ADLS_ACUITY_SCORE: 30
ADLS_ACUITY_SCORE: 30
ADLS_ACUITY_SCORE: 26
ADLS_ACUITY_SCORE: 27
ADLS_ACUITY_SCORE: 23
ADLS_ACUITY_SCORE: 26
ADLS_ACUITY_SCORE: 30
ADLS_ACUITY_SCORE: 23

## 2023-12-02 NOTE — PROGRESS NOTES
[facility]    Medicine Progress Note - Hospitalist Service    Date of Admission:  11/26/2023    Assessment & Plan   Ge Hansen is a 38 year old male who presents on 11/26/2023 with progressively worsening redness, swelling, and pain of LLE x 4 days. He was found to have cellulitis of LLE and is being admitted for IV antibiotic therapy.    Sepsis secondary to cellulitis of left lower extremity    Met SIRS criteria with tachycardia and leukocytosis (11.6). Afebrile with mild leukocytosis 11.6 ?  11.9. Lactate normal. CRP elevated at 263 and procalcitonin elevated at 2.76.     Developed redness, swelling, and pain to LLE 4 days prior to admission with progressively worsening symptoms. On exam there is warmth, erythema, swelling, and serosanguinous weeping fluid to the left lower extremity extending from the foot to above the knee (see picture below). Blood cultures x 2 collected and started on cefazolin 2 g q8hrs (11/26-11/28). Changed to vanco, unasyn 11/28/23 due to failure to improve    No prior history of cellulitis or MRSA.   Bcx x 2 11/26 NGTD  Ultrasound 11/27/23 - No deep venous thrombosis in the left lower extremity.   MRSA swab negative   X-rays 11/29/23 were negative.     Wound care and lymphedema consulted 11/30/23  Stopped Vancomycin 1,250mg Q12h (11/29 - 12/1/2023)           Afebrile. WBC 12 stable  Exam suggests significant inflammation stil  - Continue Unasyn 3g Q6h (11/28 - )  - Continue wound cares  - Recheck  CRP  - Has follow-up with lympedema in clinic Monday, could be discharged Sunday after dressings changed here    Psoriasis  WOC implicated psoriasis in etiology of cellulitis  Saw DERM once in 2020, recommended Clobetasol cream 0.05% apply twice daily to affected areas on hands, elbows, knees yo Monday through Thursday. Calcipotriene 0.005% cream Apply twice daily to affected areas on Friday through Sunday. Tacrolimus ointment Apply twice daily to affected  areas on groin and  folds of skin.   Started clobetasol, caplciporiene to areas unaffected by celluitis 12/2/2023, add LLE when cellulitis treated   - Follow-up dermatology    Hyponatremia, resolved    Clinically insignificant hyponatremia of 133 ?  133 ?  137.  - Follow periodically    Asthma    Denies shortness of breath. He has a home albuterol inhaler that he utilizes as needed. Patient requesting albuterol inhaler while hospitalized.   - Albuterol inhaler PRN for shortness of breath.    Morbid obesity  Obesity hypoventilation, severe obstructive sleep apnea     BMI 68.75.   Study Date: 10/3/2018 (520.0 lbs)- apnea/hypopnea index 134 events per hour. Hypoventilation was present with a maximum change from ~11 mmHg and 46.0 minutes at or greater than 55 mmHg. VBG was consistent with chronic compensated respiratory acidosis (pH 7.36, pCO2 62, HCO3 35). Lowest oxygen saturation was 52.0%. Time spent less than or equal to 88% was 59.5 minutes. Time spent less than or equal to 89% was 65.5 minutes. The final pressure was BiLevel 23/13 cmH2O with an AHI of 1.7 events per hour. Time in REM supine on final pressure was 49.0 minutes.    Utilizes BiPAP machine at home nightly. Unable to bring home CPAP and will place order for BiPAP machine here. Will do best to match patients home settings.  Bilevel PAP 23/13 cm H2O in spontaneous mode on room air appeared optimal on sleep study 2018   - BiPAP nightly  - Due for Sleep follow-up, last visit was 2018  - Ordered suppllies for discharge     Tobacco abuse    Daily tobacco user.   - Encourage cessation  - Nicotine patch 14mg         Per pharmacy- 12/2: 'Pt may have been overdosing on Tylenol and naproxen/NSAIDs PTA based on PTA med list. May consider rechecking LFTs to trend and changing PTA med regimens on discharge'        Diet: Regular Diet Adult    DVT Prophylaxis: Low Risk/Ambulatory with no VTE prophylaxis indicated  Araya Catheter: Not present  Lines: None     Cardiac  "Monitoring: None  Code Status: Full Code      Clinically Significant Risk Factors                         # Severe Obesity: Estimated body mass index is 73.12 kg/m  as calculated from the following:    Height as of this encounter: 1.905 m (6' 3\").    Weight as of this encounter: 265.4 kg (585 lb).      # Asthma: noted on problem list        Disposition Plan     Expected Discharge Date: 12/04/2023                    Fortino Monterroso MD  Hospitalist Service  [facility]  Securely message with [employer] (more info)  Text page via [employer] Paging/Directory   ______________________________________________________________________    Interval History   No events     Physical Exam   Vital Signs: Temp: 98.3  F (36.8  C) Temp src: Oral BP: (!) 152/81 Pulse: 86   Resp: 18 SpO2: (!) 84 % O2 Device: None (Room air)    Weight: 585 lbs 0 oz    Constitutional: awake, alert, cooperative, no apparent distress, and appears stated age  Skin: right lower extremity is somewhat more edematous than left, erythema has receded from proevious marking but there is stil very fiery, tender erythema from mid shin to mid foot with scatterred denuded epithelium and some areas of hyperkeratosis     Medical Decision Making       25 MINUTES SPENT BY ME on the date of service doing chart review, history, exam, documentation & further activities per the note.      Data     CMP  Recent Labs   Lab 12/02/23  0417 12/01/23  0520 11/30/23  0521 11/28/23  0634 11/27/23  0525 11/26/23  1457   NA  --   --   --  137 133* 133*   POTASSIUM  --   --   --  3.9 3.9 3.8   CHLORIDE  --   --   --  98 98 96*   CO2  --   --   --  27 24 24   ANIONGAP  --   --   --  12 11 13   GLC  --   --   --  99 109* 91   BUN  --   --   --  10.3 11.9 11.8   CR 0.64* 0.69 0.87 0.77 0.89 0.84   GFRESTIMATED >90 >90 >90 >90 >90 >90   JOSHUA  --   --   --  8.1* 8.0* 8.4*   PROTTOTAL  --   --   --   --   --  7.5   ALBUMIN  --   --   --   --   --  3.6   BILITOTAL  --   --   --   " --   --  0.6   ALKPHOS  --   --   --   --   --  62   AST  --   --   --   --   --  53*   ALT  --   --   --   --   --  52     CBC  Recent Labs   Lab 12/02/23  0417 12/01/23  0520 11/30/23  0521 11/29/23  0527   WBC 12.9* 12.5* 14.6* 14.4*   RBC 3.62* 3.46* 3.51* 3.80*   HGB 11.0* 10.7* 10.9* 11.6*   HCT 35.0* 33.6* 34.3* 36.8*   MCV 97 97 98 97   MCH 30.4 30.9 31.1 30.5   MCHC 31.4* 31.8 31.8 31.5   RDW 13.2 13.2 13.3 13.2    249 208 193

## 2023-12-02 NOTE — PROGRESS NOTES
Pt addimently refusing to use call light and bed alarm. Pt has a steady gait and uses his cane from home. Pt is independent with ambulation at home and work. Pt educated on importance to call if he feels unsteady or weak, he denies any of this at this time and expressed understanding.

## 2023-12-02 NOTE — PROGRESS NOTES
Dressing change done per directions, Lymphedema nurse completed wrap of the left leg.  Painful when cleansing. Appears swelling down slightly and has not extended over drawing on leg .Slight temperature 99.4 this afternoon. Patient able to get up to bathroom, with S B A and patient using walker and does use call light for assistance. Patient  refusing bed alarm on but said he would call staff  when needing to get OUT OF BED.

## 2023-12-02 NOTE — PROGRESS NOTES
Pt refusing to use the bed alarm, pt educated on the importance of using the call light before getting out of bed for his safety and he verbalized understanding.

## 2023-12-02 NOTE — PLAN OF CARE
Lymphedema-  therapist applied gradient compression wraps to L LE after RN had completed wound cares, pt requested not to have all the toes wrapped so wrapped great toe and dorsum of foot with Elastomull then short stretch bandages which are to remain on until wound cares tomorrow, then lymph therapist will re-wrap. If brown bandages get saturated please do not throw away as they can be washed and reused. Pt to follow up at OP lymphedema clinic for wound and edema cares at discharge.                        8

## 2023-12-02 NOTE — PLAN OF CARE
Goal Outcome Evaluation:    DATE & TIME: 7P-7A                  Cognitive Concerns/ Orientation : A&O X4  BEHAVIOR & AGGRESSION TOOL COLOR: green  ABNL VS/O2: room air   MOBILITY: up w/SBA  PAIN MANAGEMENT: PRN oxycodone, tylenol and ibuprofen given w/relief  DIET: regular   BOWEL/BLADDER: continent of B&B  D/C DATE: TBD  OTHER IMPORTANT INFO:     Pt up to bathroom independently- reminded to call to ensure safety getting to and from the bathroom. Pt expressed understanding and states he will call before getting up. Pt refuses call light even after safety education provided. LLE dressing intact- elevated on pillows for comfort. Pt repositioning independently. Home CPAP worn overnight.   Alison Lanza RN on 12/2/2023 at 7:07 AM

## 2023-12-03 ENCOUNTER — APPOINTMENT (OUTPATIENT)
Dept: PHYSICAL THERAPY | Facility: CLINIC | Age: 38
End: 2023-12-03
Payer: COMMERCIAL

## 2023-12-03 LAB
C DIFF GDH STL QL IA: POSITIVE
C DIFF TOX A+B STL QL IA: NEGATIVE
C DIFF TOX B STL QL: POSITIVE
CREAT SERPL-MCNC: 0.63 MG/DL (ref 0.67–1.17)
EGFRCR SERPLBLD CKD-EPI 2021: >90 ML/MIN/1.73M2
ERYTHROCYTE [DISTWIDTH] IN BLOOD BY AUTOMATED COUNT: 13.2 % (ref 10–15)
HCT VFR BLD AUTO: 35.4 % (ref 40–53)
HGB BLD-MCNC: 11.2 G/DL (ref 13.3–17.7)
MCH RBC QN AUTO: 30.2 PG (ref 26.5–33)
MCHC RBC AUTO-ENTMCNC: 31.6 G/DL (ref 31.5–36.5)
MCV RBC AUTO: 95 FL (ref 78–100)
PLATELET # BLD AUTO: 322 10E3/UL (ref 150–450)
RBC # BLD AUTO: 3.71 10E6/UL (ref 4.4–5.9)
WBC # BLD AUTO: 12.7 10E3/UL (ref 4–11)

## 2023-12-03 PROCEDURE — 87324 CLOSTRIDIUM AG IA: CPT | Performed by: INTERNAL MEDICINE

## 2023-12-03 PROCEDURE — 85027 COMPLETE CBC AUTOMATED: CPT | Performed by: INTERNAL MEDICINE

## 2023-12-03 PROCEDURE — 82565 ASSAY OF CREATININE: CPT | Performed by: INTERNAL MEDICINE

## 2023-12-03 PROCEDURE — 250N000011 HC RX IP 250 OP 636

## 2023-12-03 PROCEDURE — 87493 C DIFF AMPLIFIED PROBE: CPT | Performed by: INTERNAL MEDICINE

## 2023-12-03 PROCEDURE — 120N000004 HC R&B MS OVERFLOW

## 2023-12-03 PROCEDURE — 250N000011 HC RX IP 250 OP 636: Performed by: INTERNAL MEDICINE

## 2023-12-03 PROCEDURE — 36415 COLL VENOUS BLD VENIPUNCTURE: CPT | Performed by: INTERNAL MEDICINE

## 2023-12-03 PROCEDURE — 97140 MANUAL THERAPY 1/> REGIONS: CPT | Mod: GP | Performed by: PHYSICAL THERAPIST

## 2023-12-03 PROCEDURE — 250N000013 HC RX MED GY IP 250 OP 250 PS 637

## 2023-12-03 PROCEDURE — 99232 SBSQ HOSP IP/OBS MODERATE 35: CPT | Performed by: INTERNAL MEDICINE

## 2023-12-03 RX ADMIN — AMPICILLIN SODIUM AND SULBACTAM SODIUM 3 G: 2; 1 INJECTION, POWDER, FOR SOLUTION INTRAMUSCULAR; INTRAVENOUS at 01:14

## 2023-12-03 RX ADMIN — AMPICILLIN SODIUM AND SULBACTAM SODIUM 3 G: 2; 1 INJECTION, POWDER, FOR SOLUTION INTRAMUSCULAR; INTRAVENOUS at 14:24

## 2023-12-03 RX ADMIN — ACETAMINOPHEN 650 MG: 325 TABLET, FILM COATED ORAL at 04:04

## 2023-12-03 RX ADMIN — ONDANSETRON 4 MG: 2 INJECTION INTRAMUSCULAR; INTRAVENOUS at 17:58

## 2023-12-03 RX ADMIN — PROCHLORPERAZINE EDISYLATE 10 MG: 5 INJECTION INTRAMUSCULAR; INTRAVENOUS at 21:25

## 2023-12-03 RX ADMIN — CALCIPOTRIENE: 50 CREAM TOPICAL at 07:56

## 2023-12-03 RX ADMIN — ONDANSETRON 4 MG: 2 INJECTION INTRAMUSCULAR; INTRAVENOUS at 08:11

## 2023-12-03 RX ADMIN — AMPICILLIN SODIUM AND SULBACTAM SODIUM 3 G: 2; 1 INJECTION, POWDER, FOR SOLUTION INTRAMUSCULAR; INTRAVENOUS at 20:22

## 2023-12-03 RX ADMIN — CLOBETASOL PROPIONATE: 0.5 CREAM TOPICAL at 07:56

## 2023-12-03 RX ADMIN — AMPICILLIN SODIUM AND SULBACTAM SODIUM 3 G: 2; 1 INJECTION, POWDER, FOR SOLUTION INTRAMUSCULAR; INTRAVENOUS at 07:55

## 2023-12-03 ASSESSMENT — ACTIVITIES OF DAILY LIVING (ADL)
ADLS_ACUITY_SCORE: 23

## 2023-12-03 NOTE — PROGRESS NOTES
1930 Patient's oral temp is 101. Tylenol was last given at 1811 by previous shift.   Dr. Schmidt notified. He addressed the patient bedside and requested repeat temp to be taken in 30 minutes.    Repeat temperature 99.5.    No new orders.

## 2023-12-03 NOTE — PLAN OF CARE
Goal Outcome Evaluation:  Problem: Adult Inpatient Plan of Care  Goal: Absence of Hospital-Acquired Illness or Injury  Intervention: Prevent Skin Injury  Recent Flowsheet Documentation  Taken 12/3/2023 0000 by Jenni Cavazos RN  Body Position:   position changed independently   supine  End Of Shift Note        Subjective/Objective:     Neuro: Alert and oriented x4. States pain is tolerable and unchanged.      Cardiac: Tmax 101, Tylenol x1 for temp of 100 at 0400. Hypertensive. SBP goal <180 and met without prns.      Resp: LS CTA, RA     GI/: Continent     MSK: Ambulates with walker, steady. Refuses gait belt and assistance     Skin: No changes. Good capillary refill in LE     LDAs: PIV SL.

## 2023-12-03 NOTE — PROGRESS NOTES
[facility]    Medicine Progress Note - Hospitalist Service    Date of Admission:  11/26/2023    Assessment & Plan   Ge Hansen is a 38 year old male who presents on 11/26/2023 with progressively worsening redness, swelling, and pain of LLE x 4 days. He was found to have cellulitis of LLE and is being admitted for IV antibiotic therapy.    Sepsis secondary to cellulitis of left lower extremity    Met SIRS criteria with tachycardia and leukocytosis (11.6). Afebrile with mild leukocytosis 11.6 ?  11.9. Lactate normal. CRP elevated at 263 and procalcitonin elevated at 2.76.     Developed redness, swelling, and pain to LLE 4 days prior to admission with progressively worsening symptoms. On exam there is warmth, erythema, swelling, and serosanguinous weeping fluid to the left lower extremity extending from the foot to above the knee (see picture below). Blood cultures x 2 collected and started on cefazolin 2 g q8hrs (11/26-11/28). Changed to vanco, unasyn 11/28/23 due to failure to improve    No prior history of cellulitis or MRSA.   Bcx x 2 11/26 NGTD  Ultrasound 11/27/23 - No deep venous thrombosis in the left lower extremity.   MRSA swab negative   X-rays 11/29/23 were negative.     Wound care and lymphedema consulted 11/30/23  Stopped Vancomycin 1,250mg Q12h (11/29 - 12/1/2023)     ? 163      Tmax 101. WBC 12 stable  Exam suggests significant inflammation, but seems to have trended towards improvement last 48 hours  Patient wants to be discharged ASA due to social situation, but recommend not discharging due to fever last PM. Has been seen by SW and given information on resources and additional resources are listed in AVS   - Continue Unasyn 3g Q6h (11/28 - )  - Continue wound cares  - Recheck CRP periodically  - Has follow-up with lympedema in clinic Monday, will need to be changed later  - Asked SW to round with patient again today or tomorrow    Nausea and diarrhea 12/3/2023    - Check C. dificile    Psoriasis  [facility] implicated psoriasis in etiology of cellulitis  Saw DERM once in 2020, recommended Clobetasol cream 0.05% apply twice daily to affected areas on hands, elbows, knees yo Monday through Thursday. Calcipotriene 0.005% cream Apply twice daily to affected areas on Friday through Sunday. Tacrolimus ointment Apply twice daily to affected areas on groin and  folds of skin.   Started clobetasol, caplciporiene to areas unaffected by celluitis 12/2/2023, add LLE when cellulitis treated   - Follow-up dermatology, discharge order placed    Hyponatremia, resolved    Clinically insignificant hyponatremia of 133 ?  133 ?  137.  - Follow periodically    Asthma    Denies shortness of breath. He has a home albuterol inhaler that he utilizes as needed. Patient requesting albuterol inhaler while hospitalized.   - Albuterol inhaler PRN for shortness of breath.    Morbid obesity  Obesity hypoventilation, severe obstructive sleep apnea     BMI 68.75.   Study Date: 10/3/2018 (520.0 lbs)- apnea/hypopnea index 134 events per hour. Hypoventilation was present with a maximum change from ~11 mmHg and 46.0 minutes at or greater than 55 mmHg. VBG was consistent with chronic compensated respiratory acidosis (pH 7.36, pCO2 62, HCO3 35). Lowest oxygen saturation was 52.0%. Time spent less than or equal to 88% was 59.5 minutes. Time spent less than or equal to 89% was 65.5 minutes. The final pressure was BiLevel 23/13 cmH2O with an AHI of 1.7 events per hour. Time in REM supine on final pressure was 49.0 minutes.    Utilizes BiPAP machine at home nightly. Unable to bring home CPAP and will place order for BiPAP machine here. Will do best to match patients home settings.  Bilevel PAP 23/13 cm H2O in spontaneous mode on room air appeared optimal on sleep study 2018   - BiPAP nightly  - Due for Sleep follow-up, last visit was 2018, placed ASAP referral for discharge  - Ordered suppllies for discharge     Tobacco  "abuse    Daily tobacco user.   - Encourage cessation  - Nicotine patch 14mg         Per pharmacy- 12/2: 'Pt may have been overdosing on Tylenol and naproxen/NSAIDs PTA based on PTA med list. May consider rechecking LFTs to trend and changing PTA med regimens on discharge'          Diet: Regular Diet Adult    DVT Prophylaxis: Low Risk/Ambulatory with no VTE prophylaxis indicated  Araya Catheter: Not present  Lines: None     Cardiac Monitoring: None  Code Status: Full Code      Clinically Significant Risk Factors              # Hypoalbuminemia: Lowest albumin = 2.7 g/dL at 12/2/2023 12:10 PM, will monitor as appropriate            # Severe Obesity: Estimated body mass index is 73.12 kg/m  as calculated from the following:    Height as of this encounter: 1.905 m (6' 3\").    Weight as of this encounter: 265.4 kg (585 lb).      # Asthma: noted on problem list        Disposition Plan     Expected Discharge Date: 12/03/2023,  3:00 PM                  Fortino Monterroso MD  Hospitalist Service  [facility]  Securely message with [employer] (more info)  Text page via [employer] Paging/Directory   ______________________________________________________________________    Interval History   Having some nausea and diarrhea    Physical Exam   Vital Signs: Temp: 98.2  F (36.8  C) Temp src: Oral BP: (!) 161/90 Pulse: 98   Resp: 20 SpO2: 92 % O2 Device: None (Room air)    Weight: 585 lbs 0 oz    Constitutional: awake, alert, cooperative, no apparent distress, and appears stated age  Erythema slightly less angry, warm today. Much less tender    Medical Decision Making       30 MINUTES SPENT BY ME on the date of service doing chart review, history, exam, documentation & further activities per the note.      Data     CMP  Recent Labs   Lab 12/03/23  0549 12/02/23  1210 12/02/23  0417 12/01/23  0520 11/30/23  0521 11/28/23  0634 11/27/23  0525 11/26/23  1457   NA  --  136  --   --   --  137 133* 133*   POTASSIUM  --  3.9  " --   --   --  3.9 3.9 3.8   CHLORIDE  --  100  --   --   --  98 98 96*   CO2  --  26  --   --   --  27 24 24   ANIONGAP  --  10  --   --   --  12 11 13   GLC  --  99  --   --   --  99 109* 91   BUN  --  7.2  --   --   --  10.3 11.9 11.8   CR 0.63* 0.67 0.64* 0.69   < > 0.77 0.89 0.84   GFRESTIMATED >90 >90 >90 >90   < > >90 >90 >90   JOSHUA  --  7.9*  --   --   --  8.1* 8.0* 8.4*   PROTTOTAL  --  6.7  --   --   --   --   --  7.5   ALBUMIN  --  2.7*  --   --   --   --   --  3.6   BILITOTAL  --  0.4  --   --   --   --   --  0.6   ALKPHOS  --  63  --   --   --   --   --  62   AST  --  52*  --   --   --   --   --  53*   ALT  --  50  --   --   --   --   --  52    < > = values in this interval not displayed.     CBC  Recent Labs   Lab 12/03/23  0549 12/02/23  0417 12/01/23  0520 11/30/23  0521   WBC 12.7* 12.9* 12.5* 14.6*   RBC 3.71* 3.62* 3.46* 3.51*   HGB 11.2* 11.0* 10.7* 10.9*   HCT 35.4* 35.0* 33.6* 34.3*   MCV 95 97 97 98   MCH 30.2 30.4 30.9 31.1   MCHC 31.6 31.4* 31.8 31.8   RDW 13.2 13.2 13.2 13.3    296 249 208

## 2023-12-04 ENCOUNTER — APPOINTMENT (OUTPATIENT)
Dept: PHYSICAL THERAPY | Facility: CLINIC | Age: 38
End: 2023-12-04
Payer: COMMERCIAL

## 2023-12-04 LAB
ALBUMIN SERPL BCG-MCNC: 2.2 G/DL (ref 3.5–5.2)
ALP SERPL-CCNC: 54 U/L (ref 40–150)
ALT SERPL W P-5'-P-CCNC: 30 U/L (ref 0–70)
ANION GAP SERPL CALCULATED.3IONS-SCNC: 5 MMOL/L (ref 7–15)
AST SERPL W P-5'-P-CCNC: 29 U/L (ref 0–45)
BILIRUB SERPL-MCNC: 0.3 MG/DL
BUN SERPL-MCNC: 8.7 MG/DL (ref 6–20)
CALCIUM SERPL-MCNC: 7.4 MG/DL (ref 8.6–10)
CHLORIDE SERPL-SCNC: 100 MMOL/L (ref 98–107)
CREAT SERPL-MCNC: 0.56 MG/DL (ref 0.67–1.17)
DEPRECATED HCO3 PLAS-SCNC: 28 MMOL/L (ref 22–29)
EGFRCR SERPLBLD CKD-EPI 2021: >90 ML/MIN/1.73M2
ERYTHROCYTE [DISTWIDTH] IN BLOOD BY AUTOMATED COUNT: 13.2 % (ref 10–15)
GLUCOSE SERPL-MCNC: 124 MG/DL (ref 70–99)
HCT VFR BLD AUTO: 37.2 % (ref 40–53)
HGB BLD-MCNC: 11.9 G/DL (ref 13.3–17.7)
HGB BLD-MCNC: 13.2 G/DL (ref 13.3–17.7)
MCH RBC QN AUTO: 30.7 PG (ref 26.5–33)
MCHC RBC AUTO-ENTMCNC: 32 G/DL (ref 31.5–36.5)
MCV RBC AUTO: 96 FL (ref 78–100)
PLATELET # BLD AUTO: 346 10E3/UL (ref 150–450)
POTASSIUM SERPL-SCNC: 4.5 MMOL/L (ref 3.4–5.3)
PROT SERPL-MCNC: 6 G/DL (ref 6.4–8.3)
RBC # BLD AUTO: 3.88 10E6/UL (ref 4.4–5.9)
SODIUM SERPL-SCNC: 133 MMOL/L (ref 135–145)
WBC # BLD AUTO: 14.4 10E3/UL (ref 4–11)

## 2023-12-04 PROCEDURE — 85018 HEMOGLOBIN: CPT | Performed by: FAMILY MEDICINE

## 2023-12-04 PROCEDURE — 36415 COLL VENOUS BLD VENIPUNCTURE: CPT | Performed by: INTERNAL MEDICINE

## 2023-12-04 PROCEDURE — 250N000011 HC RX IP 250 OP 636

## 2023-12-04 PROCEDURE — 120N000004 HC R&B MS OVERFLOW

## 2023-12-04 PROCEDURE — 36415 COLL VENOUS BLD VENIPUNCTURE: CPT | Performed by: FAMILY MEDICINE

## 2023-12-04 PROCEDURE — 85027 COMPLETE CBC AUTOMATED: CPT | Performed by: INTERNAL MEDICINE

## 2023-12-04 PROCEDURE — 250N000013 HC RX MED GY IP 250 OP 250 PS 637

## 2023-12-04 PROCEDURE — 99232 SBSQ HOSP IP/OBS MODERATE 35: CPT | Performed by: FAMILY MEDICINE

## 2023-12-04 PROCEDURE — 250N000011 HC RX IP 250 OP 636: Mod: JZ | Performed by: FAMILY MEDICINE

## 2023-12-04 PROCEDURE — 97140 MANUAL THERAPY 1/> REGIONS: CPT | Mod: GP | Performed by: PHYSICAL THERAPIST

## 2023-12-04 PROCEDURE — 250N000013 HC RX MED GY IP 250 OP 250 PS 637: Performed by: FAMILY MEDICINE

## 2023-12-04 PROCEDURE — 80053 COMPREHEN METABOLIC PANEL: CPT | Performed by: INTERNAL MEDICINE

## 2023-12-04 RX ORDER — ONDANSETRON 2 MG/ML
4-8 INJECTION INTRAMUSCULAR; INTRAVENOUS EVERY 6 HOURS PRN
Status: DISCONTINUED | OUTPATIENT
Start: 2023-12-04 | End: 2023-12-07 | Stop reason: HOSPADM

## 2023-12-04 RX ORDER — VANCOMYCIN HYDROCHLORIDE 125 MG/1
125 CAPSULE ORAL 4 TIMES DAILY
Status: DISCONTINUED | OUTPATIENT
Start: 2023-12-04 | End: 2023-12-07 | Stop reason: HOSPADM

## 2023-12-04 RX ORDER — ONDANSETRON 4 MG/1
4-8 TABLET, ORALLY DISINTEGRATING ORAL EVERY 6 HOURS PRN
Status: DISCONTINUED | OUTPATIENT
Start: 2023-12-04 | End: 2023-12-07 | Stop reason: HOSPADM

## 2023-12-04 RX ADMIN — ONDANSETRON 4 MG: 2 INJECTION INTRAMUSCULAR; INTRAVENOUS at 10:05

## 2023-12-04 RX ADMIN — VANCOMYCIN HYDROCHLORIDE 125 MG: 125 CAPSULE ORAL at 14:05

## 2023-12-04 RX ADMIN — CALCIUM CARBONATE (ANTACID) CHEW TAB 500 MG 1000 MG: 500 CHEW TAB at 08:05

## 2023-12-04 RX ADMIN — CLOBETASOL PROPIONATE: 0.5 CREAM TOPICAL at 20:08

## 2023-12-04 RX ADMIN — AMPICILLIN SODIUM AND SULBACTAM SODIUM 3 G: 2; 1 INJECTION, POWDER, FOR SOLUTION INTRAMUSCULAR; INTRAVENOUS at 19:50

## 2023-12-04 RX ADMIN — CALCIPOTRIENE: 50 CREAM TOPICAL at 20:08

## 2023-12-04 RX ADMIN — VANCOMYCIN HYDROCHLORIDE 125 MG: 125 CAPSULE ORAL at 09:50

## 2023-12-04 RX ADMIN — OXYCODONE HYDROCHLORIDE 5 MG: 5 TABLET ORAL at 20:47

## 2023-12-04 RX ADMIN — AMPICILLIN SODIUM AND SULBACTAM SODIUM 3 G: 2; 1 INJECTION, POWDER, FOR SOLUTION INTRAMUSCULAR; INTRAVENOUS at 02:27

## 2023-12-04 RX ADMIN — CALCIPOTRIENE: 50 CREAM TOPICAL at 08:12

## 2023-12-04 RX ADMIN — ONDANSETRON 8 MG: 2 INJECTION INTRAMUSCULAR; INTRAVENOUS at 20:46

## 2023-12-04 RX ADMIN — VANCOMYCIN HYDROCHLORIDE 125 MG: 125 CAPSULE ORAL at 22:32

## 2023-12-04 RX ADMIN — ONDANSETRON 4 MG: 2 INJECTION INTRAMUSCULAR; INTRAVENOUS at 15:07

## 2023-12-04 RX ADMIN — CLOBETASOL PROPIONATE: 0.5 CREAM TOPICAL at 08:12

## 2023-12-04 RX ADMIN — VANCOMYCIN HYDROCHLORIDE 125 MG: 125 CAPSULE ORAL at 18:54

## 2023-12-04 RX ADMIN — AMPICILLIN SODIUM AND SULBACTAM SODIUM 3 G: 2; 1 INJECTION, POWDER, FOR SOLUTION INTRAMUSCULAR; INTRAVENOUS at 14:05

## 2023-12-04 RX ADMIN — AMPICILLIN SODIUM AND SULBACTAM SODIUM 3 G: 2; 1 INJECTION, POWDER, FOR SOLUTION INTRAMUSCULAR; INTRAVENOUS at 08:06

## 2023-12-04 ASSESSMENT — ACTIVITIES OF DAILY LIVING (ADL)
ADLS_ACUITY_SCORE: 22
ADLS_ACUITY_SCORE: 22
ADLS_ACUITY_SCORE: 23
ADLS_ACUITY_SCORE: 22
ADLS_ACUITY_SCORE: 22
ADLS_ACUITY_SCORE: 25
ADLS_ACUITY_SCORE: 22
ADLS_ACUITY_SCORE: 23
ADLS_ACUITY_SCORE: 23
ADLS_ACUITY_SCORE: 25

## 2023-12-04 NOTE — PROGRESS NOTES
"Pt's sister Zoe Hansen called to talk with care transitions. I asked permission from patient and he said \"that's fine\". Zoe's # is [phone]  "

## 2023-12-04 NOTE — PROGRESS NOTES
Care Management Follow Up    Length of Stay (days): 6    Expected Discharge Date: 2023     Concerns to be Addressed:       Patient plan of care discussed at interdisciplinary rounds: Yes    Anticipated Discharge Disposition:  Home vs Medical Respite Shelter     Anticipated Discharge Services:  Outpatient Lymphedema  Anticipated Discharge DME:  None    Patient/family educated on Medicare website which has current facility and service quality ratings:  No  Education Provided on the Discharge Plan:  Yes  Patient/Family in Agreement with the Plan:  Yes    Referrals Placed by CM/SW:  None  Private pay costs discussed: Not applicable    Additional Information:  Per IDT rounds this AM, patient is not medically stable for discharge today. Physician anticipates patient being hospitalized another 1-2 days.     CM spoke with patient's sister Zoe today. Zoe started conversation stating that patient is unable to return home with her as she has a 2 week old  at home and has an autoimmune disorder and recent . If patient has active Cdiff infection, she does not want him returning home with her. Zoe advises that no other family can accommodate patient at their home.    When CM explained more about the Cdiff infection, she stated patient may be able to return to home if not contagious. Zoe would like to speak with patient's physician further about Cdiff infection. BRADEN advised that patient will need lymphedema therapy (likely 3x/week) and has been scheduled for outpatient lymphedema therapy at clinic. CM requested physician call patient's sister.     BRADEN also advised that other option for discharge is homeless shelter, possibly medical respite shelter in [place]. Zoe would like to speak with physician before she makes a decision about patient returning to her home.     BRADEN provided information to Zoe regarding [facility] and [facility]. She advised that patient will  never follow up on his own regarding medical insurance, housing needs, etc so best to give her that information so she can make sure it gets followed up on.      Care Management will continue to follow to assist with discharge planning.       Taina Galaviz [facility]  Inpatient Care Coordinator   [facility] [phone]  [facility] [phone]

## 2023-12-04 NOTE — PROGRESS NOTES
[facility] Hospitalist Progress Note           Assessment & Plan        Ge Hansen is a 38 year old male who presents on 11/26/2023 with progressively worsening redness, swelling, and pain of LLE x 4 days. He was found to have cellulitis of LLE and is being admitted for IV antibiotic therapy.     Sepsis secondary to cellulitis of left lower extremity    Met SIRS criteria with tachycardia and leukocytosis (11.6). Afebrile with mild leukocytosis 11.6 ?  11.9. Lactate normal. CRP elevated at 263 and procalcitonin elevated at 2.76.   Developed redness, swelling, and pain to LLE 4 days prior to admission with progressively worsening symptoms. On exam there is warmth, erythema, swelling, and serosanguinous weeping fluid to the left lower extremity extending from the foot to above the knee (see picture below). Blood cultures x 2 collected and started on cefazolin 2 g q8hrs (11/26-11/28). Changed to vanco, unasyn 11/28/23 due to failure to improve     No prior history of cellulitis or MRSA.   Bcx x 2 11/26 NGTD  Ultrasound 11/27/23 - No deep venous thrombosis in the left lower extremity.   MRSA swab negative   X-rays 11/29/23 were negative.      Wound care and lymphedema consulted 11/30/23  Stopped Vancomycin 1,250mg Q12h (11/29 - 12/1/2023)      ? 164 - recheck 12/5       Tmax 101.   Patient wants to be discharged ASAP due to social situation, but recommend not discharging due to fever last PM. Has been seen by SW and given information on resources and additional resources are listed in AVS   - Continue Unasyn 3g Q6h (11/28 likely through 12/7)  - Continue wound cares  - will need outpatient follow-up with wound care on discharge.       Suspected acute CDiff infection due to antibiotics for cellulitis as above   - developed worsening diarrhea 12/3-12/4.  CDiff toxin PCR positive, as was EIA antigen but EIA toxin negative.  Overall, especially with patient being on antibiotics and having severe diarrhea suspect  that this is indicative of acute CDiff colitis.    - started on oral vancomycin 125 mg 4 times daily 12/4, anticipate 10 day course.       Psoriasis  WO implicated psoriasis in etiology of cellulitis  Saw DERM once in 2020, recommended Clobetasol cream 0.05% apply twice daily to affected areas on hands, elbows, knees yo Monday through Thursday. Calcipotriene 0.005% cream Apply twice daily to affected areas on Friday through Sunday. Tacrolimus ointment Apply twice daily to affected areas on groin and  folds of skin.   Started clobetasol, caplciporiene to areas unaffected by celluitis 12/2/2023, add LLE when cellulitis treated   - Follow-up dermatology as an outpt, order placed for on discharge      Hyponatremia, resolved  Had intermittent mildly low sodiums while inpatient.  Has not needed intervention.   - could consider recheck of sodium at follow-up with primary care provider.       Asthma    Denies shortness of breath. He has a home albuterol inhaler that he utilizes as needed. Patient requesting albuterol inhaler while hospitalized.   - continued to do well while here.   - Albuterol inhaler PRN for shortness of breath.     Morbid obesity  Obesity hypoventilation, severe obstructive sleep apnea     BMI 68.75.   Study Date: 10/3/2018 (520.0 lbs)- apnea/hypopnea index 134 events per hour. Hypoventilation was present with a maximum change from ~11 mmHg and 46.0 minutes at or greater than 55 mmHg. VBG was consistent with chronic compensated respiratory acidosis (pH 7.36, pCO2 62, HCO3 35). Lowest oxygen saturation was 52.0%. Time spent less than or equal to 88% was 59.5 minutes. Time spent less than or equal to 89% was 65.5 minutes. The final pressure was BiLevel 23/13 cmH2O with an AHI of 1.7 events per hour. Time in REM supine on final pressure was 49.0 minutes.     Utilizes BiPAP machine at home nightly. Unable to bring home CPAP and will place order for BiPAP machine here. Will do best to match patients home  "settings.  Bilevel PAP 23/13 cm H2O in spontaneous mode on room air appeared optimal on sleep study 2018   - BiPAP nightly  - Due for Sleep follow-up, last visit was 2018, placed ASAP referral for discharge  - Ordered suppllies for discharge      Tobacco abuse    Daily tobacco user.   - Encourage cessation  - Nicotine patch 14mg          Per pharmacy- 12/2: 'Pt may have been overdosing on Tylenol and naproxen/NSAIDs PTA based on PTA med list.  Liver function tests stable here    May consider confirming PTA med regimens on discharge            DVT Prophylaxis: Low Risk/Ambulatory with no VTE prophylaxis indicated  Araya Catheter: Not present  Lines: None     Cardiac Monitoring: None  Code Status: Full Code          Clinically Significant Risk Factors               # Hypoalbuminemia: Lowest albumin = 2.7 g/dL at 12/2/2023 12:10 PM, will monitor as appropriate            # Severe Obesity: Estimated body mass index is 73.12 kg/m  as calculated from the following:    Height as of this encounter: 1.905 m (6' 3\").    Weight as of this encounter: 265.4 kg (585 lb).      # Asthma: noted on problem list           Diet  Orders Placed This Encounter      Regular Diet Adult                      Disposition Plan     Now needing at least 1-2 more days inpatient to see improvement in diarrhea due to CDiff as above and to confirm adequate intake to keep up with this.    Then likely discharge home, but working on options for this.                    Dharmesh Reyes MD, MD  Hospitalist Service  [facility]  Securely message with the Vocera Web Console (learn more here)  Text page via [employer] Paging/Directory             Interval History:   Patient having worsening diarrhea the past 24h.  Frequent stools overnight.  Non-bloody.  Feels like \"gut is way off\" but no other complaints today.  No fever or chills. No dyspnea.  No other changes.                  Review of Systems:    ROS: 10 point ROS neg other than " the symptoms noted above in the HPI.           Medications:   Current active medications and PTA medications reviewed, see medication list for details.            Physical Exam:   Vitals were reviewed  Patient Vitals for the past 24 hrs:   BP Temp Temp src Pulse Resp SpO2   23 0700 (!) 143/71 98.3  F (36.8  C) Oral 94 18 92 %   23 0335 (!) 167/73 98.6  F (37  C) Oral 98 16 94 %   23 0010 (!) 160/77 98.4  F (36.9  C) Oral 104 16 92 %   23 2030 (!) 157/77 98.6  F (37  C) Oral 96 18 97 %   23 1609 (!) 157/100 98.5  F (36.9  C) Oral 103 20 92 %   23 1104 (!) 161/90 98.2  F (36.8  C) Oral 98 20 92 %       Temperatures:  Current - Temp: 98.3  F (36.8  C); Max - Temp  Av.4  F (36.9  C)  Min: 98.2  F (36.8  C)  Max: 98.6  F (37  C)  Respiration range: Resp  Av  Min: 16  Max: 20  Pulse range: Pulse  Av.8  Min: 94  Max: 104  Blood pressure range: Systolic (24hrs), Av , Min:143 , Max:167   ; Diastolic (24hrs), Av, Min:71, Max:100    Pulse oximetry range: SpO2  Av.2 %  Min: 92 %  Max: 97 %  I/O last 3 completed shifts:  In: 700 [P.O.:700]  Out: -     Intake/Output Summary (Last 24 hours) at 2023 0908  Last data filed at 12/3/2023 1300  Gross per 24 hour   Intake 700 ml   Output --   Net 700 ml     EXAM:  General: awake and alert, NAD, oriented x 3  Head: normocephalic  Neck: unremarkable, no lymphadenopathy   HEENT: oropharynx pink and moist    Heart: Regular rate and rhythm, no murmurs, rubs, or gallops  Lungs: clear to auscultation bilaterally with good air movement throughout  Abdomen: soft, just faintly tender, no masses or organomegaly  Extremities: persistent  lymphedema in lower extremities   Skin: erythema remains improved from markings left lower extremity, wraps in place, no new changes noted.               Data:     Reviewed data:  Results for orders placed or performed during the hospital encounter of 23 (from the past 24 hour(s))   CNiraj  difficile Toxin B PCR with reflex to C. difficile Antigen and Toxins A/B EIA    Specimen: Per Rectum; Stool   Result Value Ref Range    C Difficile Toxin B by PCR Positive (A) Negative    Narrative    The [employer] Xpert C. difficile Assay, performed on the [employer], is a qualitative in vitro diagnostic test for rapid detection of toxin B gene sequences from unformed (liquid or soft) stool specimens collected from patients suspected of having Clostridioides difficile infection (CDI). The test utilizes automated real-time polymerase chain reaction (PCR) to detect toxin gene sequences associated with toxin producing C. difficile. The Xpert C. difficile Assay is intended as an aid in the diagnosis of CDI.   C. difficile Antigen and Toxins A/B by Enzyme Immunoassay    Specimen: Per Rectum; Stool   Result Value Ref Range    C. difficile GDH Antigen Positive (A) Negative    C. difficile Toxin Negative Negative    Narrative    C. difficile GDH antigen detected and C. difficile toxin not detected by enzyme immunoassay. These results indicate the organism is present and the toxin is absent or below the limit of detection. Results must be interpreted based on clinical findings.   CBC with platelets   Result Value Ref Range    WBC Count 14.4 (H) 4.0 - 11.0 10e3/uL    RBC Count 3.88 (L) 4.40 - 5.90 10e6/uL    Hemoglobin 11.9 (L) 13.3 - 17.7 g/dL    Hematocrit 37.2 (L) 40.0 - 53.0 %    MCV 96 78 - 100 fL    MCH 30.7 26.5 - 33.0 pg    MCHC 32.0 31.5 - 36.5 g/dL    RDW 13.2 10.0 - 15.0 %    Platelet Count 346 150 - 450 10e3/uL   Comprehensive metabolic panel   Result Value Ref Range    Sodium 133 (L) 135 - 145 mmol/L    Potassium 4.5 3.4 - 5.3 mmol/L    Carbon Dioxide (CO2) 28 22 - 29 mmol/L    Anion Gap 5 (L) 7 - 15 mmol/L    Urea Nitrogen 8.7 6.0 - 20.0 mg/dL    Creatinine 0.56 (L) 0.67 - 1.17 mg/dL    GFR Estimate >90 >60 mL/min/1.73m2    Calcium 7.4 (L) 8.6 - 10.0 mg/dL    Chloride 100 98 - 107  mmol/L    Glucose 124 (H) 70 - 99 mg/dL    Alkaline Phosphatase 54 40 - 150 U/L    AST 29 0 - 45 U/L    ALT 30 0 - 70 U/L    Protein Total 6.0 (L) 6.4 - 8.3 g/dL    Albumin 2.2 (L) 3.5 - 5.2 g/dL    Bilirubin Total 0.3 <=1.2 mg/dL           Attestation:  I have reviewed today's vital signs, notes, medications, labs and imaging.  Amount of time spent managing this patient today:  45 minutes.     Dharmesh Reyes MD

## 2023-12-04 NOTE — PLAN OF CARE
"Goal Outcome Evaluation:    Pt very anxious overnight stating \"I don't have anywhere to go. I just have a lot going on in my head right now\". Reported nausea and abd discomfort last evening- resolved with compazine. VSS on room air, wore home CPAP. Large incontinent episode of diarrhea this AM. Afebrile all night. Using call light and able to make needs known.   Ailson Lanza RN on 12/4/2023 at 6:55 AM    "

## 2023-12-04 NOTE — PROGRESS NOTES
Lymphedema Therapy  Pt with good tolerance to lymphedema wraps.  Gradient compression bandages noted to be saturated all the way through to top layer - therapist removed all wraps and wound dressings. LLE was cleansed, wounds cleansed and wound cares provided all with good tolerance.  Gradient compression bandages re-applied foot to knee and pt reports comfort.  Due to heavy drainage, wrapping DAILY still remains appropriate a this time with goal of transitioning to OP lymphedema clinic upon discharge.      Patient's RN was able to assess leg and perform doppler when leg unwrapped, prior to being re-wrapped.

## 2023-12-04 NOTE — PROGRESS NOTES
Clinical Nutrition Services- Brief Note    Reviewed nutrition risk factors due to LOS. Pt is tolerating a Regular diet, eating well per nursing documentation and patient report. No nutrition issues identified at this time. RD will continue to follow per nutrition protocol.    Alisson Valdez RDN, RAVIN  Clinical Dietitian  Office: [phone]  Weekend pager: [phone]

## 2023-12-04 NOTE — PLAN OF CARE
Problem: Wound  Goal: Optimal Coping  Outcome: Not Progressing     Problem: Diarrhea  Goal: Effective Diarrhea Management  Outcome: Not Progressing  Intervention: Manage Diarrhea  Recent Flowsheet Documentation  Taken 12/3/2023 1600 by Joana Nicole RN  Medication Review/Management: medications reviewed  Taken 12/3/2023 0946 by Joana Nicole, RN  Medication Review/Management: medications reviewed   Goal Outcome Evaluation:         Pt reported to writer this morning that he had 3 episodes of diarrhea from last night to this morning and that he was nauseated. Pt given zofran and stool specimen sent, came back positive for C diff. Pt was educated on what c diff is and the importance of thorough handwashing and that surfaces need to be cleaned with bleach, hand out given and he verbalized understanding. Pt was a febrile this shift.

## 2023-12-04 NOTE — PROGRESS NOTES
"Pt has had 3 + liquid bloody stools, some of them incontinence. No clots. Pt is not on a blood thinner, he denies knowing about internal hemorrhoids. No external hemorrhoids or breakdown noted wiping patient. Hgb this morning 11.9. Pt is pale, c/o \"stomach churning\".  Taking Zofran. Dr Reyes notified. Hgb drawn, result pending.   "

## 2023-12-05 ENCOUNTER — APPOINTMENT (OUTPATIENT)
Dept: ULTRASOUND IMAGING | Facility: CLINIC | Age: 38
End: 2023-12-05
Payer: COMMERCIAL

## 2023-12-05 ENCOUNTER — APPOINTMENT (OUTPATIENT)
Dept: PHYSICAL THERAPY | Facility: CLINIC | Age: 38
End: 2023-12-05
Payer: COMMERCIAL

## 2023-12-05 LAB
ANION GAP SERPL CALCULATED.3IONS-SCNC: 12 MMOL/L (ref 7–15)
BUN SERPL-MCNC: 12.9 MG/DL (ref 6–20)
CALCIUM SERPL-MCNC: 7.5 MG/DL (ref 8.6–10)
CHLORIDE SERPL-SCNC: 95 MMOL/L (ref 98–107)
CREAT SERPL-MCNC: 1.1 MG/DL (ref 0.67–1.17)
CRP SERPL-MCNC: 134.31 MG/L
DEPRECATED HCO3 PLAS-SCNC: 23 MMOL/L (ref 22–29)
EGFRCR SERPLBLD CKD-EPI 2021: 89 ML/MIN/1.73M2
ERYTHROCYTE [DISTWIDTH] IN BLOOD BY AUTOMATED COUNT: 13.2 % (ref 10–15)
GLUCOSE SERPL-MCNC: 156 MG/DL (ref 70–99)
HCT VFR BLD AUTO: 44.1 % (ref 40–53)
HGB BLD-MCNC: 14 G/DL (ref 13.3–17.7)
MCH RBC QN AUTO: 30.8 PG (ref 26.5–33)
MCHC RBC AUTO-ENTMCNC: 31.7 G/DL (ref 31.5–36.5)
MCV RBC AUTO: 97 FL (ref 78–100)
PLATELET # BLD AUTO: 424 10E3/UL (ref 150–450)
POTASSIUM SERPL-SCNC: 4.5 MMOL/L (ref 3.4–5.3)
RBC # BLD AUTO: 4.54 10E6/UL (ref 4.4–5.9)
SODIUM SERPL-SCNC: 130 MMOL/L (ref 135–145)
WBC # BLD AUTO: 25 10E3/UL (ref 4–11)

## 2023-12-05 PROCEDURE — 120N000004 HC R&B MS OVERFLOW

## 2023-12-05 PROCEDURE — 250N000013 HC RX MED GY IP 250 OP 250 PS 637

## 2023-12-05 PROCEDURE — 93971 EXTREMITY STUDY: CPT | Mod: RT

## 2023-12-05 PROCEDURE — 85027 COMPLETE CBC AUTOMATED: CPT | Performed by: FAMILY MEDICINE

## 2023-12-05 PROCEDURE — 250N000013 HC RX MED GY IP 250 OP 250 PS 637: Performed by: FAMILY MEDICINE

## 2023-12-05 PROCEDURE — 80048 BASIC METABOLIC PNL TOTAL CA: CPT | Performed by: FAMILY MEDICINE

## 2023-12-05 PROCEDURE — 97140 MANUAL THERAPY 1/> REGIONS: CPT | Mod: GP | Performed by: PHYSICAL THERAPIST

## 2023-12-05 PROCEDURE — 250N000011 HC RX IP 250 OP 636: Mod: JZ | Performed by: FAMILY MEDICINE

## 2023-12-05 PROCEDURE — 86140 C-REACTIVE PROTEIN: CPT | Performed by: FAMILY MEDICINE

## 2023-12-05 PROCEDURE — 250N000011 HC RX IP 250 OP 636

## 2023-12-05 PROCEDURE — 99232 SBSQ HOSP IP/OBS MODERATE 35: CPT | Performed by: FAMILY MEDICINE

## 2023-12-05 PROCEDURE — 36415 COLL VENOUS BLD VENIPUNCTURE: CPT | Performed by: FAMILY MEDICINE

## 2023-12-05 RX ORDER — SODIUM CHLORIDE 9 MG/ML
INJECTION, SOLUTION INTRAVENOUS CONTINUOUS
Status: DISCONTINUED | OUTPATIENT
Start: 2023-12-05 | End: 2023-12-07 | Stop reason: HOSPADM

## 2023-12-05 RX ORDER — LIDOCAINE 40 MG/G
CREAM TOPICAL
Status: DISCONTINUED | OUTPATIENT
Start: 2023-12-05 | End: 2023-12-07 | Stop reason: HOSPADM

## 2023-12-05 RX ADMIN — CLOBETASOL PROPIONATE: 0.5 CREAM TOPICAL at 20:51

## 2023-12-05 RX ADMIN — ACETAMINOPHEN 650 MG: 325 TABLET, FILM COATED ORAL at 11:54

## 2023-12-05 RX ADMIN — CALCIPOTRIENE: 50 CREAM TOPICAL at 20:51

## 2023-12-05 RX ADMIN — IBUPROFEN 400 MG: 400 TABLET ORAL at 11:54

## 2023-12-05 RX ADMIN — OXYCODONE HYDROCHLORIDE 5 MG: 5 TABLET ORAL at 01:20

## 2023-12-05 RX ADMIN — NICOTINE 1 PATCH: 14 PATCH, EXTENDED RELEASE TRANSDERMAL at 06:57

## 2023-12-05 RX ADMIN — CALCIPOTRIENE: 50 CREAM TOPICAL at 08:53

## 2023-12-05 RX ADMIN — VANCOMYCIN HYDROCHLORIDE 125 MG: 125 CAPSULE ORAL at 11:54

## 2023-12-05 RX ADMIN — AMPICILLIN SODIUM AND SULBACTAM SODIUM 3 G: 2; 1 INJECTION, POWDER, FOR SOLUTION INTRAMUSCULAR; INTRAVENOUS at 14:22

## 2023-12-05 RX ADMIN — AMPICILLIN SODIUM AND SULBACTAM SODIUM 3 G: 2; 1 INJECTION, POWDER, FOR SOLUTION INTRAMUSCULAR; INTRAVENOUS at 08:51

## 2023-12-05 RX ADMIN — AMPICILLIN SODIUM AND SULBACTAM SODIUM 3 G: 2; 1 INJECTION, POWDER, FOR SOLUTION INTRAMUSCULAR; INTRAVENOUS at 20:50

## 2023-12-05 RX ADMIN — VANCOMYCIN HYDROCHLORIDE 125 MG: 125 CAPSULE ORAL at 22:06

## 2023-12-05 RX ADMIN — AMPICILLIN SODIUM AND SULBACTAM SODIUM 3 G: 2; 1 INJECTION, POWDER, FOR SOLUTION INTRAMUSCULAR; INTRAVENOUS at 01:19

## 2023-12-05 RX ADMIN — ONDANSETRON 4 MG: 2 INJECTION INTRAMUSCULAR; INTRAVENOUS at 01:20

## 2023-12-05 RX ADMIN — CLOBETASOL PROPIONATE: 0.5 CREAM TOPICAL at 08:53

## 2023-12-05 RX ADMIN — VANCOMYCIN HYDROCHLORIDE 125 MG: 125 CAPSULE ORAL at 18:15

## 2023-12-05 RX ADMIN — VANCOMYCIN HYDROCHLORIDE 125 MG: 125 CAPSULE ORAL at 08:47

## 2023-12-05 ASSESSMENT — ACTIVITIES OF DAILY LIVING (ADL)
ADLS_ACUITY_SCORE: 25
ADLS_ACUITY_SCORE: 23
ADLS_ACUITY_SCORE: 25
ADLS_ACUITY_SCORE: 25
ADLS_ACUITY_SCORE: 27
ADLS_ACUITY_SCORE: 22
ADLS_ACUITY_SCORE: 27
ADLS_ACUITY_SCORE: 24
ADLS_ACUITY_SCORE: 25
ADLS_ACUITY_SCORE: 25

## 2023-12-05 NOTE — PROGRESS NOTES
End Of Shift Note    Situation: LLE cellulitis, now C-diff, BRB per rectum, intermittent N & V    Plan: IV ABX, Oral Vancocin, antiemetics, pain control, monitor HGB    Subjective/Objective:    Neuro: A & O x4, has been very kind and thankful for staff cares today.  Was anxious this morning as his return to home was in question.    Cardiac: Regular rate, BP WNL, Afebrile    Resp: R/A, LS CTA, gets mildly winded getting up to the bathroom    GI/: Pt had multiple episodes of BRB per rectum, mixed with diarrhea and Urine, at times incontinent in the bed. He had 2 emesis episodes, Getting Zofran, which helps.    Endo: N/A    MSK: Up with SBA and walker to , needs a bit of asst to get left leg into bed.    Skin: At base line he has scattered psoriasis (getting prescribed creams) LLE wound care was done by PT Taina. Redness is receding and drainage is minimal. Wrap has remained dry today since it ws changed.     LDAs: PIV left, used for ABX.     PAIN: Mild pain in leg, c/o intermittent stomach pain, only 1 dose of Oxycodone given since 0730..

## 2023-12-05 NOTE — PROGRESS NOTES
"Assumed care [id number].  Pt A&Ox4. Able to make needs known. C/O 7/10 abdominal pain, nausea, hiccups. PRN medications given. Relief stated.      Home BiPap used NOC    Several episodes of vinay bloody diarrhea. Pt reports feeling \"awful\" and \"more tired\" than last night after 0319 episode and ambulating to toilet. Denies pain, dyspnea, O2 sat remains above 90. Pt states has not been sleeping well this NOC    Lymphodema on LLE changed d/t incontinent watery bloody BM.   "

## 2023-12-05 NOTE — PROGRESS NOTES
Lymphedema Therapy  Pt reports good tolerance to lymph wraps, but unfortunately had bowel movement accident that soiled all wraps so nursing removed and threw away and re-wrapped with ACE bandages as best as able.  Wounds noted to be healing and have less weeping  than previous days.  LLE was cleansed, wounds cleansed and wound cares provided all with good tolerance.  Gradient compression bandages re-applied foot to knee and pt reports comfort.  Daily wrapping remains appropriate a this time with goal of transitioning to OP lymphedema clinic upon discharge.

## 2023-12-05 NOTE — PROGRESS NOTES
[facility] Hospitalist Progress Note           Assessment & Plan          Ge Hansen is a 38 year old male who presents on 11/26/2023 with progressively worsening redness, swelling, and pain of LLE x 4 days. He was found to have cellulitis of LLE and is being admitted for IV antibiotic therapy.     Sepsis secondary to cellulitis of left lower extremity    Met SIRS criteria with tachycardia and leukocytosis (11.6). Afebrile with mild leukocytosis 11.6 ?  11.9. Lactate normal. CRP elevated at 263 and procalcitonin elevated at 2.76.   Developed redness, swelling, and pain to LLE 4 days prior to admission with progressively worsening symptoms. On exam there is warmth, erythema, swelling, and serosanguinous weeping fluid to the left lower extremity extending from the foot to above the knee (see picture below). Blood cultures x 2 collected and started on cefazolin 2 g q8hrs (11/26-11/28). Changed to vanco, unasyn 11/28/23 due to failure to improve     No prior history of cellulitis or MRSA.   Bcx x 2 11/26 NGTD  Ultrasound 11/27/23 - No deep venous thrombosis in the left lower extremity.   MRSA swab negative   X-rays 11/29/23 were negative.      Wound care and lymphedema consulted 11/30/23  Stopped Vancomycin 1,250mg Q12h (11/29 - 12/1/2023)      ? 164 ? 134        Tmax 101.   Patient wants to be discharged ASAP due to social situation, but recommend not discharging due to fever last PM. Has been seen by SW and given information on resources and additional resources are listed in AVS   - Continue Unasyn 3g Q6h (11/28 likely through 12/7)  - Continue wound cares  - improving slowly  - will need outpatient follow-up with wound care on discharge.       Suspected acute CDiff infection due to antibiotics for cellulitis as above   - developed worsening diarrhea 12/3-12/4.  CDiff toxin PCR positive, as was EIA antigen but EIA toxin negative.  Overall, especially with patient being on antibiotics and having severe  diarrhea suspect that this is indicative of acute CDiff colitis.    - started on oral vancomycin 125 mg 4 times daily 12/4, anticipate 10 day course.     - no notable change yet 12/5    Psoriasis  WOC implicated psoriasis in etiology of cellulitis  Saw DERM once in 2020, recommended Clobetasol cream 0.05% apply twice daily to affected areas on hands, elbows, knees yo Monday through Thursday. Calcipotriene 0.005% cream Apply twice daily to affected areas on Friday through Sunday. Tacrolimus ointment Apply twice daily to affected areas on groin and  folds of skin.   Started clobetasol, caplciporiene to areas unaffected by celluitis 12/2/2023, add LLE when cellulitis treated   - Follow-up dermatology as an outpt, order placed for on discharge      Hyponatremia, resolved  Had intermittent mildly low sodiums while inpatient.  Has not needed intervention.   - sodium trending down again 12/5 - started on NS @ 75/hour pm 12/5 given somewhat poor intake and continued diarrhea.  Follow      Asthma    Denies shortness of breath. He has a home albuterol inhaler that he utilizes as needed. Patient requesting albuterol inhaler while hospitalized.   - continued to do well while here.   - Albuterol inhaler PRN for shortness of breath.     Morbid obesity  Obesity hypoventilation, severe obstructive sleep apnea     BMI 68.75.   Study Date: 10/3/2018 (520.0 lbs)- apnea/hypopnea index 134 events per hour. Hypoventilation was present with a maximum change from ~11 mmHg and 46.0 minutes at or greater than 55 mmHg. VBG was consistent with chronic compensated respiratory acidosis (pH 7.36, pCO2 62, HCO3 35). Lowest oxygen saturation was 52.0%. Time spent less than or equal to 88% was 59.5 minutes. Time spent less than or equal to 89% was 65.5 minutes. The final pressure was BiLevel 23/13 cmH2O with an AHI of 1.7 events per hour. Time in REM supine on final pressure was 49.0 minutes.     Utilizes BiPAP machine at home nightly. Unable to  "bring home CPAP and will place order for BiPAP machine here. Will do best to match patients home settings.  Bilevel PAP 23/13 cm H2O in spontaneous mode on room air appeared optimal on sleep study 2018   - BiPAP nightly  - Due for Sleep follow-up, last visit was 2018, placed ASAP referral for discharge  - Ordered suppllies for discharge      Tobacco abuse    Daily tobacco user.   - Encourage cessation  - Nicotine patch 14mg          Per pharmacy- 12/2: 'Pt may have been overdosing on Tylenol and naproxen/NSAIDs PTA based on PTA med list.  Liver function tests stable here    May consider confirming PTA med regimens on discharge            DVT Prophylaxis: Low Risk/Ambulatory with no VTE prophylaxis indicated  Araya Catheter: Not present  Lines: None     Cardiac Monitoring: None  Code Status: Full Code          Clinically Significant Risk Factors               # Hypoalbuminemia: Lowest albumin = 2.7 g/dL at 12/2/2023 12:10 PM, will monitor as appropriate            # Severe Obesity: Estimated body mass index is 73.12 kg/m  as calculated from the following:    Height as of this encounter: 1.905 m (6' 3\").    Weight as of this encounter: 265.4 kg (585 lb).      # Asthma: noted on problem list              Diet  Orders Placed This Encounter      Regular Diet Adult                        Disposition Plan      Likely at least 2 more days inpatient still.                  Dharmesh Reyes MD, MD  Hospitalist Service  [facility]  Securely message with the [employer]oc[employer] Web Console (learn more here)  Text page via [employer] Paging/Directory             Interval History:   Not much change.  Says diarrhea is a bit better.  No other changes.  No dyspnea or fever.  No pain                Review of Systems:    ROS: 10 point ROS neg other than the symptoms noted above in the HPI.           Medications:   Current active medications and PTA medications reviewed, see medication list for details.            Physical " Exam:   Vitals were reviewed  Patient Vitals for the past 24 hrs:   BP Temp Temp src Pulse Resp SpO2   23 0632 129/73 98.7  F (37.1  C) Oral 106 20 97 %   23 0148 -- 98.3  F (36.8  C) Oral -- -- --   23 2236 (!) 142/86 -- -- 99 -- 94 %   23 2235 -- -- -- -- -- 94 %   23 2234 (!) 129/108 98.3  F (36.8  C) Oral 103 20 --   23 1751 (!) 152/77 -- -- 108 18 --       Temperatures:  Current - Temp: 98.7  F (37.1  C); Max - Temp  Av.4  F (36.9  C)  Min: 98.3  F (36.8  C)  Max: 98.7  F (37.1  C)  Respiration range: Resp  Av.3  Min: 18  Max: 20  Pulse range: Pulse  Av  Min: 99  Max: 108  Blood pressure range: Systolic (24hrs), Av , Min:129 , Max:152   ; Diastolic (24hrs), Av, Min:73, Max:108    Pulse oximetry range: SpO2  Av %  Min: 94 %  Max: 97 %  I/O last 3 completed shifts:  In: 2650 [P.O.:2550; I.V.:100]  Out: -     Intake/Output Summary (Last 24 hours) at 2023 1623  Last data filed at 2023 1300  Gross per 24 hour   Intake 2650 ml   Output --   Net 2650 ml     EXAM:  General: awake and alert, NAD, oriented x 3  Head: normocephalic  Neck: unremarkable, no lymphadenopathy   HEENT: oropharynx pink and moist    Heart: Regular rate and rhythm, no murmurs, rubs, or gallops  Lungs: clear to auscultation bilaterally with good air movement throughout  Abdomen: soft, non-tender, no masses or organomegaly  Extremities: unchanged long-standing lymphedema in lower extremities - cellulitis improving -   Skin otherwise unremarkable as visualized.               Data:     Reviewed data:  Results for orders placed or performed during the hospital encounter of 23 (from the past 24 hour(s))   CRP inflammation   Result Value Ref Range    CRP Inflammation 134.31 (H) <5.00 mg/L   CBC with platelets   Result Value Ref Range    WBC Count 25.0 (H) 4.0 - 11.0 10e3/uL    RBC Count 4.54 4.40 - 5.90 10e6/uL    Hemoglobin 14.0 13.3 - 17.7 g/dL    Hematocrit 44.1 40.0 -  53.0 %    MCV 97 78 - 100 fL    MCH 30.8 26.5 - 33.0 pg    MCHC 31.7 31.5 - 36.5 g/dL    RDW 13.2 10.0 - 15.0 %    Platelet Count 424 150 - 450 10e3/uL   Basic metabolic panel   Result Value Ref Range    Sodium 130 (L) 135 - 145 mmol/L    Potassium 4.5 3.4 - 5.3 mmol/L    Chloride 95 (L) 98 - 107 mmol/L    Carbon Dioxide (CO2) 23 22 - 29 mmol/L    Anion Gap 12 7 - 15 mmol/L    Urea Nitrogen 12.9 6.0 - 20.0 mg/dL    Creatinine 1.10 0.67 - 1.17 mg/dL    GFR Estimate 89 >60 mL/min/1.73m2    Calcium 7.5 (L) 8.6 - 10.0 mg/dL    Glucose 156 (H) 70 - 99 mg/dL           Attestation:  I have reviewed today's vital signs, notes, medications, labs and imaging.  Amount of time spent managing this patient today:  40 minutes.     Dharmesh Reyes MD

## 2023-12-06 ENCOUNTER — APPOINTMENT (OUTPATIENT)
Dept: PHYSICAL THERAPY | Facility: CLINIC | Age: 38
End: 2023-12-06
Payer: COMMERCIAL

## 2023-12-06 LAB
ANION GAP SERPL CALCULATED.3IONS-SCNC: 10 MMOL/L (ref 7–15)
BUN SERPL-MCNC: 12.7 MG/DL (ref 6–20)
CALCIUM SERPL-MCNC: 7.4 MG/DL (ref 8.6–10)
CHLORIDE SERPL-SCNC: 94 MMOL/L (ref 98–107)
CREAT SERPL-MCNC: 0.91 MG/DL (ref 0.67–1.17)
DEPRECATED HCO3 PLAS-SCNC: 25 MMOL/L (ref 22–29)
EGFRCR SERPLBLD CKD-EPI 2021: >90 ML/MIN/1.73M2
ERYTHROCYTE [DISTWIDTH] IN BLOOD BY AUTOMATED COUNT: 13.2 % (ref 10–15)
GLUCOSE SERPL-MCNC: 103 MG/DL (ref 70–99)
HCT VFR BLD AUTO: 34.9 % (ref 40–53)
HGB BLD-MCNC: 10.9 G/DL (ref 13.3–17.7)
MCH RBC QN AUTO: 30.2 PG (ref 26.5–33)
MCHC RBC AUTO-ENTMCNC: 31.2 G/DL (ref 31.5–36.5)
MCV RBC AUTO: 97 FL (ref 78–100)
OSMOLALITY SERPL: 270 MMOL/KG (ref 275–295)
OSMOLALITY UR: 270 MMOL/KG (ref 100–1200)
PLATELET # BLD AUTO: 285 10E3/UL (ref 150–450)
POTASSIUM SERPL-SCNC: 4.1 MMOL/L (ref 3.4–5.3)
RBC # BLD AUTO: 3.61 10E6/UL (ref 4.4–5.9)
SODIUM SERPL-SCNC: 129 MMOL/L (ref 135–145)
SODIUM SERPL-SCNC: 131 MMOL/L (ref 135–145)
SODIUM UR-SCNC: 20 MMOL/L
WBC # BLD AUTO: 16.6 10E3/UL (ref 4–11)

## 2023-12-06 PROCEDURE — 99232 SBSQ HOSP IP/OBS MODERATE 35: CPT | Performed by: FAMILY MEDICINE

## 2023-12-06 PROCEDURE — 85027 COMPLETE CBC AUTOMATED: CPT | Performed by: FAMILY MEDICINE

## 2023-12-06 PROCEDURE — 84300 ASSAY OF URINE SODIUM: CPT | Performed by: FAMILY MEDICINE

## 2023-12-06 PROCEDURE — 36415 COLL VENOUS BLD VENIPUNCTURE: CPT | Performed by: FAMILY MEDICINE

## 2023-12-06 PROCEDURE — 250N000011 HC RX IP 250 OP 636

## 2023-12-06 PROCEDURE — 83930 ASSAY OF BLOOD OSMOLALITY: CPT | Performed by: FAMILY MEDICINE

## 2023-12-06 PROCEDURE — 97140 MANUAL THERAPY 1/> REGIONS: CPT | Mod: GP

## 2023-12-06 PROCEDURE — 84295 ASSAY OF SERUM SODIUM: CPT | Performed by: FAMILY MEDICINE

## 2023-12-06 PROCEDURE — 80048 BASIC METABOLIC PNL TOTAL CA: CPT | Performed by: FAMILY MEDICINE

## 2023-12-06 PROCEDURE — 83935 ASSAY OF URINE OSMOLALITY: CPT | Performed by: FAMILY MEDICINE

## 2023-12-06 PROCEDURE — 250N000013 HC RX MED GY IP 250 OP 250 PS 637

## 2023-12-06 PROCEDURE — 250N000013 HC RX MED GY IP 250 OP 250 PS 637: Performed by: FAMILY MEDICINE

## 2023-12-06 PROCEDURE — 120N000004 HC R&B MS OVERFLOW

## 2023-12-06 RX ORDER — POLYETHYLENE GLYCOL 3350 17 G
2 POWDER IN PACKET (EA) ORAL
Status: DISCONTINUED | OUTPATIENT
Start: 2023-12-06 | End: 2023-12-07 | Stop reason: HOSPADM

## 2023-12-06 RX ADMIN — NICOTINE 1 PATCH: 14 PATCH, EXTENDED RELEASE TRANSDERMAL at 02:19

## 2023-12-06 RX ADMIN — VANCOMYCIN HYDROCHLORIDE 125 MG: 125 CAPSULE ORAL at 11:16

## 2023-12-06 RX ADMIN — OXYCODONE HYDROCHLORIDE 5 MG: 5 TABLET ORAL at 12:52

## 2023-12-06 RX ADMIN — CALCIPOTRIENE: 50 CREAM TOPICAL at 20:10

## 2023-12-06 RX ADMIN — AMPICILLIN SODIUM AND SULBACTAM SODIUM 3 G: 2; 1 INJECTION, POWDER, FOR SOLUTION INTRAMUSCULAR; INTRAVENOUS at 01:59

## 2023-12-06 RX ADMIN — VANCOMYCIN HYDROCHLORIDE 125 MG: 125 CAPSULE ORAL at 18:09

## 2023-12-06 RX ADMIN — VANCOMYCIN HYDROCHLORIDE 125 MG: 125 CAPSULE ORAL at 08:39

## 2023-12-06 RX ADMIN — AMPICILLIN SODIUM AND SULBACTAM SODIUM 3 G: 2; 1 INJECTION, POWDER, FOR SOLUTION INTRAMUSCULAR; INTRAVENOUS at 20:09

## 2023-12-06 RX ADMIN — AMPICILLIN SODIUM AND SULBACTAM SODIUM 3 G: 2; 1 INJECTION, POWDER, FOR SOLUTION INTRAMUSCULAR; INTRAVENOUS at 14:45

## 2023-12-06 RX ADMIN — CLOBETASOL PROPIONATE: 0.5 CREAM TOPICAL at 20:10

## 2023-12-06 RX ADMIN — CLOBETASOL PROPIONATE: 0.5 CREAM TOPICAL at 08:40

## 2023-12-06 RX ADMIN — AMPICILLIN SODIUM AND SULBACTAM SODIUM 3 G: 2; 1 INJECTION, POWDER, FOR SOLUTION INTRAMUSCULAR; INTRAVENOUS at 08:39

## 2023-12-06 RX ADMIN — CALCIPOTRIENE: 50 CREAM TOPICAL at 08:40

## 2023-12-06 RX ADMIN — VANCOMYCIN HYDROCHLORIDE 125 MG: 125 CAPSULE ORAL at 21:22

## 2023-12-06 ASSESSMENT — ACTIVITIES OF DAILY LIVING (ADL)
ADLS_ACUITY_SCORE: 23
ADLS_ACUITY_SCORE: 22
ADLS_ACUITY_SCORE: 21
ADLS_ACUITY_SCORE: 22

## 2023-12-06 NOTE — PLAN OF CARE
Goal Outcome Evaluation: Not Progressing - loose stools worse per patient and anal opening starting to break down and is very sore.        Plan of Care Reviewed With: patient    End Of Shift Note    Situation: 37 yo male patient here since 11/26 on antibiotics for celluliitis which progressed to loose stools and diagnosis of C-Diff,     Plan: On Unasyn IV and now Vancomycin Po for loose stools.    Subjective/Objective:    Neuro: Alert & orient X 4, using call light and can make his needs known.      Cardiac: Regular HR and stable Vital signs, low grade fever X 1 up to 99.1 at MN.      Resp: Able to maintain saturations in the high 90's on RA, slightly lower with exertion and recovery time.  Lung sounds diminished and mostly clear.      GI/: Hypoactive to normal bowel sounds with loose stools, no longer noting any bleeding with stools or blood streaks in loose stools in toilet, getting up to BR for such,  mostly yellow brown in color, increasingly hard for patient to clean [place]-area, anal opening because of petechiae reddened to butt cheek folds around anal opening.  Patient is drinking multiple pitchers of ice water, apple juice 2 cartons at a time and carbonated soda with caffeine and ice overnight.       MSK: Up with cane or walker, now end of this shift as soreness to rectal area is making it hard for him to move about.  Is able to get into clog shoes (very tight fit to wrapped cellulitis left leg)    Skin: wound care and lymphedema wraps to left leg, two cream/ointments to eczema patchy areas to arms, elbows, palm of hands and exposed right leg knee to toes.  Dry and scaly.     LDAs: new IV to right forearm, tolerated infusions better to new site.  Refused ordered IV fluids continuous flow  but requesting only larger flush dose after Unasyn dosing.

## 2023-12-06 NOTE — PROGRESS NOTES
[facility] Hospitalist Progress Note           Assessment & Plan          Ge Hansen is a 38 year old male who presents on 11/26/2023 with progressively worsening redness, swelling, and pain of LLE x 4 days. He was found to have cellulitis of LLE and is being admitted for IV antibiotic therapy.     Sepsis secondary to cellulitis of left lower extremity    Met SIRS criteria with tachycardia and leukocytosis (11.6). Afebrile with mild leukocytosis 11.6 ?  11.9. Lactate normal. CRP elevated at 263 and procalcitonin elevated at 2.76.   Developed redness, swelling, and pain to LLE 4 days prior to admission with progressively worsening symptoms. On exam there is warmth, erythema, swelling, and serosanguinous weeping fluid to the left lower extremity extending from the foot to above the knee (see picture below). Blood cultures x 2 collected and started on cefazolin 2 g q8hrs (11/26-11/28). Changed to vanco, unasyn 11/28/23 due to failure to improve     No prior history of cellulitis or MRSA.   Bcx x 2 11/26 NGTD  Ultrasound 11/27/23 - No deep venous thrombosis in the left lower extremity.   MRSA swab negative   X-rays 11/29/23 were negative.      Wound care and lymphedema consulted 11/30/23  Stopped Vancomycin 1,250mg Q12h (11/29 - 12/1/2023)      ? 164 ? 134        Tmax 101.   Patient wants to be discharged ASAP due to social situation, but recommend not discharging due to fever last PM. Has been seen by SW and given information on resources and additional resources are listed in AVS   - Continue Unasyn 3g Q6h (11/28 likely through 12/7)  - Continue wound cares  - improving slowly  - will need outpatient follow-up with wound care on discharge.       Acute CDiff colitis secondary to antibiotics for cellulitis as above   - developed worsening diarrhea 12/3-12/4.  CDiff toxin PCR positive, as was EIA antigen but EIA toxin negative.  Overall, especially with patient being on antibiotics and having severe diarrhea  suspect that this is indicative of acute CDiff colitis.    - started on oral vancomycin 125 mg 4 times daily 12/4, anticipate 10 day course.     - improving 12/6     Psoriasis  WOC implicated psoriasis in etiology of cellulitis  Saw DERM once in 2020, recommended Clobetasol cream 0.05% apply twice daily to affected areas on hands, elbows, knees yo Monday through Thursday. Calcipotriene 0.005% cream Apply twice daily to affected areas on Friday through Sunday. Tacrolimus ointment Apply twice daily to affected areas on groin and  folds of skin.   Started clobetasol, caplciporiene to areas unaffected by celluitis 12/2/2023, add LLE when cellulitis treated   - Follow-up dermatology as an outpt, order placed for on discharge      Hyponatremia, resolved  Had intermittent mildly low sodiums while inpatient.  Has not needed intervention.   - sodium trending down again 12/5 - started on NS @ 75/hour pm 12/5 given somewhat poor intake and continued diarrhea.  sodium still 129 AM 12/6.    - checking urine/serum osmolality/sodium - pending   - recheck in afternoon and again in AM unless down markedly       Asthma    Denies shortness of breath. He has a home albuterol inhaler that he utilizes as needed. Patient requesting albuterol inhaler while hospitalized.   - continued to do well while here.   - Albuterol inhaler PRN for shortness of breath.     Morbid obesity  Obesity hypoventilation, severe obstructive sleep apnea     BMI 68.75.   Study Date: 10/3/2018 (520.0 lbs)- apnea/hypopnea index 134 events per hour. Hypoventilation was present with a maximum change from ~11 mmHg and 46.0 minutes at or greater than 55 mmHg. VBG was consistent with chronic compensated respiratory acidosis (pH 7.36, pCO2 62, HCO3 35). Lowest oxygen saturation was 52.0%. Time spent less than or equal to 88% was 59.5 minutes. Time spent less than or equal to 89% was 65.5 minutes. The final pressure was BiLevel 23/13 cmH2O with an AHI of 1.7 events per  "hour. Time in REM supine on final pressure was 49.0 minutes.     Utilizes BiPAP machine at home nightly. Unable to bring home CPAP and will place order for BiPAP machine here. Will do best to match patients home settings.  Bilevel PAP 23/13 cm H2O in spontaneous mode on room air appeared optimal on sleep study 2018   - BiPAP nightly  - Due for Sleep follow-up, last visit was 2018, placed ASAP referral for discharge  - Ordered suppllies for discharge      Tobacco abuse    Daily tobacco user.   - Encourage cessation  - Nicotine patch 14mg          Per pharmacy- 12/2: 'Pt may have been overdosing on Tylenol and naproxen/NSAIDs PTA based on PTA med list.  Liver function tests stable here    May consider confirming PTA med regimens on discharge            DVT Prophylaxis: Low Risk/Ambulatory with no VTE prophylaxis indicated  Araya Catheter: Not present  Lines: None     Cardiac Monitoring: None  Code Status: Full Code          Clinically Significant Risk Factors               # Hypoalbuminemia: Lowest albumin = 2.7 g/dL at 12/2/2023 12:10 PM, will monitor as appropriate            # Severe Obesity: Estimated body mass index is 73.12 kg/m  as calculated from the following:    Height as of this encounter: 1.905 m (6' 3\").    Weight as of this encounter: 265.4 kg (585 lb).      # Asthma: noted on problem list                  Diet  Orders Placed This Encounter      Regular Diet Adult                    Disposition Plan   Hope for discharge home tomorrow.                 Dharmesh Reyes MD, MD  Hospitalist Service  [facility]  Securely message with the Vocera Web Console (learn more here)  Text page via [employer] Paging/Directory             Interval History:   Patient impatient to leave today.  Doesn't like the food and wants more food and more regular pop.  No fever or chills. Denies pain yesterday although apparently right leg was \"achy\" last night per report and patient has a negative US of his " right lower extremity.    Diarrhea still there but starting to slow down and last couple had no blood.  Still has had 3 bowel movements in the past 6 hours however.    No other changes.                  Review of Systems:    ROS: 10 point ROS neg other than the symptoms noted above in the HPI.           Medications:   Current active medications and PTA medications reviewed, see medication list for details.            Physical Exam:   Vitals were reviewed  Patient Vitals for the past 24 hrs:   BP Temp Temp src Pulse Resp SpO2   23 0800 (!) 147/85 98.3  F (36.8  C) Oral 98 18 --   23 0525 138/80 98.5  F (36.9  C) Oral 102 -- --   23 2358 (!) 159/76 99.1  F (37.3  C) Oral 104 20 100 %   23 2046 (!) 149/77 98.3  F (36.8  C) Oral 101 22 94 %   23 1628 135/82 98.2  F (36.8  C) Oral 94 18 94 %       Temperatures:  Current - Temp: 98.3  F (36.8  C); Max - Temp  Av.5  F (36.9  C)  Min: 98.2  F (36.8  C)  Max: 99.1  F (37.3  C)  Respiration range: Resp  Av.5  Min: 18  Max: 22  Pulse range: Pulse  Av.8  Min: 94  Max: 104  Blood pressure range: Systolic (24hrs), Av , Min:135 , Max:159   ; Diastolic (24hrs), Av, Min:76, Max:85    Pulse oximetry range: SpO2  Av %  Min: 94 %  Max: 100 %  I/O last 3 completed shifts:  In: 5180 [P.O.:4630; I.V.:550]  Out: -     Intake/Output Summary (Last 24 hours) at 2023 0920  Last data filed at 2023 0630  Gross per 24 hour   Intake 5180 ml   Output --   Net 5180 ml     EXAM:  General: awake and alert, NAD, oriented x 3  Head: normocephalic  Neck: unremarkable, no lymphadenopathy   HEENT: oropharynx pink and moist    Heart: Regular rate and rhythm, no murmurs, rubs, or gallops  Lungs: clear to auscultation bilaterally with good air movement throughout  Abdomen: soft, non-tender, no masses or organomegaly  Extremities: unchanged edema in lower extremities   Skin cellulitis continuing to slowly improve, unremarkable as visualized.                Data:     Reviewed data:  Results for orders placed or performed during the hospital encounter of 11/26/23 (from the past 24 hour(s))   US Lower Extremity Venous Duplex Right    Narrative    EXAM: US LOWER EXTREMITY VENOUS DUPLEX RIGHT  LOCATION: [facility]  DATE: 12/5/2023    INDICATION: Pain and tenderness to right calf. Evaluate for DVT  COMPARISON: None.  TECHNIQUE: Venous Duplex ultrasound of the right lower extremity with and without compression, augmentation and duplex. Color flow and spectral Doppler with waveform analysis performed.    FINDINGS: Exam includes the common femoral, femoral, popliteal, and contralateral common femoral veins as well as segmentally visualized deep calf veins and greater saphenous vein.     RIGHT: No deep vein thrombosis. No superficial thrombophlebitis. No popliteal cyst.      Impression    IMPRESSION:  1.  No deep venous thrombosis in the right lower extremity. Suboptimal visualization in the calf region due to patient's body habitus with no thrombus identified.   CBC with platelets   Result Value Ref Range    WBC Count 16.6 (H) 4.0 - 11.0 10e3/uL    RBC Count 3.61 (L) 4.40 - 5.90 10e6/uL    Hemoglobin 10.9 (L) 13.3 - 17.7 g/dL    Hematocrit 34.9 (L) 40.0 - 53.0 %    MCV 97 78 - 100 fL    MCH 30.2 26.5 - 33.0 pg    MCHC 31.2 (L) 31.5 - 36.5 g/dL    RDW 13.2 10.0 - 15.0 %    Platelet Count 285 150 - 450 10e3/uL   Basic metabolic panel   Result Value Ref Range    Sodium 129 (L) 135 - 145 mmol/L    Potassium 4.1 3.4 - 5.3 mmol/L    Chloride 94 (L) 98 - 107 mmol/L    Carbon Dioxide (CO2) 25 22 - 29 mmol/L    Anion Gap 10 7 - 15 mmol/L    Urea Nitrogen 12.7 6.0 - 20.0 mg/dL    Creatinine 0.91 0.67 - 1.17 mg/dL    GFR Estimate >90 >60 mL/min/1.73m2    Calcium 7.4 (L) 8.6 - 10.0 mg/dL    Glucose 103 (H) 70 - 99 mg/dL           Attestation:  I have reviewed today's vital signs, notes, medications, labs and imaging.  Amount of time spent managing  this patient today:  40 minutes.     Dharmesh Reyes MD

## 2023-12-06 NOTE — CARE PLAN
End Of Shift Note     Situation: LLE cellulitis, now C-diff, bloody loose stools     Plan: IV ABX, Oral Vancocin, pain control, monitor HGB     Subjective/Objective:     Neuro: A & O x4, calm, plesant     Cardiac: Regular rate, BP WNL, Afebrile     Resp: R/A, LSC, SOB getting up to the bathroom     GI/: Loose, bloody stool x 2, loose, brown stool x 1. Continent, using bathroom     Endo: N/A     MSK: Up with SBA and walker to BR, needs a bit of asst to get left leg into bed.     Skin: At base line he has scattered psoriasis (getting prescribed creams) LLE wound care was done by PT. Redness is receding and drainage is minimal.      LDAs: new PIV placed with Ultrasound.      PAIN: new pain in RLE. Ultrasound ordered.

## 2023-12-06 NOTE — PLAN OF CARE
Provider Notification    MD notified: [facility]    Time: 1832    Reason: pt complaint of new RLE pain, swelling, tenderness in calf.

## 2023-12-06 NOTE — PROGRESS NOTES
Patient making comments about being unable to rest with multiple concerns about getting good rest.  This writer told patient to press his call light to let staff know when awake to get up to void, would complete  assessments and VS at that time.  Patient in agreement.  Call light next to him in bed.

## 2023-12-06 NOTE — INTERIM SUMMARY
Interval Note:    Paged due to pain and tenderness of right calf. Pt without SOB, tachycardia, or palpitations. Pain with dorsiflexion of ankle. RLE U/S pending to evaluate for DVT.    Mitchell Sosa PA-C

## 2023-12-07 ENCOUNTER — APPOINTMENT (OUTPATIENT)
Dept: PHYSICAL THERAPY | Facility: CLINIC | Age: 38
End: 2023-12-07
Payer: COMMERCIAL

## 2023-12-07 VITALS
HEART RATE: 87 BPM | RESPIRATION RATE: 20 BRPM | OXYGEN SATURATION: 90 % | SYSTOLIC BLOOD PRESSURE: 163 MMHG | WEIGHT: 315 LBS | BODY MASS INDEX: 39.17 KG/M2 | DIASTOLIC BLOOD PRESSURE: 73 MMHG | HEIGHT: 75 IN | TEMPERATURE: 97.9 F

## 2023-12-07 LAB
ANION GAP SERPL CALCULATED.3IONS-SCNC: 6 MMOL/L (ref 7–15)
BUN SERPL-MCNC: 6.9 MG/DL (ref 6–20)
CALCIUM SERPL-MCNC: 7.6 MG/DL (ref 8.6–10)
CHLORIDE SERPL-SCNC: 102 MMOL/L (ref 98–107)
CREAT SERPL-MCNC: 0.73 MG/DL (ref 0.67–1.17)
CRP SERPL-MCNC: 89.74 MG/L
DEPRECATED HCO3 PLAS-SCNC: 27 MMOL/L (ref 22–29)
EGFRCR SERPLBLD CKD-EPI 2021: >90 ML/MIN/1.73M2
ERYTHROCYTE [DISTWIDTH] IN BLOOD BY AUTOMATED COUNT: 13.4 % (ref 10–15)
GLUCOSE SERPL-MCNC: 94 MG/DL (ref 70–99)
HCT VFR BLD AUTO: 33.1 % (ref 40–53)
HGB BLD-MCNC: 10.3 G/DL (ref 13.3–17.7)
MCH RBC QN AUTO: 30.1 PG (ref 26.5–33)
MCHC RBC AUTO-ENTMCNC: 31.1 G/DL (ref 31.5–36.5)
MCV RBC AUTO: 97 FL (ref 78–100)
PLATELET # BLD AUTO: 276 10E3/UL (ref 150–450)
POTASSIUM SERPL-SCNC: 3.7 MMOL/L (ref 3.4–5.3)
RBC # BLD AUTO: 3.42 10E6/UL (ref 4.4–5.9)
SODIUM SERPL-SCNC: 135 MMOL/L (ref 135–145)
WBC # BLD AUTO: 13.6 10E3/UL (ref 4–11)

## 2023-12-07 PROCEDURE — 85027 COMPLETE CBC AUTOMATED: CPT | Performed by: FAMILY MEDICINE

## 2023-12-07 PROCEDURE — 80048 BASIC METABOLIC PNL TOTAL CA: CPT | Performed by: FAMILY MEDICINE

## 2023-12-07 PROCEDURE — 85049 AUTOMATED PLATELET COUNT: CPT | Performed by: FAMILY MEDICINE

## 2023-12-07 PROCEDURE — 250N000011 HC RX IP 250 OP 636: Performed by: FAMILY MEDICINE

## 2023-12-07 PROCEDURE — 250N000013 HC RX MED GY IP 250 OP 250 PS 637: Performed by: FAMILY MEDICINE

## 2023-12-07 PROCEDURE — 250N000011 HC RX IP 250 OP 636

## 2023-12-07 PROCEDURE — 86140 C-REACTIVE PROTEIN: CPT | Performed by: FAMILY MEDICINE

## 2023-12-07 PROCEDURE — 97140 MANUAL THERAPY 1/> REGIONS: CPT | Mod: GP | Performed by: REHABILITATION PRACTITIONER

## 2023-12-07 PROCEDURE — 36415 COLL VENOUS BLD VENIPUNCTURE: CPT | Performed by: FAMILY MEDICINE

## 2023-12-07 PROCEDURE — 99239 HOSP IP/OBS DSCHRG MGMT >30: CPT | Performed by: FAMILY MEDICINE

## 2023-12-07 RX ORDER — ACETAMINOPHEN 325 MG/1
650 TABLET ORAL EVERY 6 HOURS PRN
COMMUNITY
Start: 2023-12-07

## 2023-12-07 RX ORDER — VANCOMYCIN HYDROCHLORIDE 125 MG/1
125 CAPSULE ORAL 4 TIMES DAILY
Qty: 40 CAPSULE | Refills: 0 | Status: SHIPPED | OUTPATIENT
Start: 2023-12-07 | End: 2023-12-17

## 2023-12-07 RX ORDER — ACETAMINOPHEN 325 MG/1
TABLET ORAL EVERY 6 HOURS PRN
COMMUNITY
Start: 2023-12-07 | End: 2023-12-07

## 2023-12-07 RX ORDER — CLOBETASOL PROPIONATE 0.5 MG/G
CREAM TOPICAL 2 TIMES DAILY
Qty: 30 G | Refills: 0 | Status: SHIPPED | OUTPATIENT
Start: 2023-12-07 | End: 2024-02-05

## 2023-12-07 RX ORDER — CALCIPOTRIENE 50 UG/G
CREAM TOPICAL 2 TIMES DAILY
Qty: 60 G | Refills: 0 | Status: SHIPPED | OUTPATIENT
Start: 2023-12-07 | End: 2024-02-05

## 2023-12-07 RX ADMIN — CLOBETASOL PROPIONATE: 0.5 CREAM TOPICAL at 08:27

## 2023-12-07 RX ADMIN — CALCIPOTRIENE: 50 CREAM TOPICAL at 08:27

## 2023-12-07 RX ADMIN — ONDANSETRON 4 MG: 4 TABLET, ORALLY DISINTEGRATING ORAL at 02:57

## 2023-12-07 RX ADMIN — AMPICILLIN SODIUM AND SULBACTAM SODIUM 3 G: 2; 1 INJECTION, POWDER, FOR SOLUTION INTRAMUSCULAR; INTRAVENOUS at 08:21

## 2023-12-07 RX ADMIN — AMPICILLIN SODIUM AND SULBACTAM SODIUM 3 G: 2; 1 INJECTION, POWDER, FOR SOLUTION INTRAMUSCULAR; INTRAVENOUS at 01:51

## 2023-12-07 RX ADMIN — VANCOMYCIN HYDROCHLORIDE 125 MG: 125 CAPSULE ORAL at 12:43

## 2023-12-07 RX ADMIN — VANCOMYCIN HYDROCHLORIDE 125 MG: 125 CAPSULE ORAL at 08:21

## 2023-12-07 RX ADMIN — AMPICILLIN SODIUM AND SULBACTAM SODIUM 3 G: 2; 1 INJECTION, POWDER, FOR SOLUTION INTRAMUSCULAR; INTRAVENOUS at 13:33

## 2023-12-07 ASSESSMENT — ACTIVITIES OF DAILY LIVING (ADL)
ADLS_ACUITY_SCORE: 20
ADLS_ACUITY_SCORE: 21
ADLS_ACUITY_SCORE: 21
ADLS_ACUITY_SCORE: 20
ADLS_ACUITY_SCORE: 20

## 2023-12-07 NOTE — PLAN OF CARE
"  Problem: Adult Inpatient Plan of Care  Goal: Plan of Care Review  Description: The Plan of Care Review/Shift note should be completed every shift.  The Outcome Evaluation is a brief statement about your assessment that the patient is improving, declining, or no change.  This information will be displayed automatically on your shift  note.  12/6/2023 1832 by Joana Nicole RN  Outcome: Progressing  12/6/2023 1832 by Joana Nicole RN  Outcome: Progressing  Goal: Patient-Specific Goal (Individualized)  Description: You can add care plan individualizations to a care plan. Examples of Individualization might be:  \"Parent requests to be called daily at 9am for status\", \"I have a hard time hearing out of my right ear\", or \"Do not touch me to wake me up as it startles  me\".  12/6/2023 1832 by Joana Nicole RN  Outcome: Progressing  12/6/2023 1832 by Joana Nicole RN  Outcome: Progressing  Goal: Absence of Hospital-Acquired Illness or Injury  12/6/2023 1832 by Joana Nicole RN  Outcome: Progressing  12/6/2023 1832 by Joana Nicole RN  Outcome: Progressing  Goal: Optimal Comfort and Wellbeing  12/6/2023 1832 by Joana Nicole RN  Outcome: Progressing  12/6/2023 1832 by Joana Nicole RN  Outcome: Progressing  Intervention: Monitor Pain and Promote Comfort  Recent Flowsheet Documentation  Taken 12/6/2023 1800 by Joana Nicole RN  Pain Management Interventions:   emotional support   pillow support provided   rest  Goal: Readiness for Transition of Care  12/6/2023 1832 by Joana Nicole RN  Outcome: Progressing  12/6/2023 1832 by Joana Nicole RN  Outcome: Progressing     Problem: Infection  Goal: Absence of Infection Signs and Symptoms  12/6/2023 1832 by Joana Nicole RN  Outcome: Progressing  12/6/2023 1832 by Wittibslager, Joana L, RN  Outcome: Progressing     Problem: Wound  Goal: Optimal Coping  12/6/2023 " 1832 by Joana Nicole RN  Outcome: Progressing  12/6/2023 1832 by Joana Nicole RN  Outcome: Progressing  Goal: Optimal Functional Ability  12/6/2023 1832 by Joana Nicole RN  Outcome: Progressing  12/6/2023 1832 by Joana Nicole RN  Outcome: Progressing  Goal: Absence of Infection Signs and Symptoms  12/6/2023 1832 by Joana Nicole RN  Outcome: Progressing  12/6/2023 1832 by Joana Nicole RN  Outcome: Progressing  Goal: Improved Oral Intake  12/6/2023 1832 by Joana Nicole RN  Outcome: Progressing  12/6/2023 1832 by Joana Nicole RN  Outcome: Progressing  Goal: Optimal Pain Control and Function  12/6/2023 1832 by Joana Nicole RN  Outcome: Progressing  12/6/2023 1832 by Joana Nicole RN  Outcome: Progressing  Intervention: Prevent or Manage Pain  Recent Flowsheet Documentation  Taken 12/6/2023 1800 by Joana Nicole RN  Pain Management Interventions:   emotional support   pillow support provided   rest  Goal: Skin Health and Integrity  12/6/2023 1832 by Joana Nicole RN  Outcome: Progressing  12/6/2023 1832 by Joana Nicole RN  Outcome: Progressing  Goal: Optimal Wound Healing  12/6/2023 1832 by Joana Nicole RN  Outcome: Progressing  12/6/2023 1832 by Joana Nicole RN  Outcome: Progressing     Problem: Diarrhea  Goal: Effective Diarrhea Management  12/6/2023 1832 by Joana Nicole RN  Outcome: Progressing  12/6/2023 1832 by Joana Nicole RN  Outcome: Progressing   End Of Shift Note    Situation: Cellulitis and wounds to LLE and lymedema wrap, C diff pos    Plan: continue wound cares and antibiotics, possibly home tomorrow, looking at Na labs, since they have been trending down    Subjective/Objective:    Neuro: A&Ox4,     Cardiac: WNL    Resp: RA, lungs diminished, occasional sob with ambulation    GI/: Reports 3 loose stools, denies blood in  stools    MSK: ind in room has a steady gait using cane from home    Skin: Wound care performed by nursing staff and lymphedema wraps done by PT    LDAs: 1 PIV SL

## 2023-12-07 NOTE — DISCHARGE SUMMARY
[facility] Discharge Summary    Ge Hansen MRN# [id number]   Age: 38 year old YOB: 1985     Date of Admission:  11/26/2023  Date of Discharge::  12/7/2023  Admitting Physician:  Henry Muñiz MD  Discharge Physician:  Dharmesh Reyes MD             Admission Diagnoses:   Cellulitis of left lower extremity [L03.116]          Principle Discharge Diagnosis:         Sepsis secondary to cellulitis of left lower extremity    See hospital course for further active diagnoses addressed during this admission.                 Medications Prior to Admission:     Medications Prior to Admission   Medication Sig Dispense Refill Last Dose    Acetaminophen (TYLENOL PO) Take 3 tablets by mouth every 6 hours as needed for mild pain or fever   11/25/2023 at prn    albuterol (PROAIR HFA/PROVENTIL HFA/VENTOLIN HFA) 108 (90 Base) MCG/ACT inhaler Inhale 2 puffs into the lungs every 6 hours as needed for shortness of breath / dyspnea or wheezing 18 g 1 11/25/2023 at pm- ran out now    ibuprofen (ADVIL/MOTRIN) 200 MG tablet Take 600 mg by mouth every 6 hours as needed for pain   Past Week at prn    naproxen sodium (ANAPROX) 220 MG tablet Take 660 mg by mouth 2 times daily as needed for moderate pain Aleve   Past Week at prn             Discharge Medications:     Current Discharge Medication List        START taking these medications    Details   amoxicillin-clavulanate (AUGMENTIN) 875-125 MG tablet Take 1 tablet by mouth 2 times daily for 4 days  Qty: 8 tablet, Refills: 0    Associated Diagnoses: Cellulitis of left lower extremity      calcipotriene (DOVONOX) 0.005 % external cream Apply topically 2 times daily  Qty: 60 g, Refills: 0    Associated Diagnoses: Psoriasis      clobetasol (TEMOVATE) 0.05 % external cream Apply topically 2 times daily  Qty: 30 g, Refills: 0    Associated Diagnoses: Psoriasis      vancomycin (VANCOCIN) 125 MG capsule Take 1 capsule (125 mg) by mouth 4 times daily for 10 days  Qty: 40  capsule, Refills: 0    Comments: Ok to substitute liquid if needed per cost/coverage/availability  Associated Diagnoses: Colitis due to Clostridium difficile           CONTINUE these medications which have NOT CHANGED    Details   Acetaminophen (TYLENOL PO) Take 3 tablets by mouth every 6 hours as needed for mild pain or fever      albuterol (PROAIR HFA/PROVENTIL HFA/VENTOLIN HFA) 108 (90 Base) MCG/ACT inhaler Inhale 2 puffs into the lungs every 6 hours as needed for shortness of breath / dyspnea or wheezing  Qty: 18 g, Refills: 1    Comments: Pharmacy may dispense brand covered by insurance (Proair, or proventil or ventolin or generic albuterol inhaler)  Associated Diagnoses: Moderate persistent asthma with acute exacerbation      ibuprofen (ADVIL/MOTRIN) 200 MG tablet Take 600 mg by mouth every 6 hours as needed for pain      naproxen sodium (ANAPROX) 220 MG tablet Take 660 mg by mouth 2 times daily as needed for moderate pain Aleve                        Brief History of Illness from time of admission:     From Admission H+P:   Ge Hansen is a 38 year old male with past medical history of asthma, psoriasis, obesity hypoventilation, and morbid obesity now presents on 11/26/2023 with redness, swelling, and pain to RAMONE.       Ge states first noticing swelling and pain x 4 days ago. He denies redness until this morning when he noticed his ankle was red. This progressed rapidly up his leg extending below the knee. He also states that he can not bend his left ankle due to the swelling. He has not had anything like this in the past. Denies fever or chill. Does state that he has been feeling runned down for the last few days. He has been having some loose stools since Thanksgiving but have seemed to improve by this time with Ankit-bismol. He denies hematochezia but does state that his diarrhea has been darker since taking Pepto-bismol. No associated abdominal pain, nausea, or vomiting. He has had a decreased  "appetite and has stopped drinking pop and substituting for water. His appetite is intact now, as he is eating in the ER. Denies chest pain or shortness of breath. Denies alcohol use. He smoke tobacco and marijuana. He has developed a headache since being in the emergency room. Evaluation in the ER showing cellulitis of LLE. He is afebrile with mild leukocytosis. He was started on IV antibiotics and admitted for further treatment.            TODAY:     Subjective:  Doing well, diarrhea clearly improving.  Just 2 stools so far today, smaller amounts.  Non-bloody. Feels much better.  No other changes.  Feels ready for discharge home and strongly requesting discharge.    No other pain.       ROS:   ROS: 10 point ROS neg other than the symptoms noted above in the HPI.     BP (!) 163/73 (BP Location: Left arm)   Pulse 87   Temp 97.9  F (36.6  C) (Oral)   Resp 20   Ht 1.905 m (6' 3\")   Wt (!) 265.4 kg (585 lb)   SpO2 90%   BMI 73.12 kg/m     EXAM:  General: awake and alert, NAD, oriented x 3  Head: normocephalic  Neck: unremarkable, no lymphadenopathy   HEENT: oropharynx pink and moist    Heart: Regular rate and rhythm, no murmurs, rubs, or gallops  Lungs: clear to auscultation bilaterally with good air movement throughout  Abdomen: soft, non-tender, no masses or organomegaly  Extremities: no edema in lower extremities   Skin unremarkable as visualized.       Hospital Course:        Ge Hansen is a 38 year old male who presents on 11/26/2023 with progressively worsening redness, swelling, and pain of LLE x 4 days. He was found to have cellulitis of LLE and is being admitted for IV antibiotic therapy.     Sepsis secondary to cellulitis of left lower extremity    Met SIRS criteria with tachycardia and leukocytosis (11.6). Afebrile with mild leukocytosis 11.6 ?  11.9. Lactate normal. CRP elevated at 263 and procalcitonin elevated at 2.76.   Developed redness, swelling, and pain to LLE 4 days prior to admission with " progressively worsening symptoms. On exam there is warmth, erythema, swelling, and serosanguinous weeping fluid to the left lower extremity extending from the foot to above the knee (see picture below). Blood cultures x 2 collected and started on cefazolin 2 g q8hrs (11/26-11/28). Changed to vanco, unasyn 11/28/23 due to failure to improve     No prior history of cellulitis or MRSA.   Bcx x 2 11/26 NGTD  Ultrasound 11/27/23 - No deep venous thrombosis in the left lower extremity.   MRSA swab negative   X-rays 11/29/23 were negative.      Wound care and lymphedema consulted 11/30/23  Stopped Vancomycin 1,250mg Q12h (11/29 - 12/1/2023)      ? 164 ? 134        Tmax 101.   Patient wants to be discharged ASAP due to social situation, but recommend not discharging due to fever last PM. Has been seen by SW and given information on resources and additional resources are listed in AVS   - Continue Unasyn 3g Q6h (11/28 likely through 12/7)  - Continue wound cares  - improving slowly - markedly better but not completely resolved on discharge.  Will continue 4 more days of augmentin, reassess at follow-up with primary care provider.    - will need outpatient follow-up with wound care on discharge.       Acute CDiff colitis secondary to antibiotics for cellulitis as above   - developed worsening diarrhea 12/3-12/4.  CDiff toxin PCR positive, as was EIA antigen but EIA toxin negative.  Overall, especially with patient being on antibiotics and having severe diarrhea suspect that this is indicative of acute CDiff colitis.    - started on oral vancomycin 125 mg 4 times daily 12/4, 14 day course given continued need for antibiotics as above for improved but not yet resolved cellulitis    - improving 12/6     Psoriasis  WO implicated psoriasis in etiology of cellulitis  Saw DERM once in 2020, recommended Clobetasol cream 0.05% apply twice daily to affected areas on hands, elbows, knees yo Monday through Thursday. Calcipotriene  0.005% cream Apply twice daily to affected areas on Friday through Sunday. Tacrolimus ointment Apply twice daily to affected areas on groin and  folds of skin.   Started clobetasol, caplciporiene to areas unaffected by celluitis 12/2/2023, add LLE once cellulitis treated   - Follow-up dermatology as an outpt, order placed for on discharge      Hyponatremia, resolved  Had intermittent mildly low sodiums while inpatient.  Has not needed intervention.   - sodium trending down again 12/5 - started on NS @ 75/hour pm 12/5 given somewhat poor intake and continued diarrhea.  sodium still 129 AM 12/6.    - sodium normalized.  Anticipate will remain so with improved diet/intake and resolving diarrhea.       Asthma    Denies shortness of breath. He has a home albuterol inhaler that he utilizes as needed. Patient requesting albuterol inhaler while hospitalized.   - continued to do well while here.   - Albuterol inhaler PRN for shortness of breath.     Morbid obesity  Obesity hypoventilation, severe obstructive sleep apnea     BMI 68.75.   Study Date: 10/3/2018 (520.0 lbs)- apnea/hypopnea index 134 events per hour. Hypoventilation was present with a maximum change from ~11 mmHg and 46.0 minutes at or greater than 55 mmHg. VBG was consistent with chronic compensated respiratory acidosis (pH 7.36, pCO2 62, HCO3 35). Lowest oxygen saturation was 52.0%. Time spent less than or equal to 88% was 59.5 minutes. Time spent less than or equal to 89% was 65.5 minutes. The final pressure was BiLevel 23/13 cmH2O with an AHI of 1.7 events per hour. Time in REM supine on final pressure was 49.0 minutes.     Utilizes BiPAP machine at home nightly. Unable to bring home CPAP and will place order for BiPAP machine here. Will do best to match patients home settings.  Bilevel PAP 23/13 cm H2O in spontaneous mode on room air appeared optimal on sleep study 2018   - BiPAP nightly  - Due for Sleep follow-up, last visit was 2018, placed ASAP referral  "for discharge  - Ordered suppllies for discharge      Tobacco abuse    Daily tobacco user.   - Encourage cessation  - Nicotine patch 14mg          Per pharmacy- 12/2: 'Pt may have been overdosing on Tylenol and naproxen/NSAIDs PTA based on PTA med list.  Liver function tests stable here    May consider confirming PTA med regimens on discharge         DVT Prophylaxis: Low Risk/Ambulatory with no VTE prophylaxis indicated  Araya Catheter: Not present  Lines: None     Cardiac Monitoring: None  Code Status: Full Code          Clinically Significant Risk Factors               # Hypoalbuminemia: Lowest albumin = 2.7 g/dL at 12/2/2023 12:10 PM, will monitor as appropriate            # Severe Obesity: Estimated body mass index is 73.12 kg/m  as calculated from the following:    Height as of this encounter: 1.905 m (6' 3\").    Weight as of this encounter: 265.4 kg (585 lb).      # Asthma: noted on problem list                    Discharge Instructions and Follow-Up:      Discharge diet: Orders Placed This Encounter      Regular Diet Adult       Discharge activity: Activity as tolerated   Discharge follow-up: Follow-up with your primary care provider in 1 week.   Follow-up with lymphedema as scheduled   Follow-up with dermatology at first available appointment   Follow-up with sleep clinic at first available appointment            Discharge Disposition:     Discharged to home       Attestation:  Amount of time performed on this discharge summary: 55 minutes.    Dharmesh Reyes MD       "

## 2023-12-07 NOTE — PLAN OF CARE
Lymphedema Therapy Discharge Summary    Reason for therapy discharge:    Discharged to home with outpatient therapy.    Progress towards therapy goal(s). See goals on Care Plan in [facility] electronic health record for goal details.  Goals partially met.  Barriers to achieving goals:   discharge from facility.    Therapy recommendation(s):    Continued therapy is recommended.  Rationale/Recommendations:  Recommend continued OP lymphedema therapy for LLE management.

## 2023-12-07 NOTE — PROGRESS NOTES
[facility] DISCHARGE NOTE    Patient discharged to home at 2:54 PM via wheel chair. Accompanied by other:self and staff. Discharge instructions reviewed with patient, opportunity offered to ask questions. Prescriptions sent to patients preferred pharmacy. All belongings sent with patient.    Sanaz Ellison RN

## 2023-12-07 NOTE — PLAN OF CARE
"Pt alert and oriented x4. Pleasant and cooperative with staff. Up Ind in room with cane. Continues on enteric precautions. Pt reports x3 loose stools overnight. Pt unhappy with continuous IV fluids. Pt educated on Na levels and need for continuous fluids until hyponatremia/diarrhea resolved. Pt stated, \"is that order from yesterday?\" To which staff replied, \"that order has been in place for a while yes.\" Pt responds, \"they were supposed to stop that shit.\" Currently SL. Pt wore home CPAP throughout night. Slept well. Makes needs known.    "

## 2023-12-08 ENCOUNTER — HOSPITAL ENCOUNTER (OUTPATIENT)
Dept: WOUND CARE | Facility: CLINIC | Age: 38
Discharge: HOME | End: 2023-12-08
Admitting: FAMILY MEDICINE
Payer: COMMERCIAL

## 2023-12-08 ENCOUNTER — PATIENT OUTREACH (OUTPATIENT)
Dept: CARE COORDINATION | Facility: CLINIC | Age: 38
End: 2023-12-08
Payer: COMMERCIAL

## 2023-12-08 DIAGNOSIS — L97.919: Primary | ICD-10-CM

## 2023-12-08 DIAGNOSIS — L97.921 NON-PRESSURE CHRONIC ULCER OF LEFT LOWER LEG, LIMITED TO BREAKDOWN OF SKIN (H): ICD-10-CM

## 2023-12-08 PROCEDURE — A6240 HYDROCOLLD DRG FILLER PASTE: HCPCS

## 2023-12-08 PROCEDURE — A6235 HYDROCOLLD DRG >16<=48 W/O B: HCPCS

## 2023-12-08 PROCEDURE — 29581 APPL MULTLAYER CMPRN SYS LEG: CPT

## 2023-12-08 PROCEDURE — G0463 HOSPITAL OUTPT CLINIC VISIT: HCPCS | Mod: 25

## 2023-12-08 PROCEDURE — A6211 FOAM DRG > 48 SQ IN W/O BRDR: HCPCS

## 2023-12-08 NOTE — PROGRESS NOTES
[facility]    Start of Care in [facility]: 12/8/2023   Referring Provider: Dr. Reyes at hospital discharge  Primary Care Provider: Kat Parnell   Wound Location: L lower leg     Wound Clinic Visit: 1st      Reason for Visit: ongoing cares to LLE s/p hospitalization r/t cellulitis     Subjective: Pt states the wrap got wet when he showered. He also states his leg is hurting.    Pt arrived independently, uses a cane for ambulation      Wound History:   From hospitalization     Summary: Left lower leg wounds related to cellulitis, likely stemming from impaired skin integrity related to untreated psoriasis. Wounds are draining heavily with some scaling present. Wounds appear to be partial thickness. In need of wound care plan for protection, debridement of scaling, and absorption. Will need outpatient lymphedema therapy referral for ongoing cares, pt does not have help at home for wound care and would expect that given his body habitus he would not be able to manage cares at home independently. Writer did proactively reach out to lymphedema therapist at [facility], where pt lives, to determine next available outpatient opening. Pt is not currently on any topical therapy for psoriasis, may need to begin this as well, note left for hospitalist service to consider.      Patient History (according to provider note(s):       Ge Hansen is a 38 year old male who presents on 11/26/2023 with progressively worsening redness, swelling, and pain of LLE x 4 days. He was found to have cellulitis of LLE and is being admitted for IV antibiotic therapy.     Sepsis secondary to cellulitis of left lower extremity    Meets SIRS criteria with tachycardia and leukocytosis (11.6). Afebrile with mild leukocytosis 11.6 ?  11.9. Lactate normal. CRP elevated at 263.86 and procalcitonin elevated at 2.76. Developed redness, swelling, and pain to LLE x 4 days ago with progressively worsening  symptoms. On exam there is warmth, erythema, swelling, and serosanguinous weeping fluid to the left lower extremity extending from the foot to above the knee (see picture below). Blood cultures x 2 collected and started on cefazolin 2 g q8hrs (11/26-11/28). No prior history of cellulitis or MRSA.     Afebrile since yesterday morning but had an elevated temperature to 100.2F last night. WBC 14.6. X-rays yesterday were negative. Erythema improved but still very painful (see pictures).  - Continue Unasyn 3g Q6h (11/28 - )  - Continue Vancomycin 1,250mg Q12h (11/29 - )  - Wound care and lymphedema consulted  - Bcx x 2 11/26 NGTD    Past Medical History:  Patient Active Problem List   Diagnosis    Morbid obesity (H) BMI 68.34    Elevated glucose    Obesity hypoventilation syndrome (H)    Tobacco use    MAYDA (obstructive sleep apnea)    Asthma    Insomnia, unspecified type    Cellulitis of left lower extremity    Anemia    Psoriasis                     Tobacco Use:     Tobacco Use      Smoking status: Every Day        Packs/day: 1.00        Years: 10.00        Additional pack years: 0.00        Total pack years: 10.00        Types: Cigarettes        Start date: 7/7/1997      Smokeless tobacco: Current        Types: Chew, Snuff      Tobacco comments: Vaping        Diabetic: no  HgbA1C:   Hemoglobin A1C   Date Value Ref Range Status   04/06/2022 5.3 0.0 - 5.6 % Final     Comment:     Normal <5.7%   Prediabetes 5.7-6.4%    Diabetes 6.5% or higher     Note: Adopted from ADA consensus guidelines.   09/15/2020 5.5 0 - 5.6 % Final     Comment:     Normal <5.7% Prediabetes 5.7-6.4%  Diabetes 6.5% or higher - adopted from ADA   consensus guidelines.           Personal/social history:  lives with his sister; works for [employer] in [employer]    Objective:   Current treatment plan: lymphedema wraps with Triad paste to wounds/periwounds and Mepilex Ag to wounds  Last changed: yesterday    General presentation - left lateral,  posterior, and medial      Wound #1 L posterior proximal    Stage/tissue depth: full thickness  4.5 cm L x 11 cm W x 0.1 cm D  Tunneling: no  Undermining: no  Wound bed type/amount: see photo; about 80% yellow non-viable and 20% red granular; non fluctuant  Wound Edges: open  Periwound: dry, scaly, appears excoriated  Drainage: copious serous  Odor: no  Pain: yes; this area is tender; pt tolerated cares with slow/gentle cleansing, distraction    Wound #2 L posterior distal    Stage/tissue depth: full thickness  Large wound is 3cm x 4cm x 2.5cm with smaller distal wound measuring 0.7cm x 1.5cm x 0.5cm   Tunneling: no  Undermining: no  Wound bed type/amount: see photos - proximal wound is a mix of non-viable tan and yellow (about 60%) and clean red (about 40%); non fluctuant  Wound Edges: open  Periwound: lymphedema  Drainage: copious serosanguineous  Odor: no  Pain: yes - area is tender; pt tolerated cares with slow/gentle cleansing, distraction    Wound #3 L lateral proximal    Stage/tissue depth: appears to be a mix of full and partial thickness  6 cm L x 7 cm W x 0.1 cm D; 8cm is longest dimension on diagonal  Tunneling: no  Undermining: no  Wound bed type/amount: see photo - mix of clean red granular and yellow non-viable adherent; non fluctuant  Wound Edges: open  Periwound: lymphedema; dried drainage  Drainage: heavy serosanguineous  Odor: no  Pain: minimal pain in this wound;  pt tolerated cares with slow/gentle cleansing, distraction    Wound #4 L lateral distal    Stage/tissue depth: mix of partial and full thickness  Overall area measures 5.5cm x 12cm on diagonal  From proximal/anterior to distal/lateral wounds measure:  2.5cm L x 3cm W x <0.1 cm D  2.4cm x 2.4cm x 2cm  2.3cm x 2.5cm x 2cm  Tunneling: no  Undermining: no  Wound bed type/amount: see photos - mix of clean red and yellow nonviable; wound beds felt boggy and noted with gentle twisting of cotton tipped applicator that two wounds had depth; non  fluctuant  Wound Edges: open  Periwound: denuded, lymphedema  Drainage: copious serosanguineous  Odor: no  Pain: minimal pain in this wound;  pt tolerated cares with slow/gentle cleansing, distraction    BLE both with lymphedema      Mobility: ambulatory with cane  Current offloading/footwear:   Sensation: full    Other callusing/areas of concern: no    Diet: not discussed      Assessment:  Multiple wounds LLE, mix of full and partial thickness, with copious drainage and uncontrolled lymphedema currently on antibiotics for cellulitis. Pt will be seeing both lymphedema therapy and wound care until lymphedema therapy is able to see pt for all visits in January.    Pt needs highly absorptive plan of care as wound cares have been daily while pt was hospitalized and will need to last over the weekend with next wrap change planned for Monday    Factors impacting wound healing:   Risk for poor nutrition: inadequate supply of protein, carbohydrates, fatty acids, and trace elements essential for all phases of wound healing  Delayed healing as part of normal aging process  Psychological stress: hospitalization, needs to get back to work  Obesity: decreases tissue perfusion  Infection: prolongs inflammatory phase, uses vital nutrients, impairs epithelialization and releases toxins    Plan:  Initiating multiple highly-absorptive/antimicrobial products to wounds/leg with goal of maintaining dressing until Monday -  Aquaphor to dry, scaly areas  Triad Hydrophilic Wound Dressing to periwound/superficial wounds  Aquacel Ag cut to fit to wounds with depth or larger surface area (both larger wounds posterior as well as 3 distinct wounds distal/lateral anterior)  Mepilex Transfer to distal/lateral anterior wounds. Mepilex Ag to other wounds.  Mextra highly absorbent large pads applied x3  Coflex 2 Layer blue layer (2 kits) with additional Rosidal to even out contours and for drainage absorption  Followed by Coflex cohesive layer at  slightly less stretch    Discussed/Education provided: plan of care with rationale      Topical care: Leg washed with soap/water, rinsed, and patted dry. Wound/surrounding skin cleansed with Vashe and gauze. Patted dry. Microklenz also used for surfactant-properities    Additional recommendations: not discussed    The following discharge instructions were reviewed with and sent home with the patient:  none    The following supplies were sent home with the patient:  none    Return visit: Monday    Verbal, written, & demonstrative education provided.  Face to face time: approximately 120 minutes  Procedure: LLE below knee compression wraps    Yisel Jara RN, CWOCN  [phone]

## 2023-12-08 NOTE — PROGRESS NOTES
[facility]: [facility]    Background: Transitional Care Management program identified per system criteria and reviewed by [facility] team for possible outreach.    Assessment: Upon chart review, [facility] Team member will not proceed with patient outreach related to this episode of Transitional Care Management program due to reason below:    Patient has a follow up appointment with an appropriate provider today for hospital discharge    Plan: Transitional Care Management episode addressed appropriately per reason noted above.      LONA Dye  [facility] [place]    *Connected Care Resource Team does NOT follow patient ongoing. Referrals are identified based on internal discharge reports and the outreach is to ensure patient has an understanding of their discharge instructions.

## 2023-12-11 ENCOUNTER — HOSPITAL ENCOUNTER (OUTPATIENT)
Dept: WOUND CARE | Facility: CLINIC | Age: 38
Discharge: HOME | End: 2023-12-11
Attending: NURSE PRACTITIONER | Admitting: NURSE PRACTITIONER
Payer: COMMERCIAL

## 2023-12-11 PROCEDURE — A6235 HYDROCOLLD DRG >16<=48 W/O B: HCPCS

## 2023-12-11 PROCEDURE — 29581 APPL MULTLAYER CMPRN SYS LEG: CPT | Mod: LT

## 2023-12-12 ENCOUNTER — THERAPY VISIT (OUTPATIENT)
Dept: PHYSICAL THERAPY | Facility: CLINIC | Age: 38
End: 2023-12-12
Attending: INTERNAL MEDICINE
Payer: COMMERCIAL

## 2023-12-12 DIAGNOSIS — I89.0 LYMPHEDEMA: Primary | ICD-10-CM

## 2023-12-12 PROCEDURE — 97162 PT EVAL MOD COMPLEX 30 MIN: CPT | Mod: GP | Performed by: PHYSICAL THERAPIST

## 2023-12-12 PROCEDURE — 97140 MANUAL THERAPY 1/> REGIONS: CPT | Mod: GP | Performed by: PHYSICAL THERAPIST

## 2023-12-12 NOTE — PROGRESS NOTES
PHYSICAL THERAPY EVALUATION  Type of Visit: Evaluation    See electronic medical record for Abuse and Falls Screening details.    Subjective       Presenting condition or subjective complaint: BLE lympehdema with LLE wounds  Date of onset: 12/01/23 (date of referral)    Relevant medical history:     Dates & types of surgery:      Prior diagnostic imaging/testing results:       Prior therapy history for the same diagnosis, illness or injury: Yes pt was hospitalized from 11/26/23 - 12/7/23 and during that time was seen by lymphedema therapists for LLE wounds and lymphedema    Prior Level of Function  Transfers: Assistive equipment, cane  Ambulation: Assistive equipment, cane    Living Environment  Social support: With family members (lives with sister and her family)   Type of home: House (pt lives in the downstairs)   Help at home: None  Equipment owned: Straight Cane     Employment: Yes works at [employer] in [place]  Hobbies/Interests:      Patient goals for therapy: decrease swelling and heal all the wounds on my L leg    Pain assessment: Pain present  LLE 6/10 when sitting; RLE 7-8/10 with numbness and burning      Objective       EDEMA EVALUATION  Additional history:  Body part affected by edema: BLEs with numerous wounds on LLE  If cancer related, treatment: n/a  If not cancer related, problems with veins or cause of swelling:    Distance able to walk: short distances with cane and breaks  Time able to stand:    Sensation problems in hands/feet: Yes pt reports newer numbness/tingling in R foot  Edema etiology:  Patient hospitalized from 11/26/23 - 12/7/23 for sepsis secondary to cellulitis of left lower extremity; past medical history of asthma, psoriasis, obesity hypoventilation, and morbid obesity; Left lower leg wounds related to cellulitis, likely stemming from impaired skin integrity related to untreated psoriasis. Pt does not have help at home for wound care and would expect that given his body  habitus he would not be able to manage cares at home independently. Pt last saw WOC on 12/11/23. Last finished antibiotic yesterday morning, 12/11/23.     FUNCTIONAL SCALES  LLIS = 48    Cognitive Status Examination  Orientation: Oriented to person, place and time   Level of Consciousness: Alert  Follows Commands and Answers Questions: 100% of the time  Personal Safety and Judgement: Intact  Memory: Intact    EDEMA  Skin Condition:  RLE with stage3 lymphedema present foot to knee, dark hemosiderin staining present from ankle to mid/upper calf, skin is very tight and dry with scattered areas of either previous scratch marks or weeping with intact blisters; very large dry cracked patch of skin at R lateral ankle;  LLE with stage 3 lymphedema present foot to knee with even darker hemosiderin staining present (vs RLE) foot to knee with redness still present that extends up into knee and L medial thigh; scattered scabs on BLEs (R>L and posterior > anterior); see below for numerous wounds on LLE    Scar: No  Capillary Refill: Decreased  Dorsal Pedal Pulse: Decreased from edema  Stemmer Sign: +  Ulceration: Yes    Wound 1: LLE posterior leg  10.9cm (L) x 4.0cm(W); flush with skin 90% yellow slough with moderate serous drainage    Wound 2: LLE lateral leg (most superior)  2.2cm(L) x 3.0cm (W) x 0.2cm(D); 100% thick yellow slough with moderate to heavy serous weeping    Wound 3: LLE lateral leg (middle of 3 wounds)  2.0cm(L) x 1.9cm(W) x 0.3cm; 100% thick yellow slough with moderate to heavy serous weeping    Wound 4: LLE latertal leg (most inferior of 3 wounds)  2.0cm(L) x 2.1cm(W) x 0.3cm(D); 100% thick yellow slough with moderate to heavy serous weeping    Wound 5: LLE lateral posterior   0.5cm(L) x 0.7cm(W) x 0.2cm(D); 100% thick yellow slough with moderate to heavy serous weeping    GIRTH MEASUREMENTS: Refer to separate girth measurement flowsheet.     VOLUME LE  Right LE (mL) 31595.33    Left LE (mL) 85661.19    LE  Volume Comparison LLE volume greater than RLE volume   % Difference 5.1%   Head/Neck Volume     Trunk Volume    Genital Volume       RANGE OF MOTION: LE ROM WFL  STRENGTH: LE Strength WFL  POSTURE: WFL  PALPATION: pain with palpation at LLE throughout  ACTIVITIES OF DAILY LIVING: mod-I using cane  GAIT/LOCOMOTION: mod-I using cane into clinic today  BALANCE: WFL  SENSATION:  R foot numbness/tingling; intact to light touch assessment  VASCULAR:  hemosiderin staining in BLEs - see above  COORDINATION: WFL  MUSCLE TONE: WFL    Assessment & Plan   CLINICAL IMPRESSIONS  Medical Diagnosis: morbid obesity and edema    Treatment Diagnosis: BLE stage 3 lymphedema / phlebolymphedema with LLE wounds   Impression/Assessment: Patient is a 38 year old male with BLE edema and LLE wound complaints.  The following significant findings have been identified: Pain, Edema, and Impaired gait. These impairments interfere with their ability to perform self care tasks, work tasks, recreational activities, household chores, driving , household mobility, and community mobility as compared to previous level of function.     Clinical Decision Making (Complexity):  Clinical Presentation: Stable/Uncomplicated  Clinical Presentation Rationale: based on medical and personal factors listed in PT evaluation  Clinical Decision Making (Complexity): Moderate complexity    PLAN OF CARE  Treatment Interventions:  Interventions: Manual Therapy, Therapeutic Exercise, Self-Care/Home Management, Debridement, Gradient Compression Bandaging, Wound Therapy, compression garment fitting/training    Long Term Goals     PT Goal 1  Goal Identifier: stg  Goal Description: pt to have around the clock tolerance to BLE GCB for edema reduction and wound healing response  Rationale: to maximize safety and independence with self cares  Target Date: 01/11/24  PT Goal 2  Goal Identifier: ltg  Goal Description: once appropriate, pt and/or family to be independent with donning,  doffing and care of compression garments for longterm edema management for maintenance  Rationale: to maximize safety and independence with self cares  Target Date: 03/05/24  PT Goal 3  Goal Identifier: ltg  Goal Description: pt to be independent with longterm BLE edema management via HEP, elevation, skin cares and compression garment wear/use  Rationale: to maximize safety and independence with self cares  Target Date: 03/05/24  PT Goal 4  Goal Identifier: ltg  Goal Description: pt to have 100% closure of all wounds on LLEL to decrease risk of repetitive cellulitis  Rationale: to maximize safety and independence with self cares;to maximize safety and independence with performance of ADLs and functional tasks  Target Date: 03/05/24  PT Goal 5  Goal Identifier: ltg  Goal Description: pt to have at least 5 point improvement on LLIS due to decreased edema and associated symptoms in BLEs  Rationale: to maximize safety and independence with performance of ADLs and functional tasks  Target Date: 03/05/24      Frequency of Treatment: 3x / week  Duration of Treatment: x12 weeks    Recommended Referrals to Other Professionals:  Wound care - due to lymphedema clinic's schedule, patient will be seeing both wound care clinic and OP lymphedema clinic for leg cares  Education Assessment:   Learner/Method: Patient;Listening;Demonstration;No Barriers to Learning    Risks and benefits of evaluation/treatment have been explained.   Patient/Family/caregiver agrees with Plan of Care.     Evaluation Time:     PT Eval, Moderate Complexity Minutes (05980): 15     Signing Clinician: Taina Drake, PT, DPT, CLT      [facility]                                                                                   OUTPATIENT PHYSICAL THERAPY      PLAN OF TREATMENT FOR OUTPATIENT REHABILITATION   Patient's Last Name, First Name, Ge Browne YOB: 1985   Provider's Name   [facility]   Medical Record No.  [id number]     Onset Date: 12/01/23 (date of referral)  Start of Care Date: 12/12/23     Medical Diagnosis:  morbid obesity and edema      PT Treatment Diagnosis:  BLE stage 3 lymphedema / phlebolymphedema with LLE wounds Plan of Treatment  Frequency/Duration: 3x / week/ x12 weeks    Certification date from 12/12/23 to 03/05/24         See note for plan of treatment details and functional goals     Taina Drake, PT, DPT, CLT                         I CERTIFY THE NEED FOR THESE SERVICES FURNISHED UNDER        THIS PLAN OF TREATMENT AND WHILE UNDER MY CARE     (Physician attestation of this document indicates review and certification of the therapy plan).              Referring Provider:  Fortino Monterroso MD    Initial Assessment  See Epic Evaluation- Start of Care Date: 12/12/23

## 2023-12-12 NOTE — PROGRESS NOTES
[facility]    Start of Care in [facility]: 12/8/2023   Referring Provider: Dr. Reyes at hospital discharge  Primary Care Provider: Kat Parnell   Wound Location: L lower leg     Wound Clinic Visit: 2nd      Reason for Visit: compression wrap change LLE     Subjective: Pt denies pain in his LLE. No strike through drainage noted through cohesive wrap.    Pt arrived independently, uses a cane for ambulation      Wound History:   From hospitalization     Summary: Left lower leg wounds related to cellulitis, likely stemming from impaired skin integrity related to untreated psoriasis. Wounds are draining heavily with some scaling present. Wounds appear to be partial thickness. In need of wound care plan for protection, debridement of scaling, and absorption. Will need outpatient lymphedema therapy referral for ongoing cares, pt does not have help at home for wound care and would expect that given his body habitus he would not be able to manage cares at home independently. Writer did proactively reach out to lymphedema therapist at [facility], where pt lives, to determine next available outpatient opening. Pt is not currently on any topical therapy for psoriasis, may need to begin this as well, note left for hospitalist service to consider.      Patient History (according to provider note(s):       Ge Hansen is a 38 year old male who presents on 11/26/2023 with progressively worsening redness, swelling, and pain of LLE x 4 days. He was found to have cellulitis of LLE and is being admitted for IV antibiotic therapy.     Sepsis secondary to cellulitis of left lower extremity    Meets SIRS criteria with tachycardia and leukocytosis (11.6). Afebrile with mild leukocytosis 11.6 ?  11.9. Lactate normal. CRP elevated at 263.86 and procalcitonin elevated at 2.76. Developed redness, swelling, and pain to LLE x 4 days ago with progressively worsening symptoms. On exam there is  warmth, erythema, swelling, and serosanguinous weeping fluid to the left lower extremity extending from the foot to above the knee (see picture below). Blood cultures x 2 collected and started on cefazolin 2 g q8hrs (11/26-11/28). No prior history of cellulitis or MRSA.     Afebrile since yesterday morning but had an elevated temperature to 100.2F last night. WBC 14.6. X-rays yesterday were negative. Erythema improved but still very painful (see pictures).  - Continue Unasyn 3g Q6h (11/28 - )  - Continue Vancomycin 1,250mg Q12h (11/29 - )  - Wound care and lymphedema consulted  - Bcx x 2 11/26 NGTD    Past Medical History:  Patient Active Problem List   Diagnosis    Morbid obesity (H) BMI 68.34    Elevated glucose    Obesity hypoventilation syndrome (H)    Tobacco use    MAYDA (obstructive sleep apnea)    Asthma    Insomnia, unspecified type    Cellulitis of left lower extremity    Anemia    Psoriasis                     Tobacco Use:     Tobacco Use      Smoking status: Every Day        Packs/day: 1.00        Years: 10.00        Additional pack years: 0.00        Total pack years: 10.00        Types: Cigarettes        Start date: 7/7/1997      Smokeless tobacco: Current        Types: Chew, Snuff      Tobacco comments: Vaping        Diabetic: no  HgbA1C:   Hemoglobin A1C   Date Value Ref Range Status   04/06/2022 5.3 0.0 - 5.6 % Final     Comment:     Normal <5.7%   Prediabetes 5.7-6.4%    Diabetes 6.5% or higher     Note: Adopted from ADA consensus guidelines.   09/15/2020 5.5 0 - 5.6 % Final     Comment:     Normal <5.7% Prediabetes 5.7-6.4%  Diabetes 6.5% or higher - adopted from ADA   consensus guidelines.           Personal/social history:  lives with his sister; works for [employer] in [employer]    Objective:   Current treatment plan:   Triad Hydrophilic Wound Dressing to periwound/superficial wounds  Aquacel Ag cut to fit to wounds with depth or larger surface area (both larger wounds posterior as well as  3 distinct wounds distal/lateral anterior)  Mepilex Transfer to distal/lateral anterior wounds. Mepilex Ag to other wounds.  Mextra highly absorbent large pads applied x3  Coflex 2 Layer blue layer (2 kits) with additional Rosidal to even out contours and for drainage absorption  Followed by Coflex cohesive layer at slightly less stretch    Last changed: Friday  General presentation - left lateral, posterior, and medial      Wound #1 L posterior proximal    No new photos/measurements 12/11; area has made significant progress since Friday  Stage/tissue depth: full thickness  4.5 cm L x 11 cm W x 0.1 cm D  Tunneling: no  Undermining: no  Wound bed type/amount: yellow non-viable tissue is debriding well and overall wound size is noticeably smaller; open tissue with much more pink than yellow; non fluctuant  Wound Edges: open  Periwound: dry, scaly, appears excoriated  Drainage: copious serous  Odor: no  Pain: no pain voiced today    Wound #2 L posterior distal    No new photos/measurements 12/11  Stage/tissue depth: full thickness  Large wound is 3cm x 4cm x 2.5cm with smaller distal wound measuring 0.7cm x 1.5cm x 0.5cm   Tunneling: no  Undermining: no  Wound bed type/amount: similar to above photo - mix of non-viable tan and yellow and clean red; non fluctuant  Wound Edges: open  Periwound: lymphedema  Drainage: copious serosanguineous  Odor: no  Pain: no pain voiced today    Wound #3 L lateral proximal    No new photos/measurements 12/11; area has made significant progress since Friday  Stage/tissue depth: appears to be a mix of full and partial thickness  6 cm L x 7 cm W x 0.1 cm D; 8cm is longest dimension on diagonal  Tunneling: no  Undermining: no  Wound bed type/amount: similar to above photo except able to remove some of the tan dried drainage and non-viable tissue is debriding; mix of clean red granular and yellow non-viable adherent; non fluctuant  Wound Edges: open  Periwound: lymphedema; dried  drainage  Drainage: heavy serosanguineous  Odor: no  Pain: no pain voiced with this wound    Wound #4 L lateral distal    No new photos/measurements 12/11  Stage/tissue depth: mix of partial and full thickness  Overall area measures 5.5cm x 12cm on diagonal  From proximal/anterior to distal/lateral wounds measure:  2.5cm L x 3cm W x <0.1 cm D  2.4cm x 2.4cm x 2cm  2.3cm x 2.5cm x 2cm  Tunneling: no  Undermining: no  Wound bed type/amount: similar appearance to above photos although denudements are healing; non fluctuant  Wound Edges: open  Periwound: denuded, lymphedema  Drainage: copious serosanguineous  Odor: no  Pain: minimal pain voiced; pt tolerated cares with slow/gentle cleansing, distraction    BLE both with lymphedema      Mobility: ambulatory with cane  Current offloading/footwear:   Sensation: full    Other callusing/areas of concern: no    Diet: not discussed      Assessment:  Multiple wounds LLE, mix of full and partial thickness, with copious drainage and uncontrolled lymphedema currently on antibiotics for cellulitis. Pt will be seeing both lymphedema therapy and wound care until lymphedema therapy is able to see pt for all visits in January - pt reporting significant improvement with pain, denied pain with wraps, wounds are debriding well, and drainage was well controlled with current plan of care    Pt needs highly absorptive plan of care as wound cares have been daily while pt was hospitalized and will need to last over the weekend with next wrap change planned for Monday    Factors impacting wound healing:   Risk for poor nutrition: inadequate supply of protein, carbohydrates, fatty acids, and trace elements essential for all phases of wound healing  Delayed healing as part of normal aging process  Psychological stress: hospitalization, needs to get back to work  Obesity: decreases tissue perfusion  Infection: prolongs inflammatory phase, uses vital nutrients, impairs epithelialization and  releases toxins    Plan:  Continuing plan of care except will omit Mepilex Ag since pt will have new wraps applied tomorrow -   Aquaphor to dry, scaly areas.  Triad Hydrophilic Wound Dressing to periwound/superficial wounds  Aquacel Ag cut to fit to wounds with depth or larger surface area (both larger wounds posterior as well as 3 distinct wounds distal/lateral anterior)  Mepilex Transfer to distal/lateral anterior wounds  Mextra highly absorbent large pads applied x2 large and one small  Coflex 2 Layer blue layer (2 kits) with additional Rosidal to even out contours and for drainage absorption  Followed by Coflex cohesive layer at slightly less stretch    Discussed/Education provided: plan of care with rationale      Topical care: Legs washed with soap/water, rinsed, and patted dry. Wound/surrounding skin cleansed with Vashe and gauze. Patted dry. Microklenz also used for surfactant-properities    Additional recommendations: not discussed    The following discharge instructions were reviewed with and sent home with the patient:  none    The following supplies were sent home with the patient:  none    Return visit: Friday (will see lymphedema therapy on Tuesday and Wednesday)    Verbal, written (hospital discharge), & demonstrative education provided.  Face to face time: approximately 60 minutes with 2 WOC RNs performing cares  Procedure: LLE below knee compression wraps    Yisel Jara RN, CWOCN  [phone]

## 2023-12-13 ENCOUNTER — THERAPY VISIT (OUTPATIENT)
Dept: PHYSICAL THERAPY | Facility: CLINIC | Age: 38
End: 2023-12-13
Attending: INTERNAL MEDICINE
Payer: COMMERCIAL

## 2023-12-13 DIAGNOSIS — E66.01 MORBID OBESITY (H): Chronic | ICD-10-CM

## 2023-12-13 DIAGNOSIS — R60.9 EDEMA, UNSPECIFIED TYPE: ICD-10-CM

## 2023-12-13 DIAGNOSIS — I89.0 LYMPHEDEMA: Primary | ICD-10-CM

## 2023-12-13 PROCEDURE — 97140 MANUAL THERAPY 1/> REGIONS: CPT | Mod: GP,CQ | Performed by: REHABILITATION PRACTITIONER

## 2023-12-15 ENCOUNTER — HOSPITAL ENCOUNTER (OUTPATIENT)
Dept: WOUND CARE | Facility: CLINIC | Age: 38
Discharge: HOME | End: 2023-12-15
Admitting: FAMILY MEDICINE
Payer: COMMERCIAL

## 2023-12-15 VITALS — SYSTOLIC BLOOD PRESSURE: 144 MMHG | TEMPERATURE: 98.9 F | DIASTOLIC BLOOD PRESSURE: 81 MMHG | HEART RATE: 115 BPM

## 2023-12-15 PROCEDURE — 29581 APPL MULTLAYER CMPRN SYS LEG: CPT

## 2023-12-15 NOTE — PROGRESS NOTES
[facility]    Start of Care in [facility]: 12/8/2023   Referring Provider: Dr. Reyes at hospital discharge  Primary Care Provider: Kat Parnell   Wound Location: L lower leg     Wound Clinic Visit: follow up      Reason for Visit: compression wrap change LLE     Subjective: Pt states he had one episode of getting dizzy when he stood up from sitting. Vital signs checked and WNL for patients. He sees primary care on Monday. Instructed him to talk with them about this. He also is asking about treatment for a corn on his right foot. This writer isn't able to treat the corn as there isn't an order; also this writer would need to verify if this clinic is able to treat corns. He states his PCP has done it prior. This writer recommended he ask at his appointment on Monday with primary care. He voiced understanding. He also states that his right toe is really hurting and is swollen and that he has new wounds on his right lower leg. This writer assessed his right leg; he has areas of dry drainage but no open areas. A generous amount of Aquaphor was applied to the areas. Right toe is edematous and was callusing but no wounds noted.    Pt denies pain in his LLE. The short stretch lymphedema wraps don't appear moist.    Pt arrived independently, uses a cane for ambulation.     Wound History:   From hospitalization     Summary: Left lower leg wounds related to cellulitis, likely stemming from impaired skin integrity related to untreated psoriasis. Wounds are draining heavily with some scaling present. Wounds appear to be partial thickness. In need of wound care plan for protection, debridement of scaling, and absorption. Will need outpatient lymphedema therapy referral for ongoing cares, pt does not have help at home for wound care and would expect that given his body habitus he would not be able to manage cares at home independently. Writer did proactively reach out to lymphedema  therapist at [facility], where pt lives, to determine next available outpatient opening. Pt is not currently on any topical therapy for psoriasis, may need to begin this as well, note left for hospitalist service to consider.      Patient History (according to provider note(s):       Ge Hansen is a 38 year old male who presents on 11/26/2023 with progressively worsening redness, swelling, and pain of LLE x 4 days. He was found to have cellulitis of LLE and is being admitted for IV antibiotic therapy.     Sepsis secondary to cellulitis of left lower extremity    Meets SIRS criteria with tachycardia and leukocytosis (11.6). Afebrile with mild leukocytosis 11.6 ?  11.9. Lactate normal. CRP elevated at 263.86 and procalcitonin elevated at 2.76. Developed redness, swelling, and pain to LLE x 4 days ago with progressively worsening symptoms. On exam there is warmth, erythema, swelling, and serosanguinous weeping fluid to the left lower extremity extending from the foot to above the knee (see picture below). Blood cultures x 2 collected and started on cefazolin 2 g q8hrs (11/26-11/28). No prior history of cellulitis or MRSA.     Afebrile since yesterday morning but had an elevated temperature to 100.2F last night. WBC 14.6. X-rays yesterday were negative. Erythema improved but still very painful (see pictures).  - Continue Unasyn 3g Q6h (11/28 - )  - Continue Vancomycin 1,250mg Q12h (11/29 - )  - Wound care and lymphedema consulted  - Bcx x 2 11/26 NGTD    Past Medical History:  Patient Active Problem List   Diagnosis    Morbid obesity (H) BMI 68.34    Elevated glucose    Obesity hypoventilation syndrome (H)    Tobacco use    MAYDA (obstructive sleep apnea)    Asthma    Insomnia, unspecified type    Cellulitis of left lower extremity    Anemia    Psoriasis                     Tobacco Use:     Tobacco Use      Smoking status: Every Day        Packs/day: 1.00        Years: 10.00        Additional pack years: 0.00         Total pack years: 10.00        Types: Cigarettes        Start date: 7/7/1997      Smokeless tobacco: Current        Types: Chew, Snuff      Tobacco comments: Vaping        Diabetic: no  HgbA1C:   Hemoglobin A1C   Date Value Ref Range Status   04/06/2022 5.3 0.0 - 5.6 % Final     Comment:     Normal <5.7%   Prediabetes 5.7-6.4%    Diabetes 6.5% or higher     Note: Adopted from ADA consensus guidelines.   09/15/2020 5.5 0 - 5.6 % Final     Comment:     Normal <5.7% Prediabetes 5.7-6.4%  Diabetes 6.5% or higher - adopted from ADA   consensus guidelines.           Personal/social history:  lives with his sister; works for [employer] in [employer]    Objective:   Current treatment plan:   Triad Hydrophilic Wound Dressing to periwound/superficial wounds  Aquacel Ag cut to fit to wounds with depth or larger surface area (both larger wounds posterior as well as 3 distinct wounds distal/lateral anterior)  Mextra highly absorbent large pads to cover  Short stretch per lymphedema therapy OR  Coflex 2 Layer blue layer (2 kits) with additional Rosidal if needed to even out contours and for drainage absorption  Followed by Coflex cohesive layer at slightly less stretch    Last changed: Wednesday by lymphedema therapy      General presentation - left lateral, posterior, and medial      Wound #1 L posterior proximal    No new photos/measurements 12/15; area has made significant progress over the last week (measurements/photos will be done on Monday)  Stage/tissue depth: full thickness  4.5 cm L x 11 cm W x 0.1 cm D  Tunneling: no  Undermining: no  Wound bed type/amount: yellow non-viable tissue is debriding well and overall wound size is noticeably smaller; open tissue with much more pink than yellow; non fluctuant  Wound Edges: open  Periwound: dry, scaly, appears excoriated  Drainage: heavy serous well contained by Aquacel Ag and Mepilex Ag  Odor: no  Pain: no pain voiced today    Wound #2 L posterior distal    No new  photos/measurements 12/15; wound appears smaller and is debriding (measurements/photos will be done on Monday)  Stage/tissue depth: full thickness  Large wound is 3cm x 4cm x 2.5cm with smaller distal wound measuring 0.7cm x 1.5cm x 0.5cm   Tunneling: no  Undermining: no  Wound bed type/amount: similar to above photo - mix of non-viable tan and yellow and clean red; non fluctuant  Wound Edges: open  Periwound: lymphedema  Drainage: heavy serous well contained by Aquacel Ag and Mepilex Ag  Odor: no  Pain: no pain voiced today    Wound #3 L lateral proximal    No new photos/measurements 12/15; area has made significant progress over the last week (measurements/photos will be done on Monday)  Stage/tissue depth: appears to be a mix of full and partial thickness  6 cm L x 7 cm W x 0.1 cm D; 8cm is longest dimension on diagonal  Tunneling: no  Undermining: no  Wound bed type/amount: scattered remaining open areas with majority of area now closed; wound beds are a mix of clean red granular and yellow non-viable adherent; non fluctuant  Wound Edges: open  Periwound: lymphedema  Drainage: moderate serosanguineous well contained by Triad and Mepilex Ag  Odor: no  Pain: no pain voiced with this wound    Wound #4 L lateral distal    No new photos/measurements 12/15; areas are debriding and partial thickness wounds are newly epithelialized  Stage/tissue depth: mix of partial and full thickness  Overall area measures 5.5cm x 12cm on diagonal  From proximal/anterior to distal/lateral wounds measure:  2.5cm L x 3cm W x <0.1 cm D  2.4cm x 2.4cm x 2cm  2.3cm x 2.5cm x 2cm  Tunneling: no  Undermining: no  Wound bed type/amount: similar appearance to above photos although periwound is now closed; non fluctuant  Wound Edges: open  Periwound: denuded, lymphedema  Drainage: heavy serous well contained by Aquacel Ag and Mepilex Ag  Odor: no  Pain: minimal pain voiced; pt tolerated cares with slow/gentle cleansing, distraction    BLE both  with lymphedema      Mobility: ambulatory with cane  Current offloading/footwear:   Sensation: full    Other callusing/areas of concern: no    Diet: not discussed      Assessment:  Multiple wounds LLE, mix of full and partial thickness, with copious drainage and uncontrolled lymphedema currently on antibiotics for cellulitis. Pt will be seeing both lymphedema therapy and wound care until lymphedema therapy is able to see pt for all visits in January - pt denies pain at rest (and minimal with cares); wounds are debriding well and drainage was well controlled with current plan of care    Pt with areas of concern on his right lower leg but states he told lymphedema therapy he couldn't safely drive if both legs were wrapped    Factors impacting wound healing:   Risk for poor nutrition: inadequate supply of protein, carbohydrates, fatty acids, and trace elements essential for all phases of wound healing  Delayed healing as part of normal aging process  Psychological stress: hospitalization, needs to get back to work  Obesity: decreases tissue perfusion  Infection: prolongs inflammatory phase, uses vital nutrients, impairs epithelialization and releases toxins    Plan:  Continuing plan of care -   Aquaphor to dry, scaly areas.  Triad Hydrophilic Wound Dressing to periwound/superficial wounds  Aquacel Ag cut to fit to wounds with depth or larger surface area (both larger wounds posterior as well as 3 distinct wounds distal/lateral anterior)  Mextra highly absorbent large pads x1 (lateral) and Optilock highly absorbent large pad x1 (posterior)  Coflex 2 Layer blue layer (2 kits) with additional blue layer to even out contours  Followed by Coflex cohesive layer    Discussed/Education provided: plan of care with rationale      Topical care: Legs washed with soap/water, rinsed, and patted dry. Wound/surrounding skin cleansed with Vashe and gauze. Patted dry. Microklenz also used for surfactant-properities    Additional  recommendations: not discussed    The following discharge instructions were reviewed with and sent home with the patient:  none    The following supplies were sent home with the patient:  none    Return visit: Monday    Verbal, written (hospital discharge), & demonstrative education provided.  Face to face time: approximately 60 minutes  Procedure: LLE below knee compression wraps    Yisel Jara RN, CWOCN  [phone]

## 2023-12-18 ENCOUNTER — TELEPHONE (OUTPATIENT)
Dept: FAMILY MEDICINE | Facility: CLINIC | Age: 38
End: 2023-12-18

## 2023-12-18 ENCOUNTER — OFFICE VISIT (OUTPATIENT)
Dept: FAMILY MEDICINE | Facility: CLINIC | Age: 38
End: 2023-12-18
Payer: COMMERCIAL

## 2023-12-18 ENCOUNTER — HOSPITAL ENCOUNTER (OUTPATIENT)
Dept: WOUND CARE | Facility: CLINIC | Age: 38
Discharge: HOME | End: 2023-12-18
Attending: NURSE PRACTITIONER | Admitting: NURSE PRACTITIONER
Payer: COMMERCIAL

## 2023-12-18 VITALS
OXYGEN SATURATION: 94 % | RESPIRATION RATE: 24 BRPM | HEIGHT: 75 IN | BODY MASS INDEX: 39.17 KG/M2 | WEIGHT: 315 LBS | HEART RATE: 72 BPM | SYSTOLIC BLOOD PRESSURE: 136 MMHG | DIASTOLIC BLOOD PRESSURE: 86 MMHG | TEMPERATURE: 98.6 F

## 2023-12-18 DIAGNOSIS — E87.1 HYPONATREMIA: ICD-10-CM

## 2023-12-18 DIAGNOSIS — M79.671 RIGHT FOOT PAIN: ICD-10-CM

## 2023-12-18 DIAGNOSIS — Z11.59 NEED FOR HEPATITIS C SCREENING TEST: ICD-10-CM

## 2023-12-18 DIAGNOSIS — Z72.0 TOBACCO USE: Chronic | ICD-10-CM

## 2023-12-18 DIAGNOSIS — L03.116 CELLULITIS OF LEFT LOWER EXTREMITY: ICD-10-CM

## 2023-12-18 DIAGNOSIS — E66.01 MORBID OBESITY (H): ICD-10-CM

## 2023-12-18 DIAGNOSIS — L40.9 PSORIASIS: ICD-10-CM

## 2023-12-18 DIAGNOSIS — R65.10 SIRS (SYSTEMIC INFLAMMATORY RESPONSE SYNDROME) (H): Primary | ICD-10-CM

## 2023-12-18 DIAGNOSIS — A04.72 C. DIFFICILE COLITIS: ICD-10-CM

## 2023-12-18 DIAGNOSIS — Z13.1 SCREENING FOR DIABETES MELLITUS: ICD-10-CM

## 2023-12-18 DIAGNOSIS — E66.01 MORBID OBESITY (H): Primary | Chronic | ICD-10-CM

## 2023-12-18 LAB
ANION GAP SERPL CALCULATED.3IONS-SCNC: 7 MMOL/L (ref 7–15)
BUN SERPL-MCNC: 5.1 MG/DL (ref 6–20)
CALCIUM SERPL-MCNC: 8.6 MG/DL (ref 8.6–10)
CHLORIDE SERPL-SCNC: 100 MMOL/L (ref 98–107)
CREAT SERPL-MCNC: 0.68 MG/DL (ref 0.67–1.17)
DEPRECATED HCO3 PLAS-SCNC: 32 MMOL/L (ref 22–29)
EGFRCR SERPLBLD CKD-EPI 2021: >90 ML/MIN/1.73M2
ERYTHROCYTE [DISTWIDTH] IN BLOOD BY AUTOMATED COUNT: 14.4 % (ref 10–15)
GLUCOSE SERPL-MCNC: 94 MG/DL (ref 70–99)
HBA1C MFR BLD: 5.5 % (ref 0–5.6)
HCT VFR BLD AUTO: 35.1 % (ref 40–53)
HCV AB SERPL QL IA: NONREACTIVE
HGB BLD-MCNC: 10.5 G/DL (ref 13.3–17.7)
MCH RBC QN AUTO: 30.3 PG (ref 26.5–33)
MCHC RBC AUTO-ENTMCNC: 29.9 G/DL (ref 31.5–36.5)
MCV RBC AUTO: 101 FL (ref 78–100)
PLATELET # BLD AUTO: 274 10E3/UL (ref 150–450)
POTASSIUM SERPL-SCNC: 4.1 MMOL/L (ref 3.4–5.3)
RBC # BLD AUTO: 3.47 10E6/UL (ref 4.4–5.9)
SODIUM SERPL-SCNC: 139 MMOL/L (ref 135–145)
URATE SERPL-MCNC: 5.4 MG/DL (ref 3.4–7)
WBC # BLD AUTO: 7.5 10E3/UL (ref 4–11)

## 2023-12-18 PROCEDURE — 29581 APPL MULTLAYER CMPRN SYS LEG: CPT | Mod: LT

## 2023-12-18 PROCEDURE — 80048 BASIC METABOLIC PNL TOTAL CA: CPT | Performed by: PHYSICIAN ASSISTANT

## 2023-12-18 PROCEDURE — 11055 PARING/CUTG B9 HYPRKER LES 1: CPT | Performed by: PHYSICIAN ASSISTANT

## 2023-12-18 PROCEDURE — 84550 ASSAY OF BLOOD/URIC ACID: CPT | Performed by: PHYSICIAN ASSISTANT

## 2023-12-18 PROCEDURE — 99495 TRANSJ CARE MGMT MOD F2F 14D: CPT | Mod: 25 | Performed by: PHYSICIAN ASSISTANT

## 2023-12-18 PROCEDURE — 83036 HEMOGLOBIN GLYCOSYLATED A1C: CPT | Performed by: PHYSICIAN ASSISTANT

## 2023-12-18 PROCEDURE — 36415 COLL VENOUS BLD VENIPUNCTURE: CPT | Performed by: PHYSICIAN ASSISTANT

## 2023-12-18 PROCEDURE — 85027 COMPLETE CBC AUTOMATED: CPT | Performed by: PHYSICIAN ASSISTANT

## 2023-12-18 PROCEDURE — 86803 HEPATITIS C AB TEST: CPT | Performed by: PHYSICIAN ASSISTANT

## 2023-12-18 RX ORDER — PREDNISONE 20 MG/1
TABLET ORAL
Qty: 3 TABLET | Refills: 0 | Status: SHIPPED | OUTPATIENT
Start: 2023-12-18 | End: 2023-12-22

## 2023-12-18 RX ORDER — L.ACIDOPH/B.ANIMALIS/B.LONGUM 15B CELL
1 CAPSULE ORAL 2 TIMES DAILY
Qty: 60 CAPSULE | Refills: 0 | Status: SHIPPED | OUTPATIENT
Start: 2023-12-18

## 2023-12-18 ASSESSMENT — PAIN SCALES - GENERAL: PAINLEVEL: SEVERE PAIN (6)

## 2023-12-18 ASSESSMENT — ASTHMA QUESTIONNAIRES: ACT_TOTALSCORE: 10

## 2023-12-18 NOTE — RESULT ENCOUNTER NOTE
Ge  A1c shows normal blood sugars over past 3 months.    Sodium level is now fully back to normal.    WBC (white count, the type of cells that increase with infection) are normal.    Hemoglobin, the red cells that carry around oxygen, are still running low.  Let's make sure that goes back to normal with repeating lab sometime in next few months.    Uric acid level is normal.  This is the situation where we were unable to fully confirm gout but I still suspect count, so continue plan of treating with prednisone.  Be seen if redness spreading or pain worsening.    Aimee

## 2023-12-18 NOTE — NURSING NOTE
"No chief complaint on file.      Initial There were no vitals taken for this visit. Estimated body mass index is 73.12 kg/m  as calculated from the following:    Height as of 11/26/23: 1.905 m (6' 3\").    Weight as of 11/27/23: 265.4 kg (585 lb).    Patient presents to the clinic using cane    Is there anyone who you would like to be able to receive your results? No  If yes have patient fill out [facility]SE[employer]      "

## 2023-12-18 NOTE — TELEPHONE ENCOUNTER
Prior Authorization Retail Medication Request    Medication/Dose: Zepbound 2.5 mg/0.5 mls  Diagnosis and ICD code (if different than what is on RX):    New/renewal/insurance change PA/secondary ins. PA:  Previously Tried and Failed:    Rationale:      Insurance   Primary: TRAVIS PULIDOP  Insurance ID:  [id number]  [phone]      Thank You,  Mary Grace Beckham, [facility] [place]

## 2023-12-18 NOTE — PROGRESS NOTES
[facility]    Start of Care in [facility]: 12/8/2023   Referring Provider: Dr. Reyes at hospital discharge  Primary Care Provider: Kat Parnell   Wound Location: L lower leg     Wound Clinic Visit: follow up      Reason for Visit: compression wrap change LLE     Subjective: Pt states he got diagnosed with gout (right great toe) this morning at his PCP appointment. He denies issues with his left leg/wrap. States he's been trying to put lotion on his right lower leg.    Pt arrived independently, uses a cane for ambulation.     Wound History:   From hospitalization     Summary: Left lower leg wounds related to cellulitis, likely stemming from impaired skin integrity related to untreated psoriasis. Wounds are draining heavily with some scaling present. Wounds appear to be partial thickness. In need of wound care plan for protection, debridement of scaling, and absorption. Will need outpatient lymphedema therapy referral for ongoing cares, pt does not have help at home for wound care and would expect that given his body habitus he would not be able to manage cares at home independently. Writer did proactively reach out to lymphedema therapist at [facility], where pt lives, to determine next available outpatient opening. Pt is not currently on any topical therapy for psoriasis, may need to begin this as well, note left for hospitalist service to consider.      Patient History (according to provider note(s):       Ge Hansen is a 38 year old male who presents on 11/26/2023 with progressively worsening redness, swelling, and pain of LLE x 4 days. He was found to have cellulitis of LLE and is being admitted for IV antibiotic therapy.     Sepsis secondary to cellulitis of left lower extremity    Meets SIRS criteria with tachycardia and leukocytosis (11.6). Afebrile with mild leukocytosis 11.6 ?  11.9. Lactate normal. CRP elevated at 263.86 and procalcitonin elevated at  2.76. Developed redness, swelling, and pain to LLE x 4 days ago with progressively worsening symptoms. On exam there is warmth, erythema, swelling, and serosanguinous weeping fluid to the left lower extremity extending from the foot to above the knee (see picture below). Blood cultures x 2 collected and started on cefazolin 2 g q8hrs (11/26-11/28). No prior history of cellulitis or MRSA.     Afebrile since yesterday morning but had an elevated temperature to 100.2F last night. WBC 14.6. X-rays yesterday were negative. Erythema improved but still very painful (see pictures).  - Continue Unasyn 3g Q6h (11/28 - )  - Continue Vancomycin 1,250mg Q12h (11/29 - )  - Wound care and lymphedema consulted  - Bcx x 2 11/26 NGTD    Past Medical History:  Patient Active Problem List   Diagnosis    Morbid obesity (H) BMI 68.34    Elevated glucose    Obesity hypoventilation syndrome (H)    Tobacco use    MAYDA (obstructive sleep apnea)    Asthma    Insomnia, unspecified type    Cellulitis of left lower extremity    Anemia    Psoriasis                     Tobacco Use:     Tobacco Use      Smoking status: Every Day        Packs/day: 1.00        Years: 10.00        Additional pack years: 0.00        Total pack years: 10.00        Types: Cigarettes        Start date: 7/7/1997      Smokeless tobacco: Current        Types: Chew, Snuff      Tobacco comments: Vaping        Diabetic: no  HgbA1C:   Hemoglobin A1C   Date Value Ref Range Status   12/18/2023 5.5 0.0 - 5.6 % Final     Comment:     Normal <5.7%   Prediabetes 5.7-6.4%    Diabetes 6.5% or higher     Note: Adopted from ADA consensus guidelines.   09/15/2020 5.5 0 - 5.6 % Final     Comment:     Normal <5.7% Prediabetes 5.7-6.4%  Diabetes 6.5% or higher - adopted from ADA   consensus guidelines.         Personal/social history:  lives with his sister; works for [employer] in [employer]    Objective:   Current treatment plan:   Triad Hydrophilic Wound Dressing to  periwound/superficial wounds  Aquacel Ag cut to fit to wounds with depth or larger surface area (both larger wounds posterior as well as 3 distinct wounds distal/lateral anterior)  Mextra OR Optilock highly absorbent large pads to cover  Short stretch per lymphedema therapy OR  Coflex 2 Layer blue layer (2 kits) with additional blue layer to even out contours and for drainage absorption  Followed by Coflex cohesive layer    Last changed: Friday    Wound #1 L posterior proximal    Stage/tissue depth: full thickness  4 cm L x 9.5 cm W x 0.1-0.2 cm D  Tunneling: no  Undermining: no  Wound bed type/amount: see photo - wound bed tissue is non-viable yellow for majority, small amount of clean red; non fluctuant  Wound Edges: open  Periwound: dry, scar tissue  Drainage: heavy serous with strike through to super absorbent cover dressing (Aquacel Ag wasn't fully moist; Triad paste also used)  Odor: no  Pain: no pain voiced today    Wound #2 L posterior distal    Stage/tissue depth: full thickness  Large wound is 3.5cm x 4.5cm x 2.5cm with smaller distal wound measuring 0.5cm x 1.5cm x 0.2cm   Tunneling: no  Undermining: no  Wound bed type/amount: flash with photo appears to have washed out wound; majority is non-viable yellow (about 90%) and 10% clean red; not as light in color as it appears in the photo; non fluctuant  Wound Edges: open  Periwound: lymphedema  Drainage: heavy serous; Aquacel Ag is saturated and strikethrough super absorbent pad  Odor: no  Pain: pt voiced pain with this wound, tolerated well though    Wound #3 L lateral proximal  Area is healed with a few areas of dried drainage    Wound #4 L lateral distal    Stage/tissue depth: mix of partial and full thickness  From proximal/anterior to distal/lateral wounds measure:  2.2cm L x 2.9cm W x 0.1 cm D  2cm x 3cm x 0.5-1.8cm  3.2cm x 2.5cm x 0.5-1.6cm  Tunneling: no  Undermining: no  Wound bed type/amount: see photo - majority of wound beds with yellow non  viable tissue and small clean red/pink; non fluctuant  Wound Edges: open  Periwound: intact, lymphedema  Drainage: heavy serous; Aquacel Ag is saturated and strikethrough super absorbent pad  Odor: no  Pain: pt voiced pain with this wound; pt tolerated cares with slow/gentle cleansing, distraction    BLE both with lymphedema; LLE now noticeably smaller than RLE      Mobility: ambulatory with cane  Current offloading/footwear:   Sensation: full    Other callusing/areas of concern: no    Diet: not discussed      Assessment:  Multiple wounds LLE, full thickness (partial thickness wounds have healed), with heavy drainage and uncontrolled lymphedema currently on antibiotics for cellulitis. Pt will be seeing both lymphedema therapy and wound care until lymphedema therapy is able to see pt for all visits in January - pt denies pain at rest (and minimal with cares); wounds are debriding well and drainage was well controlled with current plan of care. LLE noticeably smaller than RLE.    Pt with areas of concern on his right lower leg but states he told lymphedema therapy he couldn't safely drive if both legs were wrapped    R lower leg stable/no open areas    Pt diagnosed with gout in his right great toe today.    Factors impacting wound healing:   Risk for poor nutrition: inadequate supply of protein, carbohydrates, fatty acids, and trace elements essential for all phases of wound healing  Delayed healing as part of normal aging process  Psychological stress: hospitalization, needs to get back to work  Obesity: decreases tissue perfusion  Infection: prolongs inflammatory phase, uses vital nutrients, impairs epithelialization and releases toxins    Plan:  Continuing plan of care - Holding Aquacel Ag to shallow wounds (posterior proximal and most anterior/proximal)  Aquaphor to dry, scaly areas.  Triad Hydrophilic Wound Dressing to posterior proximal and most anterior proximal  Aquacel Ag cut to fit to wounds with depth  (remaining 3 wounds); strip of Aquacel tucked into deepest areas of wounds with depth  Optilock highly absorbent large pad x1 (posterior) and x1 small Mextra to anterior wounds  Coflex 2 Layer blue layer (2 kits) with additional blue layer to even out contours  Followed by Coflex cohesive layer    Discussed/Education provided: plan of care with rationale      Topical care: Legs washed with soap/water, rinsed, and patted dry. Wound/surrounding skin cleansed with Vashe and gauze. Patted dry. Microklenz also used for surfactant-properities    Additional recommendations: not discussed    The following discharge instructions were reviewed with and sent home with the patient:  none    The following supplies were sent home with the patient:  none    Return visit: Wednesday    Verbal, written (hospital discharge), & demonstrative education provided.  Face to face time: approximately 40 minutes with 2 WO RNs providing care  Procedure: LLE below knee compression wraps    Yisel Jara RN, CWOCN  [phone]

## 2023-12-18 NOTE — LETTER
[facility]  [address] [place] [id number]  Phone: [phone]  Fax: [phone]    12/18/23    Ge Hansen  [address] [place] [id number]      To whom it may concern:     Due to medical condition, Ge would benefit from the ability to sit and elevate leg as needed while at work.  This is in effect for next 1 month.    Sincerely,      Aimee Troncoso PA-C

## 2023-12-18 NOTE — PROGRESS NOTES
Assessment & Plan     SIRS (systemic inflammatory response syndrome) (H)  Cellulitis of left lower extremity  Initial tachy pulse resolved on recheck, recheck CBC given new contralateral toe pain   - CBC with platelets    Morbid obesity (H)  Uncontrolled, interested in wt loss medication but not bariatric referral.  BMI 68.  - tirzepatide-Weight Management (ZEPBOUND) 2.5 MG/0.5ML prefilled pen  Dispense: 2 mL; Refill: 0    Psoriasis  Improved, referral placed given severity of infection secondary to psoriasis  - Adult Dermatology  Referral    Right foot pain  Treat suspected gout and also trim painful corn.  Avoid NSAID and colchicine given recent diarrhea, lymphedema, sensitive stomach for which he prefers prn pepto rather than trial pepcid.    Corn cleansed with alcohol then pared down with scalpel.  No laceration or bleeding.  - Uric acid  - predniSONE (DELTASONE) 20 MG tablet  Dispense: 3 tablet; Refill: 0  - Uric acid  -   TRIM HYPERKERATOTIC SKIN LESION, ONE    C. difficile colitis  Symptoms improved dramatically but not yet normal firm stool, monitor, start probiotic temporarily.  Notes he hates taking pills but cannot afford this much toilet paper.   - Probiotic Product (FLORAJEN DIGESTION) CAPS  Dispense: 60 capsule; Refill: 0    Tobacco use  Not addressed today    Hyponatremia  Recheck.  He has intentionally been eating higher salt than his baseline high salt extensively processed diet.  - Basic metabolic panel  (Ca, Cl, CO2, Creat, Gluc, K, Na, BUN)    Screening for diabetes mellitus  - Hemoglobin A1c    Need for hepatitis C screening test  - Hepatitis C Screen Reflex to HCV RNA Quant and Genotype    56 min spent on patient visit and documentation, 4 min spent on paring corn.    Patient Instructions   Recheck labs     Suspect gout   Prednisone to treat that   Prednisone side effects: very hungry, very freitas/irritable, feeling jittery, poor sleep.    Try probiotic pills given today for 2 weeks  "to help normalize your gut  If diarrhea returns, let me know via my chart right away - would retest for C dif since it can come back.  But we do not test just to prove if cured.      Keep upcoming appt for sleep specialist   Will receive call to schedule dermatology      weight loss medication but do not start yet - bring with you to next appt so nurse can teach how to use.  Recheck appt in 2-3 weeks.  With RN appt right after.  Consider vaccine updates at that time.            No LOS data to display   Time spent by me doing chart review, history and exam, documentation and further activities per the note   MED REC REQUIRED  Post Medication Reconciliation Status: discharge medications reconciled and changed, per note/orders    BMI:   Estimated body mass index is 68.12 kg/m  as calculated from the following:    Height as of this encounter: 1.905 m (6' 3\").    Weight as of this encounter: 247.2 kg (545 lb).   Weight management plan: zack Troncoso PA-C  [facility]    Luis Carolina is a 38 year old, presenting for the following health issues:  No chief complaint on file.      12/18/2023     8:51 AM   Additional Questions   Roomed by tatianna lee cma       [facility]     Hospital Follow-up Visit:    Hospital/Nursing Home/IP Rehab Facility: [facility]  Date of Admission: 11/26/2023   Date of Discharge: 12/7/2023 (11 days   Reason(s) for Admission:    Cellulitis of left lower extremity     Was your hospitalization related to COVID-19? No   Problems taking medications regularly:  None  Medication changes since discharge: None  Problems adhering to non-medication therapy:  None    Summary of hospitalization:  [facility] discharge summary reviewed  Diagnostic Tests/Treatments reviewed.  Follow up needed: none  Other Healthcare Providers Involved in Patient s Care:          lymphedema specialist, referrals to dermatology and sleep " specialist  Update since discharge: improved.     Plan of care communicated with patient     Sudden redness on lower leg than same day traveled up leg.  Cellulitis met SIRS criteria.  Was felt to be started from psoriasis. Has had much improvement so far with calcipotriene 0.005% cream and clobetasol 0.05% given at hospital.  Does not yet have derm appt.  Scheduled to follow-up on MAYDA in Jan.  BMI 68, notes he tried to go through process for bariatric surgery 3 times in past 3 years but has to start over if he misses single nutrition visit and that he cannot go through it again.  Lymphedema, seeing lymphedema therapist who wanted to address both legs but he notes he cannot drive with both legs wrapped.  Is noting that left leg with recent infection is better but that right foot is getting progressively worse pain, especially in 1st and 2nd toes, and with corn on plantar surface.  No history gout.  No diuretics.  Freq urination since hospital discharge.  Works [employer], needs note to be able to sit with current leg condition - but feels employer would not accept long term accommodation.      Objective    There were no vitals taken for this visit.  There is no height or weight on file to calculate BMI.  Physical Exam   R foot diffusely edematous comparable to other leg, however great toe red swollen and extremely tender to any touch

## 2023-12-18 NOTE — COMMUNITY RESOURCES LIST (ENGLISH)
12/18/2023   [facility]  N/A  For additional resource needs, please contact your health insurance member services or your primary care team.  Phone: [phone]   Email: N/A   Address: [address] [place] [address]   Hours: N/A        Food and Nutrition       Food pantry  1  [facility] Distance: 0.61 miles      Delivery, Pickup   [address] [place] [address]  Language: English  Hours: Mon - Thu 8:00 AM - 3:00 PM  Fees: Free   Phone: (220) 597-4936 Website: http://www.[facility].[employer]/     2  [employer] Food Shelf - [place] Distance: 1.06 miles      Pickup   [address] [place] [address]  Language: English  Hours: Mon 9:00 AM - 5:00 PM , Wed 9:00 AM - 5:00 PM , Fri 9:00 AM - 5:00 PM  Fees: Free   Phone: (214) 816-5218 Email: mail@[employer] Website: https://www.[employer]/our-work/food-access-and-equity/     SNAP application assistance  3  [facility] ([facility]) - [place] Office Distance: 2 miles      In-Person, Phone/Virtual   [address] [place] [address]  Language: English  Hours: Mon - Fri 8:00 AM - 4:30 PM  Fees: Free   Phone: (141) 335-7556 Email: john@[facility].[employer] Website: https://www.[facility].org/agency-information     4  [facility] - [place] Family Investment Program (MFIP) - [place] Family Investment Program (MFIP) - SNAP application assistance Distance: 9.99 miles      In-Person, Phone/Virtual   [address] [address]  Language: English  Hours: Mon - Fri 8:00 AM - 4:30 PM  Fees: Free   Phone: (144) 443-5657 Email: anatoly@[facility].gov Website: https://www.[facility]./499/MFIP-Biennial-Service-Agreement-VCA-Plan          Important Numbers & Websites       United Way   [address]  Poison Control   (196) 332-4201 [facility].org  Suicide  and Crisis Lifeline   981 988[facility] Centerline.org  Childhelp [place] Child Abuse Hotline   950.230.1436 Childhelphotline.org  [place] Sexual Assault Hotline   (351) 154-2811 (HOPE) Rainn.org  [place] Runaway Safeline   (146) 136-6919 (RUNAWAY) 1800runaway.org  Pregnancy & Postpartum Support [place]   Call/text 527-641-3286 Ppsupport[place].org  Substance Abuse National Helpline ([facility]   288-873-HELP (4406) Findtreatment.gov  Emergency Services   914

## 2023-12-18 NOTE — COMMUNITY RESOURCES LIST (ENGLISH)
12/18/2023   [facility]  N/A  For questions about this resource list or additional care needs, please contact your primary care clinic or care manager.  Phone: [phone]   Email: N/A   Address: [address] [place] [address]   Hours: N/A        Food and Nutrition       Food pantry  1  [facility] Distance: 0.61 miles      Delivery, Pickup   [address] [place] [address]  Language: English  Hours: Mon - Thu 8:00 AM - 3:00 PM  Fees: Free   Phone: (632) 178-9542 Website: http://www.[facility].[employer]/     2  [employer] Food [facility] Distance: 1.06 miles      Pickup   [address] [place] [address]  Language: English  Hours: Mon 9:00 AM - 5:00 PM , Wed 9:00 AM - 5:00 PM , Fri 9:00 AM - 5:00 PM  Fees: Free   Phone: (751) 807-5010 Email: mail@[facility].[employer] Website: https://www.[employer]/our-work/food-access-and-equity/     SNAP application assistance  3  [facility] ([facility]) - [place] Office Distance: 2 miles      In-Person, Phone/Virtual   [address] [place] [address]  Language: English  Hours: Mon - Fri 8:00 AM - 4:30 PM  Fees: Free   Phone: (773) 581-4073 Email: john@[facility].[employer] Website: https://www.[facility].org/agency-information     4  [facility] - [place] Family Investment Program (MFIP) - [place] Family Investment Program (MFIP) - SNAP application assistance Distance: 9.99 miles      In-Person, Phone/Virtual   [address] [address]  Language: English  Hours: Mon - Fri 8:00 AM - 4:30 PM  Fees: Free   Phone: (964) 732-5747 Email: anatoly@[facility].gov Website: https://www.[facility]./499/MFIP-Biennial-Service-Agreement-VCA-Plan          Important Numbers & Websites       Emergency Services   911  [facility] Services   311  Poison Control   (800)  116-5330  Suicide Prevention Lifeline   (469) 261-2638 (TALK)  Child Abuse Hotline   (168) 373-1193 (4-A-Child)  Sexual Assault Hotline   (492) 432-1279 (HOPE)  National Runaway Safeline   (860) 183-9129 (RUNAWAY)  All-Options Talkline   (751) 585-1057  Substance Abuse Referral   (292) 357-8662 (HELP)

## 2023-12-18 NOTE — TELEPHONE ENCOUNTER
Prior Authorization Retail Medication Request    Medication/Dose: Florajen Digestive caps  Diagnosis and ICD code (if different than what is on RX):    New/renewal/insurance change PA/secondary ins. PA:  Previously Tried and Failed:    Rationale:      Insurance   Primary: TRAVIS SON PMAP  Insurance ID:  [id number]  [phone]    Thank You,  Mary Grace Beckham, [facility]

## 2023-12-18 NOTE — PATIENT INSTRUCTIONS
Recheck labs     Suspect gout   Prednisone to treat that   Prednisone side effects: very hungry, very freitas/irritable, feeling jittery, poor sleep.    Try probiotic pills given today for 2 weeks to help normalize your gut  If diarrhea returns, let me know via my chart right away - would retest for C dif since it can come back.  But we do not test just to prove if cured.      Keep upcoming appt for sleep specialist   Will receive call to schedule dermatology      weight loss medication but do not start yet - bring with you to next appt so nurse can teach how to use.  Recheck appt in 2-3 weeks.  With RN appt right after.  Consider vaccine updates at that time.

## 2023-12-20 ENCOUNTER — HOSPITAL ENCOUNTER (OUTPATIENT)
Dept: WOUND CARE | Facility: CLINIC | Age: 38
Discharge: HOME | End: 2023-12-20
Attending: NURSE PRACTITIONER | Admitting: NURSE PRACTITIONER
Payer: COMMERCIAL

## 2023-12-20 PROCEDURE — A6235 HYDROCOLLD DRG >16<=48 W/O B: HCPCS

## 2023-12-20 PROCEDURE — 29581 APPL MULTLAYER CMPRN SYS LEG: CPT

## 2023-12-20 NOTE — PROGRESS NOTES
[facility]    Start of Care in [facility]: 12/8/2023   Referring Provider: Dr. Reyes at hospital discharge  Primary Care Provider: Kat Parnell   Wound Location: L lower leg     Wound Clinic Visit: follow up      Reason for Visit: compression wrap change LLE     Subjective: Pt states his left leg is doing well. He started using Aspercream on his right great toe and states it's numb so feeling a lot better. He states the lotion on his right lower leg is helping to heal the dry areas.    Pt arrived independently, not using a cane today.     Wound History:   From hospitalization     Summary: Left lower leg wounds related to cellulitis, likely stemming from impaired skin integrity related to untreated psoriasis. Wounds are draining heavily with some scaling present. Wounds appear to be partial thickness. In need of wound care plan for protection, debridement of scaling, and absorption. Will need outpatient lymphedema therapy referral for ongoing cares, pt does not have help at home for wound care and would expect that given his body habitus he would not be able to manage cares at home independently. Writer did proactively reach out to lymphedema therapist at [facility], where pt lives, to determine next available outpatient opening. Pt is not currently on any topical therapy for psoriasis, may need to begin this as well, note left for hospitalist service to consider.      Patient History (according to provider note(s):       Ge Hansen is a 38 year old male who presents on 11/26/2023 with progressively worsening redness, swelling, and pain of LLE x 4 days. He was found to have cellulitis of LLE and is being admitted for IV antibiotic therapy.     Sepsis secondary to cellulitis of left lower extremity    Meets SIRS criteria with tachycardia and leukocytosis (11.6). Afebrile with mild leukocytosis 11.6 ?  11.9. Lactate normal. CRP elevated at 263.86 and procalcitonin  elevated at 2.76. Developed redness, swelling, and pain to LLE x 4 days ago with progressively worsening symptoms. On exam there is warmth, erythema, swelling, and serosanguinous weeping fluid to the left lower extremity extending from the foot to above the knee (see picture below). Blood cultures x 2 collected and started on cefazolin 2 g q8hrs (11/26-11/28). No prior history of cellulitis or MRSA.     Afebrile since yesterday morning but had an elevated temperature to 100.2F last night. WBC 14.6. X-rays yesterday were negative. Erythema improved but still very painful (see pictures).  - Continue Unasyn 3g Q6h (11/28 - )  - Continue Vancomycin 1,250mg Q12h (11/29 - )  - Wound care and lymphedema consulted  - Bcx x 2 11/26 NGTD    Past Medical History:  Patient Active Problem List   Diagnosis    Morbid obesity (H) BMI 68.34    Elevated glucose    Obesity hypoventilation syndrome (H)    Tobacco use    MAYDA (obstructive sleep apnea)    Asthma    Insomnia, unspecified type    Cellulitis of left lower extremity    Anemia    Psoriasis                     Tobacco Use:     Tobacco Use      Smoking status: Every Day        Packs/day: 1.00        Years: 10.00        Additional pack years: 0.00        Total pack years: 10.00        Types: Cigarettes        Start date: 7/7/1997      Smokeless tobacco: Current        Types: Chew, Snuff      Tobacco comments: Vaping        Diabetic: no  HgbA1C:   Hemoglobin A1C   Date Value Ref Range Status   12/18/2023 5.5 0.0 - 5.6 % Final     Comment:     Normal <5.7%   Prediabetes 5.7-6.4%    Diabetes 6.5% or higher     Note: Adopted from ADA consensus guidelines.   09/15/2020 5.5 0 - 5.6 % Final     Comment:     Normal <5.7% Prediabetes 5.7-6.4%  Diabetes 6.5% or higher - adopted from ADA   consensus guidelines.         Personal/social history:  lives with his sister; works for [employer] in [employer]    Objective:   Current treatment plan:   Triad Hydrophilic Wound Dressing to  periwound/superficial wounds  Aquacel Ag cut to fit to wounds with depth or larger surface area (both larger wounds posterior as well as 3 distinct wounds distal/lateral anterior)  Mextra OR Optilock highly absorbent large pads to cover  Short stretch per lymphedema therapy OR  Coflex 2 Layer blue layer (2 kits) with additional blue layer to even out contours and for drainage absorption  Followed by Coflex cohesive layer    Last changed: Monday    Wound #1 L posterior proximal    No new photos/measurements 12/20  Stage/tissue depth: full thickness  4 cm L x 9.5 cm W x 0.1-0.2 cm D  Tunneling: no  Undermining: no  Wound bed type/amount: wound similar to above photo but overall open area is smaller; majority of wound bed is non-viable yellow, small amount of clean red; non fluctuant  Wound Edges: open  Periwound: dry, scar tissue  Drainage: heavy serous with strike through to super absorbent cover dressing in smaller area than wound size  Odor: no  Pain: slightly tender    Wound #2 L posterior distal    No new photos/measurements 12/20  Stage/tissue depth: full thickness  Large wound is 3.5cm x 4.5cm x 2.5cm with smaller distal wound measuring 0.5cm x 1.5cm x 0.2cm   Tunneling: no  Undermining: no  Wound bed type/amount: flash with photo appears to have washed out wound; majority is non-viable yellow (about 90%) and 10% clean red; not as light in color as it appears in the photo; non fluctuant  Wound Edges: open  Periwound: lymphedema  Drainage: heavy serous; Aquacel Ag is saturated and strikethrough super absorbent pad  Odor: no  Pain: no pain voiced with this wound today    Wound #3 L lateral proximal  Area is healed with a few areas of dried drainage    Wound #4 L lateral distal    No new photos/measurements 12/20  Stage/tissue depth: mix of partial and full thickness  From proximal/anterior to distal/lateral wounds measure:  2.2cm L x 2.9cm W x 0.1 cm D  2cm x 3cm x 0.5-1.8cm  3.2cm x 2.5cm x 0.5-1.6cm  Tunneling:  no  Undermining: no  Wound bed type/amount: similar to above photo - majority of wound beds with yellow non viable tissue and small clean red/pink; non fluctuant  Wound Edges: open  Periwound: intact, lymphedema  Drainage: heavy serous; Aquacel Ag is saturated and strikethrough to super absorbent pad  Odor: no  Pain: pt voiced pain with this wound (up to 5/10 with cares, down to 0 after cares performed); pt tolerated cares with slow/gentle cleansing, distraction    BLE both with lymphedema; LLE now noticeably smaller than RLE      Mobility: ambulatory with cane  Current offloading/footwear:   Sensation: full    Other callusing/areas of concern: no    Diet: not discussed      Assessment:  Multiple wounds LLE, full thickness (partial thickness wounds have healed), with heavy drainage. Pt will be seeing both lymphedema therapy and wound care until lymphedema therapy is able to see pt for all visits in January - pt denies pain at rest (up to 5/10 with cares); wounds are debriding well and drainage was well controlled with current plan of care. LLE noticeably smaller than RLE.    Pt with areas of concern on his right lower leg but states he told lymphedema therapy he couldn't safely drive if both legs were wrapped (discussed with pt now that swelling is decreasing in LLE, wondering if he'd feel he could have both legs wrapped?)    R lower leg stable/no open areas    Pt diagnosed with gout in his right great toe; pain greatly improved with topical lidocaine    Factors impacting wound healing:   Risk for poor nutrition: inadequate supply of protein, carbohydrates, fatty acids, and trace elements essential for all phases of wound healing  Delayed healing as part of normal aging process  Psychological stress: hospitalization, needs to get back to work  Obesity: decreases tissue perfusion  Infection: prolongs inflammatory phase, uses vital nutrients, impairs epithelialization and releases toxins    Plan:  Toenail trim  x6    Continuing plan of care  Aquaphor to dry, scaly areas.  Triad Hydrophilic Wound Dressing to posterior proximal and most anterior proximal (and smaller wounds on lateral leg)  Aquacel Ag cut to fit to wounds with depth (remaining 3 wounds); strip of Aquacel tucked into deepest areas of wounds with depth  Held Optilock/Mextra since lymphedema therapy usually uses Mepilex Ag. Used Mepilex Ag over the 5 largest wounds.  Coflex 2 Layer blue layer (2 kits) with additional blue layer to even out contours; 2 8x10 ABD pads over the initial blue layer  Followed by Coflex cohesive layer    Discussed/Education provided: plan of care with rationale    Topical care: Legs washed with soap/water, rinsed, and patted dry. Wound/surrounding skin cleansed with Vashe and gauze. Patted dry. Microklenz also used for surfactant-properities    Additional recommendations: not discussed    The following discharge instructions were reviewed with and sent home with the patient:  none    The following supplies were sent home with the patient:  none    Return visit: Tuesday (sees lymphedema therapy Friday)    Verbal, written (hospital discharge), & demonstrative education provided.  Face to face time: approximately 60 minutes  Procedure: LLE below knee compression wraps    Yisel Jara RN, CWOCN  [phone]

## 2023-12-21 NOTE — TELEPHONE ENCOUNTER
PRIOR AUTHORIZATION DENIED    Medication: Zepbound 2.5 mg/0.5 mls    Denial Date: 12/21/2023    Denial Rational:         Appeal Information: This medication was denied. If physician would like to appeal because patient has contraindication or allergy to covered medication please write letter of medical necessity and route back to PA team to initiate.  If no further action is needed please close encounter thank you.

## 2023-12-21 NOTE — TELEPHONE ENCOUNTER
Central Prior Authorization Team   Phone: [phone]    PA Initiation    Medication: Zepbound 2.5 mg/0.5 mls  Insurance Company: [employer] - Phone [phone] Fax [phone]  Pharmacy Filling the Rx: [place], [place] - [address] [place] [address]  Filling Pharmacy Phone: [phone]  Filling Pharmacy Fax:    Start Date: 12/21/2023

## 2023-12-21 NOTE — TELEPHONE ENCOUNTER
PRIOR AUTHORIZATION DENIED    Medication: Florajen Digestive caps    Denial Date: 12/21/2023    Denial Rational:  Per insurance, medication is excluded from patient's benefit plan and will not be covered. Review and appeal are not available because of this exclusion.                Appeal Information:  N/A

## 2023-12-21 NOTE — TELEPHONE ENCOUNTER
Central Prior Authorization Team   Phone: [phone]    PA Initiation    Medication: Florajen Digestive caps  Insurance Company: [facility] - Phone [phone] Fax [phone]  Pharmacy Filling the Rx: [place], [place] - [address] [place] [address]  Filling Pharmacy Phone: [phone]  Filling Pharmacy Fax:    Start Date: 12/21/2023

## 2023-12-22 ENCOUNTER — THERAPY VISIT (OUTPATIENT)
Dept: PHYSICAL THERAPY | Facility: CLINIC | Age: 38
End: 2023-12-22
Attending: INTERNAL MEDICINE
Payer: COMMERCIAL

## 2023-12-22 DIAGNOSIS — I89.0 LYMPHEDEMA: Primary | ICD-10-CM

## 2023-12-22 PROCEDURE — 97140 MANUAL THERAPY 1/> REGIONS: CPT | Mod: GP,CQ | Performed by: REHABILITATION PRACTITIONER

## 2023-12-22 NOTE — TELEPHONE ENCOUNTER
Notify patient that while his insurance will not cover the weight loss medication I had initially prescribed, that they WILL cover other very good alternatives.  I have sent a prescription for Wegovy.  Per our plan -   weight loss medication but do not start yet - bring with you to next appt so nurse can teach how to use.  Recheck appt in 2-3 weeks.  With RN appt right after.

## 2023-12-22 NOTE — TELEPHONE ENCOUNTER
Spoke with patient, relayed Aimee YUAN message below and patient verbalized good understanding.     Appointment scheduled 1/11/24 with Aimee YUAN then RN only appointment to follow on same day.    Julie Behrendt RN

## 2023-12-22 NOTE — TELEPHONE ENCOUNTER
Patient returned call, relayed Aimee YUAN detailed message below and he verbalized good understanding.     Appointment scheduled with Aimee YUAN 1/11/24 arrival time of 1040 AM.    Julie Behrendt RN

## 2023-12-22 NOTE — TELEPHONE ENCOUNTER
Let Ge know that I had tried to prescribe a probiotic to help his gut recover after the C dif treatment, but insurance will not cover it.  If he wants to try a probiotic, he will need to cash-pay.    See other tele encounter for this patient as well.

## 2023-12-26 ENCOUNTER — HOSPITAL ENCOUNTER (OUTPATIENT)
Dept: WOUND CARE | Facility: CLINIC | Age: 38
Discharge: HOME | End: 2023-12-26
Admitting: FAMILY MEDICINE
Payer: COMMERCIAL

## 2023-12-26 PROCEDURE — 29581 APPL MULTLAYER CMPRN SYS LEG: CPT

## 2023-12-26 NOTE — PROGRESS NOTES
[facility]    Start of Care in [facility]: 12/8/2023   Referring Provider: Dr. Reyes at hospital discharge  Primary Care Provider: Kat Parnell   Wound Location: L lower leg     Wound Clinic Visit: follow up      Reason for Visit: compression wrap change LLE     Subjective: Pt states his the wrap did not go high up enough on his leg on Friday so wound was exposed and this caused him some pain, he applied a tubular support bandage over this, this is now sticking.     Pt arrived independently, not using a cane today.     Wound History:   From hospitalization     Summary: Left lower leg wounds related to cellulitis, likely stemming from impaired skin integrity related to untreated psoriasis. Wounds are draining heavily with some scaling present. Wounds appear to be partial thickness. In need of wound care plan for protection, debridement of scaling, and absorption. Will need outpatient lymphedema therapy referral for ongoing cares, pt does not have help at home for wound care and would expect that given his body habitus he would not be able to manage cares at home independently. Writer did proactively reach out to lymphedema therapist at [facility], where pt lives, to determine next available outpatient opening. Pt is not currently on any topical therapy for psoriasis, may need to begin this as well, note left for hospitalist service to consider.      Patient History (according to provider note(s):       Ge Hansen is a 38 year old male who presents on 11/26/2023 with progressively worsening redness, swelling, and pain of LLE x 4 days. He was found to have cellulitis of LLE and is being admitted for IV antibiotic therapy.     Sepsis secondary to cellulitis of left lower extremity    Meets SIRS criteria with tachycardia and leukocytosis (11.6). Afebrile with mild leukocytosis 11.6 ?  11.9. Lactate normal. CRP elevated at 263.86 and procalcitonin elevated at 2.76.  Developed redness, swelling, and pain to LLE x 4 days ago with progressively worsening symptoms. On exam there is warmth, erythema, swelling, and serosanguinous weeping fluid to the left lower extremity extending from the foot to above the knee (see picture below). Blood cultures x 2 collected and started on cefazolin 2 g q8hrs (11/26-11/28). No prior history of cellulitis or MRSA.     Afebrile since yesterday morning but had an elevated temperature to 100.2F last night. WBC 14.6. X-rays yesterday were negative. Erythema improved but still very painful (see pictures).  - Continue Unasyn 3g Q6h (11/28 - )  - Continue Vancomycin 1,250mg Q12h (11/29 - )  - Wound care and lymphedema consulted  - Bcx x 2 11/26 NGTD    Past Medical History:  Patient Active Problem List   Diagnosis    Morbid obesity (H) BMI 68.34    Elevated glucose    Obesity hypoventilation syndrome (H)    Tobacco use    MAYDA (obstructive sleep apnea)    Asthma    Insomnia, unspecified type    Cellulitis of left lower extremity    Anemia    Psoriasis                     Tobacco Use:     Tobacco Use      Smoking status: Every Day        Packs/day: 1.00        Years: 10.00        Additional pack years: 0.00        Total pack years: 10.00        Types: Cigarettes        Start date: 7/7/1997      Smokeless tobacco: Current        Types: Chew, Snuff      Tobacco comments: Vaping        Diabetic: no  HgbA1C:   Hemoglobin A1C   Date Value Ref Range Status   12/18/2023 5.5 0.0 - 5.6 % Final     Comment:     Normal <5.7%   Prediabetes 5.7-6.4%    Diabetes 6.5% or higher     Note: Adopted from ADA consensus guidelines.   09/15/2020 5.5 0 - 5.6 % Final     Comment:     Normal <5.7% Prediabetes 5.7-6.4%  Diabetes 6.5% or higher - adopted from ADA   consensus guidelines.         Personal/social history:  lives with his sister; works for [employer] in [place]    Objective:   Current treatment plan:   Triad Hydrophilic Wound Dressing to periwound/superficial  wounds  Aquacel Ag cut to fit to wounds with depth or larger surface area (both larger wounds posterior as well as 3 distinct wounds distal/lateral anterior) (at last visit some gauze packing strip was also used in depth)  Mepilex Ag cover  Short stretch per lymphedema therapy OR  Coflex 2 Layer blue layer (2 kits) with additional blue layer to even out contours and for drainage absorption  Followed by Coflex cohesive layer    Last changed: Friday    Wound #1 L posterior proximal    Stage/tissue depth: full thickness  Overall affected area is 4 cm L x 8 cm W x 0 cm D  Tunneling: no  Undermining: no  Wound bed type/amount: dried/crusting, minimal moist red/yellow; non fluctuant  Wound Edges: open  Periwound: dry, scar tissue  Drainage: small dried serosanguinous  Odor: no  Pain: slightly tender    Wound #2 L posterior distal      Stage/tissue depth: full thickness  Large wound is 3.3cm x 4cm x 2.4cm with smaller distal wound measuring 0.5cm x 1.5cm x 0.2cm (not in photo)  Tunneling: no  Undermining: no  Wound bed type/amount: about 70% red in early granulation, 30% yellow;  non fluctuant  Wound Edges: open  Periwound: lymphedema  Drainage: heavy serous; Aquacel Ag is saturated and strikethrough to foam pad, well contained in bandage  Odor: no  Pain: no pain voiced with this wound today    Wound #3 L lateral proximal  Area is healed with a few areas of dried drainage    Wound #4 L lateral distal      Stage/tissue depth: mix of partial and full thickness  From proximal/anterior to distal/lateral wounds measure:  2.2cm L x 2.6cm W x 0.1 cm D  2.2cm x 2.8cm x up to 1.4cm  3cm x 2.5cm x up to 1.6cm  Today a 3rd wound with depth is noted just posterior to last wound, this is 0.6x0.7x1.8cm, seen in last photo above  Tunneling: no  Undermining: no  Wound bed type/amount: see above photo -  wound beds with yellow non viable tissue that is actively debriding with each visit, early granulation is noted in all wounds, most is  anterior most wound and small clean red/pink; non fluctuant  Wound Edges: open  Periwound: intact, lymphedema  Drainage: heavy serous; Aquacel Ag is saturated and strikethrough to mepilex ag, well contained in bandage  Odor: no  Pain: pt voiced pain with this wound (up to 5/10 with cares, down to 0 after cares performed); pt tolerated cares with slow/gentle cleansing, distraction    BLE both with lymphedema; LLE now noticeably smaller than RLE      Mobility: ambulatory with cane  Current offloading/footwear:   Sensation: full    Other callusing/areas of concern: no    Diet: not discussed      Assessment:  Multiple wounds LLE, full thickness with heavy drainage. Wounds are debriding well and granulation forming, drainage has decreased    Pt will be seeing both lymphedema therapy and wound care until lymphedema therapy is able to see pt for all visits in January, LLE noticeably smaller than RLE as edema is improved    R lower leg stable/no open areas, some scabbing, plan for eventual wrapping of this leg also in the Edema Clinic    Pt diagnosed with gout in his right great toe; pain greatly improved with topical lidocaine    Factors impacting wound healing:   Risk for poor nutrition: inadequate supply of protein, carbohydrates, fatty acids, and trace elements essential for all phases of wound healing  Delayed healing as part of normal aging process  Psychological stress: hospitalization, needs to get back to work  Obesity: decreases tissue perfusion  Infection: prolongs inflammatory phase, uses vital nutrients, impairs epithelialization and releases toxins    Plan:  Continuing plan of care  Aquaphor to dry, scaly areas.  Triad Hydrophilic Wound Dressing to most anterior proximal   Aquacel Ag cut to fit to wounds with depth (remaining 3 wounds); strip of Aquacel tucked into deepest areas of wounds with depth  Mepilex Ag to cover.  Coflex 2 Layer blue layer (2 kits) with additional blue layer to even out  contours      Discussed/Education provided: plan of care with rationale    Topical care: Legs washed with soap/water, rinsed, and patted dry. Wound/surrounding skin cleansed with Vashe and gauze. Patted dry. Microklenz also used for surfactant-properities    Additional recommendations: not discussed    The following discharge instructions were reviewed with and sent home with the patient:  none    The following supplies were sent home with the patient:  None    Return visit:  Friday    Verbal, written (hospital discharge), & demonstrative education provided.  Face to face time: approximately 60 minutes  Procedure: LLE below knee compression wrap    Zenaida Landis RN, CWOCN   [phone]

## 2023-12-27 ENCOUNTER — THERAPY VISIT (OUTPATIENT)
Dept: PHYSICAL THERAPY | Facility: CLINIC | Age: 38
End: 2023-12-27
Attending: INTERNAL MEDICINE
Payer: COMMERCIAL

## 2023-12-27 DIAGNOSIS — I89.0 LYMPHEDEMA: Primary | ICD-10-CM

## 2023-12-27 PROCEDURE — 97140 MANUAL THERAPY 1/> REGIONS: CPT | Mod: GP,CQ | Performed by: REHABILITATION PRACTITIONER

## 2023-12-29 ENCOUNTER — HOSPITAL ENCOUNTER (OUTPATIENT)
Dept: WOUND CARE | Facility: CLINIC | Age: 38
Discharge: HOME | End: 2023-12-29
Admitting: FAMILY MEDICINE
Payer: COMMERCIAL

## 2023-12-29 PROCEDURE — A6235 HYDROCOLLD DRG >16<=48 W/O B: HCPCS

## 2023-12-29 PROCEDURE — 29581 APPL MULTLAYER CMPRN SYS LEG: CPT | Mod: LT

## 2023-12-29 NOTE — PROGRESS NOTES
[facility]    Start of Care in [facility]: 12/8/2023   Referring Provider: Dr. Reyes at hospital discharge  Primary Care Provider: Kat Parnell   Wound Location: L lower leg     Wound Clinic Visit: follow up      Reason for Visit: compression wrap change LLE     Subjective: Pt states the lymphedema wrap applied on Wednesday dug into his leg just below his knee. He removed that part of the wrap and padded it and then kept the area covered.    Pt arrived by himself, using a cane for ambulation     Wound History:   From hospitalization     Summary: Left lower leg wounds related to cellulitis, likely stemming from impaired skin integrity related to untreated psoriasis. Wounds are draining heavily with some scaling present. Wounds appear to be partial thickness. In need of wound care plan for protection, debridement of scaling, and absorption. Will need outpatient lymphedema therapy referral for ongoing cares, pt does not have help at home for wound care and would expect that given his body habitus he would not be able to manage cares at home independently. Writer did proactively reach out to lymphedema therapist at [facility], where pt lives, to determine next available outpatient opening. Pt is not currently on any topical therapy for psoriasis, may need to begin this as well, note left for hospitalist service to consider.      Patient History (according to provider note(s):       Ge Hansen is a 38 year old male who presents on 11/26/2023 with progressively worsening redness, swelling, and pain of LLE x 4 days. He was found to have cellulitis of LLE and is being admitted for IV antibiotic therapy.     Sepsis secondary to cellulitis of left lower extremity    Meets SIRS criteria with tachycardia and leukocytosis (11.6). Afebrile with mild leukocytosis 11.6 ?  11.9. Lactate normal. CRP elevated at 263.86 and procalcitonin elevated at 2.76. Developed redness,  swelling, and pain to LLE x 4 days ago with progressively worsening symptoms. On exam there is warmth, erythema, swelling, and serosanguinous weeping fluid to the left lower extremity extending from the foot to above the knee (see picture below). Blood cultures x 2 collected and started on cefazolin 2 g q8hrs (11/26-11/28). No prior history of cellulitis or MRSA.     Afebrile since yesterday morning but had an elevated temperature to 100.2F last night. WBC 14.6. X-rays yesterday were negative. Erythema improved but still very painful (see pictures).  - Continue Unasyn 3g Q6h (11/28 - )  - Continue Vancomycin 1,250mg Q12h (11/29 - )  - Wound care and lymphedema consulted  - Bcx x 2 11/26 NGTD    Past Medical History:  Patient Active Problem List   Diagnosis    Morbid obesity (H) BMI 68.34    Elevated glucose    Obesity hypoventilation syndrome (H)    Tobacco use    MAYDA (obstructive sleep apnea)    Asthma    Insomnia, unspecified type    Cellulitis of left lower extremity    Anemia    Psoriasis                     Tobacco Use:     Tobacco Use      Smoking status: Every Day        Packs/day: 1.00        Years: 10.00        Additional pack years: 0.00        Total pack years: 10.00        Types: Cigarettes        Start date: 7/7/1997      Smokeless tobacco: Current        Types: Chew, Snuff      Tobacco comments: Vaping        Diabetic: no  HgbA1C:   Hemoglobin A1C   Date Value Ref Range Status   12/18/2023 5.5 0.0 - 5.6 % Final     Comment:     Normal <5.7%   Prediabetes 5.7-6.4%    Diabetes 6.5% or higher     Note: Adopted from ADA consensus guidelines.   09/15/2020 5.5 0 - 5.6 % Final     Comment:     Normal <5.7% Prediabetes 5.7-6.4%  Diabetes 6.5% or higher - adopted from ADA   consensus guidelines.         Personal/social history:  lives with his sister; works for [email or web address] in [employer]    Objective:   Current treatment plan:   Triad Hydrophilic Wound Dressing to periwound/superficial wounds  Aquacel Ag cut  to fit to wounds with depth or larger surface area (both larger wounds posterior as well as 3 distinct wounds distal/lateral anterior) (at last visit some gauze packing strip was also used in depth)  Mepilex Ag cover  Short stretch per lymphedema therapy OR  Coflex 2 Layer blue layer (2 kits) with additional blue layer to even out contours and for drainage absorption  Followed by Coflex cohesive layer    Last changed: Friday    Wound #1 L posterior proximal    No new photos/measurements 12/29  Stage/tissue depth: full thickness  Overall affected area is 4 cm L x 8 cm W x 0 cm D  Tunneling: no  Undermining: no  Wound bed type/amount: dried/crusting; no open areas noted today; non fluctuant  Wound Edges: open  Periwound: dry, scar tissue  Drainage: dried serosanguinous  Odor: no  Pain: slightly tender; pt tolerated cares with slow/gentle cleansing, distraction    Wound #2 L posterior distal    No new photos/measurements 12/29  Stage/tissue depth: full thickness  Large wound is 3.3cm x 4cm x 2.4cm with smaller distal wound measuring 0.5cm x 1.5cm x 0.2cm (not in photo)  Tunneling: no  Undermining: no  Wound bed type/amount: similar appearance to above photo - about 70% red in early granulation, 30% yellow;  non fluctuant  Wound Edges: open  Periwound: lymphedema  Drainage: heavy serous; Aquacel Ag is saturated and strikethrough to foam pad, well contained in bandage  Odor: no  Pain: wound is tender; pt tolerated cares with slow/gentle cleansing, distraction    Wound #3 L lateral proximal  Area is healed with a few areas of dried drainage    Wound #4 L lateral distal    No new photos/measurements 12/29  Stage/tissue depth: mix of partial and full thickness  From proximal/anterior to distal/lateral wounds measure:  2.2cm L x 2.6cm W x 0.1 cm D  2.2cm x 2.8cm x up to 1.4cm  3cm x 2.5cm x up to 1.6cm  3rd wound with depth is noted just posterior to last wound, this is 0.6x0.7x1.8cm, seen in last photo above  Tunneling:  no  Undermining: no  Wound bed type/amount: wounds appear similar to above photos; wound beds with yellow non viable tissue that is actively debriding with each visit, early granulation is noted in all wounds; non fluctuant  Wound Edges: open  Periwound: intact, lymphedema  Drainage: heavy serous; Aquacel Ag is saturated and strikethrough to Biatain Ag; Biatain Ag has made a significant indentation in the leg and sloughing skin noted on the edges  Odor: no  Pain: pt voiced pain with these wounds; pt tolerated cares with slow/gentle cleansing, distraction    BLE both with lymphedema; LLE now noticeably smaller than RLE      Mobility: ambulatory with cane  Current offloading/footwear:   Sensation: full    Other callusing/areas of concern:     Blanchable pink with some darker non-blanchable red, areas of non-blanching redness appear to be Stage 1 vs SDTI - measures 0.6cm x 20cm x flush      Indentation from dressing with sloughing skin on edges    Diet: not discussed      Assessment:  Multiple wounds LLE, full thickness with heavy drainage. Wounds are debriding well and granulation forming, drainage has decreased    R lower leg stable/no open areas, some scabbing, plan for eventual wrapping of this leg also in the Edema Clinic    Pt diagnosed with gout in his right great toe; pain greatly improved with topical lidocaine    Today is last wound clinic visit    Pt with new pressure injury along anterior/medial leg just distal to knee from lymphedema wrap and sloughing skin around Biatain Ag foam pads.    Factors impacting wound healing:   Risk for poor nutrition: inadequate supply of protein, carbohydrates, fatty acids, and trace elements essential for all phases of wound healing  Delayed healing as part of normal aging process  Psychological stress: hospitalization, needs to get back to work  Obesity: decreases tissue perfusion  Infection: prolongs inflammatory phase, uses vital nutrients, impairs epithelialization and  releases toxins    Plan:  A few adjustments noted in bold, otherwise continuing plan of care  Aquaphor to dry, scaly areas.  Triad Hydrophilic Wound Dressing to posterior distal wound  Aquacel Ag cut to fit to wounds with depth (remaining 3 wounds); strip of Aquacel tucked into deepest areas of wounds with depth  Used Mextra pad to cover lateral and distal posterior wounds; used strips of Mepitel One over pressure area and proximal posterior leg wound followed by ABD pads for padding/protection  Coflex 2 Layer blue layer (2 kits) with additional blue layer to even out contours    Discussed/Education provided: plan of care with rationale    Topical care: Legs washed with soap/water, rinsed, and patted dry. Wound/surrounding skin cleansed with Vashe and gauze. Patted dry. Microklenz also used for surfactant-properties    Additional recommendations: not discussed    The following discharge instructions were reviewed with and sent home with the patient:  none    The following supplies were sent home with the patient:  None    Return visit:  plans to follow up with lymphedema therapy going forward    Verbal, written (hospital discharge), & demonstrative education provided.  Face to face time: approximately 50 minutes  Procedure: LLE below knee compression wrap    Yisel Jara RN, CWOCN   [phone]

## 2023-12-30 ENCOUNTER — NURSE TRIAGE (OUTPATIENT)
Dept: NURSING | Facility: CLINIC | Age: 38
End: 2023-12-30
Payer: COMMERCIAL

## 2024-01-02 ENCOUNTER — THERAPY VISIT (OUTPATIENT)
Dept: PHYSICAL THERAPY | Facility: CLINIC | Age: 39
End: 2024-01-02
Attending: INTERNAL MEDICINE

## 2024-01-02 DIAGNOSIS — I89.0 LYMPHEDEMA: Primary | ICD-10-CM

## 2024-01-02 PROCEDURE — 97140 MANUAL THERAPY 1/> REGIONS: CPT | Mod: GP | Performed by: PHYSICAL THERAPIST

## 2024-01-03 ENCOUNTER — THERAPY VISIT (OUTPATIENT)
Dept: PHYSICAL THERAPY | Facility: CLINIC | Age: 39
End: 2024-01-03
Attending: INTERNAL MEDICINE

## 2024-01-03 DIAGNOSIS — I89.0 LYMPHEDEMA: Primary | ICD-10-CM

## 2024-01-03 PROCEDURE — 97140 MANUAL THERAPY 1/> REGIONS: CPT | Mod: GP,CQ | Performed by: REHABILITATION PRACTITIONER

## 2024-01-05 ENCOUNTER — THERAPY VISIT (OUTPATIENT)
Dept: PHYSICAL THERAPY | Facility: CLINIC | Age: 39
End: 2024-01-05
Attending: INTERNAL MEDICINE

## 2024-01-05 DIAGNOSIS — I89.0 LYMPHEDEMA: Primary | ICD-10-CM

## 2024-01-05 PROCEDURE — 97140 MANUAL THERAPY 1/> REGIONS: CPT | Mod: GP,CQ | Performed by: REHABILITATION PRACTITIONER

## 2024-01-08 ENCOUNTER — THERAPY VISIT (OUTPATIENT)
Dept: PHYSICAL THERAPY | Facility: CLINIC | Age: 39
End: 2024-01-08
Attending: INTERNAL MEDICINE

## 2024-01-08 DIAGNOSIS — I89.0 LYMPHEDEMA: Primary | ICD-10-CM

## 2024-01-08 PROCEDURE — 97140 MANUAL THERAPY 1/> REGIONS: CPT | Mod: GP,CQ | Performed by: REHABILITATION PRACTITIONER

## 2024-01-10 ENCOUNTER — THERAPY VISIT (OUTPATIENT)
Dept: PHYSICAL THERAPY | Facility: CLINIC | Age: 39
End: 2024-01-10
Attending: INTERNAL MEDICINE

## 2024-01-10 DIAGNOSIS — I89.0 LYMPHEDEMA: Primary | ICD-10-CM

## 2024-01-10 PROCEDURE — 97140 MANUAL THERAPY 1/> REGIONS: CPT | Mod: GP,CQ | Performed by: REHABILITATION PRACTITIONER

## 2024-01-12 ENCOUNTER — THERAPY VISIT (OUTPATIENT)
Dept: PHYSICAL THERAPY | Facility: CLINIC | Age: 39
End: 2024-01-12
Attending: INTERNAL MEDICINE

## 2024-01-12 DIAGNOSIS — I89.0 LYMPHEDEMA: Primary | ICD-10-CM

## 2024-01-12 PROCEDURE — 97140 MANUAL THERAPY 1/> REGIONS: CPT | Mod: GP | Performed by: REHABILITATION PRACTITIONER

## 2024-01-15 ENCOUNTER — THERAPY VISIT (OUTPATIENT)
Dept: PHYSICAL THERAPY | Facility: CLINIC | Age: 39
End: 2024-01-15
Attending: INTERNAL MEDICINE

## 2024-01-15 DIAGNOSIS — I89.0 LYMPHEDEMA: Primary | ICD-10-CM

## 2024-01-15 PROCEDURE — 97140 MANUAL THERAPY 1/> REGIONS: CPT | Mod: GP | Performed by: REHABILITATION PRACTITIONER

## 2024-01-17 ENCOUNTER — THERAPY VISIT (OUTPATIENT)
Dept: PHYSICAL THERAPY | Facility: CLINIC | Age: 39
End: 2024-01-17
Attending: INTERNAL MEDICINE

## 2024-01-17 DIAGNOSIS — I89.0 LYMPHEDEMA: Primary | ICD-10-CM

## 2024-01-17 PROCEDURE — 97140 MANUAL THERAPY 1/> REGIONS: CPT | Mod: GP | Performed by: REHABILITATION PRACTITIONER

## 2024-01-17 NOTE — PROGRESS NOTES
PLAN  Continue therapy per current plan of care of 3x/week until all wounds 100 % closed / healed    Beginning/End Dates of Progress Note Reporting Period:  12/12/23 to 01/11/2024    Referring Provider:  Fortino Monterroso MD           01/10/24 0500   Appointment Info   Signing clinician's name / credentials Olivia Pisano PTA/CLT   Visits Used 9 BLE/Svee/[facility] MA   Medical Diagnosis morbid obesity and edema   PT Tx Diagnosis BLE stage 3 lymphedema / phlebolymphedema with LLE wounds   Precautions/Limitations cane into clinic; monitor LLE for worsening redness   Other pertinent information Pt will see both OP lymph clinic and WOC until the week of Jan 8th when pt can consistently be seen at OP lymph clinic 3x/week   Progress Note/Certification   Start of Care Date 12/12/23   Onset of illness/injury or Date of Surgery 12/01/23  (date of referral)   Therapy Frequency 3x / week   Predicted Duration x12 weeks   Certification date from 12/12/23   Certification date to 03/05/24   Progress Note Due Date 01/11/24   Progress Note Completed Date 12/12/23   Supervision   PT Assistant Visit Number 1       Present No   GOALS   PT Goals 2;3;4;5;6   PT Goal 1   Goal Identifier stg   Goal Description pt to have around the clock tolerance to BLE GCB for edema reduction and wound healing response   Rationale to maximize safety and independence with self cares   Target Date 01/11/24   Date Met 01/02/24  (Met for LLE; pt unable to tolerate GCB on RLE due to driving difficulty (using Spandagrip K instead w/good tolerance))   PT Goal 2   Goal Identifier ltg   Goal Description once appropriate, pt and/or family to be independent with donning, doffing and care of compression garments for longterm edema management for maintenance   Rationale to maximize safety and independence with self cares   Target Date 03/05/24   PT Goal 3   Goal Identifier ltg   Goal Description pt to be independent with longterm BLE edema  management via HEP, elevation, skin cares and compression garment wear/use   Rationale to maximize safety and independence with self cares   Target Date 03/05/24   PT Goal 4   Goal Identifier ltg   Goal Description pt to have 100% closure of all wounds on LLEL to decrease risk of repetitive cellulitis   Rationale to maximize safety and independence with self cares;to maximize safety and independence with performance of ADLs and functional tasks   Target Date 03/05/24   PT Goal 5   Goal Identifier ltg   Goal Description pt to have at least 5 point improvement on LLIS due to decreased edema and associated symptoms in BLEs   Rationale to maximize safety and independence with performance of ADLs and functional tasks   Target Date 03/05/24   Subjective Report   Subjective Report it was itching again under the wraps   Objective Measures   Objective Measures Objective Measure 1;Objective Measure 2;Objective Measure 3;Objective Measure 4;Objective Measure 5;Objective Measure 6  (all last taken on 1/8/24)   Objective Measure 1   Objective Measure girth   Details R LE -3.4%, L LE -4.7%   Objective Measure 2   Objective Measure L lateral posterior calf wound   Details 0.4cm(L) x 0.6cm(W) x 1.7cm(D); 100% granulated tissue   Objective Measure 3   Objective Measure LLE lateral leg (most superior)   Details 1.1cm(L) x 1.5cm (W) x 1.1cm(D); superior 1/2 wound has epithelial tissue growth, distal half of wound granulated tissue   Objective Measure 4   Objective Measure LLE lateral leg (middle of 3 wounds)   Details 1.6cm(L) x 2.0cm(W) x 0.6cm; 100% granulated tissue   Objective Measure 5   Objective Measure LLE latertal leg (most inferior of 3 wounds)   Details 1.7cm(L) x 2.5cm(W) x 0.5cm(D); 100% thick yellow slough with moderate to heavy serous weeping   Objective Measure 6   Objective Measure L LE posterior/lateral after #3 small wound   Details 4.0cm(L)x 3.8cm(W)x 0.2mm(D) 90% yellow slough   Treatment Interventions (PT)    Interventions Manual Therapy   Manual Therapy   Manual Therapy: Mobilization, MFR, MLD, friction massage minutes ([id number]) 65   Manual Therapy 1 - Details pt arrived into clinic with Spandagrip on RLE and GCB on LLE; all compression and wound dressings removed; mod/min drainage through out wound dressings and no odor, BLEs cleansed, improved appearance of skin after use of Triamcinilone cream, pt stated he has scoriosis and has a cream that he will bring to next appt so therapist can apply to B LE's, wounds on LLE cleansed with Microklenze and gauze pads; applied Lidocaine OTC ointment and let sit on all wounds for ~10 minutes prior to tweezer and currett debridement of loose?mushy yesllow slough in wound 2 and 3 and the posterior wound, wounds then again cleansed with Microklenze and gauze pads, Triamcinilon cream with Cereve lotion applied to entire B LE's,  tunneling wounds filled with dry packing strips and covered with Aquacel AG, and all wounds covered with Mepilex AG, held in place with kerlix, R LE  x2 Spandagrip J MTP's to knee and a 3rd piece from mid-calf to knee with instructions to remove 3rd piece if because painful, and LLE wrapped: yellow tubifast foot to knee, (1.5) lg cotton MTPs to knee,  10cm short-stretch MTP's to ankle, 12x5cm short stretch bandage ankle to knee, [id number] short-stretch ankle to knee herringbone pattern, all applied at 50% overlap and compression - pt reports comfort.   Skilled Intervention CDT; skin and wound cares, GCB, education on treatment phase vs longterm management   Education   Learner/Method Patient;Listening;No Barriers to Learning   Plan   Home program keep wraps on until next appt   Plan for next session BLE GCB, modify wrapping as needed; LLE wound cares, debridement within tolerance, wound measurements when time.   Total Session Time   Timed Code Treatment Minutes 65   Total Treatment Time (sum of timed and untimed services) 65

## 2024-01-19 ENCOUNTER — THERAPY VISIT (OUTPATIENT)
Dept: PHYSICAL THERAPY | Facility: CLINIC | Age: 39
End: 2024-01-19
Attending: INTERNAL MEDICINE

## 2024-01-19 DIAGNOSIS — I89.0 LYMPHEDEMA: Primary | ICD-10-CM

## 2024-01-19 PROCEDURE — 97140 MANUAL THERAPY 1/> REGIONS: CPT | Mod: GP | Performed by: REHABILITATION PRACTITIONER

## 2024-01-22 ENCOUNTER — THERAPY VISIT (OUTPATIENT)
Dept: PHYSICAL THERAPY | Facility: CLINIC | Age: 39
End: 2024-01-22
Attending: INTERNAL MEDICINE

## 2024-01-22 DIAGNOSIS — I89.0 LYMPHEDEMA: Primary | ICD-10-CM

## 2024-01-22 PROCEDURE — 97140 MANUAL THERAPY 1/> REGIONS: CPT | Mod: GP | Performed by: REHABILITATION PRACTITIONER

## 2024-01-24 ENCOUNTER — THERAPY VISIT (OUTPATIENT)
Dept: PHYSICAL THERAPY | Facility: CLINIC | Age: 39
End: 2024-01-24
Attending: INTERNAL MEDICINE

## 2024-01-24 DIAGNOSIS — I89.0 LYMPHEDEMA: Primary | ICD-10-CM

## 2024-01-24 PROCEDURE — 97140 MANUAL THERAPY 1/> REGIONS: CPT | Mod: GP | Performed by: REHABILITATION PRACTITIONER

## 2024-01-26 ENCOUNTER — THERAPY VISIT (OUTPATIENT)
Dept: PHYSICAL THERAPY | Facility: CLINIC | Age: 39
End: 2024-01-26
Attending: INTERNAL MEDICINE

## 2024-01-26 DIAGNOSIS — I89.0 LYMPHEDEMA: Primary | ICD-10-CM

## 2024-01-26 PROCEDURE — 97140 MANUAL THERAPY 1/> REGIONS: CPT | Mod: GP | Performed by: REHABILITATION PRACTITIONER

## 2024-01-29 ENCOUNTER — THERAPY VISIT (OUTPATIENT)
Dept: PHYSICAL THERAPY | Facility: CLINIC | Age: 39
End: 2024-01-29
Attending: INTERNAL MEDICINE

## 2024-01-29 DIAGNOSIS — I89.0 LYMPHEDEMA: Primary | ICD-10-CM

## 2024-01-29 PROCEDURE — 97140 MANUAL THERAPY 1/> REGIONS: CPT | Mod: GP | Performed by: REHABILITATION PRACTITIONER

## 2024-01-31 ENCOUNTER — THERAPY VISIT (OUTPATIENT)
Dept: PHYSICAL THERAPY | Facility: CLINIC | Age: 39
End: 2024-01-31
Attending: INTERNAL MEDICINE

## 2024-01-31 DIAGNOSIS — I89.0 LYMPHEDEMA: Primary | ICD-10-CM

## 2024-01-31 PROCEDURE — 97140 MANUAL THERAPY 1/> REGIONS: CPT | Mod: GP | Performed by: REHABILITATION PRACTITIONER

## 2024-02-02 ENCOUNTER — THERAPY VISIT (OUTPATIENT)
Dept: PHYSICAL THERAPY | Facility: CLINIC | Age: 39
End: 2024-02-02
Attending: INTERNAL MEDICINE

## 2024-02-02 DIAGNOSIS — I89.0 LYMPHEDEMA: Primary | ICD-10-CM

## 2024-02-02 PROCEDURE — 97140 MANUAL THERAPY 1/> REGIONS: CPT | Mod: GP | Performed by: REHABILITATION PRACTITIONER

## 2024-02-05 ENCOUNTER — THERAPY VISIT (OUTPATIENT)
Dept: PHYSICAL THERAPY | Facility: CLINIC | Age: 39
End: 2024-02-05
Attending: INTERNAL MEDICINE

## 2024-02-05 ENCOUNTER — OFFICE VISIT (OUTPATIENT)
Dept: FAMILY MEDICINE | Facility: CLINIC | Age: 39
End: 2024-02-05

## 2024-02-05 VITALS
TEMPERATURE: 97.3 F | WEIGHT: 315 LBS | HEART RATE: 115 BPM | HEIGHT: 75 IN | OXYGEN SATURATION: 95 % | BODY MASS INDEX: 39.17 KG/M2 | RESPIRATION RATE: 14 BRPM | SYSTOLIC BLOOD PRESSURE: 154 MMHG | DIASTOLIC BLOOD PRESSURE: 90 MMHG

## 2024-02-05 DIAGNOSIS — Z59.9 FINANCIAL DIFFICULTY: ICD-10-CM

## 2024-02-05 DIAGNOSIS — F15.20 CAFFEINE DEPENDENCE (H): ICD-10-CM

## 2024-02-05 DIAGNOSIS — I89.0 LYMPHEDEMA: Primary | ICD-10-CM

## 2024-02-05 DIAGNOSIS — E66.01 MORBID OBESITY (H): Chronic | ICD-10-CM

## 2024-02-05 DIAGNOSIS — M79.674 GREAT TOE PAIN, RIGHT: ICD-10-CM

## 2024-02-05 DIAGNOSIS — L40.9 PSORIASIS: Chronic | ICD-10-CM

## 2024-02-05 DIAGNOSIS — J45.41 MODERATE PERSISTENT ASTHMA WITH ACUTE EXACERBATION: ICD-10-CM

## 2024-02-05 PROCEDURE — 99214 OFFICE O/P EST MOD 30 MIN: CPT | Performed by: PHYSICIAN ASSISTANT

## 2024-02-05 PROCEDURE — 97140 MANUAL THERAPY 1/> REGIONS: CPT | Mod: GP | Performed by: REHABILITATION PRACTITIONER

## 2024-02-05 RX ORDER — CLOBETASOL PROPIONATE 0.5 MG/G
CREAM TOPICAL 2 TIMES DAILY
Qty: 60 G | Refills: 11 | Status: SHIPPED | OUTPATIENT
Start: 2024-02-05

## 2024-02-05 RX ORDER — ALBUTEROL SULFATE 90 UG/1
2 AEROSOL, METERED RESPIRATORY (INHALATION) EVERY 6 HOURS PRN
Qty: 18 G | Refills: 1 | Status: SHIPPED | OUTPATIENT
Start: 2024-02-05

## 2024-02-05 RX ORDER — TRIAMCINOLONE ACETONIDE 5 MG/G
OINTMENT TOPICAL
Qty: 60 G | Refills: 1 | Status: SHIPPED | OUTPATIENT
Start: 2024-02-05

## 2024-02-05 RX ORDER — FUROSEMIDE 20 MG
20 TABLET ORAL DAILY
Qty: 15 TABLET | Refills: 0 | Status: SHIPPED | OUTPATIENT
Start: 2024-02-05

## 2024-02-05 RX ORDER — CALCIPOTRIENE 50 UG/G
CREAM TOPICAL 2 TIMES DAILY
Qty: 60 G | Refills: 11 | Status: SHIPPED | OUTPATIENT
Start: 2024-02-05

## 2024-02-05 SDOH — ECONOMIC STABILITY - INCOME SECURITY: PROBLEM RELATED TO HOUSING AND ECONOMIC CIRCUMSTANCES, UNSPECIFIED: Z59.9

## 2024-02-05 ASSESSMENT — ASTHMA QUESTIONNAIRES
ACT_TOTALSCORE: 7
ACT_TOTALSCORE: 7
QUESTION_4 LAST FOUR WEEKS HOW OFTEN HAVE YOU USED YOUR RESCUE INHALER OR NEBULIZER MEDICATION (SUCH AS ALBUTEROL): THREE OR MORE TIMES PER DAY
QUESTION_1 LAST FOUR WEEKS HOW MUCH OF THE TIME DID YOUR ASTHMA KEEP YOU FROM GETTING AS MUCH DONE AT WORK, SCHOOL OR AT HOME: ALL OF THE TIME
QUESTION_3 LAST FOUR WEEKS HOW OFTEN DID YOUR ASTHMA SYMPTOMS (WHEEZING, COUGHING, SHORTNESS OF BREATH, CHEST TIGHTNESS OR PAIN) WAKE YOU UP AT NIGHT OR EARLIER THAN USUAL IN THE MORNING: FOUR OR MORE NIGHTS A WEEK
QUESTION_2 LAST FOUR WEEKS HOW OFTEN HAVE YOU HAD SHORTNESS OF BREATH: MORE THAN ONCE A DAY
QUESTION_5 LAST FOUR WEEKS HOW WOULD YOU RATE YOUR ASTHMA CONTROL: SOMEWHAT CONTROLLED

## 2024-02-05 ASSESSMENT — PAIN SCALES - GENERAL: PAINLEVEL: NO PAIN (0)

## 2024-02-05 ASSESSMENT — ENCOUNTER SYMPTOMS: LEG PAIN: 1

## 2024-02-05 NOTE — PATIENT INSTRUCTIONS
Use triamcinolone until can afford to get calcipotriene and clobetasol.      Suggest trying water pill - if you and Lili feel it is helpful and want to continue, then do lab in 2 weeks    Care coordinator to contact you to help figure out insurance, finances, housing     Caffeine - up to 400 mg is generally safe per day - reduce red bull    Sleep study and derm when able     Podiatry referral    When insurance figured out, CALL your insurance to see IF they cover weight loss medication, and if so - WHICH one.  Zepbound (easier to get ahold of to start), wegovy, or saxenda

## 2024-02-05 NOTE — PROGRESS NOTES
Assessment & Plan     Lymphedema  Improved somewhat.  He feels wounds are closing but slowly.  Picture he had sent through My Chart was after being slathered in lidocaine ointment and NOT infection.  High salt diet, a lot of standing.  He does not feel he can do his job with a workability letter modifying his activity at this time - he is seated when possible.  Agreeable to try short trial of lasix.    - **Basic metabolic panel FUTURE 14d; Future  - furosemide (LASIX) 20 MG tablet; Take 1 tablet (20 mg) by mouth daily Suggest in morning.    Morbid obesity (H) BMI 68.34  Uncontrolled, must figure insurance before we can pursue wt loss medication.      Psoriasis  Worse, though it sounds like this is largely due to being unable to afford his usual medications due to current insurance issue.  Will have him use triamcinolone until insurance is again in place.  Due to recent severe cellulitis (hospitalization) that was secondary to his psoriasis, I do still recommend that he see dermatology even if he feels that he is doing well.  - clobetasol (TEMOVATE) 0.05 % external cream; Apply topically 2 times daily  - calcipotriene (DOVONOX) 0.005 % external cream; Apply topically 2 times daily  - triamcinolone (KENALOG) 0.5 % external ointment; To psoriasis as needed.  To be used until you get insurance coverage for calcipotriene and clobetasol -- do NOT use triamcinolone plus the others.    Moderate persistent asthma with acute exacerbation  He declines to treat further today, refill  - albuterol (PROAIR HFA/PROVENTIL HFA/VENTOLIN HFA) 108 (90 Base) MCG/ACT inhaler; Inhale 2 puffs into the lungs every 6 hours as needed for shortness of breath or wheezing    Financial difficulty  Large debt to [employer] due to insurance issue, will see if we can help in anyway with care coordinator for any of finances.  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  Patient  "blames caffeine and declines to treat at this time.  Redress next visit.    Great toe pain, right  Unclear cause of pain  - Orthopedic  Referral; Future    56 min spent with patient day of visit, however will downcode to level 4 as he is cash pay at this time.    Patient Instructions   Use triamcinolone until can afford to get calcipotriene and clobetasol.      Suggest trying water pill - if you and Lili feel it is helpful and want to continue, then do lab in 2 weeks    Care coordinator to contact you to help figure out insurance, finances, housing     Caffeine - up to 400 mg is generally safe per day - reduce red bull    Sleep study and derm when able     Podiatry referral    When insurance figured out, CALL your insurance to see IF they cover weight loss medication, and if so - WHICH one.  Zepbound (easier to get ahold of to start), wegovy, or saxenda        Subjective   Ge is a 38 year old, presenting for the following health issues:  Leg Pain        2/5/2024     1:11 PM   Additional Questions   Roomed by Gini     History of Present Illness       Reason for visit:  .      Recheck bilateral legs   Left leg is worse   Lymphedema   Currently wrapped- see photos on his phone    Right toe   Pain 8/10     5-6 redbull, plus 20 oz pop, plus 1 L pop per day.      Objective    BP (!) 154/90   Pulse 115   Temp 97.3  F (36.3  C) (Tympanic)   Resp 14   Ht 1.905 m (6' 3\")   Wt (!) 240 kg (529 lb)   SpO2 95%   BMI 66.12 kg/m    Body mass index is 66.12 kg/m .          Signed Electronically by: Aimee Troncoso PA-C    "

## 2024-02-07 ENCOUNTER — THERAPY VISIT (OUTPATIENT)
Dept: PHYSICAL THERAPY | Facility: CLINIC | Age: 39
End: 2024-02-07
Attending: INTERNAL MEDICINE

## 2024-02-07 ENCOUNTER — PATIENT OUTREACH (OUTPATIENT)
Dept: CARE COORDINATION | Facility: CLINIC | Age: 39
End: 2024-02-07

## 2024-02-07 DIAGNOSIS — Z71.89 OTHER SPECIFIED COUNSELING: Primary | Chronic | ICD-10-CM

## 2024-02-07 DIAGNOSIS — I89.0 LYMPHEDEMA: Primary | ICD-10-CM

## 2024-02-07 PROCEDURE — 97140 MANUAL THERAPY 1/> REGIONS: CPT | Mod: GP | Performed by: REHABILITATION PRACTITIONER

## 2024-02-07 NOTE — PROGRESS NOTES
Clinic Care Coordination Contact  Community Health Worker Initial Outreach    CHW Initial Information Gathering:  Referral Source: PCP  Preferred Hospital: [place], [place]  [phone]  Preferred Urgent Care: [facility], [phone]  Current living arrangement:: Not Assessed  CHW Additional Questions  MyChart active?: Yes  Patient sent Social Determinants of Health questionnaire?: No    Patient accepts CC: No, FRW referral only. Patient will be sent Care Coordination introduction letter for future reference.     LONA Duncan  [facility] Care Coordination  [facility]  [phone]

## 2024-02-08 ENCOUNTER — PATIENT OUTREACH (OUTPATIENT)
Dept: CARE COORDINATION | Facility: CLINIC | Age: 39
End: 2024-02-08

## 2024-02-09 ENCOUNTER — THERAPY VISIT (OUTPATIENT)
Dept: PHYSICAL THERAPY | Facility: CLINIC | Age: 39
End: 2024-02-09
Attending: INTERNAL MEDICINE

## 2024-02-09 DIAGNOSIS — I89.0 LYMPHEDEMA: Primary | ICD-10-CM

## 2024-02-09 PROCEDURE — 97140 MANUAL THERAPY 1/> REGIONS: CPT | Mod: GP | Performed by: REHABILITATION PRACTITIONER

## 2024-02-12 ENCOUNTER — THERAPY VISIT (OUTPATIENT)
Dept: PHYSICAL THERAPY | Facility: CLINIC | Age: 39
End: 2024-02-12
Attending: INTERNAL MEDICINE

## 2024-02-12 DIAGNOSIS — I89.0 LYMPHEDEMA: Primary | ICD-10-CM

## 2024-02-12 PROCEDURE — 97140 MANUAL THERAPY 1/> REGIONS: CPT | Mod: GP | Performed by: REHABILITATION PRACTITIONER

## 2024-02-13 PROBLEM — F15.20 CAFFEINE DEPENDENCE (H): Status: ACTIVE | Noted: 2024-02-13

## 2024-02-14 ENCOUNTER — THERAPY VISIT (OUTPATIENT)
Dept: PHYSICAL THERAPY | Facility: CLINIC | Age: 39
End: 2024-02-14
Attending: INTERNAL MEDICINE

## 2024-02-14 DIAGNOSIS — I89.0 LYMPHEDEMA: Primary | ICD-10-CM

## 2024-02-14 PROCEDURE — 97140 MANUAL THERAPY 1/> REGIONS: CPT | Mod: GP | Performed by: REHABILITATION PRACTITIONER

## 2024-02-15 ENCOUNTER — PATIENT OUTREACH (OUTPATIENT)
Dept: CARE COORDINATION | Facility: CLINIC | Age: 39
End: 2024-02-15

## 2024-02-16 ENCOUNTER — THERAPY VISIT (OUTPATIENT)
Dept: PHYSICAL THERAPY | Facility: CLINIC | Age: 39
End: 2024-02-16
Attending: INTERNAL MEDICINE

## 2024-02-16 DIAGNOSIS — I89.0 LYMPHEDEMA: Primary | ICD-10-CM

## 2024-02-16 PROCEDURE — 97140 MANUAL THERAPY 1/> REGIONS: CPT | Mod: GP | Performed by: REHABILITATION PRACTITIONER

## 2024-02-19 ENCOUNTER — THERAPY VISIT (OUTPATIENT)
Dept: PHYSICAL THERAPY | Facility: CLINIC | Age: 39
End: 2024-02-19
Attending: INTERNAL MEDICINE

## 2024-02-19 DIAGNOSIS — I89.0 LYMPHEDEMA: Primary | ICD-10-CM

## 2024-02-19 PROCEDURE — 97140 MANUAL THERAPY 1/> REGIONS: CPT | Mod: GP | Performed by: REHABILITATION PRACTITIONER

## 2024-02-21 ENCOUNTER — THERAPY VISIT (OUTPATIENT)
Dept: PHYSICAL THERAPY | Facility: CLINIC | Age: 39
End: 2024-02-21
Attending: INTERNAL MEDICINE

## 2024-02-21 DIAGNOSIS — I89.0 LYMPHEDEMA: Primary | ICD-10-CM

## 2024-02-21 PROCEDURE — 97140 MANUAL THERAPY 1/> REGIONS: CPT | Mod: GP | Performed by: REHABILITATION PRACTITIONER

## 2024-02-23 ENCOUNTER — THERAPY VISIT (OUTPATIENT)
Dept: PHYSICAL THERAPY | Facility: CLINIC | Age: 39
End: 2024-02-23
Attending: INTERNAL MEDICINE

## 2024-02-23 DIAGNOSIS — I89.0 LYMPHEDEMA: Primary | ICD-10-CM

## 2024-02-23 PROCEDURE — 97140 MANUAL THERAPY 1/> REGIONS: CPT | Mod: GP | Performed by: REHABILITATION PRACTITIONER

## 2024-02-26 ENCOUNTER — THERAPY VISIT (OUTPATIENT)
Dept: PHYSICAL THERAPY | Facility: CLINIC | Age: 39
End: 2024-02-26
Attending: INTERNAL MEDICINE

## 2024-02-26 DIAGNOSIS — I89.0 LYMPHEDEMA: Primary | ICD-10-CM

## 2024-02-26 PROCEDURE — 97140 MANUAL THERAPY 1/> REGIONS: CPT | Mod: GP | Performed by: REHABILITATION PRACTITIONER

## 2024-03-01 ENCOUNTER — THERAPY VISIT (OUTPATIENT)
Dept: PHYSICAL THERAPY | Facility: CLINIC | Age: 39
End: 2024-03-01
Attending: INTERNAL MEDICINE

## 2024-03-01 DIAGNOSIS — I89.0 LYMPHEDEMA: Primary | ICD-10-CM

## 2024-03-01 PROCEDURE — 97140 MANUAL THERAPY 1/> REGIONS: CPT | Mod: GP | Performed by: REHABILITATION PRACTITIONER

## 2024-03-02 NOTE — PROGRESS NOTES
PLAN  Continue therapy per current plan of care of 3x/week until all wounds 100% closed/healed to decrease risk of infection. Pt will also be sent to [facility] for custom compression garments for long-term lymphedema management for maintenance.    Beginning/End Dates of Progress Note Reporting Period:  01/11/24 to 02/09/2024    Referring Provider:  Fortino Monterroso MD               02/09/24 0500   Appointment Info   Signing clinician's name / credentials Olivia Pisano PTA/CLT   Visits Used 21 BLE/Violetee/Blue Plus MA   Medical Diagnosis morbid obesity and edema   PT Tx Diagnosis BLE stage 3 lymphedema / phlebolymphedema with LLE wounds   Precautions/Limitations cane into clinic; monitor LLE for worsening redness   Other pertinent information Pt will see both OP lymph clinic and WOC until the week of Jan 8th when pt can consistently be seen at OP lymph clinic 3x/week   Progress Note/Certification   Start of Care Date 12/12/23   Onset of illness/injury or Date of Surgery 12/01/23  (date of referral)   Therapy Frequency 3x / week   Predicted Duration x12 weeks   Certification date from 12/12/23   Certification date to 03/05/24   Progress Note Due Date 02/10/24   Progress Note Completed Date 01/11/24   Supervision   PT Assistant Visit Number 5       Present No   GOALS   PT Goals 2;3;4;5;6   PT Goal 1   Goal Identifier stg   Goal Description pt to have around the clock tolerance to BLE GCB for edema reduction and wound healing response   Rationale to maximize safety and independence with self cares   Target Date 01/11/24   Date Met 01/02/24  (Met for LLE; pt unable to tolerate GCB on RLE due to driving difficulty (using Spandagrip K instead w/good tolerance))   PT Goal 2   Goal Identifier ltg   Goal Description once appropriate, pt and/or family to be independent with donning, doffing and care of compression garments for longterm edema management for maintenance   Rationale to maximize safety and  independence with self cares   Target Date 03/05/24   PT Goal 3   Goal Identifier ltg   Goal Description pt to be independent with longterm BLE edema management via HEP, elevation, skin cares and compression garment wear/use   Rationale to maximize safety and independence with self cares   Target Date 03/05/24   PT Goal 4   Goal Identifier ltg   Goal Description pt to have 100% closure of all wounds on LLEL to decrease risk of repetitive cellulitis   Rationale to maximize safety and independence with self cares;to maximize safety and independence with performance of ADLs and functional tasks   Target Date 03/05/24   PT Goal 5   Goal Identifier ltg   Goal Description pt to have at least 5 point improvement on LLIS due to decreased edema and associated symptoms in BLEs   Rationale to maximize safety and independence with performance of ADLs and functional tasks   Target Date 03/05/24   Subjective Report   Subjective Report I bought in the Triamcinilone cream   Objective Measures   Objective Measures Objective Measure 1   Objective Measure 1   Objective Measure girth   Details R LE -4.1%   Treatment Interventions (PT)   Interventions Manual Therapy   Manual Therapy   Manual Therapy: Mobilization, MFR, MLD, friction massage minutes ([id number]) 65   Manual Therapy 1 - Details pt arrived into clinic with B LE GCB's in place, all compression and wound dressings removed; min drainage through out wound dressings and kerlix and no odor, R girth measurements taken, Lidocaine applied to all wounds,  L LE cleansed, wounds on LLE cleansed with Microklenze and gauze pads; x2 different creams applied to B LE Psoriasis patches, Triamcinilone cream to B LE's with Eucerine lotion over top, all wounds filled with strips of Aquacel AG dressing, all wounds covered with Mepilex AG, held in place with kerlix, R LE GCB: yellow tubifast ankle to knee, (1) cotton ankle to knee extra padding to anterior ankle lobe,  Komprex 2 placed over hard  fibrotic areas on posterior leg, 12x10 short stretch bandage ankl eo knee herringbone, 12x5cm short stretch bandage mid-calf to knee, and LLE wrapped: yellow tubifast foot to knee, (1.5) lg cotton MTPs to knee, 10cm short-stretch MTP's to ankle, 12x5cm short stretch bandage ankle to knee, [id number] short-stretch ankle to knee herringbone pattern, all applied at 50% overlap and compression - pt reports comfort.   Skilled Intervention CDT; skin and wound cares, GCB, education on treatment phase vs longterm management   Patient Response/Progress L LE itching   Plan   Home program keep wraps on until next appt   Plan for next session BLE GCB, modify wrapping as needed; LLE wound cares, debridement within tolerance, wound and girth measurements when time.   Total Session Time   Timed Code Treatment Minutes 65   Total Treatment Time (sum of timed and untimed services) 65

## 2024-03-05 ENCOUNTER — THERAPY VISIT (OUTPATIENT)
Dept: PHYSICAL THERAPY | Facility: CLINIC | Age: 39
End: 2024-03-05
Attending: INTERNAL MEDICINE

## 2024-03-05 DIAGNOSIS — I89.0 LYMPHEDEMA: Primary | ICD-10-CM

## 2024-03-05 PROCEDURE — 97140 MANUAL THERAPY 1/> REGIONS: CPT | Mod: GP | Performed by: REHABILITATION PRACTITIONER

## 2024-03-08 ENCOUNTER — THERAPY VISIT (OUTPATIENT)
Dept: PHYSICAL THERAPY | Facility: CLINIC | Age: 39
End: 2024-03-08
Attending: INTERNAL MEDICINE

## 2024-03-08 DIAGNOSIS — I89.0 LYMPHEDEMA: Primary | ICD-10-CM

## 2024-03-08 PROCEDURE — 97140 MANUAL THERAPY 1/> REGIONS: CPT | Mod: GP | Performed by: REHABILITATION PRACTITIONER

## 2024-03-13 ENCOUNTER — THERAPY VISIT (OUTPATIENT)
Dept: PHYSICAL THERAPY | Facility: CLINIC | Age: 39
End: 2024-03-13
Attending: INTERNAL MEDICINE

## 2024-03-13 DIAGNOSIS — I89.0 LYMPHEDEMA: Primary | ICD-10-CM

## 2024-03-13 PROCEDURE — 97140 MANUAL THERAPY 1/> REGIONS: CPT | Mod: GP | Performed by: REHABILITATION PRACTITIONER

## 2024-03-15 ENCOUNTER — THERAPY VISIT (OUTPATIENT)
Dept: PHYSICAL THERAPY | Facility: CLINIC | Age: 39
End: 2024-03-15
Attending: INTERNAL MEDICINE

## 2024-03-15 DIAGNOSIS — I89.0 LYMPHEDEMA: Primary | ICD-10-CM

## 2024-03-15 PROCEDURE — 97140 MANUAL THERAPY 1/> REGIONS: CPT | Mod: GP | Performed by: REHABILITATION PRACTITIONER

## 2024-03-21 ENCOUNTER — THERAPY VISIT (OUTPATIENT)
Dept: PHYSICAL THERAPY | Facility: CLINIC | Age: 39
End: 2024-03-21
Attending: INTERNAL MEDICINE

## 2024-03-21 DIAGNOSIS — I89.0 LYMPHEDEMA: Primary | ICD-10-CM

## 2024-03-21 PROCEDURE — 97140 MANUAL THERAPY 1/> REGIONS: CPT | Mod: GP | Performed by: PHYSICAL THERAPIST

## 2024-03-25 NOTE — PROGRESS NOTES
PLAN  Continue therapy per current plan of care until wounds 100% healed/closed and pt in compression garments for maintenance.    Beginning/End Dates of Progress Note Reporting Period:  2/10/2024 to 03/05/2024    Referring Provider:  Fortino Monterroso MD    [facility]                                                                                   OUTPATIENT PHYSICAL THERAPY    PLAN OF TREATMENT FOR OUTPATIENT REHABILITATION   Patient's Last Name, First Name, Ge Browne YOB: 1985   Provider's Name   [facility]   Medical Record No.  [id number]     Onset Date: 12/1/23 (date of referral) Start of Care Date: 12/12/2023     Medical Diagnosis:  morbid obesity and edema      PT Treatment Diagnosis:  BLE stage 3 lymphedema/ phlebolymphedema  Plan of Treatment  Frequency/Duration: 3x/week for 12 weeks    Certification date from 3/6/24 - 5/29/24       See note for plan of treatment details and functional goals     Taina Drake, PT, DPT, CLT                         I CERTIFY THE NEED FOR THESE SERVICES FURNISHED UNDER        THIS PLAN OF TREATMENT AND WHILE UNDER MY CARE     (Physician attestation of this document indicates review and certification of the therapy plan).              Referring Provider:  Fortino Monterroso MD    Initial Assessment  See Epic Evaluation-                     03/05/24 0500   Appointment Info   Signing clinician's name / credentials Olivia Pisano PTA/CLT   Visits Used 29 BLE/Svee/Blue Plus MA   Medical Diagnosis morbid obesity and edema   PT Tx Diagnosis BLE stage 3 lymphedema / phlebolymphedema with LLE wounds   Precautions/Limitations cane into clinic; monitor LLE for worsening redness   Other pertinent information Pt will see both OP lymph clinic and WOC until the week of Jan 8th when pt can consistently be seen at OP lymph clinic 3x/week   Progress Note/Certification   Start of Care Date 12/12/23   Onset of  illness/injury or Date of Surgery 12/01/23  (date of referral)   Therapy Frequency 3x / week   Predicted Duration x12 weeks   Certification date from 03/06/24   Certification date to 05/29/24   Progress Note Due Date 03/05/24   Progress Note Completed Date 02/10/24   Supervision   Assistant Supervision PTA visit observed, intervention appropriate, plan of care reviewed and remains appropriate   PT Assistant Visit Number 7       Present No   GOALS   PT Goals 2;3;4;5;6   PT Goal 1   Goal Identifier stg   Goal Description pt to have around the clock tolerance to BLE GCB for edema reduction and wound healing response   Rationale to maximize safety and independence with self cares   Target Date 01/11/24   Date Met 01/02/24  (Met for LLE; pt unable to tolerate GCB on RLE due to driving difficulty (using Spandagrip K instead w/good tolerance))   PT Goal 2   Goal Identifier ltg   Goal Description once appropriate, pt and/or family to be independent with donning, doffing and care of compression garments for longterm edema management for maintenance   Rationale to maximize safety and independence with self cares   Target Date 03/05/24   PT Goal 3   Goal Description pt to be independent with longterm BLE edema management via HEP, elevation, skin cares and compression garment wear/use   Rationale to maximize safety and independence with self cares   Target Date 03/05/24   PT Goal 4   Goal Identifier ltg   Goal Description pt to have 100% closure of all wounds on LLEL to decrease risk of repetitive cellulitis   Rationale to maximize safety and independence with self cares;to maximize safety and independence with performance of ADLs and functional tasks   Target Date 03/05/24   PT Goal 5   Goal Identifier ltg   Goal Description pt to have at least 5 point improvement on LLIS due to decreased edema and associated symptoms in BLEs   Rationale to maximize safety and independence with performance of ADLs and  functional tasks   Target Date 03/05/24   Subjective Report   Subjective Report the bandages fell down over the weekend I removed the R leg wraps because they were bunching at my ankle and it was really painful   Treatment Interventions (PT)   Interventions Manual Therapy   Manual Therapy   Manual Therapy: Mobilization, MFR, MLD, friction massage minutes ([id number]) 55   Manual Therapy 1 - Details pt arrived into clinic with R LE spandagrip and L LE GCB's in place, all compression and wound dressings removed; Lidocaine applied to all wounds, B LE's cleansed, L LE wounds cleansed with Microklenze and gauze pads; x2 different creams applied to B LE Psoriasis patches, Triamcinilone applied to L LE around wounds and Sween 24 lotion over top, anterior leg wounds filled with 1/4in packing strips soaked in vashe wash, all wounds covered with Mepilex transfer, R LE GCB: yellow tubifast ankle to knee, (1) cotton ankle to knee extra padding to anterior ankle lobe,  Komprex 2 placed over hard fibrotic areas on posterior leg, 10x5 short stretch bandage ankle to mid-leg herringbone, [id number] short stretch bandage ankle to knee herringbone, and LLE wrapped: yellow tubifast ankle to knee, (1.5) lg cotton ankle to knee, 10x5cm short stretch bandage ankle to knee, [id number] short-stretch ankle to knee herringbone pattern, all applied at 50% overlap and compression - pt reports comfort. therapist will take garment measurements on Friday and cx fitting appt due to pt not having Insurance to cover the cost of custom garments   Skilled Intervention CDT; skin and wound cares, GCB, education on treatment phase vs longterm management   Patient Response/Progress no complaints   Plan   Home program keep wraps on until next appt   Plan for next session BLE GCB, modify wrapping as needed; LLE wound cares, debridement within tolerance, wound and girth measurements when time. garment measurements   Comments   Comments email sent to Klaudia anderson  fitting appt;  Primary therapist directly observed portion of PTA's session with patient, legs and wounds assessed and pt's POC reviewed in pt's presence which remains appropriate at this time.   Total Session Time   Timed Code Treatment Minutes 55   Total Treatment Time (sum of timed and untimed services) 55

## 2024-03-27 ENCOUNTER — THERAPY VISIT (OUTPATIENT)
Dept: PHYSICAL THERAPY | Facility: CLINIC | Age: 39
End: 2024-03-27
Attending: INTERNAL MEDICINE

## 2024-03-27 DIAGNOSIS — I89.0 LYMPHEDEMA: Primary | ICD-10-CM

## 2024-03-27 PROCEDURE — 97140 MANUAL THERAPY 1/> REGIONS: CPT | Mod: GP | Performed by: REHABILITATION PRACTITIONER

## 2024-03-29 ENCOUNTER — THERAPY VISIT (OUTPATIENT)
Dept: PHYSICAL THERAPY | Facility: CLINIC | Age: 39
End: 2024-03-29
Attending: INTERNAL MEDICINE

## 2024-03-29 DIAGNOSIS — I89.0 LYMPHEDEMA: Primary | ICD-10-CM

## 2024-03-29 PROCEDURE — 97140 MANUAL THERAPY 1/> REGIONS: CPT | Mod: GP | Performed by: REHABILITATION PRACTITIONER

## 2024-04-01 ENCOUNTER — THERAPY VISIT (OUTPATIENT)
Dept: PHYSICAL THERAPY | Facility: CLINIC | Age: 39
End: 2024-04-01
Attending: INTERNAL MEDICINE

## 2024-04-01 DIAGNOSIS — I89.0 LYMPHEDEMA: Primary | ICD-10-CM

## 2024-04-01 PROCEDURE — 97140 MANUAL THERAPY 1/> REGIONS: CPT | Mod: GP | Performed by: REHABILITATION PRACTITIONER

## 2024-04-05 ENCOUNTER — THERAPY VISIT (OUTPATIENT)
Dept: PHYSICAL THERAPY | Facility: CLINIC | Age: 39
End: 2024-04-05
Attending: INTERNAL MEDICINE

## 2024-04-05 DIAGNOSIS — I89.0 LYMPHEDEMA: Primary | ICD-10-CM

## 2024-04-05 PROCEDURE — 97140 MANUAL THERAPY 1/> REGIONS: CPT | Mod: GP | Performed by: REHABILITATION PRACTITIONER

## 2024-04-08 ENCOUNTER — THERAPY VISIT (OUTPATIENT)
Dept: PHYSICAL THERAPY | Facility: CLINIC | Age: 39
End: 2024-04-08
Attending: INTERNAL MEDICINE

## 2024-04-08 DIAGNOSIS — I89.0 LYMPHEDEMA: Primary | ICD-10-CM

## 2024-04-08 PROCEDURE — 97140 MANUAL THERAPY 1/> REGIONS: CPT | Mod: GP | Performed by: REHABILITATION PRACTITIONER

## 2024-04-08 NOTE — PROGRESS NOTES
PLAN  Continue therapy per current plan of care of 3x/week until all wounds 100% closed/healed and patient into compression garmnets for longterm BLE lymphedema management for maintenance.    Beginning/End Dates of Progress Note Reporting Period:  03/05/24 to 04/05/2024    Referring Provider:  Fortino Monterroso MD           04/05/24 0500   Appointment Info   Signing clinician's name / credentials Olivia Pisano PTA/CLT   Visits Used 37 BLE/Svee/Blue Plus MA   Medical Diagnosis morbid obesity and edema   PT Tx Diagnosis BLE stage 3 lymphedema / phlebolymphedema with LLE wounds   Precautions/Limitations cane into clinic; monitor LLE for worsening redness   Other pertinent information Pt will see both OP lymph clinic and WOC until the week of Jan 8th when pt can consistently be seen at OP lymph clinic 3x/week   Progress Note/Certification   Start of Care Date 12/12/23   Onset of illness/injury or Date of Surgery 12/01/23  (date of referral)   Therapy Frequency 3x / week   Predicted Duration x12 weeks   Certification date from 03/06/24   Certification date to 05/29/24   Progress Note Due Date 04/05/24   Progress Note Completed Date 03/05/24   Supervision   PT Assistant Visit Number 1       Present No   GOALS   PT Goals 2;3;4;5;6   PT Goal 1   Goal Identifier stg   Goal Description pt to have around the clock tolerance to BLE GCB for edema reduction and wound healing response   Rationale to maximize safety and independence with self cares   Target Date 01/11/24   Date Met 01/02/24  (Met for LLE; pt unable to tolerate GCB on RLE due to driving difficulty (using Spandagrip K instead w/good tolerance))   PT Goal 2   Goal Identifier ltg   Goal Description once appropriate, pt and/or family to be independent with donning, doffing and care of compression garments for longterm edema management for maintenance   Rationale to maximize safety and independence with self cares   Target Date 05/29/24   PT  Goal 3   Goal Identifier ltg   Goal Description pt to be independent with longterm BLE edema management via HEP, elevation, skin cares and compression garment wear/use   Rationale to maximize safety and independence with self cares   Target Date 05/29/24   PT Goal 4   Goal Identifier ltg   Goal Description pt to have 100% closure of all wounds on LLEL to decrease risk of repetitive cellulitis   Rationale to maximize safety and independence with self cares;to maximize safety and independence with performance of ADLs and functional tasks   Target Date 05/29/24   PT Goal 5   Goal Identifier ltg   Goal Description pt to have at least 5 point improvement on LLIS due to decreased edema and associated symptoms in BLEs   Rationale to maximize safety and independence with performance of ADLs and functional tasks   Target Date 05/29/24   Subjective Report   Subjective Report did you hear I came on Wednesday when I did not have an appt.?   Objective Measures   Objective Measures Objective Measure 1   Objective Measure 1   Objective Measure girth   Details R LE -1.9%, L LE +5.3%   Objective Measure 3   Objective Measure LLE lateral leg (most superior) #1   Details 100% closed   Objective Measure 4   Objective Measure LLE lateral leg (middle of 3 wounds) #2   Details 100% closed with continued healing   Objective Measure 5   Objective Measure LLE latertal leg #3   Details 100% closed with continued healing   Objective Measure 6   Objective Measure L LE posterior   Details 0.1cm(L)x 0.1cm(W)x 0cm(D) 100% epithelial tissue   Treatment Interventions (PT)   Interventions Manual Therapy   Manual Therapy   Manual Therapy: Mobilization, MFR, MLD, friction massage minutes ([id number]) 50   Manual Therapy 1 Circaid reduction kit lower leg Wide/regular   Manual Therapy 1 - Details pt arrived into clinic with B LE x2 Stockinets and x1 short strech bandage in place, all compression and wound dressings removed; B LE girth measurements take and L  LE wound measurements, B LE's cleansed, L LE wounds cleansed with Microclenze and gauze pads; R MIRIAN Love lg head, treatment #1 x 10 minutes to fibrotic areas on anterior mid to lower leg, Eucerine lotion, all wounds covered with Mepilex transfer and B LE x2 pieces of Spandagrip J ankle to knee, and [id number] short stretch bandage ankle to knee herringbone pattern, 50% stretch and over lap. pt reported comfort,   Skilled Intervention CDT; skin and wound cares, GCB, education on treatment phase vs longterm management   Patient Response/Progress pt 10' late   Plan   Home program spandagrip and GCB to B LE,   Plan for next session continue with Spandagrip and x1 GCB if edema seems to be managed, modify wrapping as needed; LLE wound cares,  wound and girth measurements when time. MLD or Theragun to B LE when time permits   Total Session Time   Timed Code Treatment Minutes 50   Total Treatment Time (sum of timed and untimed services) 50

## 2024-04-17 ENCOUNTER — THERAPY VISIT (OUTPATIENT)
Dept: PHYSICAL THERAPY | Facility: CLINIC | Age: 39
End: 2024-04-17
Attending: INTERNAL MEDICINE

## 2024-04-17 DIAGNOSIS — I89.0 LYMPHEDEMA: Primary | ICD-10-CM

## 2024-04-17 PROCEDURE — 97140 MANUAL THERAPY 1/> REGIONS: CPT | Mod: GP | Performed by: REHABILITATION PRACTITIONER

## 2024-04-19 ENCOUNTER — THERAPY VISIT (OUTPATIENT)
Dept: PHYSICAL THERAPY | Facility: CLINIC | Age: 39
End: 2024-04-19
Attending: INTERNAL MEDICINE

## 2024-04-19 DIAGNOSIS — I89.0 LYMPHEDEMA: Primary | ICD-10-CM

## 2024-04-19 PROCEDURE — 97140 MANUAL THERAPY 1/> REGIONS: CPT | Mod: GP | Performed by: REHABILITATION PRACTITIONER

## 2024-04-26 ENCOUNTER — THERAPY VISIT (OUTPATIENT)
Dept: PHYSICAL THERAPY | Facility: CLINIC | Age: 39
End: 2024-04-26
Attending: INTERNAL MEDICINE

## 2024-04-26 DIAGNOSIS — I89.0 LYMPHEDEMA: Primary | ICD-10-CM

## 2024-04-26 PROCEDURE — 97140 MANUAL THERAPY 1/> REGIONS: CPT | Mod: GP | Performed by: REHABILITATION PRACTITIONER

## 2024-05-03 ENCOUNTER — THERAPY VISIT (OUTPATIENT)
Dept: PHYSICAL THERAPY | Facility: CLINIC | Age: 39
End: 2024-05-03
Attending: INTERNAL MEDICINE

## 2024-05-03 DIAGNOSIS — I89.0 LYMPHEDEMA: Primary | ICD-10-CM

## 2024-05-03 PROCEDURE — 97140 MANUAL THERAPY 1/> REGIONS: CPT | Mod: GP | Performed by: REHABILITATION PRACTITIONER

## 2024-05-06 NOTE — PROGRESS NOTES
PLAN  Other:  Patient has been decreased from initial 3x/week to current 1x/week as pt's BLE wounds are all healed/closed and pt is wearing compression garments.  Patient is wearing BLE compression garments for maintenance and anticipate a few additional sessions to ensure that current compression garments and wear schedule are adequate in achieving longterm edema management for maintenance.      Beginning/End Dates of Progress Note Reporting Period:  04/05/24 to 05/05/2024    Referring Provider:  Fortino Monterroso MD           05/03/24 0500   Appointment Info   Signing clinician's name / credentials Olivia Freeman Spur PTA/CLT   Visits Used 42 BLE/Kriss/Blue Plus MA   Medical Diagnosis morbid obesity and edema   PT Tx Diagnosis BLE stage 3 lymphedema / phlebolymphedema with LLE wounds   Precautions/Limitations cane into clinic; monitor LLE for worsening redness   Other pertinent information Pt will see both OP lymph clinic and [facility]OC until the week of Jan 8th when pt can consistently be seen at OP lymph clinic 3x/week   Progress Note/Certification   Start of Care Date 12/12/23   Onset of illness/injury or Date of Surgery 12/01/23  (date of referral)   Therapy Frequency 1x/week   Predicted Duration x12 weeks   Certification date from 03/06/24   Certification date to 05/29/24   Progress Note Due Date 05/05/24   Progress Note Completed Date 04/05/24   Supervision   PT Assistant Visit Number 6       Present No   GOALS   PT Goals 2;3;4;5;6   PT Goal 1   Goal Identifier stg   Goal Description pt to have around the clock tolerance to BLE GCB for edema reduction and wound healing response   Rationale to maximize safety and independence with self cares   Target Date 01/11/24   Date Met 01/02/24  (Met for LLE; pt unable to tolerate GCB on RLE due to driving difficulty (using Spandagrip K instead w/good tolerance))   PT Goal 2   Goal Identifier ltg   Goal Description once appropriate, pt and/or family to be  independent with donning, doffing and care of compression garments for longterm edema management for maintenance   Rationale to maximize safety and independence with self cares   Target Date 05/29/24   Date Met 05/03/24   PT Goal 3   Goal Identifier ltg   Goal Description pt to be independent with longterm BLE edema management via HEP, elevation, skin cares and compression garment wear/use   Rationale to maximize safety and independence with self cares   Target Date 05/29/24   PT Goal 4   Goal Identifier ltg   Goal Description pt to have 100% closure of all wounds on LLEL to decrease risk of repetitive cellulitis   Rationale to maximize safety and independence with self cares;to maximize safety and independence with performance of ADLs and functional tasks   Target Date 05/29/24   Date Met 04/17/24   PT Goal 5   Goal Identifier ltg   Goal Description pt to have at least 5 point improvement on LLIS due to decreased edema and associated symptoms in BLEs   Rationale to maximize safety and independence with performance of ADLs and functional tasks   Target Date 05/29/24   Subjective Report   Subjective Report I don't like the R leg wrap it never stays up   Objective Measures   Objective Measures Objective Measure 1   Objective Measure 1   Objective Measure girth   Details R LE +10.7%, L LE +8.5%   Treatment Interventions (PT)   Interventions Manual Therapy   Manual Therapy   Manual Therapy: Mobilization, MFR, MLD, friction massage minutes ([id number]) 55   Manual Therapy 1 Circaid reduction kit lower leg Wide/regular, ordering from [facility]   Manual Therapy 1 - Details pt arrived with B LE compression wraps on B LE except he did not have liners on under wraps and wraps were down to ankles. therapist removed and B LE girth measurements taken, B LE's cleansed and removed min dry skin from previous wound areas, Poriasis ointments to dry patches on B LE's and Sween 24, therapist donned R LE: Spandagrip H ankle to knee, compression  wrap and added x2 pieces of Velfoam at the top to extend wrap and hold at top of leg vs slidding down leg, L LE Spandagrip G ankle to knee, velcro wrap. Ed: doing good skin care to reduce infections or wounds, pt at a higher risk for infections due to previous hospital stay,ordering more liners, a secure strap for the R LE since it did not come with the garment, difference between using the silver liner vs the beige, therapist recommends pt purchase the silver liners due to high risk for infections, issued h/o's for ordering these products. pt may reduce appt's to 1xwk   Skilled Intervention CDT; skin and wound cares, garment education   Patient Response/Progress pt happy to reduce appt's   Education   Learner/Method Patient;Listening;Demonstration;No Barriers to Learning;Pictures/Video   Plan   Home program B LE garments daytime, off at night, skin care daily   Plan for next session B LE girth measurements, address any questions or concerns about home program/garments, modify as needed   Total Session Time   Timed Code Treatment Minutes 55   Total Treatment Time (sum of timed and untimed services) 55

## 2024-05-21 NOTE — PROGRESS NOTES
Lymphedema Therapy Re-Certification      [facility]                                                                                   OUTPATIENT PHYSICAL THERAPY    PLAN OF TREATMENT FOR OUTPATIENT REHABILITATION   Patient's Last Name, First Name, Ge Browne YOB: 1985   Provider's Name   [facility]   Medical Record No.  [id number]     Onset Date:   12/1/23 Start of Care Date:  12/12/23     Medical Diagnosis:   morbid obesity and edema      PT Treatment Diagnosis:   B LE stage 3 lymphedema/ phlebolymphedema with L LE wounds Plan of Treatment  Frequency/Duration: other /  8 weeks  (hold due to recieving garments and to return in June to primary therapist to assess tolerance to current program)    Certification date from 5/21/2024   to   7/16/2024       See note for plan of treatment details and functional goals     Lili Pisano PTA                         I CERTIFY THE NEED FOR THESE SERVICES FURNISHED UNDER        THIS PLAN OF TREATMENT AND WHILE UNDER MY CARE     (Physician attestation of this document indicates review and certification of the therapy plan).              Referring Provider:  Fortino Monterroso    Initial Assessment  See Epic Evaluation-               05/03/24 0500   Appointment Info   Signing clinician's name / credentials Olivia Pisano PTA/CLT   Visits Used 42 BLE/Svee/Blue Plus MA   Medical Diagnosis morbid obesity and edema   PT Tx Diagnosis BLE stage 3 lymphedema / phlebolymphedema with LLE wounds   Precautions/Limitations cane into clinic; monitor LLE for worsening redness   Other pertinent information Pt will see both OP lymph clinic and [facility]OC until the week of Jan 8th when pt can consistently be seen at OP lymph clinic 3x/week   Progress Note/Certification   Start of Care Date 12/12/23   Onset of illness/injury or Date of Surgery 12/01/23  (date of referral)   Therapy Frequency other  (hold due to  recieving garments and to return in June to primary therapist to assess tolerance to current program)   Predicted Duration x 8 weeks   Certification date from 05/21/24   Certification date to 07/16/24   Progress Note Due Date 06/05/24   Progress Note Completed Date 05/05/24   Supervision   PT Assistant Visit Number 6       Present No   GOALS   PT Goals 2;3;4;5;6   PT Goal 1   Goal Identifier stg   Goal Description pt to have around the clock tolerance to BLE GCB for edema reduction and wound healing response   Rationale to maximize safety and independence with self cares   Target Date 01/11/24   Date Met 01/02/24  (Met for LLE; pt unable to tolerate GCB on RLE due to driving difficulty (using Spandagrip K instead w/good tolerance))   PT Goal 2   Goal Identifier ltg   Goal Description once appropriate, pt and/or family to be independent with donning, doffing and care of compression garments for longterm edema management for maintenance   Rationale to maximize safety and independence with self cares   Target Date 05/29/24   Date Met 05/03/24   PT Goal 3   Goal Identifier ltg   Goal Description pt to be independent with longterm BLE edema management via HEP, elevation, skin cares and compression garment wear/use   Rationale to maximize safety and independence with self cares   Target Date 07/16/24   PT Goal 4   Goal Identifier ltg   Goal Description pt to have 100% closure of all wounds on LLEL to decrease risk of repetitive cellulitis   Rationale to maximize safety and independence with self cares;to maximize safety and independence with performance of ADLs and functional tasks   Target Date 05/29/24   Date Met 04/17/24   PT Goal 5   Goal Identifier ltg   Goal Description pt to have at least 5 point improvement on LLIS due to decreased edema and associated symptoms in BLEs   Rationale to maximize safety and independence with performance of ADLs and functional tasks   Target Date 07/16/24    Subjective Report   Subjective Report I don't like the R leg wrap it never stays up   Objective Measures   Objective Measures Objective Measure 1   Objective Measure 1   Objective Measure girth   Details R LE +10.7%, L LE +8.5%   Treatment Interventions (PT)   Interventions Manual Therapy   Manual Therapy   Manual Therapy: Mobilization, MFR, MLD, friction massage minutes ([id number]) 55   Manual Therapy 1 Circaid reduction kit lower leg Wide/regular, ordering from [facility]   Manual Therapy 1 - Details pt arrived with B LE compression wraps on B LE except he did not have liners on under wraps and wraps were down to ankles. therapist removed and B LE girth measurements taken, B LE's cleansed and removed min dry skin from previous wound areas, Poriasis ointments to dry patches on B LE's and Sween 24, therapist donned R LE: Spandagrip H ankle to knee, compression wrap and added x2 pieces of Velfoam at the top to extend wrap and hold at top of leg vs slidding down leg, L LE Spandagrip G ankle to knee, velcro wrap. Ed: doing good skin care to reduce infections or wounds, pt at a higher risk for infections due to previous hospital stay,ordering more liners, a secure strap for the R LE since it did not come with the garment, difference between using the silver liner vs the beige, therapist recommends pt purchase the silver liners due to high risk for infections, issued h/o's for ordering these products. pt may reduce appt's to 1xwk   Skilled Intervention CDT; skin and wound cares, garment education   Patient Response/Progress pt happy to reduce appt's   Education   Learner/Method Patient;Listening;Demonstration;No Barriers to Learning;Pictures/Video   Plan   Home program B LE garments daytime, off at night, skin care daily   Plan for next session B LE girth measurements, address any questions or concerns about home program/garments, modify as needed   Total Session Time   Timed Code Treatment Minutes 55   Total Treatment Time (sum  of timed and untimed services) 55

## 2024-06-23 ENCOUNTER — HEALTH MAINTENANCE LETTER (OUTPATIENT)
Age: 39
End: 2024-06-23

## 2024-07-01 PROBLEM — I89.0 LYMPHEDEMA: Status: RESOLVED | Noted: 2024-01-15 | Resolved: 2024-07-01

## 2024-07-01 NOTE — PROGRESS NOTES
DISCHARGE  Reason for Discharge: Patient has failed to schedule further appointments.  Pt last seen in clinic on 5/3/24 and then stopped coming and cancelled all appts, despite being called from clinic for a follow-up. Pt will be discharged at this time.    Equipment Issued:     Discharge Plan: Patient to continue home program.    Referring Provider:  Fortino Monterroso MD           05/03/24 0500   Appointment Info   Signing clinician's name / credentials Olivia Pisano PTA/CLT   Visits Used 42 BLE/Svee/Blue Plus MA   Medical Diagnosis morbid obesity and edema   PT Tx Diagnosis BLE stage 3 lymphedema / phlebolymphedema with LLE wounds   Precautions/Limitations cane into clinic; monitor LLE for worsening redness   Other pertinent information Pt will see both OP lymph clinic and [facility]OC until the week of Jan 8th when pt can consistently be seen at OP lymph clinic 3x/week   Progress Note/Certification   Start of Care Date 12/12/23   Onset of illness/injury or Date of Surgery 12/01/23  (date of referral)   Therapy Frequency other  (hold due to recieving garments and to return in June to primary therapist to assess tolerance to current program)   Predicted Duration x 8 weeks   Certification date from 05/21/24   Certification date to 07/16/24   Progress Note Due Date 06/05/24   Progress Note Completed Date 05/05/24   Supervision   PT Assistant Visit Number 6       Present No   GOALS   PT Goals 2;3;4;5;6   PT Goal 1   Goal Identifier stg   Goal Description pt to have around the clock tolerance to BLE GCB for edema reduction and wound healing response   Rationale to maximize safety and independence with self cares   Target Date 01/11/24   Date Met 01/02/24  (Met for LLE; pt unable to tolerate GCB on RLE due to driving difficulty (using Spandagrip K instead w/good tolerance))   PT Goal 2   Goal Identifier ltg   Goal Description once appropriate, pt and/or family to be independent with donning, doffing  and care of compression garments for longterm edema management for maintenance   Rationale to maximize safety and independence with self cares   Target Date 05/29/24   Date Met 05/03/24   PT Goal 3   Goal Identifier ltg   Goal Description pt to be independent with longterm BLE edema management via HEP, elevation, skin cares and compression garment wear/use   Rationale to maximize safety and independence with self cares   Target Date 07/16/24   PT Goal 4   Goal Identifier ltg   Goal Description pt to have 100% closure of all wounds on LLEL to decrease risk of repetitive cellulitis   Rationale to maximize safety and independence with self cares;to maximize safety and independence with performance of ADLs and functional tasks   Target Date 05/29/24   Date Met 04/17/24   PT Goal 5   Goal Identifier ltg   Goal Description pt to have at least 5 point improvement on LLIS due to decreased edema and associated symptoms in BLEs   Rationale to maximize safety and independence with performance of ADLs and functional tasks   Target Date 07/16/24   Subjective Report   Subjective Report I don't like the R leg wrap it never stays up   Objective Measures   Objective Measures Objective Measure 1   Objective Measure 1   Objective Measure girth   Details R LE +10.7%, L LE +8.5%   Treatment Interventions (PT)   Interventions Manual Therapy   Manual Therapy   Manual Therapy: Mobilization, MFR, MLD, friction massage minutes ([id number]) 55   Manual Therapy 1 Circaid reduction kit lower leg Wide/regular, ordering from [facility]   Manual Therapy 1 - Details pt arrived with B LE compression wraps on B LE except he did not have liners on under wraps and wraps were down to ankles. therapist removed and B LE girth measurements taken, B LE's cleansed and removed min dry skin from previous wound areas, Poriasis ointments to dry patches on B LE's and Sween 24, therapist donned R LE: Spandagrip H ankle to knee, compression wrap and added x2 pieces of Velfoam  at the top to extend wrap and hold at top of leg vs slidding down leg, L LE Spandagrip G ankle to knee, velcro wrap. Ed: doing good skin care to reduce infections or wounds, pt at a higher risk for infections due to previous hospital stay,ordering more liners, a secure strap for the R LE since it did not come with the garment, difference between using the silver liner vs the beige, therapist recommends pt purchase the silver liners due to high risk for infections, issued h/o's for ordering these products. pt may reduce appt's to 1xwk   Skilled Intervention CDT; skin and wound cares, garment education   Patient Response/Progress pt happy to reduce appt's   Education   Learner/Method Patient;Listening;Demonstration;No Barriers to Learning;Pictures/Video   Plan   Home program B LE garments daytime, off at night, skin care daily   Plan for next session B LE girth measurements, address any questions or concerns about home program/garments, modify as needed   Total Session Time   Timed Code Treatment Minutes 55   Total Treatment Time (sum of timed and untimed services) 55

## 2024-08-01 ENCOUNTER — TELEPHONE (OUTPATIENT)
Dept: FAMILY MEDICINE | Facility: CLINIC | Age: 39
End: 2024-08-01

## 2024-08-01 ENCOUNTER — OFFICE VISIT (OUTPATIENT)
Dept: URGENT CARE | Facility: URGENT CARE | Age: 39
End: 2024-08-01

## 2024-08-01 VITALS
DIASTOLIC BLOOD PRESSURE: 89 MMHG | RESPIRATION RATE: 20 BRPM | HEART RATE: 98 BPM | OXYGEN SATURATION: 97 % | TEMPERATURE: 99.9 F | SYSTOLIC BLOOD PRESSURE: 149 MMHG

## 2024-08-01 DIAGNOSIS — I89.0 LYMPHEDEMA: ICD-10-CM

## 2024-08-01 DIAGNOSIS — L03.115 CELLULITIS OF RIGHT LOWER EXTREMITY: Primary | ICD-10-CM

## 2024-08-01 PROCEDURE — 96372 THER/PROPH/DIAG INJ SC/IM: CPT

## 2024-08-01 PROCEDURE — 99214 OFFICE O/P EST MOD 30 MIN: CPT | Mod: 25

## 2024-08-01 RX ORDER — CEFTRIAXONE SODIUM 1 G
1 VIAL (EA) INJECTION ONCE
Status: COMPLETED | OUTPATIENT
Start: 2024-08-01 | End: 2024-08-01

## 2024-08-01 RX ORDER — CEPHALEXIN 500 MG/1
500 CAPSULE ORAL 3 TIMES DAILY
Qty: 21 CAPSULE | Refills: 0 | Status: SHIPPED | OUTPATIENT
Start: 2024-08-01 | End: 2024-08-08

## 2024-08-01 RX ADMIN — Medication 1 G: at 14:39

## 2024-08-01 NOTE — TELEPHONE ENCOUNTER
Kat,    Please advise.    Patient calling stating he has cellulitis in his lower leg. Patient requesting antibiotic. Refusing to complete an office visit/go to Urgent Care/Emergency room as he does not have insurance.    Advised patient of complications of untreated cellulitis. Patient states he has been hospitalized for cellulitis in the past, still refusing to seek care and requesting antibiotic.    Thank you  YAAKOV Ryder [facility]

## 2024-08-01 NOTE — TELEPHONE ENCOUNTER
Patient needs to be seen in clinic, urgent care or ER for treatment.  This needs to be evaluated and rule out any additional complication including blood clot or anything else going on.  Could be appropriate for ADS if patient is agreeable.    Thanks,  Kat Wiggins, DNP, APRN-CNP

## 2024-08-01 NOTE — TELEPHONE ENCOUNTER
Patient currently in Urgent Care for evaluation. Requesting pain medication for pain. Advised patient to discuss with Urgent Care provider.    Catalina Torres RN on 8/1/2024 at 1:08 PM

## 2024-08-01 NOTE — PATIENT INSTRUCTIONS
Diagnosis: Cellulitis _ skin infection   Today we did:  - IM antibiotics   Worried about sepsis    If no improvement in 1-2 days need to go into the ED for IV antibiotics     Plan:   Antibiotics prescription today   Ibuprofen or tylenol for pain   Keep area clean and dry      Monitor for:   Symptoms are not getting better and are getting worse  Drainage   Red streaks: infection in the blood   Symptoms not improving   Severe headache            Cellulitis   Cellulitis is an infection of the skin (rash) and underlying tissue caused by streptococcal, staphylococcal, or other bacteria.   Cellulitis can be caused by different types of bacteria. Bacteria enter the body through a cut or sore.   Poisons made by the bacteria destroy skin cells. The infection spreads over the area within a day or two and can affect tissues below the skin.  Cellulitis most often occurs on the face, arms, or legs, but it can happen anywhere.   Symptoms of cellulitis may include:  Redness, swelling, extreme tenderness or pain   skin that feels hot to the touch, red streaks from the wound or sore, pus-filled sores (abscesses) swollen and tender lymph glands, fever.

## 2024-08-01 NOTE — PROGRESS NOTES
URGENT CARE  Assessment & Plan   Assessment:   Ge Hansen is a 39 year old male who's clinical presentation today is consistent with:   1. Cellulitis of right lower extremity- recurrent   2. Lymphedema - chronic   - cefTRIAXone (ROCEPHIN) in lidocaine 1% (PF) for IM administration 1 g  - cephALEXin (KEFLEX) 500 MG capsule;   Plan:  Spoke with patient that I am worried about sepsis, given his cellulitis and fever; additionally the last time he was seen for this a year ago he was admitted for the same thing needing IV antibiotics, discussed with patient I think he needs to present to the emergency department for higher level of care and IV antibiotics today, patient refused stating he is not going to the ER or the hospital, patient states he had a horrible experience last time and additionally he does not have insurance and is self-pay, patient is refusing to go to the ER today and would like a more conservative treatment approach.    Thus through shared decision making with patient and provider we decided to treat cellulitis with an IM injection of Rocephin followed by Oral outpatient antibiotics at home however discussed with patient if he does not see any improvement in the next 1 to 2 days he needs to follow-up in the ED for IV antibiotics, sooner if symptoms worsen, return precautions given, margins were outlined, I reiterated the importance of close monitoring with the patient multiple times, he states an understanding of risks of outpatient treatment   No alarm signs or symptoms present   Differential Diagnoses for this patient's chief complaint that I considered include:  Erysipelas, abscess, sepsis, Atopic dermatitis, allergic Dermatitis, contact dermatitis, Insect bite/sting, drug reaction,  Necrotizing fasciitis, thrombophlebitis, DVT     30 minutes spent by me (on the date of the encounter) doing chart review, history, physical exam, documentation, diagnostic testing, education/counseling and further  activities per the note   Patient is} agreeable to treatment plan and state they will follow-up if symptoms do not improve and/or if symptoms worsen   see patient's AVS 'monitor for' section for specific patient instructions given and discussed regarding what to watch for and when to follow up    SONIA [facility]      ______________________________________________________________________      Subjective     HPI: Ge Hansen  is a 39 year old  male who presents today for evaluation the following concerns:   Patient presents today and reports a rash noted to the right lower leg , which started 2  days ago, patient endorses a hx of lymphedema and has had cellulitis in the past   EMR shows he was admitted on 11/26/23 for cellulitis of left leg needing IV antibiotics  Patient states that he is not going to the ED or hospital for his again, endorsing he had a bad experience and he does not have insurance   Patient endorses the rash is not  pruritic, but is slightly} painful   Patient states the rash is: red} hot and  edematous    Denies any weeping, drainage or pus, denies any abscess  Patient states the rash began suddenly, patient states the rash has a demarcated area and is targeted. Patient states it happened after he used a back scratcher to scratch his leg and broke a spot open   Patient endorses a fever, but states he has had it for 5 days, stating he had it prior to the cellulitis, patient states he thinks the fever is unrelated to his skin infection, stating his co-worker had a viral URI and that is what his fever is from     Review of Systems:  Pertinent review of systems as reflected in HPI, otherwise negative.     Objective    Physical Exam:  Vitals:    08/01/24 1349   BP: (!) 149/89   Pulse: 98   Resp: 20   Temp: 99.9  F (37.7  C)   TempSrc: Tympanic   SpO2: 97%      General alert and oriented, no acute distress, non ill-appearing   Vital signs reviewed:  afebrile and normotensive    SKIN:   Right leg:   noted macular erythema with distinct borders, warmth/ heat noted (warmer than body temp)  tenderness to palpation , and slight edema noted.  No abscesses seen, no fluctuance noted, no drainage appreciated, no bullae or  vesicles. See image below          ______________________________________________________________________    I explained my diagnostic considerations and recommendations to the patient, who voiced understanding and agreement with the treatment plan.   All questions were answered.   We discussed potential side effects, risks and benefits of any prescribed or recommended therapies, as well as expectations for response to treatments.  Please see AVS for any patient instructions & handouts given.   Patient was advised to contact the Nurse Care Line, their Primary Care provider, Urgent Care, or the Emergency Department if there are new or worsening symptoms, or call 911 for emergencies.

## 2024-09-12 ENCOUNTER — TELEPHONE (OUTPATIENT)
Dept: FAMILY MEDICINE | Facility: CLINIC | Age: 39
End: 2024-09-12

## 2024-09-12 NOTE — TELEPHONE ENCOUNTER
Called pt no answer. Left message encouraging pt to come to today's appt, go to ER, or make another appt.    Alisson Ordonez, CMA

## 2025-06-17 ENCOUNTER — APPOINTMENT (OUTPATIENT)
Dept: ULTRASOUND IMAGING | Facility: CLINIC | Age: 40
End: 2025-06-17
Attending: FAMILY MEDICINE

## 2025-06-17 ENCOUNTER — HOSPITAL ENCOUNTER (EMERGENCY)
Facility: CLINIC | Age: 40
Discharge: HOME | End: 2025-06-17
Attending: FAMILY MEDICINE | Admitting: FAMILY MEDICINE

## 2025-06-17 VITALS
TEMPERATURE: 98 F | OXYGEN SATURATION: 93 % | BODY MASS INDEX: 41.75 KG/M2 | HEIGHT: 73 IN | HEART RATE: 100 BPM | WEIGHT: 315 LBS | SYSTOLIC BLOOD PRESSURE: 152 MMHG | RESPIRATION RATE: 20 BRPM | DIASTOLIC BLOOD PRESSURE: 75 MMHG

## 2025-06-17 DIAGNOSIS — E66.2 OBESITY HYPOVENTILATION SYNDROME (H): ICD-10-CM

## 2025-06-17 DIAGNOSIS — S30.22XA CONTUSION OF SCROTUM, INITIAL ENCOUNTER: ICD-10-CM

## 2025-06-17 DIAGNOSIS — E66.01 MORBID OBESITY (H): Chronic | ICD-10-CM

## 2025-06-17 PROCEDURE — 99284 EMERGENCY DEPT VISIT MOD MDM: CPT | Mod: 25 | Performed by: FAMILY MEDICINE

## 2025-06-17 PROCEDURE — 250N000013 HC RX MED GY IP 250 OP 250 PS 637: Performed by: FAMILY MEDICINE

## 2025-06-17 PROCEDURE — 99284 EMERGENCY DEPT VISIT MOD MDM: CPT | Performed by: FAMILY MEDICINE

## 2025-06-17 PROCEDURE — 93976 VASCULAR STUDY: CPT

## 2025-06-17 RX ORDER — ACETAMINOPHEN 500 MG
1000 TABLET ORAL ONCE
Status: COMPLETED | OUTPATIENT
Start: 2025-06-17 | End: 2025-06-17

## 2025-06-17 RX ADMIN — ACETAMINOPHEN 1000 MG: 500 TABLET, FILM COATED ORAL at 13:31

## 2025-06-17 ASSESSMENT — COLUMBIA-SUICIDE SEVERITY RATING SCALE - C-SSRS
6. HAVE YOU EVER DONE ANYTHING, STARTED TO DO ANYTHING, OR PREPARED TO DO ANYTHING TO END YOUR LIFE?: NO
1. IN THE PAST MONTH, HAVE YOU WISHED YOU WERE DEAD OR WISHED YOU COULD GO TO SLEEP AND NOT WAKE UP?: NO
2. HAVE YOU ACTUALLY HAD ANY THOUGHTS OF KILLING YOURSELF IN THE PAST MONTH?: NO

## 2025-06-17 ASSESSMENT — ACTIVITIES OF DAILY LIVING (ADL)
ADLS_ACUITY_SCORE: 58
ADLS_ACUITY_SCORE: 58

## 2025-06-17 NOTE — ED PROVIDER NOTES
"  HPI   Patient is a 39-year-old male presenting with scrotal pain and swelling after a fall.  Per my chart review, the patient is morbidly obese with obstructive sleep apnea.  He has BiPAP available to him at home.  He uses this on a regular basis while sleeping.  However, recently he has had nasal congestion which is limited his BiPAP use to intermittent through the night.  He does smoke tobacco and has asthma.  He uses albuterol occasionally.  He has psoriasis.    The patient was in the stand-up shower today before work when he slipped and fell.  As he fell he landed onto his buttock and scrotum.  He says, \"my scrotum sits between my legs at baseline.  When I fell and landed on it.\"  He has had severe pain since the fall.  His pain is localized to the scrotum.  He has swelling and tenderness.  He denies open sores or bleeding.  He is able to ambulate but moving his legs elicits pain because his scrotum is causing him pain.  He denies hip or pelvis pain.  He denies new abdominal pain.  He does have some more discomfort involving his right low back compared to baseline.  No radiating pain into the buttocks or down the leg.  No incontinence or new difficulty with bowel or bladder.  No other injuries reported.      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      Anemia 12/01/2023     Priority: Medium    Cellulitis of left lower extremity 11/26/2023     Priority: Medium    Insomnia, unspecified type 01/26/2021     Priority: Medium    Morbid obesity (H) BMI 68.34 09/13/2018     Priority: Medium    Elevated glucose 09/13/2018     Priority: Medium    Obesity hypoventilation syndrome (H) 09/13/2018     Priority: Medium     Some asthma hx too.        Tobacco use 09/13/2018     Priority: Medium    MAYDA (obstructive sleep apnea) 09/13/2018     Priority: " Medium     Study Date: 10/3/2018( 520.0 lbs).  The combined apnea/hypopnea index was 134.8 events per hour (central apnea/hypopnea index was 4.1 events per hour). The REM AHI was - events per hour. The supine AHI was 128.9 events per hour. The RERA index was - events per hour. The RDI was 134.8 events per hour. Transcutaneous carbon dioxide monitoring was used, and significant hypoventilation was present with a maximum change from ~11 mmHg and 46.0 minutes at or greater than 55 mmHg. VBG was consistent with chronic compensated respiratory acidosis (pH 7.36, pCO2 62, HCO3 35). Baseline oxygen saturation was 89.4%. Lowest oxygen saturation was 52.0%. Time spent less than or equal to 88% was 59.5 minutes. Time spent less than or equal to 89% was 65.5 minutes. The final pressure was BiLevel 23/13/0 cmH2O with an AHI of 1.7 events per hour. Time in REM supine on final pressure was 49.0 minutes.         Asthma 06/01/2006     Priority: Medium    Psoriasis 12/01/2023     Priority: Low      Past Medical History:    Past Medical History:   Diagnosis Date    ADHD     Asthma     Lactose intolerance     Obesity hypoventilation syndrome (H)     Plantar fasciitis 01/15/2021    Sleep apnea     Tobacco use      Past Surgical History:    Past Surgical History:   Procedure Laterality Date    ROTATOR CUFF REPAIR RT/LT       Family History:    Family History   Problem Relation Age of Onset    Obesity Sister     Depression Sister     Anxiety Disorder Sister     Mental Illness Sister     Diabetes Father         Lost a leg    Hypertension Father     Hypertension Mother     Mental Illness Mother     Hypertension Maternal Grandmother     Cerebrovascular Disease Maternal Grandmother     Osteoporosis Sister      Social History:  Marital Status:  Single [1]  Social History     Tobacco Use    Smoking status: Every Day     Current packs/day: 1.00     Average packs/day: 1 pack/day for 27.9 years (27.9 ttl pk-yrs)     Types: Cigarettes     Start  "date: 7/7/1997    Smokeless tobacco: Current     Types: Chew, Snuff    Tobacco comments:     Vaping    Substance Use Topics    Alcohol use: Yes    Drug use: No      Medications:    acetaminophen (TYLENOL) 325 MG tablet  albuterol (PROAIR HFA/PROVENTIL HFA/VENTOLIN HFA) 108 (90 Base) MCG/ACT inhaler  calcipotriene (DOVONOX) 0.005 % external cream  clobetasol (TEMOVATE) 0.05 % external cream  furosemide (LASIX) 20 MG tablet  ibuprofen (ADVIL/MOTRIN) 200 MG tablet  Probiotic Product (FLORAJEN DIGESTION) CAPS  triamcinolone (KENALOG) 0.5 % external ointment      Review of Systems   All other systems reviewed and are negative.      PE   BP: (!) 152/75  Pulse: 100  Temp: 98  F (36.7  C)  Resp: 20  Height: 185.4 cm (6' 1\")  Weight: (!) 226.8 kg (500 lb)  SpO2: (!) 85 %  Physical Exam  Vitals and nursing note reviewed.   Constitutional:       Appearance: He is obese.      Comments: He is able to tolerate taking his pants off on his own.  He is cooperative and answering questions well   HENT:      Head: Atraumatic.      Right Ear: External ear normal.      Left Ear: External ear normal.      Nose: Nose normal.      Mouth/Throat:      Mouth: Mucous membranes are moist.      Pharynx: Oropharynx is clear.   Eyes:      General: No scleral icterus.     Extraocular Movements: Extraocular movements intact.      Conjunctiva/sclera: Conjunctivae normal.      Pupils: Pupils are equal, round, and reactive to light.   Cardiovascular:      Rate and Rhythm: Normal rate.   Pulmonary:      Effort: Pulmonary effort is normal. No respiratory distress.   Genitourinary:     Comments: His scrotum is swollen on the left more so than the right.  His testicles are boggy and little bit hard to define.  He has tenderness throughout.  No scrotal swelling, open sores, redness.  Penis not visualized.  Musculoskeletal:         General: Normal range of motion.      Cervical back: Normal range of motion.      Comments: Not tender to palpation or major " muscles, joints, and long bones.  No midline cervical, thoracic, or lumbar spinous process tenderness.   Skin:     General: Skin is warm and dry.   Neurological:      Mental Status: He is oriented to person, place, and time.   Psychiatric:         Behavior: Behavior normal.         ED COURSE and MDM   1342.  Patient with scrotal pain, tenderness, swelling.  Ultrasound pending.  Tylenol ordered.  Respiratory therapy consulted, qualifying for home oxygen with hypoventilation of obesity.  He notes that his nasal congestion has been worsened over the past week, as above.  He denies any wheezing or chest congestion.  No fever.  No new wheezing.  When asked if he would be here for breathing related problems had he not fallen, the patient shook his head no and laughed.  He denies any difficulty with breathing compared to his baseline.  Portable chest x-ray pending.    Patient has been assessed for Home Oxygen needs. Oxygen readings:    *Pulse oximetry (SpO2) = 85% on room air at rest while awake.    *SpO2 improved to 85-95% on 2 liters/minute at rest.    Oxygen Documentation  I certify that this patient, Ge Hansen has been under my care (or a nurse practitioner or physican's assistant working with me). This is the face-to-face encounter for oxygen medical necessity.      At the time of this encounter, I have reviewed the qualifying testing and have determined that supplemental oxygen is reasonable and necessary and is expected to improve the patient's condition in a home setting.         Patient has continued oxygen desaturation due to hypoventilation of obesity.    If portability is ordered, is the patient mobile within the home? yes    Was this visit performed as a telehealth visit: NO      Electronic medical chart reviewed, including medical problems, medications, medical allergies, social history.  Recent hospitalizations and surgical procedures reviewed.  Recent clinic visits and consultations reviewed.  Recent  labs and test results reviewed.  Nursing notes reviewed.    The patient, their parent if applicable, and/or their medical decision maker(s) and I have reviewed all of the available historical information, applicable PMH, physical exam findings, and objective diagnostic data gathered during this ED visit.  We then discussed all work-up options and then together agreed upon the course taken during this visit.  The ultimate disposition and plan was a cooperative decision made between myself and the patient, their parent if applicable, and/or their legal decision maker(s).  The risks and benefits of all decisions made during this visit were discussed to the best of my abilities given the circumstances, and all parties are understanding of the pertinent ramifications of these decisions.      LABS  Labs Ordered and Resulted from Time of ED Arrival to Time of ED Departure - No data to display    IMAGING  Images reviewed by me.  Radiology report also reviewed.  US Testicular & Scrotum w Doppler Ltd   Final Result   IMPRESSION:   1.  Generalized scrotal thickening without focal fluid collection.   2.  Small right hydrocele.   3.  Bilateral inguinal hernias.             Procedures    Medications   acetaminophen (TYLENOL) tablet 1,000 mg (1,000 mg Oral $Given 6/17/25 1333)         IMPRESSION       ICD-10-CM    1. Morbid obesity (H) BMI 68.34  E66.01 Home Oxygen Order for DME - ONLY FOR DME     ED To Primary Care Referral      2. Contusion of scrotum, initial encounter  S30.22XA ED To Primary Care Referral      3. Obesity hypoventilation syndrome (H)  E66.2 ED To Primary Care Referral               Medication List      There are no discharge medications for this visit.                             Cedric Vásquez MD  06/17/25 4209

## 2025-06-17 NOTE — ED TRIAGE NOTES
Pt reports he fell in the shower today, bilateral testicle pain. States testicles are swollen to twice normal size.      Triage Assessment (Adult)       Row Name 06/17/25 1301          Triage Assessment    Airway WDL WDL        Cardiac WDL    Cardiac WDL WDL        Cognitive/Neuro/Behavioral WDL    Cognitive/Neuro/Behavioral WDL WDL

## 2025-06-17 NOTE — PROGRESS NOTES
Home Oxygen Assessment Tools  Patient's 02 sat on RA at rest 85% for10 minutes. Patient is on 2LPM/%  (NC) Sp02 93 %, after 10 minutes of standing on the side of the bed. If patient's Sp02 at rest is less than 88%, then oxygen may be needed and must monitor patient's Sp02 with activity.  Pt unable to walk at this time due his weight 500 lbs and bilateral testicle swelling with extreme pain from a fall at home.

## 2025-06-17 NOTE — DISCHARGE INSTRUCTIONS
RETURN TO THE EMERGENCY ROOM FOR THE FOLLOWING:    Severely worsened pain, worsened pain and fever greater than 101, or at anytime for any concern.    FOLLOW UP:    Consider follow-up with your primary clinic for further discussion regarding hypoxemia related to hypoventilation and obesity.  Referral order placed at the time of discharge, expect a phone call within the next few days to help with scheduling.    TREATMENT RECOMMENDATIONS:    Tylenol as needed for comfort.    Oxygen qualification performed in the ED.  Home oxygen for O2 saturation less than 88%.    NURSE ADVICE LINE:  [phone] or [phone]

## 2025-07-12 ENCOUNTER — HEALTH MAINTENANCE LETTER (OUTPATIENT)
Age: 40
End: 2025-07-12

## 2025-07-31 ENCOUNTER — HOSPITAL ENCOUNTER (INPATIENT)
Facility: CLINIC | Age: 40
End: 2025-07-31
Attending: EMERGENCY MEDICINE | Admitting: STUDENT IN AN ORGANIZED HEALTH CARE EDUCATION/TRAINING PROGRAM

## 2025-07-31 ENCOUNTER — APPOINTMENT (OUTPATIENT)
Dept: CT IMAGING | Facility: CLINIC | Age: 40
DRG: 291 | End: 2025-07-31
Attending: EMERGENCY MEDICINE

## 2025-07-31 ENCOUNTER — APPOINTMENT (OUTPATIENT)
Dept: ULTRASOUND IMAGING | Facility: CLINIC | Age: 40
DRG: 291 | End: 2025-07-31
Attending: EMERGENCY MEDICINE

## 2025-07-31 VITALS
HEIGHT: 73 IN | OXYGEN SATURATION: 96 % | TEMPERATURE: 98.3 F | BODY MASS INDEX: 41.75 KG/M2 | SYSTOLIC BLOOD PRESSURE: 127 MMHG | DIASTOLIC BLOOD PRESSURE: 78 MMHG | RESPIRATION RATE: 18 BRPM | HEART RATE: 94 BPM | WEIGHT: 315 LBS

## 2025-07-31 DIAGNOSIS — J96.01 ACUTE RESPIRATORY FAILURE WITH HYPOXIA (H): Primary | ICD-10-CM

## 2025-07-31 DIAGNOSIS — E66.2 OBESITY HYPOVENTILATION SYNDROME (H): Chronic | ICD-10-CM

## 2025-07-31 DIAGNOSIS — N50.89 SCROTAL SWELLING: ICD-10-CM

## 2025-07-31 DIAGNOSIS — G47.33 OSA (OBSTRUCTIVE SLEEP APNEA): Chronic | ICD-10-CM

## 2025-07-31 DIAGNOSIS — I50.9 ACUTE HEART FAILURE, UNSPECIFIED HEART FAILURE TYPE (H): ICD-10-CM

## 2025-07-31 DIAGNOSIS — I89.0 LYMPHEDEMA: ICD-10-CM

## 2025-07-31 DIAGNOSIS — E66.01 MORBID OBESITY (H): Chronic | ICD-10-CM

## 2025-07-31 DIAGNOSIS — I50.811 ACUTE RIGHT-SIDED HEART FAILURE (H): ICD-10-CM

## 2025-07-31 DIAGNOSIS — I50.9 CONGESTIVE HEART FAILURE, UNSPECIFIED HF CHRONICITY, UNSPECIFIED HEART FAILURE TYPE (H): ICD-10-CM

## 2025-07-31 DIAGNOSIS — R60.1 ANASARCA: ICD-10-CM

## 2025-07-31 DIAGNOSIS — I27.20 PULMONARY HYPERTENSION (H): ICD-10-CM

## 2025-07-31 PROBLEM — N43.3 BILATERAL HYDROCELE: Status: ACTIVE | Noted: 2025-07-31

## 2025-07-31 PROBLEM — L40.9 PSORIASIS: Chronic | Status: ACTIVE | Noted: 2023-12-01

## 2025-07-31 LAB
ALBUMIN SERPL BCG-MCNC: 3.6 G/DL (ref 3.5–5.2)
ALP SERPL-CCNC: 68 U/L (ref 40–150)
ALT SERPL W P-5'-P-CCNC: 49 U/L (ref 0–70)
ANION GAP SERPL CALCULATED.3IONS-SCNC: 7 MMOL/L (ref 7–15)
AST SERPL W P-5'-P-CCNC: 51 U/L (ref 0–45)
BASE EXCESS BLDV CALC-SCNC: 7.9 MMOL/L (ref -3–3)
BASOPHILS # BLD AUTO: 0 10E3/UL (ref 0–0.2)
BASOPHILS NFR BLD AUTO: 0 %
BILIRUB DIRECT SERPL-MCNC: 0.35 MG/DL (ref 0–0.3)
BILIRUB SERPL-MCNC: 0.9 MG/DL
BUN SERPL-MCNC: 13.7 MG/DL (ref 6–20)
CALCIUM SERPL-MCNC: 8.1 MG/DL (ref 8.8–10.4)
CHLORIDE SERPL-SCNC: 97 MMOL/L (ref 98–107)
CREAT SERPL-MCNC: 0.89 MG/DL (ref 0.67–1.17)
EGFRCR SERPLBLD CKD-EPI 2021: >90 ML/MIN/1.73M2
EOSINOPHIL # BLD AUTO: 0.1 10E3/UL (ref 0–0.7)
EOSINOPHIL NFR BLD AUTO: 2 %
ERYTHROCYTE [DISTWIDTH] IN BLOOD BY AUTOMATED COUNT: 15.3 % (ref 10–15)
GLUCOSE SERPL-MCNC: 105 MG/DL (ref 70–99)
HCO3 BLDV-SCNC: 37 MMOL/L (ref 21–28)
HCO3 SERPL-SCNC: 31 MMOL/L (ref 22–29)
HCT VFR BLD AUTO: 43.2 % (ref 40–53)
HGB BLD-MCNC: 12.5 G/DL (ref 13.3–17.7)
HOLD SPECIMEN: NORMAL
IMM GRANULOCYTES # BLD: 0.1 10E3/UL
IMM GRANULOCYTES NFR BLD: 1 %
LYMPHOCYTES # BLD AUTO: 1.9 10E3/UL (ref 0.8–5.3)
LYMPHOCYTES NFR BLD AUTO: 26 %
MCH RBC QN AUTO: 28 PG (ref 26.5–33)
MCHC RBC AUTO-ENTMCNC: 28.9 G/DL (ref 31.5–36.5)
MCV RBC AUTO: 97 FL (ref 78–100)
MONOCYTES # BLD AUTO: 0.4 10E3/UL (ref 0–1.3)
MONOCYTES NFR BLD AUTO: 6 %
NEUTROPHILS # BLD AUTO: 4.8 10E3/UL (ref 1.6–8.3)
NEUTROPHILS NFR BLD AUTO: 66 %
NRBC # BLD AUTO: 0.1 10E3/UL
NRBC BLD AUTO-RTO: 1 /100
NT-PROBNP SERPL-MCNC: 3696 PG/ML (ref 0–93)
O2/TOTAL GAS SETTING VFR VENT: 50 %
OXYHGB MFR BLDV: 88 % (ref 70–75)
PCO2 BLDV: 72 MM HG (ref 40–50)
PH BLDV: 7.32 [PH] (ref 7.32–7.43)
PLATELET # BLD AUTO: 226 10E3/UL (ref 150–450)
PO2 BLDV: 66 MM HG (ref 25–47)
POTASSIUM SERPL-SCNC: 4.5 MMOL/L (ref 3.4–5.3)
PROT SERPL-MCNC: 7.4 G/DL (ref 6.4–8.3)
RBC # BLD AUTO: 4.46 10E6/UL (ref 4.4–5.9)
SAO2 % BLDV: 91.4 % (ref 70–75)
SODIUM SERPL-SCNC: 135 MMOL/L (ref 135–145)
T4 FREE SERPL-MCNC: 0.9 NG/DL (ref 0.9–1.7)
TSH SERPL DL<=0.005 MIU/L-ACNC: 4.5 UIU/ML (ref 0.3–4.2)
WBC # BLD AUTO: 7.4 10E3/UL (ref 4–11)

## 2025-07-31 PROCEDURE — 85025 COMPLETE CBC W/AUTO DIFF WBC: CPT | Performed by: EMERGENCY MEDICINE

## 2025-07-31 PROCEDURE — 71275 CT ANGIOGRAPHY CHEST: CPT

## 2025-07-31 PROCEDURE — 36415 COLL VENOUS BLD VENIPUNCTURE: CPT | Performed by: EMERGENCY MEDICINE

## 2025-07-31 PROCEDURE — 84443 ASSAY THYROID STIM HORMONE: CPT | Performed by: EMERGENCY MEDICINE

## 2025-07-31 PROCEDURE — 999N000157 HC STATISTIC RCP TIME EA 10 MIN

## 2025-07-31 PROCEDURE — 82248 BILIRUBIN DIRECT: CPT | Performed by: EMERGENCY MEDICINE

## 2025-07-31 PROCEDURE — 84439 ASSAY OF FREE THYROXINE: CPT | Performed by: EMERGENCY MEDICINE

## 2025-07-31 PROCEDURE — 5A09357 ASSISTANCE WITH RESPIRATORY VENTILATION, LESS THAN 24 CONSECUTIVE HOURS, CONTINUOUS POSITIVE AIRWAY PRESSURE: ICD-10-PCS | Performed by: PHYSICIAN ASSISTANT

## 2025-07-31 PROCEDURE — 250N000011 HC RX IP 250 OP 636: Performed by: EMERGENCY MEDICINE

## 2025-07-31 PROCEDURE — 83880 ASSAY OF NATRIURETIC PEPTIDE: CPT | Performed by: EMERGENCY MEDICINE

## 2025-07-31 PROCEDURE — 93976 VASCULAR STUDY: CPT

## 2025-07-31 PROCEDURE — 82805 BLOOD GASES W/O2 SATURATION: CPT | Performed by: EMERGENCY MEDICINE

## 2025-07-31 PROCEDURE — 99285 EMERGENCY DEPT VISIT HI MDM: CPT | Mod: 25 | Performed by: EMERGENCY MEDICINE

## 2025-07-31 PROCEDURE — 82310 ASSAY OF CALCIUM: CPT | Performed by: EMERGENCY MEDICINE

## 2025-07-31 PROCEDURE — 120N000013 HC R&B IMCU

## 2025-07-31 PROCEDURE — 96374 THER/PROPH/DIAG INJ IV PUSH: CPT | Mod: 59

## 2025-07-31 PROCEDURE — 99223 1ST HOSP IP/OBS HIGH 75: CPT | Performed by: PHYSICIAN ASSISTANT

## 2025-07-31 PROCEDURE — 250N000009 HC RX 250: Performed by: EMERGENCY MEDICINE

## 2025-07-31 RX ORDER — IOPAMIDOL 755 MG/ML
150 INJECTION, SOLUTION INTRAVASCULAR ONCE
Status: COMPLETED | OUTPATIENT
Start: 2025-07-31 | End: 2025-07-31

## 2025-07-31 RX ORDER — FUROSEMIDE 10 MG/ML
60 INJECTION INTRAMUSCULAR; INTRAVENOUS ONCE
Status: COMPLETED | OUTPATIENT
Start: 2025-07-31 | End: 2025-07-31

## 2025-07-31 RX ORDER — CARBOXYMETHYLCELLULOSE SODIUM 5 MG/ML
1 SOLUTION/ DROPS OPHTHALMIC
Status: ACTIVE | OUTPATIENT
Start: 2025-07-31

## 2025-07-31 RX ORDER — CALCIUM CARBONATE 500 MG/1
1000 TABLET, CHEWABLE ORAL 4 TIMES DAILY PRN
Status: ACTIVE | OUTPATIENT
Start: 2025-07-31

## 2025-07-31 RX ORDER — IOPAMIDOL 755 MG/ML
90 INJECTION, SOLUTION INTRAVASCULAR ONCE
Status: DISCONTINUED | OUTPATIENT
Start: 2025-07-31 | End: 2025-07-31

## 2025-07-31 RX ADMIN — IOPAMIDOL 150 ML: 755 INJECTION, SOLUTION INTRAVENOUS at 21:28

## 2025-07-31 RX ADMIN — FUROSEMIDE 60 MG: 10 INJECTION, SOLUTION INTRAMUSCULAR; INTRAVENOUS at 20:42

## 2025-07-31 RX ADMIN — SODIUM CHLORIDE 100 ML: 9 INJECTION, SOLUTION INTRAVENOUS at 21:29

## 2025-07-31 ASSESSMENT — ACTIVITIES OF DAILY LIVING (ADL)
CHANGE_IN_FUNCTIONAL_STATUS_SINCE_ONSET_OF_CURRENT_ILLNESS/INJURY: YES
EQUIPMENT_CURRENTLY_USED_AT_HOME: CANE, STRAIGHT
DOING_ERRANDS_INDEPENDENTLY_DIFFICULTY: NO
ADLS_ACUITY_SCORE: 58
ADLS_ACUITY_SCORE: 58
DIFFICULTY_COMMUNICATING: NO
WEAR_GLASSES_OR_BLIND: NO
NUMBER_OF_TIMES_PATIENT_HAS_FALLEN_WITHIN_LAST_SIX_MONTHS: 2
ADLS_ACUITY_SCORE: 58
ADLS_ACUITY_SCORE: 58
HEARING_DIFFICULTY_OR_DEAF: NO
TOILETING_ISSUES: NO
DRESSING/BATHING_DIFFICULTY: NO
CONCENTRATING,_REMEMBERING_OR_MAKING_DECISIONS_DIFFICULTY: NO
FALL_HISTORY_WITHIN_LAST_SIX_MONTHS: YES
WALKING_OR_CLIMBING_STAIRS_DIFFICULTY: NO
DIFFICULTY_EATING/SWALLOWING: NO

## 2025-07-31 ASSESSMENT — COLUMBIA-SUICIDE SEVERITY RATING SCALE - C-SSRS
2. HAVE YOU ACTUALLY HAD ANY THOUGHTS OF KILLING YOURSELF IN THE PAST MONTH?: NO
6. HAVE YOU EVER DONE ANYTHING, STARTED TO DO ANYTHING, OR PREPARED TO DO ANYTHING TO END YOUR LIFE?: NO
1. IN THE PAST MONTH, HAVE YOU WISHED YOU WERE DEAD OR WISHED YOU COULD GO TO SLEEP AND NOT WAKE UP?: NO

## 2025-08-01 ENCOUNTER — APPOINTMENT (OUTPATIENT)
Dept: CARDIOLOGY | Facility: CLINIC | Age: 40
DRG: 291 | End: 2025-08-01
Attending: PHYSICIAN ASSISTANT

## 2025-08-01 ENCOUNTER — APPOINTMENT (OUTPATIENT)
Dept: OCCUPATIONAL THERAPY | Facility: CLINIC | Age: 40
DRG: 291 | End: 2025-08-01

## 2025-08-01 PROBLEM — J96.02 ACUTE RESPIRATORY FAILURE WITH HYPOXIA AND HYPERCAPNIA (H): Status: ACTIVE | Noted: 2025-08-01

## 2025-08-01 PROBLEM — I50.810 RIGHT HEART FAILURE (H): Status: ACTIVE | Noted: 2025-08-01

## 2025-08-01 PROBLEM — N43.3 BILATERAL HYDROCELE: Status: RESOLVED | Noted: 2025-07-31 | Resolved: 2025-08-01

## 2025-08-01 PROBLEM — I27.20 PULMONARY HYPERTENSION (H): Status: ACTIVE | Noted: 2025-08-01

## 2025-08-01 PROBLEM — I50.9 ACUTE HEART FAILURE, UNSPECIFIED HEART FAILURE TYPE (H): Status: RESOLVED | Noted: 2025-07-31 | Resolved: 2025-08-01

## 2025-08-01 PROBLEM — G47.33 OSA TREATED WITH BIPAP: Status: ACTIVE | Noted: 2018-09-13

## 2025-08-01 PROBLEM — J96.01 ACUTE RESPIRATORY FAILURE WITH HYPOXIA AND HYPERCAPNIA (H): Status: ACTIVE | Noted: 2025-08-01

## 2025-08-01 LAB
ALBUMIN SERPL BCG-MCNC: 3.5 G/DL (ref 3.5–5.2)
ALLEN'S TEST: YES
ALP SERPL-CCNC: 68 U/L (ref 40–150)
ALT SERPL W P-5'-P-CCNC: 48 U/L (ref 0–70)
ANION GAP SERPL CALCULATED.3IONS-SCNC: 8 MMOL/L (ref 7–15)
AST SERPL W P-5'-P-CCNC: 49 U/L (ref 0–45)
BASE EXCESS BLDA CALC-SCNC: 8.5 MMOL/L (ref -3–3)
BASE EXCESS BLDV CALC-SCNC: 8.2 MMOL/L (ref -3–3)
BILIRUB DIRECT SERPL-MCNC: 0.28 MG/DL (ref 0–0.3)
BILIRUB SERPL-MCNC: 0.6 MG/DL
BUN SERPL-MCNC: 13 MG/DL (ref 6–20)
CALCIUM SERPL-MCNC: 8.2 MG/DL (ref 8.8–10.4)
CHLORIDE SERPL-SCNC: 99 MMOL/L (ref 98–107)
CREAT SERPL-MCNC: 0.96 MG/DL (ref 0.67–1.17)
EGFRCR SERPLBLD CKD-EPI 2021: >90 ML/MIN/1.73M2
ERYTHROCYTE [DISTWIDTH] IN BLOOD BY AUTOMATED COUNT: 15.4 % (ref 10–15)
GLUCOSE SERPL-MCNC: 111 MG/DL (ref 70–99)
HCO3 BLD-SCNC: 39 MMOL/L (ref 21–28)
HCO3 BLDV-SCNC: 39 MMOL/L (ref 21–28)
HCO3 SERPL-SCNC: 33 MMOL/L (ref 22–29)
HCT VFR BLD AUTO: 45.3 % (ref 40–53)
HGB BLD-MCNC: 12.6 G/DL (ref 13.3–17.7)
LVEF ECHO: NORMAL
MCH RBC QN AUTO: 27.7 PG (ref 26.5–33)
MCHC RBC AUTO-ENTMCNC: 27.8 G/DL (ref 31.5–36.5)
MCV RBC AUTO: 100 FL (ref 78–100)
O2/TOTAL GAS SETTING VFR VENT: 0 %
O2/TOTAL GAS SETTING VFR VENT: 58 %
OXYHGB MFR BLDA: 92 % (ref 92–100)
OXYHGB MFR BLDV: 85 % (ref 70–75)
PCO2 BLD: 89 MM HG (ref 35–45)
PCO2 BLDV: 92 MM HG (ref 40–50)
PH BLD: 7.25 [PH] (ref 7.35–7.45)
PH BLDV: 7.24 [PH] (ref 7.32–7.43)
PLATELET # BLD AUTO: 204 10E3/UL (ref 150–450)
PO2 BLD: 87 MM HG (ref 80–105)
PO2 BLDV: 60 MM HG (ref 25–47)
POTASSIUM SERPL-SCNC: 4.5 MMOL/L (ref 3.4–5.3)
PROT SERPL-MCNC: 7.5 G/DL (ref 6.4–8.3)
RBC # BLD AUTO: 4.55 10E6/UL (ref 4.4–5.9)
SAO2 % BLDA: 95.8 % (ref 96–97)
SAO2 % BLDV: 87.8 % (ref 70–75)
SODIUM SERPL-SCNC: 140 MMOL/L (ref 135–145)
WBC # BLD AUTO: 6.6 10E3/UL (ref 4–11)

## 2025-08-01 PROCEDURE — 999N000157 HC STATISTIC RCP TIME EA 10 MIN

## 2025-08-01 PROCEDURE — 84155 ASSAY OF PROTEIN SERUM: CPT | Performed by: PHYSICIAN ASSISTANT

## 2025-08-01 PROCEDURE — 97535 SELF CARE MNGMENT TRAINING: CPT | Mod: GO

## 2025-08-01 PROCEDURE — 999N000208 ECHOCARDIOGRAM COMPLETE

## 2025-08-01 PROCEDURE — 250N000011 HC RX IP 250 OP 636: Performed by: INTERNAL MEDICINE

## 2025-08-01 PROCEDURE — 250N000013 HC RX MED GY IP 250 OP 250 PS 637: Performed by: PHYSICIAN ASSISTANT

## 2025-08-01 PROCEDURE — 255N000002 HC RX 255 OP 636: Performed by: PHYSICIAN ASSISTANT

## 2025-08-01 PROCEDURE — 93306 TTE W/DOPPLER COMPLETE: CPT | Mod: 26 | Performed by: INTERNAL MEDICINE

## 2025-08-01 PROCEDURE — 85014 HEMATOCRIT: CPT | Performed by: PHYSICIAN ASSISTANT

## 2025-08-01 PROCEDURE — 36415 COLL VENOUS BLD VENIPUNCTURE: CPT | Performed by: PHYSICIAN ASSISTANT

## 2025-08-01 PROCEDURE — 82805 BLOOD GASES W/O2 SATURATION: CPT | Performed by: PHYSICIAN ASSISTANT

## 2025-08-01 PROCEDURE — 250N000013 HC RX MED GY IP 250 OP 250 PS 637: Performed by: INTERNAL MEDICINE

## 2025-08-01 PROCEDURE — 82310 ASSAY OF CALCIUM: CPT | Performed by: PHYSICIAN ASSISTANT

## 2025-08-01 PROCEDURE — 99233 SBSQ HOSP IP/OBS HIGH 50: CPT | Performed by: INTERNAL MEDICINE

## 2025-08-01 PROCEDURE — 250N000011 HC RX IP 250 OP 636: Performed by: PHYSICIAN ASSISTANT

## 2025-08-01 PROCEDURE — 120N000013 HC R&B IMCU

## 2025-08-01 PROCEDURE — 97165 OT EVAL LOW COMPLEX 30 MIN: CPT | Mod: GO

## 2025-08-01 PROCEDURE — 36600 WITHDRAWAL OF ARTERIAL BLOOD: CPT

## 2025-08-01 RX ORDER — PROCHLORPERAZINE MALEATE 5 MG/1
10 TABLET ORAL EVERY 6 HOURS PRN
Status: DISCONTINUED | OUTPATIENT
Start: 2025-08-01 | End: 2025-08-05 | Stop reason: HOSPADM

## 2025-08-01 RX ORDER — ALBUTEROL SULFATE 90 UG/1
2 INHALANT RESPIRATORY (INHALATION) EVERY 6 HOURS PRN
Status: DISCONTINUED | OUTPATIENT
Start: 2025-08-01 | End: 2025-08-05 | Stop reason: HOSPADM

## 2025-08-01 RX ORDER — LIDOCAINE 40 MG/G
CREAM TOPICAL
Status: DISCONTINUED | OUTPATIENT
Start: 2025-08-01 | End: 2025-08-05 | Stop reason: HOSPADM

## 2025-08-01 RX ORDER — FUROSEMIDE 10 MG/ML
40 INJECTION INTRAMUSCULAR; INTRAVENOUS EVERY 8 HOURS
Status: COMPLETED | OUTPATIENT
Start: 2025-08-01 | End: 2025-08-01

## 2025-08-01 RX ORDER — NALOXONE HYDROCHLORIDE 0.4 MG/ML
0.4 INJECTION, SOLUTION INTRAMUSCULAR; INTRAVENOUS; SUBCUTANEOUS
Status: DISCONTINUED | OUTPATIENT
Start: 2025-08-01 | End: 2025-08-05 | Stop reason: HOSPADM

## 2025-08-01 RX ORDER — NALOXONE HYDROCHLORIDE 0.4 MG/ML
0.2 INJECTION, SOLUTION INTRAMUSCULAR; INTRAVENOUS; SUBCUTANEOUS
Status: DISCONTINUED | OUTPATIENT
Start: 2025-08-01 | End: 2025-08-05 | Stop reason: HOSPADM

## 2025-08-01 RX ORDER — ACETAMINOPHEN 325 MG/1
650 TABLET ORAL EVERY 6 HOURS PRN
Status: DISCONTINUED | OUTPATIENT
Start: 2025-08-01 | End: 2025-08-01

## 2025-08-01 RX ORDER — HEPARIN SODIUM 5000 [USP'U]/.5ML
5000 INJECTION, SOLUTION INTRAVENOUS; SUBCUTANEOUS EVERY 12 HOURS
Status: DISCONTINUED | OUTPATIENT
Start: 2025-08-01 | End: 2025-08-05 | Stop reason: HOSPADM

## 2025-08-01 RX ORDER — ALBUTEROL SULFATE 0.83 MG/ML
2.5 SOLUTION RESPIRATORY (INHALATION) EVERY 4 HOURS PRN
Status: DISCONTINUED | OUTPATIENT
Start: 2025-08-01 | End: 2025-08-05 | Stop reason: HOSPADM

## 2025-08-01 RX ORDER — ACETAMINOPHEN 650 MG/1
650 SUPPOSITORY RECTAL EVERY 4 HOURS PRN
Status: DISCONTINUED | OUTPATIENT
Start: 2025-08-01 | End: 2025-08-05 | Stop reason: HOSPADM

## 2025-08-01 RX ORDER — FUROSEMIDE 10 MG/ML
40 INJECTION INTRAMUSCULAR; INTRAVENOUS EVERY 8 HOURS
Status: DISCONTINUED | OUTPATIENT
Start: 2025-08-01 | End: 2025-08-02

## 2025-08-01 RX ORDER — MICONAZOLE NITRATE 2 G/100G
CREAM TOPICAL 2 TIMES DAILY
Status: DISCONTINUED | OUTPATIENT
Start: 2025-08-01 | End: 2025-08-05 | Stop reason: HOSPADM

## 2025-08-01 RX ORDER — OXYCODONE HYDROCHLORIDE 5 MG/1
5 TABLET ORAL EVERY 4 HOURS PRN
Refills: 0 | Status: DISCONTINUED | OUTPATIENT
Start: 2025-08-01 | End: 2025-08-05 | Stop reason: HOSPADM

## 2025-08-01 RX ORDER — ACETAMINOPHEN 325 MG/1
650 TABLET ORAL EVERY 4 HOURS PRN
Status: DISCONTINUED | OUTPATIENT
Start: 2025-08-01 | End: 2025-08-05 | Stop reason: HOSPADM

## 2025-08-01 RX ORDER — ONDANSETRON 2 MG/ML
4 INJECTION INTRAMUSCULAR; INTRAVENOUS EVERY 6 HOURS PRN
Status: DISCONTINUED | OUTPATIENT
Start: 2025-08-01 | End: 2025-08-05 | Stop reason: HOSPADM

## 2025-08-01 RX ORDER — CARVEDILOL 6.25 MG/1
6.25 TABLET ORAL 2 TIMES DAILY
Status: DISCONTINUED | OUTPATIENT
Start: 2025-08-01 | End: 2025-08-02

## 2025-08-01 RX ORDER — ONDANSETRON 4 MG/1
4 TABLET, ORALLY DISINTEGRATING ORAL EVERY 6 HOURS PRN
Status: DISCONTINUED | OUTPATIENT
Start: 2025-08-01 | End: 2025-08-05 | Stop reason: HOSPADM

## 2025-08-01 RX ORDER — AMOXICILLIN 250 MG
1 CAPSULE ORAL 2 TIMES DAILY PRN
Status: DISCONTINUED | OUTPATIENT
Start: 2025-08-01 | End: 2025-08-05 | Stop reason: HOSPADM

## 2025-08-01 RX ORDER — SALIVA STIMULANT COMB. NO.3
1 SPRAY, NON-AEROSOL (ML) MUCOUS MEMBRANE 4 TIMES DAILY PRN
Status: DISCONTINUED | OUTPATIENT
Start: 2025-08-01 | End: 2025-08-05 | Stop reason: HOSPADM

## 2025-08-01 RX ORDER — SPIRONOLACTONE 25 MG/1
25 TABLET ORAL DAILY
Status: DISCONTINUED | OUTPATIENT
Start: 2025-08-01 | End: 2025-08-05 | Stop reason: HOSPADM

## 2025-08-01 RX ORDER — AMOXICILLIN 250 MG
2 CAPSULE ORAL 2 TIMES DAILY PRN
Status: DISCONTINUED | OUTPATIENT
Start: 2025-08-01 | End: 2025-08-05 | Stop reason: HOSPADM

## 2025-08-01 RX ADMIN — ACETAMINOPHEN 650 MG: 325 TABLET ORAL at 20:41

## 2025-08-01 RX ADMIN — HEPARIN SODIUM 5000 UNITS: 10000 INJECTION, SOLUTION INTRAVENOUS; SUBCUTANEOUS at 18:34

## 2025-08-01 RX ADMIN — HUMAN ALBUMIN MICROSPHERES AND PERFLUTREN 2 ML (DILUTED): 10; .22 INJECTION, SOLUTION INTRAVENOUS at 08:23

## 2025-08-01 RX ADMIN — MICONAZOLE NITRATE: 20 CREAM TOPICAL at 20:30

## 2025-08-01 RX ADMIN — CARVEDILOL 6.25 MG: 6.25 TABLET, FILM COATED ORAL at 08:54

## 2025-08-01 RX ADMIN — FUROSEMIDE 40 MG: 10 INJECTION, SOLUTION INTRAMUSCULAR; INTRAVENOUS at 06:40

## 2025-08-01 RX ADMIN — CARVEDILOL 6.25 MG: 6.25 TABLET, FILM COATED ORAL at 20:40

## 2025-08-01 RX ADMIN — FUROSEMIDE 40 MG: 10 INJECTION, SOLUTION INTRAMUSCULAR; INTRAVENOUS at 13:46

## 2025-08-01 RX ADMIN — FUROSEMIDE 40 MG: 10 INJECTION, SOLUTION INTRAMUSCULAR; INTRAVENOUS at 22:21

## 2025-08-01 RX ADMIN — MICONAZOLE NITRATE: 20 CREAM TOPICAL at 08:56

## 2025-08-01 RX ADMIN — SPIRONOLACTONE 25 MG: 25 TABLET ORAL at 15:14

## 2025-08-01 ASSESSMENT — ACTIVITIES OF DAILY LIVING (ADL)
ADLS_ACUITY_SCORE: 47
ADLS_ACUITY_SCORE: 49
ADLS_ACUITY_SCORE: 41
ADLS_ACUITY_SCORE: 47
ADLS_ACUITY_SCORE: 47
ADLS_ACUITY_SCORE: 41
ADLS_ACUITY_SCORE: 49
ADLS_ACUITY_SCORE: 41
ADLS_ACUITY_SCORE: 49
ADLS_ACUITY_SCORE: 41
ADLS_ACUITY_SCORE: 49
ADLS_ACUITY_SCORE: 49
ADLS_ACUITY_SCORE: 41
ADLS_ACUITY_SCORE: 47
ADLS_ACUITY_SCORE: 37
ADLS_ACUITY_SCORE: 47
ADLS_ACUITY_SCORE: 49

## 2025-08-01 NOTE — ED PROVIDER NOTES
ED Provider Note  [facility]      History     Chief Complaint   Patient presents with    Groin Swelling    Shortness of Breath     HPI  Ge Hansen is a 40 year old male who presents to the emergency department with concerns regarding severe scrotal swelling.  Reports that over the past 2 days developed increased severe swelling of the scrotum.  Did have similar type of issue in June of this year.  I reviewed ED visit, and patient did have ultrasound that was performed at that time, and ultimately had improved swelling after that visit.  States that his testicles are in scrotum are normally walnut sized, however now has severe scrotal swelling that began insidiously while at work 2 days ago.  Mostly sits at work.  Denies any specific traumatic episode over the past 2 to 3 days.  Has been able to urinate.  Decreased appetite over the past 2 to 3 days.  No fever.  No drainage.     Patient states he is supposed to be on medications, however does not take those medications secondary to inability to afford them.  Has not been on diuretics for many months.    Independent Historian:        Review of External Notes:  As above.      I also reviewed August, 2024 [facility] visit when patient was noted to have lymphedema, with cellulitis and stasis dermatitis, and patient ultimately left AMA as they were unable to obtain bariatric bed.      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      Anemia 12/01/2023     Priority: Medium    Cellulitis of left lower extremity 11/26/2023     Priority: Medium    Insomnia, unspecified type 01/26/2021     Priority: Medium    Morbid obesity (H) BMI 68.34 09/13/2018     Priority: Medium    Elevated glucose 09/13/2018     Priority: Medium    Obesity hypoventilation syndrome (H)  09/13/2018     Priority: Medium     Some asthma hx too.        Tobacco use 09/13/2018     Priority: Medium    MAYDA (obstructive sleep apnea) 09/13/2018     Priority: Medium     Study Date: 10/3/2018( 520.0 lbs).  The combined apnea/hypopnea index was 134.8 events per hour (central apnea/hypopnea index was 4.1 events per hour). The REM AHI was - events per hour. The supine AHI was 128.9 events per hour. The RERA index was - events per hour. The RDI was 134.8 events per hour. Transcutaneous carbon dioxide monitoring was used, and significant hypoventilation was present with a maximum change from ~11 mmHg and 46.0 minutes at or greater than 55 mmHg. VBG was consistent with chronic compensated respiratory acidosis (pH 7.36, pCO2 62, HCO3 35). Baseline oxygen saturation was 89.4%. Lowest oxygen saturation was 52.0%. Time spent less than or equal to 88% was 59.5 minutes. Time spent less than or equal to 89% was 65.5 minutes. The final pressure was BiLevel 23/13/0 cmH2O with an AHI of 1.7 events per hour. Time in REM supine on final pressure was 49.0 minutes.         Asthma 06/01/2006     Priority: Medium    Psoriasis 12/01/2023     Priority: Low        Past Medical History:    Past Medical History:   Diagnosis Date    ADHD     Asthma     Lactose intolerance     Obesity hypoventilation syndrome (H)     Plantar fasciitis 01/15/2021    Sleep apnea     Tobacco use        Past Surgical History:    Past Surgical History:   Procedure Laterality Date    ROTATOR CUFF REPAIR RT/LT         Family History:    Family History   Problem Relation Age of Onset    Obesity Sister     Depression Sister     Anxiety Disorder Sister     Mental Illness Sister     Diabetes Father         Lost a leg    Hypertension Father     Hypertension Mother     Mental Illness Mother     Hypertension Maternal Grandmother     Cerebrovascular Disease Maternal Grandmother     Osteoporosis Sister        Social History:  Marital Status:  Single [1]  Social History      Tobacco Use    Smoking status: Every Day     Current packs/day: 1.00     Average packs/day: 1 pack/day for 28.1 years (28.1 ttl pk-yrs)     Types: Cigarettes     Start date: 7/7/1997    Smokeless tobacco: Current     Types: Chew, Snuff    Tobacco comments:     Vaping    Substance Use Topics    Alcohol use: Yes    Drug use: No        Medications:    acetaminophen (TYLENOL) 325 MG tablet  albuterol (PROAIR HFA/PROVENTIL HFA/VENTOLIN HFA) 108 (90 Base) MCG/ACT inhaler  calcipotriene (DOVONOX) 0.005 % external cream  clobetasol (TEMOVATE) 0.05 % external cream  furosemide (LASIX) 20 MG tablet  ibuprofen (ADVIL/MOTRIN) 200 MG tablet  Probiotic Product (FLORAJEN DIGESTION) CAPS  triamcinolone (KENALOG) 0.5 % external ointment          Review of Systems  A medically appropriate review of systems was performed with pertinent positives and negatives noted in the HPI, and all other systems negative.    Physical Exam   Patient Vitals for the past 24 hrs:   BP Temp Temp src Pulse Resp SpO2 Weight   07/31/25 2200 (!) 152/97 -- -- 95 16 95 % --   07/31/25 2100 (!) 148/84 -- -- 93 20 92 % --   07/31/25 2030 (!) 145/79 -- -- -- -- 99 % --   07/31/25 2019 -- -- -- -- -- (!) 75 % --   07/31/25 2017 -- -- -- -- -- (!) 88 % --   07/31/25 2005 -- -- -- -- 22 (!) 91 % --   07/31/25 2000 (!) 144/79 98.2  F (36.8  C) Oral 102 24 (!) 82 % --   07/31/25 1955 -- -- -- -- -- -- (!) 249.5 kg (550 lb)          Physical Exam  General: alert and in acute distress on arrival.  Patient complaining of severe scrotal swelling and pain  Head: atraumatic, normocephalic  Lungs: Increased work of breathing, with distant breath sounds and heart sounds  CV:  extremities warm and perfused.  Diffuse edema throughout the entire body.  Abd: obese, with extremely large pannus, and severe morbid obesity  :  severe scrotal swelling.  Unable to visualize penis.  Skin: Chronic appearing skin changes, with  slight erythema in the intertriginous areas.  Neuro:  Patient awake, alert, speech is fluent,         ED Course                 Procedures                 None         Recent Results (from the past 24 hours)   CBC with Platelets and Differential (Limited Occurrences)    Narrative    The following orders were created for panel order CBC with Platelets and Differential (Limited Occurrences).  Procedure                               Abnormality         Status                     ---------                               -----------         ------                     CBC with platelets and ...[[id number]]  Abnormal            Final result                 Please view results for these tests on the individual orders.   Basic Metabolic Panel (Limited Occurrences)   Result Value Ref Range    Sodium 135 135 - 145 mmol/L    Potassium 4.5 3.4 - 5.3 mmol/L    Chloride 97 (L) 98 - 107 mmol/L    Carbon Dioxide (CO2) 31 (H) 22 - 29 mmol/L    Anion Gap 7 7 - 15 mmol/L    Urea Nitrogen 13.7 6.0 - 20.0 mg/dL    Creatinine 0.89 0.67 - 1.17 mg/dL    GFR Estimate >90 >60 mL/min/1.73m2    Calcium 8.1 (L) 8.8 - 10.4 mg/dL    Glucose 105 (H) 70 - 99 mg/dL   Hepatic Function Panel (Limited Occurrences)   Result Value Ref Range    Protein Total 7.4 6.4 - 8.3 g/dL    Albumin 3.6 3.5 - 5.2 g/dL    Bilirubin Total 0.9 <=1.2 mg/dL    Alkaline Phosphatase 68 40 - 150 U/L    AST 51 (H) 0 - 45 U/L    ALT 49 0 - 70 U/L    Bilirubin Direct 0.35 (H) 0.00 - 0.30 mg/dL   Blood gas venous   Result Value Ref Range    pH Venous 7.32 7.32 - 7.43    pCO2 Venous 72 (H) 40 - 50 mm Hg    pO2 Venous 66 (H) 25 - 47 mm Hg    Bicarbonate Venous 37 (H) 21 - 28 mmol/L    Base Excess/Deficit Venous 7.9 (H) -3.0 - 3.0 mmol/L    FIO2 50     Oxyhemoglobin Venous 88 (H) 70 - 75 %    O2 Sat, Venous 91.4 (H) 70.0 - 75.0 %    Narrative    In healthy individuals, oxyhemoglobin (O2Hb) and oxygen saturation (SO2) are approximately equal. In the presence of dyshemoglobins, oxyhemoglobin can be considerably lower than oxygen  saturation.   NT-proBNP   Result Value Ref Range    NT-proBNP 3,696 (H) 0 - 93 pg/mL   TSH with free T4 reflex   Result Value Ref Range    TSH 4.50 (H) 0.30 - 4.20 uIU/mL   Ruby Draw    Narrative    The following orders were created for panel order Ruby Draw.  Procedure                               Abnormality         Status                     ---------                               -----------         ------                     Extra Blue Top Tube[[id number]]                             Final result                 Please view results for these tests on the individual orders.   CBC with platelets and differential   Result Value Ref Range    WBC Count 7.4 4.0 - 11.0 10e3/uL    RBC Count 4.46 4.40 - 5.90 10e6/uL    Hemoglobin 12.5 (L) 13.3 - 17.7 g/dL    Hematocrit 43.2 40.0 - 53.0 %    MCV 97 78 - 100 fL    MCH 28.0 26.5 - 33.0 pg    MCHC 28.9 (L) 31.5 - 36.5 g/dL    RDW 15.3 (H) 10.0 - 15.0 %    Platelet Count 226 150 - 450 10e3/uL    % Neutrophils 66 %    % Lymphocytes 26 %    % Monocytes 6 %    % Eosinophils 2 %    % Basophils 0 %    % Immature Granulocytes 1 %    NRBCs per 100 WBC 1 (H) <1 /100    Absolute Neutrophils 4.8 1.6 - 8.3 10e3/uL    Absolute Lymphocytes 1.9 0.8 - 5.3 10e3/uL    Absolute Monocytes 0.4 0.0 - 1.3 10e3/uL    Absolute Eosinophils 0.1 0.0 - 0.7 10e3/uL    Absolute Basophils 0.0 0.0 - 0.2 10e3/uL    Absolute Immature Granulocytes 0.1 <=0.4 10e3/uL    Absolute NRBCs 0.1 10e3/uL   Extra Blue Top Tube   Result Value Ref Range    Hold Specimen JIC    T4 free   Result Value Ref Range    Free T4 0.90 0.90 - 1.70 ng/dL   CT Chest (PE) Abdomen Pelvis w Contrast    Narrative    EXAM: CT CHEST PE ABDOMEN PELVIS W CONTRAST  LOCATION: [facility]  DATE: 7/31/2025    INDICATION: Hypoxia and anasarca; severe scrotal swelling; morbid obesity.  COMPARISON: None available.  TECHNIQUE: CT chest pulmonary angiogram and routine CT abdomen pelvis with IV contrast. Arterial phase  through the chest and venous phase through the abdomen and pelvis. Multiplanar reformats and MIP reconstructions were performed. Dose reduction   techniques were used.   CONTRAST: 100 mL Isovue-370.    FINDINGS: Limited study quality due to underpenetration, as a result of morbid obesity.    ANGIOGRAM CHEST: No identifiable acute pulmonary embolism. Study limitations preclude complete diagnostic assessment of subsegmental branches. Moderate right heart strain and flattening of the interventricular septum. Mild enlargement of the main   pulmonary artery.    Thoracic aorta is normal in course and caliber.    LUNGS AND PLEURA: Trachea and large airways are patent. No focal airspace consolidation. Mild to moderate dependent atelectatic change within the right middle and right lower lobes. Associated elevation of the right hemidiaphragm.     Indeterminate 3 mm nodule in the subpleural right upper lobe, series 6 image 92.    No pneumothorax.     No pleural effusion.     MEDIASTINUM/AXILLAE: No mediastinal/hilar lymphadenopathy.     Thoracic esophagus is unremarkable.      No axillary lymphadenopathy.    Chest wall is unremarkable.    Mild cardiomegaly and asymmetric right heart enlargement. No pericardial effusion.    CORONARY ARTERY CALCIFICATION: None.    HEPATOBILIARY: Subtle liver surface nodularity and widening of periportal spaces, suggesting the early changes of cirrhosis.    Gallbladder is nondistended and demonstrates diffuse wall thickening, favoring edema. This may be secondary to underlying liver disease or anasarca.    No intrahepatic or intrahepatic biliary ductal dilatation.    PANCREAS: Enhances normally. No peripancreatic inflammatory fat stranding.    SPLEEN: Enhances normally. Normal size.    ADRENAL GLANDS: Normal.    KIDNEYS: Both kidneys enhance symmetrically, without hydronephrosis.    No nephroureterolithiasis.    Urinary bladder is unremarkable.    PELVIC ORGANS: No suspicious abnormality. Of  note, the scrotum and testicles are excluded from field-of-view.    BOWEL: No evidence of acute gastrointestinal inflammation or obstruction. Normal appendix. Colonic diverticulosis.    No intraperitoneal free fluid or free air. Moderate sized fat-containing umbilical hernia.    LYMPH NODES: Mildly enlarged bilateral inguinal and iliac chain lymph nodes. For reference, a right external iliac chain lymph node measures 13 mm in short axis, series 12 image 218.    VASCULATURE: No abdominal aortic aneurysm.    MUSCULOSKELETAL: No suspicious abnormality.    OTHER: Mild body wall edema.      Impression    IMPRESSION:  1.  No identifiable acute pulmonary embolism. Study limitations preclude complete diagnostic assessment of subsegmental branches.  2.  Moderate right heart strain and flattening of the interventricular septum, which may be secondary to pulmonary hypertension (presumably chronic).  3.  Indeterminate 3 mm right upper lobe pulmonary nodule.  4.  Early changes of hepatic cirrhosis.  5.  Gallbladder wall thickening, favoring edema. This may be secondary to underlying liver disease or anasarca. Acute cholecystitis is unlikely, given the absence of gallbladder distention.  6.  Mildly enlarged bilateral inguinal and iliac chain lymph nodes, likely reactive. CT imaging follow-up recommended in 6 months.  7.  Colonic diverticulosis.  8.  Moderate-sized fat-containing umbilical hernia.  9.  Mild body wall edema, consistent with anasarca.  10.  Scrotum and testicles excluded from field-of-view. If requested, repeat CT imaging can be performed to include the scrotum and testicles, at no additional cost to the patient.     US Testicular & Scrotum w Doppler Ltd    Narrative    EXAM: US TESTICULAR AND SCROTUM WITH DOPPLER LIMITED  LOCATION: [facility]  DATE: 07/31/2025    INDICATION: Severe scrotal swelling with pain and thickening.  COMPARISON: None.  TECHNIQUE: Ultrasound of scrotum with color  flow and spectral Doppler with waveform analysis performed.    FINDINGS:  RIGHT: Right testicle measures 2.7 x 2.2 x 2.3 cm. Doppler flow documented. Color assessment reduced due to extensive soft tissue edema and depth of the testicle. Epididymis not well seen. Complex hydrocele. No varicocele.    LEFT: Left testicle measures 2.6 x 1.8 x 2.2 cm. Doppler flow documented. Color assessment reduced due to extensive soft tissue edema and depth of the testicle. Epididymis not well seen. Complex hydrocele. No varicocele.      Impression    IMPRESSION:  1.  Significant scrotal soft tissue edema bilaterally and bilateral complex hydroceles. Correlate for scrotal cellulitis.       MEDICATIONS GIVEN IN THE EMERGENCY DEPARTMENT:  Medications   No lozenges or gum should be given while patient on BIPAP/AVAPS/AVAPS AE (has no administration in time range)   carboxymethylcellulose PF (REFRESH PLUS) 0.5 % ophthalmic solution 1 drop (has no administration in time range)   Patient may continue current oral medications (has no administration in time range)   furosemide (LASIX) injection 60 mg (60 mg Intravenous $Given 7/31/25 2042)   sodium chloride 0.9 % bag for CT scan flush (100 mLs As instructed $Given 7/31/25 2129)   iopamidol (ISOVUE-370) solution 150 mL (150 mLs Intravenous $Given 7/31/25 2128)           Independent Interpretation (X-rays, CTs, rhythm strip):  I have personally reviewed and interpreted the below studies.  My personal interpretation is as follows:    No appreciable PE.  No lobar infiltrate.  Additional findings as reviewed in radiology impression above    Consultations/Discussion of Management or Tests:  None       Social Determinants of Health affecting care:         Assessments & Plan (with Medical Decision Making)  40 year old male who presents to the Emergency Department for evaluation of severe scrotal swelling.  This has been present over the past 2 days.  Has had similar issues previously, however this  was in the setting of trauma last month when patient had sat down, and injured the scrotum at that time.  Patient arrives with severe scrotal swelling, inability to visualize penis, and states he has been able to urinate, however scrotum is 20 times the normal size at least.  Patient is supposed to be on medications at home, however states he cannot afford those medications.  He is on BiPAP at night, and states that he has continued to use this.  He states that his normal oxygen saturations are 95% on room air at home.  Notably, patient arrives hypoxic, with good waveforms that show oxygen saturations in the 60s.  Likely has component of chronicity to this oxygen saturation.  Started initially on oxime mask oxygen, however transitioned over to BiPAP, with 50% FiO2.    With regards to the scrotal swelling, concern is for more anasarca related condition.  Denies any recent trauma.  Will obtain ultrasound imaging, however based on the severity of the swelling, with severity of hypoxemia, will also obtain CT scan of chest to evaluate for pulmonary embolism, and also include abdomen/pelvis to evaluate for any further potential scrotal related condition.    Patient with ultrasound showing generalized scrotal thickening.  Also with complex hydroceles which are present.  Clinically, no significant signs of scrotal cellulitis.  Does have chronic appearing and diffuse edematous changes that are noted, with skin thickening likely secondary to edema.  Patient without any fever at home.  White blood cell count is normal.  CT with no evidence of acute findings.  No PE.  Moderate right heart strain, presumably chronic with chronic pulmonary hypertension.  Early changes of liver cirrhosis, with gallbladder wall thickening, and multiple other changes secondary to patient's super morbid obesity.    Based on the acute respiratory failure with hypoxia, patient does require hospitalization.  I discussed with hospitalist, Tamar  GAY Mello, who agreed to accept patient for hospitalization.  Patient has received IV furosemide.     I have reviewed the nursing notes.    I have reviewed the findings, diagnosis, plan and need for follow up with the patient.             NEW PRESCRIPTIONS STARTED AT TODAY'S ER VISIT  New Prescriptions    No medications on file       Final diagnoses:   Congestive heart failure, unspecified HF chronicity, unspecified heart failure type (H)   Anasarca   Scrotal swelling   Pulmonary hypertension (H)   Acute respiratory failure with hypoxia (H)   Morbid obesity (H) BMI 68.34   Obesity hypoventilation syndrome (H)   MAYDA (obstructive sleep apnea)       7/31/2025   [facility] EMERGENCY DEPT       Brian, Shaquille Melgar MD  07/31/25 [id number]

## 2025-08-01 NOTE — PROGRESS NOTES
Skin affirmation note    Admitting nurse completed full skin assessment, Santy score and Santy interventions. This writer agrees with the initial skin assessment findings.

## 2025-08-01 NOTE — ED NOTES
[facility]   Admission Handoff    The patient is Ge Hansen, 40 year old who arrived in the ED by AMBULANCE from home with a complaint of Groin Swelling and Shortness of Breath  . The patient's current symptoms are new and during this time the symptoms have decreased. In the ED, patient was diagnosed with   Final diagnoses:   Congestive heart failure, unspecified HF chronicity, unspecified heart failure type (H)   Anasarca   Scrotal swelling   Pulmonary hypertension (H)   Acute respiratory failure with hypoxia (H)   Morbid obesity (H) BMI 68.34   Obesity hypoventilation syndrome (H)   MAYDA (obstructive sleep apnea)         Needed?: No    Allergies:  No Known Allergies    Past Medical Hx:   Past Medical History:   Diagnosis Date    ADHD     Asthma     Lactose intolerance     Obesity hypoventilation syndrome (H)     Plantar fasciitis 01/15/2021    Sleep apnea     Tobacco use        Initial vitals were: BP: (!) 144/79  Pulse: 102  Temp: 98.2  F (36.8  C)  Resp: 24  Weight: (!) 249.5 kg (550 lb)  SpO2: (!) 82 %   Recent vital Signs: BP (!) 152/97   Pulse 95   Temp 98.2  F (36.8  C) (Oral)   Resp 16   Wt (!) 249.5 kg (550 lb)   SpO2 95%   BMI 72.56 kg/m      Elimination Status: Continent: No and wears briefs     Activity Level: Independent    Fall Status: Reason for falls risk:  Mobility  nonskid shoes/slippers when out of bed, patient and family education, assistive device/personal items within reach, and activity supervised    Baseline Mental status: WDL  Current Mental Status changes: at basesline    Infection present or suspected this encounter: no  Sepsis suspected: No    Isolation type: none    Bariatric equipment needed?: Yes    In the ED these meds were given:   Medications   No lozenges or gum should be given while patient on BIPAP/AVAPS/AVAPS AE (has no administration in time range)   carboxymethylcellulose PF (REFRESH PLUS) 0.5 % ophthalmic solution 1 drop (has no  administration in time range)   Patient may continue current oral medications (has no administration in time range)   furosemide (LASIX) injection 60 mg (60 mg Intravenous $Given 7/31/25 2042)   sodium chloride 0.9 % bag for CT scan flush (100 mLs As instructed $Given 7/31/25 2129)   iopamidol (ISOVUE-370) solution 150 mL (150 mLs Intravenous $Given 7/31/25 2128)       Drips running?  No    Home pump  No    Current LDAs: Peripheral IV: Site left forearm; Gauge 18  none     Results:   Labs/Imaging  Ordered and Resulted from Time of ED Arrival Up to the Time of Departure from the ED  Recent Results (from the past 24 hours)   CBC with Platelets and Differential (Limited Occurrences)    Narrative    The following orders were created for panel order CBC with Platelets and Differential (Limited Occurrences).  Procedure                               Abnormality         Status                     ---------                               -----------         ------                     CBC with platelets and ...[[id number]]  Abnormal            Final result                 Please view results for these tests on the individual orders.   Basic Metabolic Panel (Limited Occurrences)   Result Value Ref Range    Sodium 135 135 - 145 mmol/L    Potassium 4.5 3.4 - 5.3 mmol/L    Chloride 97 (L) 98 - 107 mmol/L    Carbon Dioxide (CO2) 31 (H) 22 - 29 mmol/L    Anion Gap 7 7 - 15 mmol/L    Urea Nitrogen 13.7 6.0 - 20.0 mg/dL    Creatinine 0.89 0.67 - 1.17 mg/dL    GFR Estimate >90 >60 mL/min/1.73m2    Calcium 8.1 (L) 8.8 - 10.4 mg/dL    Glucose 105 (H) 70 - 99 mg/dL   Hepatic Function Panel (Limited Occurrences)   Result Value Ref Range    Protein Total 7.4 6.4 - 8.3 g/dL    Albumin 3.6 3.5 - 5.2 g/dL    Bilirubin Total 0.9 <=1.2 mg/dL    Alkaline Phosphatase 68 40 - 150 U/L    AST 51 (H) 0 - 45 U/L    ALT 49 0 - 70 U/L    Bilirubin Direct 0.35 (H) 0.00 - 0.30 mg/dL   Blood gas venous   Result Value Ref Range    pH Venous 7.32 7.32 - 7.43     pCO2 Venous 72 (H) 40 - 50 mm Hg    pO2 Venous 66 (H) 25 - 47 mm Hg    Bicarbonate Venous 37 (H) 21 - 28 mmol/L    Base Excess/Deficit Venous 7.9 (H) -3.0 - 3.0 mmol/L    FIO2 50     Oxyhemoglobin Venous 88 (H) 70 - 75 %    O2 Sat, Venous 91.4 (H) 70.0 - 75.0 %    Narrative    In healthy individuals, oxyhemoglobin (O2Hb) and oxygen saturation (SO2) are approximately equal. In the presence of dyshemoglobins, oxyhemoglobin can be considerably lower than oxygen saturation.   NT-proBNP   Result Value Ref Range    NT-proBNP 3,696 (H) 0 - 93 pg/mL   TSH with free T4 reflex   Result Value Ref Range    TSH 4.50 (H) 0.30 - 4.20 uIU/mL   [place] Draw    Narrative    The following orders were created for panel order [place] Draw.  Procedure                               Abnormality         Status                     ---------                               -----------         ------                     Extra Blue Top Tube[[id number]]                             Final result                 Please view results for these tests on the individual orders.   CBC with platelets and differential   Result Value Ref Range    WBC Count 7.4 4.0 - 11.0 10e3/uL    RBC Count 4.46 4.40 - 5.90 10e6/uL    Hemoglobin 12.5 (L) 13.3 - 17.7 g/dL    Hematocrit 43.2 40.0 - 53.0 %    MCV 97 78 - 100 fL    MCH 28.0 26.5 - 33.0 pg    MCHC 28.9 (L) 31.5 - 36.5 g/dL    RDW 15.3 (H) 10.0 - 15.0 %    Platelet Count 226 150 - 450 10e3/uL    % Neutrophils 66 %    % Lymphocytes 26 %    % Monocytes 6 %    % Eosinophils 2 %    % Basophils 0 %    % Immature Granulocytes 1 %    NRBCs per 100 WBC 1 (H) <1 /100    Absolute Neutrophils 4.8 1.6 - 8.3 10e3/uL    Absolute Lymphocytes 1.9 0.8 - 5.3 10e3/uL    Absolute Monocytes 0.4 0.0 - 1.3 10e3/uL    Absolute Eosinophils 0.1 0.0 - 0.7 10e3/uL    Absolute Basophils 0.0 0.0 - 0.2 10e3/uL    Absolute Immature Granulocytes 0.1 <=0.4 10e3/uL    Absolute NRBCs 0.1 10e3/uL   Extra Blue Top Tube   Result Value Ref Range     Hold Specimen [facility]    T4 free   Result Value Ref Range    Free T4 0.90 0.90 - 1.70 ng/dL   CT Chest (PE) Abdomen Pelvis w Contrast    Narrative    EXAM: CT CHEST PE ABDOMEN PELVIS W CONTRAST  LOCATION: [facility]  DATE: 7/31/2025    INDICATION: Hypoxia and anasarca; severe scrotal swelling; morbid obesity.  COMPARISON: None available.  TECHNIQUE: CT chest pulmonary angiogram and routine CT abdomen pelvis with IV contrast. Arterial phase through the chest and venous phase through the abdomen and pelvis. Multiplanar reformats and MIP reconstructions were performed. Dose reduction   techniques were used.   CONTRAST: 100 mL Isovue-370.    FINDINGS: Limited study quality due to underpenetration, as a result of morbid obesity.    ANGIOGRAM CHEST: No identifiable acute pulmonary embolism. Study limitations preclude complete diagnostic assessment of subsegmental branches. Moderate right heart strain and flattening of the interventricular septum. Mild enlargement of the main   pulmonary artery.    Thoracic aorta is normal in course and caliber.    LUNGS AND PLEURA: Trachea and large airways are patent. No focal airspace consolidation. Mild to moderate dependent atelectatic change within the right middle and right lower lobes. Associated elevation of the right hemidiaphragm.     Indeterminate 3 mm nodule in the subpleural right upper lobe, series 6 image 92.    No pneumothorax.     No pleural effusion.     MEDIASTINUM/AXILLAE: No mediastinal/hilar lymphadenopathy.     Thoracic esophagus is unremarkable.      No axillary lymphadenopathy.    Chest wall is unremarkable.    Mild cardiomegaly and asymmetric right heart enlargement. No pericardial effusion.    CORONARY ARTERY CALCIFICATION: None.    HEPATOBILIARY: Subtle liver surface nodularity and widening of periportal spaces, suggesting the early changes of cirrhosis.    Gallbladder is nondistended and demonstrates diffuse wall thickening, favoring  edema. This may be secondary to underlying liver disease or anasarca.    No intrahepatic or intrahepatic biliary ductal dilatation.    PANCREAS: Enhances normally. No peripancreatic inflammatory fat stranding.    SPLEEN: Enhances normally. Normal size.    ADRENAL GLANDS: Normal.    KIDNEYS: Both kidneys enhance symmetrically, without hydronephrosis.    No nephroureterolithiasis.    Urinary bladder is unremarkable.    PELVIC ORGANS: No suspicious abnormality. Of note, the scrotum and testicles are excluded from field-of-view.    BOWEL: No evidence of acute gastrointestinal inflammation or obstruction. Normal appendix. Colonic diverticulosis.    No intraperitoneal free fluid or free air. Moderate sized fat-containing umbilical hernia.    LYMPH NODES: Mildly enlarged bilateral inguinal and iliac chain lymph nodes. For reference, a right external iliac chain lymph node measures 13 mm in short axis, series 12 image 218.    VASCULATURE: No abdominal aortic aneurysm.    MUSCULOSKELETAL: No suspicious abnormality.    OTHER: Mild body wall edema.      Impression    IMPRESSION:  1.  No identifiable acute pulmonary embolism. Study limitations preclude complete diagnostic assessment of subsegmental branches.  2.  Moderate right heart strain and flattening of the interventricular septum, which may be secondary to pulmonary hypertension (presumably chronic).  3.  Indeterminate 3 mm right upper lobe pulmonary nodule.  4.  Early changes of hepatic cirrhosis.  5.  Gallbladder wall thickening, favoring edema. This may be secondary to underlying liver disease or anasarca. Acute cholecystitis is unlikely, given the absence of gallbladder distention.  6.  Mildly enlarged bilateral inguinal and iliac chain lymph nodes, likely reactive. CT imaging follow-up recommended in 6 months.  7.  Colonic diverticulosis.  8.  Moderate-sized fat-containing umbilical hernia.  9.  Mild body wall edema, consistent with anasarca.  10.  Scrotum and  testicles excluded from field-of-view. If requested, repeat CT imaging can be performed to include the scrotum and testicles, at no additional cost to the patient.     US Testicular & Scrotum w Doppler Ltd    Narrative    EXAM: US TESTICULAR AND SCROTUM WITH DOPPLER LIMITED  LOCATION: [facility]  DATE: 07/31/2025    INDICATION: Severe scrotal swelling with pain and thickening.  COMPARISON: None.  TECHNIQUE: Ultrasound of scrotum with color flow and spectral Doppler with waveform analysis performed.    FINDINGS:  RIGHT: Right testicle measures 2.7 x 2.2 x 2.3 cm. Doppler flow documented. Color assessment reduced due to extensive soft tissue edema and depth of the testicle. Epididymis not well seen. Complex hydrocele. No varicocele.    LEFT: Left testicle measures 2.6 x 1.8 x 2.2 cm. Doppler flow documented. Color assessment reduced due to extensive soft tissue edema and depth of the testicle. Epididymis not well seen. Complex hydrocele. No varicocele.      Impression    IMPRESSION:  1.  Significant scrotal soft tissue edema bilaterally and bilateral complex hydroceles. Correlate for scrotal cellulitis.       For the majority of the shift this patient's behavior was Green     Cardiac Rhythm: Normal Sinus  Pt needs tele? No  Skin/wound Issues: severe scrotal edema and eczema     Code Status: Full Code    Pain control: good    Nausea control: good    Abnormal labs/tests/findings requiring intervention: none    Patient tested for COVID 19 prior to admission: NO     OBS brochure/video discussed/provided to patient/family: N/A     Family present during ED course? No     Family Comments/Social Situation comments: none    Tasks needing completion: None    Alexis Lala RN

## 2025-08-01 NOTE — H&P
[facility]    History and Physical - Hospitalist Service       Date of Admission:  7/31/2025    Assessment & Plan      Ge Hansen is a 40 year old male admitted on 7/31/2025. He ***    Principal Problem:  Acute heart failure, unspecified heart failure type (H)    Active Problems:  Lymphedema   Furosemide 20 mg daily   Scrotal swelling  Seen in ED 6/17/2025 with scrotal pain and swelling after a fall.   Bilateral complex hydroceles    Morbid obesity (H) BMI 68.34    Obesity hypoventilation syndrome (H)    MAYDA (obstructive sleep apnea)    Asthma  Albuterol 108 mcg/act inhaler 2 puffs prn for shortness of breath or wheezing  Psoriasis  Clobetasol 0.05% external cream, Calcipotriene 0.005% external ointment as needed  Tobacco use          Diet: NPO for Medical/Clinical Reasons Except for: Meds    DVT Prophylaxis: {DVT PROPHYLAXIS:424254}  Araya Catheter: Not present  Lines: None     Cardiac Monitoring: None  Code Status:  ***    Clinically Significant Risk Factors Present on Admission   { TIP  This section helps capture the illness of the patient on admission.     - Review diagnoses highlighted in blue; right click, edit & delete if not appropriate   - If blank, no additional diagnoses identified   :18158}       # Hypochloremia: Lowest Cl = 97 mmol/L in last 2 days, will monitor as appropriate  # Hypocalcemia: Lowest Ca = 8.1 mg/dL in last 2 days, will monitor and replace as appropriate                       # Asthma: noted on problem list        Disposition Plan   {TIP  It is required to update Medically Ready for Discharge [MRD] daily until the patient is 'Ready Now' (meets clinical goals). MRD reflects medical readiness -- not LAMAR (Expected Discharge Date), which may be delayed by disposition. Last MRD-Anticipated in 2-4 Days  . Use the SmartList below to update for today:785057}  Medically Ready for Discharge: {TIME; MEDICALLY READY FOR DISCHARGE:75327154}         The patient's care  was discussed with the { :320141}.    [facility]  [facility]  Securely message with Lynn (more info)  Text page via [employer] Paging/Directory     ______________________________________________________________________    Chief Complaint   ***    {History obtained from:6236672}    History of Present Illness   Ge Hansen is a 40 year old male who ***      Past Medical History    Past Medical History:   Diagnosis Date     ADHD      Asthma      Lactose intolerance      Obesity hypoventilation syndrome (H)      Plantar fasciitis 01/15/2021     Sleep apnea      Tobacco use        Past Surgical History   Past Surgical History:   Procedure Laterality Date     ROTATOR CUFF REPAIR RT/LT         Prior to Admission Medications   Prior to Admission Medications   Prescriptions Last Dose Informant Patient Reported? Taking?   Probiotic Product (FLORAJEN DIGESTION) CAPS   No No   Sig: Take 1 capsule by mouth 2 times daily   acetaminophen (TYLENOL) 325 MG tablet   Yes No   Sig: Take 2 tablets (650 mg) by mouth every 6 hours as needed for mild pain or fever   albuterol (PROAIR HFA/PROVENTIL HFA/VENTOLIN HFA) 108 (90 Base) MCG/ACT inhaler   No No   Sig: Inhale 2 puffs into the lungs every 6 hours as needed for shortness of breath or wheezing   calcipotriene (DOVONOX) 0.005 % external cream   No No   Sig: Apply topically 2 times daily   clobetasol (TEMOVATE) 0.05 % external cream   No No   Sig: Apply topically 2 times daily   furosemide (LASIX) 20 MG tablet   No No   Sig: Take 1 tablet (20 mg) by mouth daily Suggest in morning.   ibuprofen (ADVIL/MOTRIN) 200 MG tablet  Self Yes No   Sig: Take 600 mg by mouth every 6 hours as needed for pain   triamcinolone (KENALOG) 0.5 % external ointment   No No   Sig: To psoriasis as needed.  To be used until you get insurance coverage for calcipotriene and clobetasol -- do NOT use triamcinolone plus the others.      Facility-Administered  "Medications: None      {Additional Note Sections (OPTIONAL)  :126226}     Physical Exam   Vital Signs: Temp: 98.2  F (36.8  C) Temp src: Oral BP: 122/69 Pulse: 92   Resp: 18 SpO2: 92 % O2 Device: BiPAP/CPAP Oxygen Delivery: 10 LPM  Weight: 550 lbs 0 oz    {Recommend personal SmartPhrase or Notewriter for exam (OPTIONAL)    :230874}    Medical Decision Making   { TIP   MDM Calculator    MDM grid (w/ times)    Coding Support Chat  Billing is now based on time OR medical decision making complexity. Medical decision making included in your A&P does NOT need to be re-documented here.    :19431}    {Time  :214514::\"*** MINUTES SPENT BY ME on the date of service doing chart review, history, exam, documentation & further activities per the note.\"}      Data   {INSERT LABS/IMAGING (OPTIONAL)   :624577}  "

## 2025-08-01 NOTE — ED TRIAGE NOTES
Pt arrived via EMS for SOB, and testicle swelling. Pt reported  swelling of testicle one other time. Pt denied dysuria, frequency, trauma, injury or new sexual partners.      Triage Assessment (Adult)       Row Name 07/31/25 1946          Triage Assessment    Airway WDL WDL        Respiratory WDL    Respiratory WDL X  SOB x 2 days        Skin Circulation/Temperature WDL    Skin Circulation/Temperature WDL X  redness to scrotum        Cardiac WDL    Cardiac WDL WDL        Peripheral/Neurovascular WDL    Peripheral Neurovascular WDL WDL        Cognitive/Neuro/Behavioral WDL    Cognitive/Neuro/Behavioral WDL WDL

## 2025-08-01 NOTE — PROGRESS NOTES
[facility]    Hospital Medicine   Cross Cover Note  Date of Service: 8/1/2025     Notified that patient changed his mind about code status, told RN that he now wishes to be full code.    - Code status changed to full code in Epic      Adwoa Youssef PA-C  [facility]ist Service

## 2025-08-01 NOTE — PLAN OF CARE
Pt stable during shift - vss, up to bsc/stand w/ x2 assist but able to move his body on his own w/ encouragement.  Has to stand to urinate in a trash can d/t swollen scrotum/inverted penis - scrotum excoriated & reddened from urination as sometimes incontinent of urine.  Urine out not exact d/t incontinence & urinating on floor.  Alternating b/t hiflow & bipap during shift.

## 2025-08-01 NOTE — PLAN OF CARE
"Physical Therapy: 2 PT orders received. First PT order received for \"scrotal swelling\". Unfortunately, do not have compression shorts/supporter in this facility. Would recommend adding towel roll under scrotum to assist with edema. Discussed with nurse.     Second PT order to evaluate for \"weakness/physical deconditioning\" will be completed tomorrow if needed, unable to see pt today due to scheduling.     "

## 2025-08-01 NOTE — PROGRESS NOTES
Transported patient to CT on BiPAP without incident.    Michael Lemon, RT on 7/31/2025 at 9:53 PM

## 2025-08-01 NOTE — PROGRESS NOTES
[facility]    Hospitalist Progress Note    Assessment & Plan    40 year old male with significant past medical history of morbid obesity, lymphedema, obesity hypoventilation syndrome, obstructive sleep apnea, psoriasis, and moderate persistent asthma admitted on 7/31/2025 with scrotal swelling and pain. He was not SOB but found to have acute respiratory failure with hypoxia and hypercapnia and suspected chronic right heart failure.    ed, wean as able       Impression:     Severe Right heart failure (H)   -- aggressive IV diuresis, currently on Lasix 40 mg IV q8h, and will add Spironolactone 25 mg every day   -- BMP in AM     Scrotal swelling -- related to right heart failure   -- did have scrotal US which showed bilateral hydrocele, but this is from whole body fluid overload   -- compress and elevated scrotum as possible (PT and OT aware)      Acute respiratory failure with hypoxia and hypercapnia (H)    Obesity hypoventilation syndrome     MAYDA treated with BiPAP   -- will aim for O2 sat 85% or higher (too much O2 and he hypoventilates and retains more      Pulmonary hypertension (H)       Morbid obesity -- BMI 55.0    -- much improved, down from 68      Tobacco use      Asthma      Lymphedema        Psoriasis      Financial -- not on medical assistance, does not qualify.  Can't afford meds   -- Care management aware of situation, helping as much as possible   -- he would have to private pay for home care ... He says he can get by at home        Plan:  as above    DVT Prophylaxis: Heparin SQ  Code Status: No CPR- Do NOT Intubate       Expected Discharge Date: 08/03/2025            Medically Ready for Discharge: Anticipated in 2-4 Days      Other Diagnoses  Clinically Significant Risk Factors Present on Admission          # Hypochloremia: Lowest Cl = 97 mmol/L in last 2 days, will monitor as appropriate  # Hypocalcemia: Lowest Ca = 8.1 mg/dL in last 2 days, will monitor and replace as  "appropriate                   # Morbid Obesity: Estimated body mass index is 54.98 kg/m  as calculated from the following:    Height as of this encounter: 1.854 m (6' 1\").    Weight as of this encounter: 189 kg (416 lb 11.2 oz).       # Asthma: noted on problem list          Daron Beach MD  Pager [phone]  Cell Phone [phone]  Text Page (7am to 6pm)  (50 min total)    Interval History   Feels OK, denies SOB, main concern is scrotal swelling.   No real testicular pain, but scrotum is tight from swelling.     Physical Exam   Temp: 98.2  F (36.8  C) Temp src: Axillary BP: (!) 151/80 Pulse: 77   Resp: 10 SpO2: (!) 85 % O2 Device: BiPAP/CPAP Oxygen Delivery: 50 LPM  Vitals:    07/31/25 1955 07/31/25 2335   Weight: (!) 249.5 kg (550 lb) (!) 189 kg (416 lb 11.2 oz)     Vital Signs with Ranges  Temp:  [98.2  F (36.8  C)-98.3  F (36.8  C)] 98.2  F (36.8  C)  Pulse:  [] 77  Resp:  [10-24] 10  BP: (122-152)/() 151/80  FiO2 (%):  [50 %-73 %] 65 %  SpO2:  [75 %-99 %] 85 %  I/O last 3 completed shifts:  In: -   Out: 500 [Urine:500]    # Pain Assessment:      8/1/2025    12:00 PM   Current Pain Score   Patient currently in pain? other (see comments)   Ge camacho pain level was assessed and he currently denies pain.        Constitutional: Awake, alert, cooperative, no apparent distress  Respiratory: Clear to auscultation bilaterally, no crackles or wheezing  Cardiovascular: Regular rate and rhythm, normal S1 and S2, and no murmur noted  GI: Normal bowel sounds, soft, obese, non-tender  Extrem: No calf tenderness, 3+ bilateral leg edema with chronic stasis changes  Neuro: Ox3, no focal motor or sensory deficits  Scotum; huge scrotum (cantelope sized), no specific tenderness, unable to see penis    Medications   Current Facility-Administered Medications   Medication Dose Route Frequency Provider Last Rate Last Admin    No lozenges or gum should be given while patient on BIPAP/AVAPS/AVAPS AE   Does not " apply Continuous PRN Adwoa Youssef PA-C        No lozenges or gum should be given while patient on BIPAP/AVAPS/AVAPS AE   Does not apply Continuous PRN Adwoa Youssef PA-C        Patient may continue current oral medications   Does not apply Continuous PRN Adwoa Youssef PA-C        Patient may continue current oral medications   Does not apply Continuous PRN Adwoa Youssef PA-C        Reason ACE/ARB/ARNI order not selected   Other DOES NOT GO TO Adwoa Domínguez PA-C         Current Facility-Administered Medications   Medication Dose Route Frequency Provider Last Rate Last Admin    carvedilol (COREG) tablet 6.25 mg  6.25 mg Oral BID Adwoa Youssef PA-C   6.25 mg at 08/01/25 0854    furosemide (LASIX) injection 40 mg  40 mg Intravenous Q8H Daron Olivier MD        miconazole (MICATIN) 2 % cream   Topical BID Adwoa Youssef PA-C   Given at 08/01/25 0856    sodium chloride (PF) 0.9% PF flush 3 mL  3 mL Intracatheter Q8H [facility] Adwoa Youssef PA-C        sodium chloride (PF) 0.9% PF flush 3 mL  3 mL Intracatheter Q8H [facility] Adwoa Youssef PA-C   3 mL at 08/01/25 0640    sodium chloride (PF) 0.9% PF flush 3 mL  3 mL Intracatheter Q8H [facility] Daron Olivier MD   3 mL at 08/01/25 1346    spironolactone (ALDACTONE) tablet 25 mg  25 mg Oral Daily Daron Olivier MD           Data   Recent Labs   Lab 08/01/25  0824 07/31/25 2034   WBC 6.6 7.4   HGB 12.6* 12.5*    97    226    135   POTASSIUM 4.5 4.5   CHLORIDE 99 97*   CO2 33* 31*   BUN 13.0 13.7   CR 0.96 0.89   ANIONGAP 8 7   JOSHUA 8.2* 8.1*   * 105*   ALBUMIN 3.5 3.6   PROTTOTAL 7.5 7.4   BILITOTAL 0.6 0.9   ALKPHOS 68 68   ALT 48 49   AST 49* 51*       Imaging:   Recent Results (from the past 24 hours)   CT Chest (PE) Abdomen Pelvis w Contrast    Narrative    EXAM: CT CHEST PE ABDOMEN PELVIS W CONTRAST  LOCATION: [facility]  DATE:  7/31/2025    INDICATION: Hypoxia and anasarca; severe scrotal swelling; morbid obesity.  COMPARISON: None available.  TECHNIQUE: CT chest pulmonary angiogram and routine CT abdomen pelvis with IV contrast. Arterial phase through the chest and venous phase through the abdomen and pelvis. Multiplanar reformats and MIP reconstructions were performed. Dose reduction   techniques were used.   CONTRAST: 100 mL Isovue-370.    FINDINGS: Limited study quality due to underpenetration, as a result of morbid obesity.    ANGIOGRAM CHEST: No identifiable acute pulmonary embolism. Study limitations preclude complete diagnostic assessment of subsegmental branches. Moderate right heart strain and flattening of the interventricular septum. Mild enlargement of the main   pulmonary artery.    Thoracic aorta is normal in course and caliber.    LUNGS AND PLEURA: Trachea and large airways are patent. No focal airspace consolidation. Mild to moderate dependent atelectatic change within the right middle and right lower lobes. Associated elevation of the right hemidiaphragm.     Indeterminate 3 mm nodule in the subpleural right upper lobe, series 6 image 92.    No pneumothorax.     No pleural effusion.     MEDIASTINUM/AXILLAE: No mediastinal/hilar lymphadenopathy.     Thoracic esophagus is unremarkable.      No axillary lymphadenopathy.    Chest wall is unremarkable.    Mild cardiomegaly and asymmetric right heart enlargement. No pericardial effusion.    CORONARY ARTERY CALCIFICATION: None.    HEPATOBILIARY: Subtle liver surface nodularity and widening of periportal spaces, suggesting the early changes of cirrhosis.    Gallbladder is nondistended and demonstrates diffuse wall thickening, favoring edema. This may be secondary to underlying liver disease or anasarca.    No intrahepatic or intrahepatic biliary ductal dilatation.    PANCREAS: Enhances normally. No peripancreatic inflammatory fat stranding.    SPLEEN: Enhances normally. Normal  size.    ADRENAL GLANDS: Normal.    KIDNEYS: Both kidneys enhance symmetrically, without hydronephrosis.    No nephroureterolithiasis.    Urinary bladder is unremarkable.    PELVIC ORGANS: No suspicious abnormality. Of note, the scrotum and testicles are excluded from field-of-view.    BOWEL: No evidence of acute gastrointestinal inflammation or obstruction. Normal appendix. Colonic diverticulosis.    No intraperitoneal free fluid or free air. Moderate sized fat-containing umbilical hernia.    LYMPH NODES: Mildly enlarged bilateral inguinal and iliac chain lymph nodes. For reference, a right external iliac chain lymph node measures 13 mm in short axis, series 12 image 218.    VASCULATURE: No abdominal aortic aneurysm.    MUSCULOSKELETAL: No suspicious abnormality.    OTHER: Mild body wall edema.      Impression    IMPRESSION:  1.  No identifiable acute pulmonary embolism. Study limitations preclude complete diagnostic assessment of subsegmental branches.  2.  Moderate right heart strain and flattening of the interventricular septum, which may be secondary to pulmonary hypertension (presumably chronic).  3.  Indeterminate 3 mm right upper lobe pulmonary nodule.  4.  Early changes of hepatic cirrhosis.  5.  Gallbladder wall thickening, favoring edema. This may be secondary to underlying liver disease or anasarca. Acute cholecystitis is unlikely, given the absence of gallbladder distention.  6.  Mildly enlarged bilateral inguinal and iliac chain lymph nodes, likely reactive. CT imaging follow-up recommended in 6 months.  7.  Colonic diverticulosis.  8.  Moderate-sized fat-containing umbilical hernia.  9.  Mild body wall edema, consistent with anasarca.  10.  Scrotum and testicles excluded from field-of-view. If requested, repeat CT imaging can be performed to include the scrotum and testicles, at no additional cost to the patient.     US Testicular & Scrotum w Doppler Ltd    Narrative    EXAM: US TESTICULAR AND SCROTUM  WITH DOPPLER LIMITED  LOCATION: [facility]  DATE: 2025    INDICATION: Severe scrotal swelling with pain and thickening.  COMPARISON: None.  TECHNIQUE: Ultrasound of scrotum with color flow and spectral Doppler with waveform analysis performed.    FINDINGS:  RIGHT: Right testicle measures 2.7 x 2.2 x 2.3 cm. Doppler flow documented. Color assessment reduced due to extensive soft tissue edema and depth of the testicle. Epididymis not well seen. Complex hydrocele. No varicocele.    LEFT: Left testicle measures 2.6 x 1.8 x 2.2 cm. Doppler flow documented. Color assessment reduced due to extensive soft tissue edema and depth of the testicle. Epididymis not well seen. Complex hydrocele. No varicocele.      Impression    IMPRESSION:  1.  Significant scrotal soft tissue edema bilaterally and bilateral complex hydroceles. Correlate for scrotal cellulitis.   Echocardiogram Complete   Result Value    LVEF  55%    Narrative    [id number]  [id number]  [id number]  [id number]^SHREYA^TAY^ANDI     [facility]  Echocardiography Laboratory  [facility] [facility] [place] [address]     Name: HARPREET PACK  MRN: [id number]  : 1985  Study Date: 2025 08:07 AM  Age: 40 yrs  Gender: Male  Patient Location: [facility]  Reason For Study: Heart Failure  Ordering Physician: TAY CASH  [facility] Phys[facility]  Performed By: Sophia Kidd RDCS     BSA: 2.9 m2  Height: 73 in  Weight: 416 lb  HR: 87  BP: 127/78 mmHg  ______________________________________________________________________________  Procedure  Echocardiogram with two-dimensional, color and spectral Doppler. Optison (NDC  #[id number]) given intravenously.  ______________________________________________________________________________  Interpretation Summary     1. The left ventricle is normal in structure, function and size. The visual  ejection fraction is estimated at 55%.  2. The right ventricle  is mild to moderately dilated. Moderately decreased  right ventricular systolic function Some septal flattening.  3. No valve disease.  4. Mild (35-45mmHg) pulmonary hypertension is present. The right ventricular  systolic pressure is approximated at 40mmHg plus the right atrial pressure.     No previous echo for comparison.  ______________________________________________________________________________  Left Ventricle  The left ventricle is normal in structure, function and size. Mild septal LVH.  The visual ejection fraction is estimated at 55%. Left ventricular diastolic  function is indeterminate. Normal left ventricular wall motion.     Right Ventricle  The right ventricle is mild to moderately dilated. Moderately decreased right  ventricular systolic function. Some septal flattening.     Atria  The left atrium is mildly dilated. The right atrium is mildly dilated. There  is no atrial shunt seen.     Mitral Valve  The mitral valve is normal in structure and function.     Tricuspid Valve  There is mild (1+) tricuspid regurgitation. Mild (35-45mmHg) pulmonary  hypertension is present. The right ventricular systolic pressure is  approximated at 40mmHg plus the right atrial pressure.     Aortic Valve  The aortic valve is normal in structure and function.     Pulmonic Valve  The pulmonic valve is normal in structure and function.     Vessels  Normal ascending, transverse (arch), and descending aorta. Dilation of the  inferior vena cava is present with abnormal respiratory variation in diameter.     Pericardium  There is no pericardial effusion.     Rhythm  Sinus rhythm was noted.  ______________________________________________________________________________  MMode/2D Measurements & Calculations  IVSd: 1.3 cm     LVIDd: 5.5 cm  LVIDs: 4.1 cm  LVPWd: 1.0 cm  FS: 25.6 %  LV mass(C)d: 259.1 grams  LV mass(C)dI: 88.2 grams/m2  Ao root diam: 3.1 cm  LA dimension: 5.2 cm  asc Aorta Diam: 2.9 cm  LA/Ao: 1.7  Ao root diam  index Ht(cm/m): 1.7  Ao root diam index BSA (cm/m2): 1.1  Asc Ao diam index BSA (cm/m2): 1.00  Asc Ao diam index Ht(cm/m): 1.6  LA Volume (BP): 103.0 ml     LA Volume Index (BP): 35.0 ml/m2  RWT: 0.37     Doppler Measurements & Calculations  MV E max james: 109.0 cm/sec  MV A max james: 75.3 cm/sec  MV E/A: 1.4  MV dec time: 0.18 sec  PA acc time: 0.10 sec  TR max james: 314.4 cm/sec  TR max P.5 mmHg  E/E' avg: 10.4  Lateral E/e': 10.1  Medial E/e': 10.7     ______________________________________________________________________________  Report approved by: Arnie Keys MD on 2025 09:36 AM

## 2025-08-01 NOTE — MEDICATION SCRIBE - ADMISSION MEDICATION HISTORY
Medication Scribe Admission Medication History    Admission medication history is complete. The information provided in this note is only as accurate as the sources available at the time of the update.    Information Source(s): Patient via in-person    Pertinent Information: Pt did not want to talk much. He stated he doesn't take any medications anymore. Hasn't taken lasix for multiple months. Says he hasn't used the creams in about that long as well.     Changes made to PTA medication list:  Added: None  Deleted: None  Changed: None    Allergies reviewed with patient and updates made in EHR: unable to assess    Medication History Completed By: Mike Infante 8/1/2025 9:19 AM    PTA Med List   Medication Sig Last Dose/Taking    acetaminophen (TYLENOL) 325 MG tablet Take 2 tablets (650 mg) by mouth every 6 hours as needed for mild pain or fever 7/31/2025 Morning    albuterol (PROAIR HFA/PROVENTIL HFA/VENTOLIN HFA) 108 (90 Base) MCG/ACT inhaler Inhale 2 puffs into the lungs every 6 hours as needed for shortness of breath or wheezing 7/31/2025 Evening    ibuprofen (ADVIL/MOTRIN) 200 MG tablet Take 600 mg by mouth every 6 hours as needed for pain 7/31/2025 Morning    calcipotriene (DOVONOX) 0.005 % external cream Apply topically 2 times daily More than a month    clobetasol (TEMOVATE) 0.05 % external cream Apply topically 2 times daily More than a month    furosemide (LASIX) 20 MG tablet Take 1 tablet (20 mg) by mouth daily Suggest in morning. More than a month    triamcinolone (KENALOG) 0.5 % external ointment To psoriasis as needed.  To be used until you get insurance coverage for calcipotriene and clobetasol -- do NOT use triamcinolone plus the others. More than a month

## 2025-08-01 NOTE — PROGRESS NOTES
"[place] [facility] ADMISSION NOTE    Patient admitted to room ICU 3 at approximately 2330 via cart from emergency room. Patient was accompanied by nurse and other:RT and 2nd RN.     Verbal SBAR report received from YAAKOV Espinoza prior to patient arrival.     Patient ambulated to bed Assist 3. Patient alert and oriented X 3. Pain is controlled without any medications.  . Admission vital signs: Blood pressure 136/79, pulse 90, temperature 98.2  F (36.8  C), temperature source Axillary, resp. rate 14, height 1.854 m (6' 1\"), weight (!) 189 kg (416 lb 11.2 oz), SpO2 94%. Patient was oriented to plan of care, call light, bed controls, tv, telephone, bathroom, and visiting hours.     Risk Assessment    The following safety risks were identified during admission: fall. Yellow risk band applied: YES.     Skin Initial Assessment    This writer admitted this patient and completed a full skin assessment and Santy score in the Adult PCS flowsheet.   Photo documentation of skin problem and/or wound competed via [employer] application (located under Media):  Yes    Appropriate interventions initiated as needed.     Secondary skin check completed by Dana NUNO.    Santy Risk Assessment  Sensory Perception: 4-->no impairment  Moisture: 2-->very moist  Activity: 3-->walks occasionally  Mobility: 2-->very limited  Nutrition: 3-->adequate  Friction and Shear: 2-->potential problem  Santy Score: 16  Moisture Interventions: No brief in bed, Barrier ointment (CriticAid, Triad paste)  Friction/Shear Interventions: HOB 30 degrees or less  Sensory/Activity/Mobility Interventions: Encourage activity/OOB  Mattress: Bariatric low air loss  Bed Frame: Bariatric    Education    Patient has a [place] to Observation order: No  Observation education completed and documented: N/A      Vandana Sunshine RN      "

## 2025-08-01 NOTE — CONSULTS
"Care Management Note:    CM team received order from MD, \"Would benefit from Home Care on discharge -- RN and maybe HHA.\"    JESSIE reviewed EMR, discovered pt with no insurance coverage, therefore, no payor source for Home Care services.    JESSIE discussed with Jennifer, Patient  with [employer], and requested she consult with pt ASAP to determine MA eligibility for pt.  Per Jennifer, pt is over income for MA, but can apply for radha care for his hospitalization bills.    JESSIE informed MD of above and informed that pt will not have home care services & to make follow up appointments, as needed for the pt with his PCP clinic.    No further care management intervention anticipated at this time.  Please re-consult if further needs arise.  Care management signing off.      PEDRO Mckinley    "

## 2025-08-01 NOTE — PROGRESS NOTES
08/01/25 0900   Appointment Info   Signing Clinician's Name / Credentials (OT) Julisa Flynn, OTR/L   Living Environment   People in Home alone   Current Living Arrangements apartment  (Studio apartment)   Home Accessibility no concerns   Transportation Anticipated car, drives self   Living Environment Comments Pt lives on main level. Pt reports slippery BR floor tiles have been contributing to falls at home.   Self-Care   Usual Activity Tolerance poor   Current Activity Tolerance poor   Regular Exercise No   Equipment Currently Used at Home cane, straight;dressing device;other (see comments)  (Reacher, pt uses AE to assist with LB dressing at baseline)   Fall history within last six months yes   Number of times patient has fallen within last six months 3   Activity/Exercise/Self-Care Comment Pt independent with ADLs at baseline.   Instrumental Activities of Daily Living (IADL)   Previous Responsibilities work;driving;medication management;finances;housekeeping;meal prep;shopping;laundry   IADL Comments Pt completes IADLs independently at baseline   General Information   Onset of Illness/Injury or Date of Surgery 07/31/25   Referring Physician Dr. Adwoa oYussef   Patient/Family Therapy Goal Statement (OT) Return home   Additional Occupational Profile Info/Pertinent History of Current Problem From H&P: Ge Hansen is a 40 year old male with significant past medical history of morbid obesity, lymphedema, obesity hypoventilation syndrome, obstructive sleep apnea, psoriasis, and moderate persistent asthma admitted on 7/31/2025. He presented to the ED with chief complain of scrotal swelling and pain. He was found to be in acute respiratory failure with hypoxia and hypercapnia and suspected acute heart failure.   Existing Precautions/Restrictions fall   Cognitive Status Examination   Orientation Status orientation to person, place and time   Visual Perception   Visual Impairment/Limitations WFL   Sensory    Sensory Quick Adds sensation intact   Pain Assessment   Patient Currently in Pain No  (When pt moves, pain is onset. Pain is felt in private area and second toe on L foot.)   Range of Motion Comprehensive   General Range of Motion other (see comments)  (Pt uses AE to assist with LB dressing d/t reduced ROM)   Strength Comprehensive (MMT)   General Manual Muscle Testing (MMT) Assessment no strength deficits identified   Muscle Tone Assessment   Muscle Tone Quick Adds No deficits were identified   Clinical Impression   Criteria for Skilled Therapeutic Interventions Met (OT) Yes, treatment indicated   OT Diagnosis Decreased functional endurance   Influenced by the following impairments heart failure   OT Problem List-Impairments impacting ADL problems related to;activity tolerance impaired   Assessment of Occupational Performance 1-3 Performance Deficits   Identified Performance Deficits heart management   Planned Therapy Interventions (OT) home program guidelines   Intervention Comments OT educated pt on IP OT role and POC. Pt agreed, evaluation completed and treatment completed.   Clinical Decision Making Complexity (OT) problem focused assessment/low complexity   Risk & Benefits of therapy have been explained evaluation/treatment results reviewed;care plan/treatment goals reviewed;risks/benefits reviewed;current/potential barriers reviewed;participants voiced agreement with care plan;participants included;patient   Clinical Impression Comments Pt w/ decreased functional endurance resulting in reduced independence with ADLs. Pt would benefit from skilled OT to educate on home program guidelines for improved home heart management.   OT Total Evaluation Time   OT Eval, Low Complexity Minutes (30144) 15   OT Goals   Therapy Frequency (OT) One time eval and treatment   OT Predicted Duration/Target Date for Goal Attainment 08/01/25   OT Goals Home Management   OT: Home Management Modified independent;using adaptive  equipment   Interventions   Interventions Quick Adds Self-Care/Home Management   Self-Care/Home Management   Self-Care/Home Mgmt/ADL, Compensatory, Meal Prep Minutes (98356) 15   Treatment Detail/Skilled Intervention OT educated pt on home program for managing symptoms to prevent heart failure. Pt does not own a scale that supports his weight, so that may be one barrier. OT educated pt on energy conservation and work simplification techniques and energy scale. OT educated pt on progressing walking HEP. Pt had no questions and expressed agreement with home management program. Pt left in bed with needs in reach.   OT Discharge Planning   OT Plan Treat 1x   OT Discharge Recommendation (DC Rec) home with assist;home with home care occupational therapy   OT Rationale for DC Rec Pt lives alone but is confident he can manage ADL/IADLs with his adaptive equipment. Pt would benefit from assist w/ heavy IADLs d/t reduced functional endurance. Pt would benefit from skilled home OT to promote safety in environment, and progress functional endurance for ADL/IADLs to develop and maintain independence.   OT Brief overview of current status Modified independent, Ax3 transfers   OT Total Distance Amb During Session (feet) 0   OT Equipment Needed at Discharge bariatric equipment  (Scale)   Total Session Time   Timed Code Treatment Minutes 15   Total Session Time (sum of timed and untimed services) 30   Psychosocial Support   Trust Relationship/Rapport care explained;choices provided;questions encouraged;thoughts/feelings acknowledged

## 2025-08-01 NOTE — H&P
[facility]    History and Physical - Hospitalist Service       Date of Admission:  7/31/2025    Assessment & Plan      Ge Hansen is a 40 year old male admitted on 7/31/2025. He ***    Principal Problem:  Acute heart failure, unspecified heart failure type (H)    Active Problems:  Lymphedema   Furosemide 20 mg daily   Scrotal swelling  Seen in ED 6/17/2025 with scrotal pain and swelling after a fall.   Bilateral complex hydroceles    Morbid obesity (H) BMI 68.34    Obesity hypoventilation syndrome (H)    MAYDA (obstructive sleep apnea)    Asthma  Albuterol 108 mcg/act inhaler 2 puffs prn for shortness of breath or wheezing  Psoriasis  Clobetasol 0.05% external cream, Calcipotriene 0.005% external ointment as needed  Tobacco use          Diet: NPO for Medical/Clinical Reasons Except for: Meds    DVT Prophylaxis: {DVT PROPHYLAXIS:167229}  Araya Catheter: Not present  Lines: None     Cardiac Monitoring: None  Code Status:  ***    Clinically Significant Risk Factors Present on Admission   { TIP  This section helps capture the illness of the patient on admission.     - Review diagnoses highlighted in blue; right click, edit & delete if not appropriate   - If blank, no additional diagnoses identified   :88840}       # Hypochloremia: Lowest Cl = 97 mmol/L in last 2 days, will monitor as appropriate  # Hypocalcemia: Lowest Ca = 8.1 mg/dL in last 2 days, will monitor and replace as appropriate                       # Asthma: noted on problem list        Disposition Plan   {TIP  It is required to update Medically Ready for Discharge [MRD] daily until the patient is 'Ready Now' (meets clinical goals). MRD reflects medical readiness -- not LAMAR (Expected Discharge Date), which may be delayed by disposition. Last MRD-Anticipated in 2-4 Days  . Use the SmartList below to update for today:882386}  Medically Ready for Discharge: {TIME; MEDICALLY READY FOR DISCHARGE:79125727}         The patient's care  was discussed with the { :550625}.    [facility]  [facility]  Securely message with [employer] (more info)  Text page via [employer] Paging/Directory     ______________________________________________________________________    Chief Complaint   ***    {History obtained from:4760203}    History of Present Illness   Ge Hansen is a 40 year old male who ***      Past Medical History    Past Medical History:   Diagnosis Date    ADHD     Asthma     Lactose intolerance     Obesity hypoventilation syndrome (H)     Plantar fasciitis 01/15/2021    Sleep apnea     Tobacco use        Past Surgical History   Past Surgical History:   Procedure Laterality Date    ROTATOR CUFF REPAIR RT/LT         Prior to Admission Medications   Prior to Admission Medications   Prescriptions Last Dose Informant Patient Reported? Taking?   Probiotic Product (FLORAJEN DIGESTION) CAPS   No No   Sig: Take 1 capsule by mouth 2 times daily   acetaminophen (TYLENOL) 325 MG tablet   Yes No   Sig: Take 2 tablets (650 mg) by mouth every 6 hours as needed for mild pain or fever   albuterol (PROAIR HFA/PROVENTIL HFA/VENTOLIN HFA) 108 (90 Base) MCG/ACT inhaler   No No   Sig: Inhale 2 puffs into the lungs every 6 hours as needed for shortness of breath or wheezing   calcipotriene (DOVONOX) 0.005 % external cream   No No   Sig: Apply topically 2 times daily   clobetasol (TEMOVATE) 0.05 % external cream   No No   Sig: Apply topically 2 times daily   furosemide (LASIX) 20 MG tablet   No No   Sig: Take 1 tablet (20 mg) by mouth daily Suggest in morning.   ibuprofen (ADVIL/MOTRIN) 200 MG tablet  Self Yes No   Sig: Take 600 mg by mouth every 6 hours as needed for pain   triamcinolone (KENALOG) 0.5 % external ointment   No No   Sig: To psoriasis as needed.  To be used until you get insurance coverage for calcipotriene and clobetasol -- do NOT use triamcinolone plus the others.      Facility-Administered Medications:  "None      {Additional Note Sections (OPTIONAL)  :185542}     Physical Exam   Vital Signs: Temp: 98.2  F (36.8  C) Temp src: Oral BP: 122/69 Pulse: 92   Resp: 18 SpO2: 92 % O2 Device: BiPAP/CPAP Oxygen Delivery: 10 LPM  Weight: 550 lbs 0 oz    {Recommend personal SmartPhrase or Notewriter for exam (OPTIONAL)    :318903}    Medical Decision Making   { TIP   MDM Calculator    MDM grid (w/ times)    Coding Support Chat  Billing is now based on time OR medical decision making complexity. Medical decision making included in your A&P does NOT need to be re-documented here.    :35084}    {Time  :649701::\"*** MINUTES SPENT BY ME on the date of service doing chart review, history, exam, documentation & further activities per the note.\"}      Data   {INSERT LABS/IMAGING (OPTIONAL)   :779998}  " "  DVT Prophylaxis: Ambulate every shift  Araya Catheter: Not present  Lines: None     Cardiac Monitoring: ACTIVE order. Indication: Acute decompensated heart failure (48 hours)  Code Status: No CPR- Do NOT Intubate - discussed at time of admission     Clinically Significant Risk Factors Present on Admission          # Hypochloremia: Lowest Cl = 97 mmol/L in last 2 days, will monitor as appropriate  # Hypocalcemia: Lowest Ca = 8.1 mg/dL in last 2 days, will monitor and replace as appropriate                   # Morbid Obesity: Estimated body mass index is 54.98 kg/m  as calculated from the following:    Height as of this encounter: 1.854 m (6' 1\").    Weight as of this encounter: 189 kg (416 lb 11.2 oz).       # Asthma: noted on problem list        Disposition Plan     Medically Ready for Discharge: Anticipated in 2-4 Days         The patient's care was discussed with the Attending Physician, Dr. Rafita Raygoza, Bedside Nurse, and Patient.      Patient was seen in a shared visit between NICOLE Paz and Adwoa Youssef PA-C.  Note was co-authored by NICOLE Paz and Adwoa Youssef PA-C.    I was present with the PA student who participated in the service and in the documentation of the note. I have verified the history and personally performed the physical exam and medical decision making. I agree with the assessment and plan of care as documented in the note.      Adwoa Youssef PA-C  Hospitalist Service  [facility]  Securely message with [employer] (more info)  Text page via [facility] Paging/Directory     ______________________________________________________________________    Chief Complaint   \"My testicles are the size of a loaf of bread.\"    History is obtained from the patient, review of EMR, and emergency department sign out from Dr. Odin Torres.     History of Present Illness   Ge Hansen is a 40 year old male with significant past medical history of " "morbid obesity, lymphedema, obesity hypoventilation syndrome, obstructive sleep apnea, psoriasis, and moderate persistent asthma admitted on 7/31/2025. He presented to the ED with chief complain of scrotal swelling and pain. He was found to be in acute respiratory failure with hypoxia and hypercapnia and suspected acute heart failure.     Patient started feeling short of breath a few months ago \"in the spring;\" he attributed it to allergies.   Shortness of breath has been progressively worsening since onset, worse with exertion but present at rest as well.   He describes symptoms of orthopnea, says he sleeps better propped up.  Has had lower extremity swelling but does not really feel that it has worsened, only the swelling in his testicles.   Has had a cough for a few months as well, productive with brown sputum.  Feels wheezy at times, uses his albuterol inhaler daily.     For the past few days his scrotum has been progressively more edematous, painful, and red.   He is experiencing a lot of pain of his testicles because of the swelling.   Still able to urinate normally, no dysuria.    Because of the pain and swelling of his testicles and the fact that his breathing isn't improving, he presented to the emergency department for evaluation and was found to have acute respiratory failure due to a new diagnosis of heart failure with anasarca as well as bilateral hydroceles.    He denies any prior diagnosis of heart failure.  Has been prescribed lasix in the past for edema, but never started taking it.     Review of systems   Has been intentionally trying to lose weight, is down from 550 pounds to 416 pounds.  Has poor sleep at baseline, says he does not sleep well with his BiPAP on but has been compliant with use.   No chest pain, palpitations.   Has chronic rash on his hands and feet, has been diagnosed with psoriasis in the past but is not on any pharmacologic treatment because the creams he has been prescribed have " been too expensive to use.   The remainder review of systems is negative.       Past Medical History    Past Medical History:   Diagnosis Date    ADHD     Asthma     Lactose intolerance     Obesity hypoventilation syndrome (H)     Plantar fasciitis 01/15/2021    Sleep apnea     Tobacco use        Past Surgical History   Past Surgical History:   Procedure Laterality Date    ROTATOR CUFF REPAIR RT/LT         Prior to Admission Medications   Prior to Admission Medications   Prescriptions Last Dose Informant Patient Reported? Taking?   Probiotic Product (FLORAJEN DIGESTION) CAPS Unknown  No No   Sig: Take 1 capsule by mouth 2 times daily   acetaminophen (TYLENOL) 325 MG tablet 7/31/2025 Morning  Yes Yes   Sig: Take 2 tablets (650 mg) by mouth every 6 hours as needed for mild pain or fever   albuterol (PROAIR HFA/PROVENTIL HFA/VENTOLIN HFA) 108 (90 Base) MCG/ACT inhaler 7/31/2025 Evening  No Yes   Sig: Inhale 2 puffs into the lungs every 6 hours as needed for shortness of breath or wheezing   calcipotriene (DOVONOX) 0.005 % external cream Unknown  No No   Sig: Apply topically 2 times daily   clobetasol (TEMOVATE) 0.05 % external cream Unknown  No No   Sig: Apply topically 2 times daily   furosemide (LASIX) 20 MG tablet Unknown  No No   Sig: Take 1 tablet (20 mg) by mouth daily Suggest in morning.   ibuprofen (ADVIL/MOTRIN) 200 MG tablet 7/31/2025 Morning Self Yes Yes   Sig: Take 600 mg by mouth every 6 hours as needed for pain   triamcinolone (KENALOG) 0.5 % external ointment Unknown  No No   Sig: To psoriasis as needed.  To be used until you get insurance coverage for calcipotriene and clobetasol -- do NOT use triamcinolone plus the others.      Facility-Administered Medications: None        Review of Systems    The 10 point Review of Systems is negative other than noted in the HPI or here.     Social History   I have reviewed this patient's social history and updated it with pertinent information if needed.  Social  History     Tobacco Use    Smoking status: Every Day     Current packs/day: 1.00     Average packs/day: 1 pack/day for 28.1 years (28.1 ttl pk-yrs)     Types: Cigarettes     Start date: 7/7/1997    Smokeless tobacco: Current     Types: Chew, Snuff    Tobacco comments:     Vaping    Substance Use Topics    Alcohol use: Yes    Drug use: No         Family History   I have reviewed this patient's family history and updated it with pertinent information if needed.  Family History   Problem Relation Age of Onset    Obesity Sister     Depression Sister     Anxiety Disorder Sister     Mental Illness Sister     Diabetes Father         Lost a leg    Hypertension Father     Hypertension Mother     Mental Illness Mother     Hypertension Maternal Grandmother     Cerebrovascular Disease Maternal Grandmother     Osteoporosis Sister         Physical Exam   Vital Signs: Temp: 98.3  F (36.8  C) Temp src: Axillary BP: 127/78 Pulse: 91   Resp: 18 SpO2: 96 % O2 Device: High Flow Nasal Cannula (HFNC) Oxygen Delivery: 50 LPM  Weight: 416 lbs 11.2 oz    Constitutional: Alert, oriented, cooperative. No apparent distress, appears nontoxic. Speaking in full coherent sentences.      Eyes: Eyes are clear, pupils are reactive. No scleral icterus.      HEENT: Oropharynx is clear and moist, no lesions. Normocephalic, no evidence of cranial trauma.       Cardiovascular: Regular rhythm and rate, normal S1 and S2. No murmur, rubs, or gallops. Peripheral pulses intact bilaterally. Bilateral lower extremity edema that is present proximally into scrotum and lower abdomen. JVP difficult to assess due to body habitus.      Respiratory: Non-labored breathing on high flow nasal canula. Distant lung sounds, no wheezes, rhonchi, or crackles appreciated.      GI: Soft, non-distended. Non-tender, no rebound or guarding. No hepatosplenomegaly or masses appreciated. Normal bowel sounds.      Musculoskeletal: Without obvious deformity, normal range of motion.  Distal CMS intact.       Skin: Warm and dry, no ecchymoses. No mottling of skin. Thick white plaques with erythematous base present on palms of hands and plantar aspect of feet. Bilateral lower extremity julisa erythema present consistent with venous stasis. Intertriginous erythema present.      Neurologic: Patient moves all extremities. Gross strength and sensation are equal bilaterally.     Genitourinary: Scrotum appears engorged / enlarged the size of large grapefruit, edematous, and erythematous. Penis buried.      Medical Decision Making       80 MINUTES SPENT BY ME on the date of service doing chart review, history, exam, documentation & further activities per the note.  MANAGEMENT DISCUSSED with the following over the past 24 hours: Dr. Rafita Ryagoza   NOTE(S)/MEDICAL RECORDS REVIEWED over the past 24 hours: emergency department notes 7/31/25  Tests ORDERED & REVIEWED in the past 24 hours:  - CMP  - CBC  - BNP  - VBG      Data     I have personally reviewed the following data over the past 24 hrs:    7.4  \   12.5 (L)   / 226     135 97 (L) 13.7 /  105 (H)   4.5 31 (H) 0.89 \     ALT: 49 AST: 51 (H) AP: 68 TBILI: 0.9   ALB: 3.6 TOT PROTEIN: 7.4 LIPASE: N/A     Trop: N/A BNP: 3,696 (H)     TSH: 4.50 (H) T4: 0.90 A1C: N/A       Imaging results reviewed over the past 24 hrs:   Recent Results (from the past 24 hours)   CT Chest (PE) Abdomen Pelvis w Contrast    Narrative    EXAM: CT CHEST PE ABDOMEN PELVIS W CONTRAST  LOCATION: [facility]  DATE: 7/31/2025    INDICATION: Hypoxia and anasarca; severe scrotal swelling; morbid obesity.  COMPARISON: None available.  TECHNIQUE: CT chest pulmonary angiogram and routine CT abdomen pelvis with IV contrast. Arterial phase through the chest and venous phase through the abdomen and pelvis. Multiplanar reformats and MIP reconstructions were performed. Dose reduction   techniques were used.   CONTRAST: 100 mL Isovue-370.    FINDINGS: Limited study  quality due to underpenetration, as a result of morbid obesity.    ANGIOGRAM CHEST: No identifiable acute pulmonary embolism. Study limitations preclude complete diagnostic assessment of subsegmental branches. Moderate right heart strain and flattening of the interventricular septum. Mild enlargement of the main   pulmonary artery.    Thoracic aorta is normal in course and caliber.    LUNGS AND PLEURA: Trachea and large airways are patent. No focal airspace consolidation. Mild to moderate dependent atelectatic change within the right middle and right lower lobes. Associated elevation of the right hemidiaphragm.     Indeterminate 3 mm nodule in the subpleural right upper lobe, series 6 image 92.    No pneumothorax.     No pleural effusion.     MEDIASTINUM/AXILLAE: No mediastinal/hilar lymphadenopathy.     Thoracic esophagus is unremarkable.      No axillary lymphadenopathy.    Chest wall is unremarkable.    Mild cardiomegaly and asymmetric right heart enlargement. No pericardial effusion.    CORONARY ARTERY CALCIFICATION: None.    HEPATOBILIARY: Subtle liver surface nodularity and widening of periportal spaces, suggesting the early changes of cirrhosis.    Gallbladder is nondistended and demonstrates diffuse wall thickening, favoring edema. This may be secondary to underlying liver disease or anasarca.    No intrahepatic or intrahepatic biliary ductal dilatation.    PANCREAS: Enhances normally. No peripancreatic inflammatory fat stranding.    SPLEEN: Enhances normally. Normal size.    ADRENAL GLANDS: Normal.    KIDNEYS: Both kidneys enhance symmetrically, without hydronephrosis.    No nephroureterolithiasis.    Urinary bladder is unremarkable.    PELVIC ORGANS: No suspicious abnormality. Of note, the scrotum and testicles are excluded from field-of-view.    BOWEL: No evidence of acute gastrointestinal inflammation or obstruction. Normal appendix. Colonic diverticulosis.    No intraperitoneal free fluid or free air.  Moderate sized fat-containing umbilical hernia.    LYMPH NODES: Mildly enlarged bilateral inguinal and iliac chain lymph nodes. For reference, a right external iliac chain lymph node measures 13 mm in short axis, series 12 image 218.    VASCULATURE: No abdominal aortic aneurysm.    MUSCULOSKELETAL: No suspicious abnormality.    OTHER: Mild body wall edema.      Impression    IMPRESSION:  1.  No identifiable acute pulmonary embolism. Study limitations preclude complete diagnostic assessment of subsegmental branches.  2.  Moderate right heart strain and flattening of the interventricular septum, which may be secondary to pulmonary hypertension (presumably chronic).  3.  Indeterminate 3 mm right upper lobe pulmonary nodule.  4.  Early changes of hepatic cirrhosis.  5.  Gallbladder wall thickening, favoring edema. This may be secondary to underlying liver disease or anasarca. Acute cholecystitis is unlikely, given the absence of gallbladder distention.  6.  Mildly enlarged bilateral inguinal and iliac chain lymph nodes, likely reactive. CT imaging follow-up recommended in 6 months.  7.  Colonic diverticulosis.  8.  Moderate-sized fat-containing umbilical hernia.  9.  Mild body wall edema, consistent with anasarca.  10.  Scrotum and testicles excluded from field-of-view. If requested, repeat CT imaging can be performed to include the scrotum and testicles, at no additional cost to the patient.     US Testicular & Scrotum w Doppler Ltd    Narrative    EXAM: US TESTICULAR AND SCROTUM WITH DOPPLER LIMITED  LOCATION: [facility]  DATE: 07/31/2025    INDICATION: Severe scrotal swelling with pain and thickening.  COMPARISON: None.  TECHNIQUE: Ultrasound of scrotum with color flow and spectral Doppler with waveform analysis performed.    FINDINGS:  RIGHT: Right testicle measures 2.7 x 2.2 x 2.3 cm. Doppler flow documented. Color assessment reduced due to extensive soft tissue edema and depth of the  testicle. Epididymis not well seen. Complex hydrocele. No varicocele.    LEFT: Left testicle measures 2.6 x 1.8 x 2.2 cm. Doppler flow documented. Color assessment reduced due to extensive soft tissue edema and depth of the testicle. Epididymis not well seen. Complex hydrocele. No varicocele.      Impression    IMPRESSION:  1.  Significant scrotal soft tissue edema bilaterally and bilateral complex hydroceles. Correlate for scrotal cellulitis.

## 2025-08-01 NOTE — PLAN OF CARE
End of Shift Note    Situation:Scrotal edema. Anasarca. Acute respiratory failure, hypoxia. New heart failure diagnosis.     Plan:Diuresing.     Subjective/Objective    Neuro:A&O x4. Flat emotional state. Frustrated with situation.     Cardiac: Tele sinus rhythm. Normotensive. +2 to +3 pitting general edema.     Resp: Bipap- AVAPS 60%. Sating low 90's.     GI/: Diuresing. 400ml UOP.     MSK:A2-3 pivot to commode.     Skin:Severe scrotal edema. Redness in various folds. Morbid obese.    LDA:PIV SL

## 2025-08-01 NOTE — PROGRESS NOTES
Pt started on Bipap with settings of AVAPS of EPAP 8 cmH2O, Min IPAP 10, Max IPAP 35, rate of 10 and 50% FiO2.  Prior to starting BiPAP he was awake, no signs of distress or confusion, and able to answers appropriately.  He was 67% SATs on 15 lpm oxy mask.  BS were clear/diminished bilaterally.  Sat's improved to 98% once BiPAP was started.  Pt states he wears a BiPAP at home, but was not sure of his settings.    Michael Lemon, RT on 7/31/2025 at 8:38 PM

## 2025-08-02 ENCOUNTER — APPOINTMENT (OUTPATIENT)
Dept: PHYSICAL THERAPY | Facility: CLINIC | Age: 40
DRG: 291 | End: 2025-08-02

## 2025-08-02 LAB
ANION GAP SERPL CALCULATED.3IONS-SCNC: 6 MMOL/L (ref 7–15)
BUN SERPL-MCNC: 14 MG/DL (ref 6–20)
CALCIUM SERPL-MCNC: 8.1 MG/DL (ref 8.8–10.4)
CHLORIDE SERPL-SCNC: 94 MMOL/L (ref 98–107)
CREAT SERPL-MCNC: 1.09 MG/DL (ref 0.67–1.17)
EGFRCR SERPLBLD CKD-EPI 2021: 89 ML/MIN/1.73M2
GLUCOSE SERPL-MCNC: 112 MG/DL (ref 70–99)
HCO3 SERPL-SCNC: 37 MMOL/L (ref 22–29)
HOLD SPECIMEN: NORMAL
MAGNESIUM SERPL-MCNC: 2.1 MG/DL (ref 1.7–2.3)
POTASSIUM SERPL-SCNC: 4.7 MMOL/L (ref 3.4–5.3)
SODIUM SERPL-SCNC: 137 MMOL/L (ref 135–145)

## 2025-08-02 PROCEDURE — 250N000013 HC RX MED GY IP 250 OP 250 PS 637: Performed by: INTERNAL MEDICINE

## 2025-08-02 PROCEDURE — 82947 ASSAY GLUCOSE BLOOD QUANT: CPT | Performed by: INTERNAL MEDICINE

## 2025-08-02 PROCEDURE — 94660 CPAP INITIATION&MGMT: CPT

## 2025-08-02 PROCEDURE — 36415 COLL VENOUS BLD VENIPUNCTURE: CPT | Performed by: INTERNAL MEDICINE

## 2025-08-02 PROCEDURE — 97116 GAIT TRAINING THERAPY: CPT | Mod: GP

## 2025-08-02 PROCEDURE — 250N000013 HC RX MED GY IP 250 OP 250 PS 637: Performed by: PHYSICIAN ASSISTANT

## 2025-08-02 PROCEDURE — 120N000013 HC R&B IMCU

## 2025-08-02 PROCEDURE — 97110 THERAPEUTIC EXERCISES: CPT | Mod: GP

## 2025-08-02 PROCEDURE — 250N000011 HC RX IP 250 OP 636: Performed by: STUDENT IN AN ORGANIZED HEALTH CARE EDUCATION/TRAINING PROGRAM

## 2025-08-02 PROCEDURE — 97161 PT EVAL LOW COMPLEX 20 MIN: CPT | Mod: GP

## 2025-08-02 PROCEDURE — 99233 SBSQ HOSP IP/OBS HIGH 50: CPT | Performed by: STUDENT IN AN ORGANIZED HEALTH CARE EDUCATION/TRAINING PROGRAM

## 2025-08-02 PROCEDURE — 250N000013 HC RX MED GY IP 250 OP 250 PS 637: Performed by: STUDENT IN AN ORGANIZED HEALTH CARE EDUCATION/TRAINING PROGRAM

## 2025-08-02 PROCEDURE — 250N000011 HC RX IP 250 OP 636: Performed by: INTERNAL MEDICINE

## 2025-08-02 PROCEDURE — 83735 ASSAY OF MAGNESIUM: CPT | Performed by: INTERNAL MEDICINE

## 2025-08-02 RX ORDER — POTASSIUM CHLORIDE 750 MG/1
10 TABLET, EXTENDED RELEASE ORAL 2 TIMES DAILY
Status: DISCONTINUED | OUTPATIENT
Start: 2025-08-02 | End: 2025-08-05

## 2025-08-02 RX ORDER — ACETAZOLAMIDE 500 MG/5ML
500 INJECTION, POWDER, LYOPHILIZED, FOR SOLUTION INTRAVENOUS DAILY
Status: COMPLETED | OUTPATIENT
Start: 2025-08-02 | End: 2025-08-04

## 2025-08-02 RX ORDER — VALSARTAN 40 MG/1
20 TABLET ORAL EVERY 12 HOURS SCHEDULED
Status: DISCONTINUED | OUTPATIENT
Start: 2025-08-02 | End: 2025-08-05 | Stop reason: HOSPADM

## 2025-08-02 RX ORDER — CHLOROTHIAZIDE SODIUM 500 MG/1
500 INJECTION INTRAVENOUS ONCE
Status: DISCONTINUED | OUTPATIENT
Start: 2025-08-02 | End: 2025-08-02

## 2025-08-02 RX ORDER — CHLOROTHIAZIDE SODIUM 500 MG/1
250 INJECTION INTRAVENOUS ONCE
Status: COMPLETED | OUTPATIENT
Start: 2025-08-02 | End: 2025-08-02

## 2025-08-02 RX ORDER — FUROSEMIDE 10 MG/ML
40 INJECTION INTRAMUSCULAR; INTRAVENOUS EVERY 6 HOURS
Status: DISCONTINUED | OUTPATIENT
Start: 2025-08-02 | End: 2025-08-03

## 2025-08-02 RX ADMIN — FUROSEMIDE 40 MG: 10 INJECTION, SOLUTION INTRAVENOUS at 11:50

## 2025-08-02 RX ADMIN — FUROSEMIDE 40 MG: 10 INJECTION, SOLUTION INTRAVENOUS at 18:34

## 2025-08-02 RX ADMIN — ACETAMINOPHEN 650 MG: 325 TABLET ORAL at 19:58

## 2025-08-02 RX ADMIN — VALSARTAN 20 MG: 40 TABLET ORAL at 19:59

## 2025-08-02 RX ADMIN — ACETAZOLAMIDE 500 MG: 500 INJECTION, POWDER, LYOPHILIZED, FOR SOLUTION INTRAVENOUS at 11:50

## 2025-08-02 RX ADMIN — CARVEDILOL 6.25 MG: 6.25 TABLET, FILM COATED ORAL at 08:48

## 2025-08-02 RX ADMIN — POTASSIUM CHLORIDE 10 MEQ: 750 TABLET, FILM COATED, EXTENDED RELEASE ORAL at 19:58

## 2025-08-02 RX ADMIN — MICONAZOLE NITRATE: 20 CREAM TOPICAL at 19:57

## 2025-08-02 RX ADMIN — FUROSEMIDE 40 MG: 10 INJECTION, SOLUTION INTRAMUSCULAR; INTRAVENOUS at 05:51

## 2025-08-02 RX ADMIN — HEPARIN SODIUM 5000 UNITS: 10000 INJECTION, SOLUTION INTRAVENOUS; SUBCUTANEOUS at 05:52

## 2025-08-02 RX ADMIN — ACETAMINOPHEN 650 MG: 325 TABLET ORAL at 05:50

## 2025-08-02 RX ADMIN — SPIRONOLACTONE 25 MG: 25 TABLET ORAL at 08:48

## 2025-08-02 RX ADMIN — HEPARIN SODIUM 5000 UNITS: 10000 INJECTION, SOLUTION INTRAVENOUS; SUBCUTANEOUS at 18:35

## 2025-08-02 RX ADMIN — MAGNESIUM 64 MG (MAGNESIUM CHLORIDE) TABLET,DELAYED RELEASE 535 MG: at 11:51

## 2025-08-02 RX ADMIN — VALSARTAN 20 MG: 40 TABLET ORAL at 11:50

## 2025-08-02 RX ADMIN — MICONAZOLE NITRATE: 20 CREAM TOPICAL at 08:46

## 2025-08-02 ASSESSMENT — ACTIVITIES OF DAILY LIVING (ADL)
ADLS_ACUITY_SCORE: 43
ADLS_ACUITY_SCORE: 47
ADLS_ACUITY_SCORE: 43
ADLS_ACUITY_SCORE: 45
ADLS_ACUITY_SCORE: 43
ADLS_ACUITY_SCORE: 45

## 2025-08-02 NOTE — PLAN OF CARE
Goal Outcome Evaluation:pt was confused and illogical at first assessment last evening and has since been alert and oriented. Using the call light for assist. Pt dyspnic and takes breaks but able to sit self at edge of bed and stand with just minimal assist . Alternates HFNC and bipap.Voids into wastebasket while standing due to large edematous scrotum. Pt drinking large amounts of water. Pain with scrotum with movement and cleansing.

## 2025-08-02 NOTE — PROGRESS NOTES
"Pt refusing 1500 lab draw - stated \"If it does not involve a nerf gun I won't do it.\"  MD updated.  "

## 2025-08-02 NOTE — PROGRESS NOTES
[facility]    Medicine Progress Note - Hospitalist Service    Date of Admission:  7/31/2025    Assessment & Plan   40 year old male with significant past medical history of morbid obesity, lymphedema, obesity hypoventilation syndrome, obstructive sleep apnea, psoriasis, and moderate persistent asthma admitted on 7/31/2025 with scrotal swelling and pain. He was not SOB but found to have acute respiratory failure with hypoxia and hypercapnia and suspected chronic right heart failure.    ed, wean as able       Impression:   Acute Decompensated HFpEF   -- aggressive IV diuresis, currently on Lasix 40 mg IV 68h, and Spironolactone 25 mg every day   -- due to insurance cost (and unavailability of candesartan)- start valsartan 20mg BID 8/2   -- Diamox daily 500mg iv , diuril 250mg x1 in afternoon (hold if urine output last 4 hours >200cc/hr or if SBP <120)   -- RFP in afternoon and in AM     Scrotal swelling -- related to heart failure   -- did have scrotal US which showed bilateral hydrocele, but this is from whole body fluid overload   -- compress and elevated scrotum as possible (PT and OT aware)      Acute respiratory failure with hypoxia and hypercapnia (H)    Obesity hypoventilation syndrome     MAYDA treated with BiPAP   -- will aim for O2 sat 85% or higher (too much O2 and he hypoventilates and retains more      Pulmonary hypertension (H)       Morbid obesity -- BMI 55.0    -- suspect weight not accurate, bed weight today showing +100lbs difference      Tobacco use      Asthma      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  Can't afford meds   -- Care management aware of situation, helping as much as possible   -- he would have to private pay for home care ... He says he can get by at home                Diet: Combination Diet 2 gm NA Diet    DVT Prophylaxis: Heparin SQ   Araya Catheter: Not present  Lines: None     Cardiac  "Monitoring: ACTIVE order. Indication: acute respiratory failure  Code Status: Full Code      Clinically Significant Risk Factors          # Hypochloremia: Lowest Cl = 94 mmol/L in last 2 days, will monitor as appropriate  # Hypocalcemia: Lowest Ca = 8.1 mg/dL in last 2 days, will monitor and replace as appropriate                    # Morbid Obesity: Estimated body mass index is 54.98 kg/m  as calculated from the following:    Height as of this encounter: 1.854 m (6' 1\").    Weight as of this encounter: 189 kg (416 lb 11.2 oz)., PRESENT ON ADMISSION     # Asthma: noted on problem list        Social Drivers of Health    Food Insecurity: High Risk (7/31/2025)    Food Insecurity     Within the past 12 months, did you worry that your food would run out before you got money to buy more?: Yes     Within the past 12 months, did the food you bought just not last and you didn t have money to get more?: Yes   Housing Stability: High Risk (7/31/2025)    Housing Stability     Do you have housing? : Yes     Are you worried about losing your housing?: Yes   Tobacco Use: High Risk (8/1/2024)    Patient History     Smoking Tobacco Use: Every Day     Smokeless Tobacco Use: Current   Financial Resource Strain: High Risk (7/31/2025)    Financial Resource Strain     Within the past 12 months, have you or your family members you live with been unable to get utilities (heat, electricity) when it was really needed?: Yes   Transportation Needs: High Risk (7/31/2025)    Transportation Needs     Within the past 12 months, has lack of transportation kept you from medical appointments, getting your medicines, non-medical meetings or appointments, work, or from getting things that you need?: Yes          Disposition Plan     Medically Ready for Discharge: Anticipated Tomorrow             Yonatan Louis DO  Hospitalist Service  [facility]  Securely message with [employer] (more info)  Text page via [facility] Paging/Directory "   ______________________________________________________________________    Interval History   Continue diuresis    Physical Exam   Vital Signs: Temp: 98.1  F (36.7  C) Temp src: Oral BP: 138/87 Pulse: 80   Resp: 13 SpO2: 94 % O2 Device: Nasal cannula Oxygen Delivery: 3 LPM  Weight: 416 lbs 11.2 oz    Obese  Lymphedemamatous changes  No distress    Medical Decision Making       40 MINUTES SPENT BY ME on the date of service doing chart review, history, exam, documentation & further activities per the note.      Data     I have personally reviewed the following data over the past 24 hrs:    N/A  \   N/A   / N/A     137 94 (L) 14.0 /  112 (H)   4.7 37 (H) 1.09 \       Imaging results reviewed over the past 24 hrs:   No results found for this or any previous visit (from the past 24 hours).

## 2025-08-02 NOTE — PLAN OF CARE
Pt stable during shift - vss, able to wean o2 from hiflow to o2 per nc @2L w/ sats 90-95%.  When pt is off o2 falls to 65% but quickly rises once replaced.  Pt had increased urine after diuretics increased.  Up to pee in trash can w/ walker & x1 assist w/ pt being fatigued & dyspneic after movement - unable to get accurate output b/c urine drips from edematous scrotum onto floor.

## 2025-08-02 NOTE — PROGRESS NOTES
Continues on AVAPS with short intermittent breaks on HHFNC. BS- diminished. Tolerating AVAPS well tonight. RT to follow.

## 2025-08-02 NOTE — PROGRESS NOTES
08/02/25 1100   Appointment Info   Signing Clinician's Name / Credentials (PT) Dari Landis, PT   Living Environment   People in Home alone   Current Living Arrangements apartment  (Formerly [facility] apartment)   Home Accessibility no concerns   Transportation Anticipated car, drives self   Self-Care   Usual Activity Tolerance poor   Current Activity Tolerance poor   Equipment Currently Used at Home cane, straight;dressing device;other (see comments)  (Reacher, pt uses AE to assist with LB dressing at baseline)   Fall history within last six months yes   Number of times patient has fallen within last six months 3   General Information   Onset of Illness/Injury or Date of Surgery 07/31/25   Referring Physician Dr Olivier   Patient/Family Therapy Goals Statement (PT) wants to go home, has no insurance for TCU   Pertinent History of Current Problem (include personal factors and/or comorbidities that impact the POC) Ge Hansen is a 40 year old male with significant past medical history of morbid obesity, lymphedema, obesity hypoventilation syndrome, obstructive sleep apnea, psoriasis, and moderate persistent asthma admitted on 7/31/2025. He presented to the ED with chief complain of scrotal swelling and pain. He was found to be in acute respiratory failure with hypoxia and hypercapnia and suspected acute heart failure.   Cognition   Affect/Mental Status (Cognition) WNL   Orientation Status (Cognition) oriented x 4   Follows Commands (Cognition) WNL   Pain Assessment   Patient Currently in Pain Yes, see Vital Sign flowsheet  (pt c/o scrotal pain)   Integumentary/Edema   Integumentary/Edema Comments B LE and scrotal edema   Posture    Posture Forward head position   Range of Motion (ROM)   Range of Motion ROM deficits secondary to pain;ROM deficits secondary to swelling   Strength (Manual Muscle Testing)   Strength (Manual Muscle Testing) Deficits observed during functional mobility   Gait/Stairs (Locomotion)   Comment,  (Gait/Stairs) Pt transfers supine<>sit with bed rail and gait belt tied to foot board to pull up on, sit <> stand with SBA/indep. Pivot to commode with SBA with wide GINGER. Amb with RW with SBA 40' with wide waddle gait. Limited by fatigue.   Balance   Balance Comments good with RW   Clinical Impression   Criteria for Skilled Therapeutic Intervention Yes, treatment indicated   PT Diagnosis (PT) weak and deconditioned   Influenced by the following impairments weakness, swelling, obesity   Functional limitations due to impairments mobility, gait and endurance   Clinical Presentation (PT Evaluation Complexity) stable   Clinical Presentation Rationale clinical judgement   Clinical Decision Making (Complexity) low complexity   Planned Therapy Interventions (PT) bed mobility training;gait training;strengthening   Risk & Benefits of therapy have been explained evaluation/treatment results reviewed;care plan/treatment goals reviewed;risks/benefits reviewed;participants voiced agreement with care plan;participants included;patient   PT Total Evaluation Time   PT Eval, Low Complexity Minutes (17910) 15   Physical Therapy Goals   PT Frequency 5x/week   PT Predicted Duration/Target Date for Goal Attainment 08/09/25   PT Goals Bed Mobility;Gait   PT: Bed Mobility Supervision/stand-by assist;Supine to/from sit   PT: Gait Supervision/stand-by assist;Rolling walker;100 feet   Interventions   Interventions Quick Adds Gait Training;Therapeutic Procedure   Therapeutic Procedure/Exercise   Ther. Procedure: strength, endurance, ROM, flexibillity Minutes (52224) 10   Treatment Detail/Skilled Intervention supine AP,  heel slides, hip abd/add, SAQ, SLR x 10   Gait Training   Gait Training Minutes (66742) 15   Symptoms Noted During/After Treatment (Gait Training) fatigue   Treatment Detail/Skilled Intervention Pt transfers supine<>sit with bed rail and gait belt tied to foot board to pull up on, sit <> stand with SBA/indep. Pivot to commode  with SBA with wide GINGER. Amb with RW with SBA 40' with wide waddle gait. Limited by fatigue.   Distance in Feet 40   PT Discharge Planning   PT Plan Sat 1/5: bed mobility, gait, LE strengthening   PT Discharge Recommendation (DC Rec) home;home with home care physical therapy   PT Rationale for DC Rec Pt would benefit from Home PT for LE strengthening, bed mobility, gait endurance.   PT Brief overview of current status Pt transfers supine<>sit with bed rail and gait belt tied to foot board to pull up on, sit <> stand with SBA/indep. Pivot to commode with SBA with wide GINGER. Amb with RW with SBA 40' with wide waddle gait. Limited by fatigue.   PT Total Distance Amb During Session (feet) 40   Physical Therapy Time and Intention   Timed Code Treatment Minutes 25   Total Session Time (sum of timed and untimed services) 40   Psychosocial Support   Trust Relationship/Rapport care explained;choices provided;questions encouraged;thoughts/feelings acknowledged        Negative Screen

## 2025-08-03 LAB
ALBUMIN SERPL BCG-MCNC: 3.3 G/DL (ref 3.5–5.2)
ANION GAP SERPL CALCULATED.3IONS-SCNC: 8 MMOL/L (ref 7–15)
BUN SERPL-MCNC: 15.5 MG/DL (ref 6–20)
CALCIUM SERPL-MCNC: 8 MG/DL (ref 8.8–10.4)
CHLORIDE SERPL-SCNC: 90 MMOL/L (ref 98–107)
CREAT SERPL-MCNC: 0.88 MG/DL (ref 0.67–1.17)
EGFRCR SERPLBLD CKD-EPI 2021: >90 ML/MIN/1.73M2
EST. AVERAGE GLUCOSE BLD GHB EST-MCNC: 123 MG/DL
GLUCOSE SERPL-MCNC: 100 MG/DL (ref 70–99)
HBA1C MFR BLD: 5.9 %
HCO3 SERPL-SCNC: 36 MMOL/L (ref 22–29)
HOLD SPECIMEN: NORMAL
PHOSPHATE SERPL-MCNC: 3 MG/DL (ref 2.5–4.5)
POTASSIUM SERPL-SCNC: 4.2 MMOL/L (ref 3.4–5.3)
SODIUM SERPL-SCNC: 134 MMOL/L (ref 135–145)

## 2025-08-03 PROCEDURE — 99233 SBSQ HOSP IP/OBS HIGH 50: CPT | Performed by: STUDENT IN AN ORGANIZED HEALTH CARE EDUCATION/TRAINING PROGRAM

## 2025-08-03 PROCEDURE — 120N000013 HC R&B IMCU

## 2025-08-03 PROCEDURE — 999N000157 HC STATISTIC RCP TIME EA 10 MIN

## 2025-08-03 PROCEDURE — 250N000013 HC RX MED GY IP 250 OP 250 PS 637: Performed by: STUDENT IN AN ORGANIZED HEALTH CARE EDUCATION/TRAINING PROGRAM

## 2025-08-03 PROCEDURE — 82040 ASSAY OF SERUM ALBUMIN: CPT | Performed by: STUDENT IN AN ORGANIZED HEALTH CARE EDUCATION/TRAINING PROGRAM

## 2025-08-03 PROCEDURE — 250N000011 HC RX IP 250 OP 636: Performed by: INTERNAL MEDICINE

## 2025-08-03 PROCEDURE — 83036 HEMOGLOBIN GLYCOSYLATED A1C: CPT | Performed by: STUDENT IN AN ORGANIZED HEALTH CARE EDUCATION/TRAINING PROGRAM

## 2025-08-03 PROCEDURE — 250N000011 HC RX IP 250 OP 636: Performed by: STUDENT IN AN ORGANIZED HEALTH CARE EDUCATION/TRAINING PROGRAM

## 2025-08-03 PROCEDURE — 250N000013 HC RX MED GY IP 250 OP 250 PS 637: Performed by: PHYSICIAN ASSISTANT

## 2025-08-03 PROCEDURE — 99207 PR NO BILLABLE SERVICE THIS VISIT: CPT | Performed by: STUDENT IN AN ORGANIZED HEALTH CARE EDUCATION/TRAINING PROGRAM

## 2025-08-03 PROCEDURE — 36415 COLL VENOUS BLD VENIPUNCTURE: CPT | Performed by: STUDENT IN AN ORGANIZED HEALTH CARE EDUCATION/TRAINING PROGRAM

## 2025-08-03 RX ORDER — FUROSEMIDE 10 MG/ML
60 INJECTION INTRAMUSCULAR; INTRAVENOUS EVERY 6 HOURS
Status: DISCONTINUED | OUTPATIENT
Start: 2025-08-03 | End: 2025-08-04

## 2025-08-03 RX ADMIN — MAGNESIUM 64 MG (MAGNESIUM CHLORIDE) TABLET,DELAYED RELEASE 535 MG: at 08:45

## 2025-08-03 RX ADMIN — MICONAZOLE NITRATE: 20 CREAM TOPICAL at 08:45

## 2025-08-03 RX ADMIN — SPIRONOLACTONE 25 MG: 25 TABLET ORAL at 08:45

## 2025-08-03 RX ADMIN — VALSARTAN 20 MG: 40 TABLET ORAL at 08:45

## 2025-08-03 RX ADMIN — HEPARIN SODIUM 5000 UNITS: 10000 INJECTION, SOLUTION INTRAVENOUS; SUBCUTANEOUS at 06:55

## 2025-08-03 RX ADMIN — FUROSEMIDE 60 MG: 10 INJECTION, SOLUTION INTRAMUSCULAR; INTRAVENOUS at 09:29

## 2025-08-03 RX ADMIN — FUROSEMIDE 60 MG: 10 INJECTION, SOLUTION INTRAMUSCULAR; INTRAVENOUS at 20:59

## 2025-08-03 RX ADMIN — FUROSEMIDE 60 MG: 10 INJECTION, SOLUTION INTRAMUSCULAR; INTRAVENOUS at 15:34

## 2025-08-03 RX ADMIN — POTASSIUM CHLORIDE 10 MEQ: 750 TABLET, FILM COATED, EXTENDED RELEASE ORAL at 20:57

## 2025-08-03 RX ADMIN — MICONAZOLE NITRATE: 20 CREAM TOPICAL at 20:59

## 2025-08-03 RX ADMIN — ACETAZOLAMIDE 500 MG: 500 INJECTION, POWDER, LYOPHILIZED, FOR SOLUTION INTRAVENOUS at 08:45

## 2025-08-03 RX ADMIN — EMPAGLIFLOZIN 10 MG: 10 TABLET, FILM COATED ORAL at 15:34

## 2025-08-03 RX ADMIN — ACETAMINOPHEN 650 MG: 325 TABLET ORAL at 04:06

## 2025-08-03 RX ADMIN — FUROSEMIDE 40 MG: 10 INJECTION, SOLUTION INTRAVENOUS at 00:11

## 2025-08-03 RX ADMIN — VALSARTAN 20 MG: 40 TABLET ORAL at 20:56

## 2025-08-03 RX ADMIN — HEPARIN SODIUM 5000 UNITS: 10000 INJECTION, SOLUTION INTRAVENOUS; SUBCUTANEOUS at 20:59

## 2025-08-03 RX ADMIN — ALBUTEROL SULFATE 2 PUFF: 90 INHALANT RESPIRATORY (INHALATION) at 09:52

## 2025-08-03 RX ADMIN — FUROSEMIDE 40 MG: 10 INJECTION, SOLUTION INTRAVENOUS at 06:55

## 2025-08-03 RX ADMIN — POTASSIUM CHLORIDE 10 MEQ: 750 TABLET, FILM COATED, EXTENDED RELEASE ORAL at 08:45

## 2025-08-03 ASSESSMENT — ACTIVITIES OF DAILY LIVING (ADL)
ADLS_ACUITY_SCORE: 42
ADLS_ACUITY_SCORE: 39
ADLS_ACUITY_SCORE: 42
ADLS_ACUITY_SCORE: 39
ADLS_ACUITY_SCORE: 42
ADLS_ACUITY_SCORE: 39
ADLS_ACUITY_SCORE: 42
ADLS_ACUITY_SCORE: 39
ADLS_ACUITY_SCORE: 39
ADLS_ACUITY_SCORE: 42
ADLS_ACUITY_SCORE: 39
ADLS_ACUITY_SCORE: 42
ADLS_ACUITY_SCORE: 42
ADLS_ACUITY_SCORE: 39
ADLS_ACUITY_SCORE: 42

## 2025-08-03 NOTE — PROGRESS NOTES
[facility]    Medicine Progress Note - Hospitalist Service    Date of Admission:  7/31/2025    Assessment & Plan   40 year old male with significant past medical history of morbid obesity, lymphedema, obesity hypoventilation syndrome, obstructive sleep apnea, psoriasis, and moderate persistent asthma admitted on 7/31/2025 with scrotal swelling and pain. He was not SOB but found to have acute respiratory failure with hypoxia and hypercapnia and suspected chronic right heart failure.    ed, wean as able    Update [ August 3, 2025 ]:  Unable to go home as he can't get close his legs to be able to get into his truck yet.       Impression:   Acute Decompensated HFpEF   -- aggressive IV diuresis, currently on Lasix 40 mg IV 68h, and Spironolactone 25 mg every day   -- due to insurance cost (and unavailability of candesartan)- start valsartan 20mg BID 8/2 08/03: Diamox daily 500mg iv , diuril 250mg x1 in afternoon (hold if urine output last 4 hours >200cc/hr or if SBP <120)     - continue:  - IV diuresis  - Valsartan 20mg BID (08/02 start  - diamox daily x3d   - Aldactone 25mg daily  - start jardiance while in patient for augmented diuresis and hopeful decreased length of stay for THIS patient       Scrotal swelling -- related to heart failure   -- did have scrotal US which showed bilateral hydrocele, but this is from whole body fluid overload   -- compress and elevated scrotum as possible (PT and OT aware)      Acute respiratory failure with hypoxia and hypercapnia (H)    Obesity hypoventilation syndrome     MAYDA treated with BiPAP   -- will aim for O2 sat 85% or higher (too much O2 and he hypoventilates and retains more      Pulmonary hypertension (H)       Morbid obesity -- BMI 77.0    -- suspect weight not accurate, bed weight today showing +100lbs difference      Tobacco use      Asthma      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  Can't afford meds   -- Care management aware of situation, helping as much as possible   -- he would have to private pay for home care ... He says he can get by at home              Diet: Combination Diet High Kcal/High Protein Diet, ADULT; 2 gm NA Diet    DVT Prophylaxis: Heparin SQ  Araya Catheter: Not present  Lines: None     Cardiac Monitoring: ACTIVE order. Indication: acute respiratory failure  Code Status: Full Code      Clinically Significant Risk Factors         # Hyponatremia: Lowest Na = 134 mmol/L in last 2 days, will monitor as appropriate  # Hypochloremia: Lowest Cl = 90 mmol/L in last 2 days, will monitor as appropriate  # Hypocalcemia: Lowest Ca = 8 mg/dL in last 2 days, will monitor and replace as appropriate     # Hypoalbuminemia: Lowest albumin = 3.3 g/dL at 8/3/2025  8:06 AM, will monitor as appropriate                # Morbid Obesity: Estimated body mass index is 77.74 kg/m  as calculated from the following:    Height as of this encounter: 1.854 m (6' 1\").    Weight as of this encounter: 267.3 kg (589 lb 3.2 oz)., PRESENT ON ADMISSION     # Asthma: noted on problem list        Social Drivers of Health    Food Insecurity: High Risk (7/31/2025)    Food Insecurity     Within the past 12 months, did you worry that your food would run out before you got money to buy more?: Yes     Within the past 12 months, did the food you bought just not last and you didn t have money to get more?: Yes   Housing Stability: High Risk (7/31/2025)    Housing Stability     Do you have housing? : Yes     Are you worried about losing your housing?: Yes   Tobacco Use: High Risk (8/1/2024)    Patient History     Smoking Tobacco Use: Every Day     Smokeless Tobacco Use: Current   Financial Resource Strain: High Risk (7/31/2025)    Financial Resource Strain     Within the past 12 months, have you or your family members you live with been unable to get utilities (heat, electricity) when it was really needed?: Yes "   Transportation Needs: High Risk (7/31/2025)    Transportation Needs     Within the past 12 months, has lack of transportation kept you from medical appointments, getting your medicines, non-medical meetings or appointments, work, or from getting things that you need?: Yes          Disposition Plan     Medically Ready for Discharge: Anticipated Tomorrow  1-2 days pending ability to ambulate and diuresis           Yonatan Louis DO  Hospitalist Service  [facility]  Securely message with [employer] (more info)  Text page via [employer] Paging/Directory   ______________________________________________________________________    Interval History   No acute events      Physical Exam   Vital Signs: Temp: 98.2  F (36.8  C) Temp src: Oral BP: 122/66 Pulse: 73   Resp: 25 SpO2: (!) 91 % O2 Device: Nasal cannula Oxygen Delivery: 2 LPM  Weight: 589 lbs 3.2 oz    Obese  Lymphedematous changes   No apparent distress  Ncat    Moves extremities spontaneously    Medical Decision Making       55 MINUTES SPENT BY ME on the date of service doing chart review, history, exam, documentation & further activities per the note.      Data     I have personally reviewed the following data over the past 24 hrs:    N/A  \   N/A   / N/A     134 (L) 90 (L) 15.5 /  100 (H)   4.2 36 (H) 0.88 \     ALT: N/A AST: N/A AP: N/A TBILI: N/A   ALB: 3.3 (L) TOT PROTEIN: N/A LIPASE: N/A     TSH: N/A T4: N/A A1C: 5.9 (H)       Imaging results reviewed over the past 24 hrs:   No results found for this or any previous visit (from the past 24 hours).

## 2025-08-03 NOTE — PLAN OF CARE
Pt stable during shift - vss, diuresing a lot.  At times incontinent - will soak himself/bed & a lake of urine from the bed to the window.  Urinating in trash can d/t scrotum edema & penis inside scrotum which causes inaccurate output as well d/t urine flowing down scrotum.  Pt states he can make it home in his truck when his scrotum is less swollen so he can close his legs more & shut the door.  He stated today he would not have been able to shut the door.  Pt had me take a picture of his scrotum with his phone so he could see how it was doing - pt thinks it is looking better.  Eating & drinking well - drinks a lot of fluids throughout the day.  Remains on 2L of o2 per nc w/ sats low 90s - on room air 76%.

## 2025-08-03 NOTE — PLAN OF CARE
Goal Outcome Evaluation:  Pt using 2L NC most of the noc, he does tolerate the bipap for periods but states he does not like how the mask fits. Remains with significant dyspnea with any activity. Sits at bedside with minimal assist.

## 2025-08-04 LAB
ALBUMIN SERPL BCG-MCNC: 3.5 G/DL (ref 3.5–5.2)
ANION GAP SERPL CALCULATED.3IONS-SCNC: 8 MMOL/L (ref 7–15)
BUN SERPL-MCNC: 15 MG/DL (ref 6–20)
CALCIUM SERPL-MCNC: 8.5 MG/DL (ref 8.8–10.4)
CHLORIDE SERPL-SCNC: 93 MMOL/L (ref 98–107)
CREAT SERPL-MCNC: 0.94 MG/DL (ref 0.67–1.17)
EGFRCR SERPLBLD CKD-EPI 2021: >90 ML/MIN/1.73M2
GLUCOSE SERPL-MCNC: 96 MG/DL (ref 70–99)
HCO3 SERPL-SCNC: 37 MMOL/L (ref 22–29)
HOLD SPECIMEN: NORMAL
PHOSPHATE SERPL-MCNC: 3.3 MG/DL (ref 2.5–4.5)
POTASSIUM SERPL-SCNC: 4.3 MMOL/L (ref 3.4–5.3)
SODIUM SERPL-SCNC: 138 MMOL/L (ref 135–145)

## 2025-08-04 PROCEDURE — 250N000013 HC RX MED GY IP 250 OP 250 PS 637: Performed by: STUDENT IN AN ORGANIZED HEALTH CARE EDUCATION/TRAINING PROGRAM

## 2025-08-04 PROCEDURE — 99233 SBSQ HOSP IP/OBS HIGH 50: CPT | Performed by: STUDENT IN AN ORGANIZED HEALTH CARE EDUCATION/TRAINING PROGRAM

## 2025-08-04 PROCEDURE — 250N000011 HC RX IP 250 OP 636: Performed by: STUDENT IN AN ORGANIZED HEALTH CARE EDUCATION/TRAINING PROGRAM

## 2025-08-04 PROCEDURE — 82947 ASSAY GLUCOSE BLOOD QUANT: CPT | Performed by: STUDENT IN AN ORGANIZED HEALTH CARE EDUCATION/TRAINING PROGRAM

## 2025-08-04 PROCEDURE — 999N000157 HC STATISTIC RCP TIME EA 10 MIN

## 2025-08-04 PROCEDURE — 36415 COLL VENOUS BLD VENIPUNCTURE: CPT | Performed by: STUDENT IN AN ORGANIZED HEALTH CARE EDUCATION/TRAINING PROGRAM

## 2025-08-04 PROCEDURE — 250N000011 HC RX IP 250 OP 636: Performed by: INTERNAL MEDICINE

## 2025-08-04 PROCEDURE — 94660 CPAP INITIATION&MGMT: CPT

## 2025-08-04 PROCEDURE — 120N000013 HC R&B IMCU

## 2025-08-04 RX ORDER — SPIRONOLACTONE 25 MG/1
25 TABLET ORAL DAILY
Qty: 30 TABLET | Refills: 0 | Status: SHIPPED | OUTPATIENT
Start: 2025-08-05 | End: 2025-08-05

## 2025-08-04 RX ORDER — VALSARTAN 40 MG/1
20 TABLET ORAL EVERY 12 HOURS
Qty: 30 TABLET | Refills: 0 | Status: SHIPPED | OUTPATIENT
Start: 2025-08-04 | End: 2025-08-05

## 2025-08-04 RX ORDER — FUROSEMIDE 40 MG/1
40 TABLET ORAL
Status: DISCONTINUED | OUTPATIENT
Start: 2025-08-04 | End: 2025-08-04

## 2025-08-04 RX ORDER — METOLAZONE 2.5 MG/1
5 TABLET ORAL ONCE
Status: COMPLETED | OUTPATIENT
Start: 2025-08-04 | End: 2025-08-04

## 2025-08-04 RX ORDER — FUROSEMIDE 40 MG/1
40 TABLET ORAL
Qty: 60 TABLET | Refills: 0 | Status: SHIPPED | OUTPATIENT
Start: 2025-08-04 | End: 2025-08-05

## 2025-08-04 RX ORDER — TORSEMIDE 20 MG/1
60 TABLET ORAL DAILY
Status: DISCONTINUED | OUTPATIENT
Start: 2025-08-04 | End: 2025-08-05

## 2025-08-04 RX ADMIN — EMPAGLIFLOZIN 10 MG: 10 TABLET, FILM COATED ORAL at 08:12

## 2025-08-04 RX ADMIN — FUROSEMIDE 60 MG: 10 INJECTION, SOLUTION INTRAMUSCULAR; INTRAVENOUS at 09:03

## 2025-08-04 RX ADMIN — HEPARIN SODIUM 5000 UNITS: 10000 INJECTION, SOLUTION INTRAVENOUS; SUBCUTANEOUS at 20:59

## 2025-08-04 RX ADMIN — POTASSIUM CHLORIDE 10 MEQ: 750 TABLET, FILM COATED, EXTENDED RELEASE ORAL at 08:12

## 2025-08-04 RX ADMIN — SPIRONOLACTONE 25 MG: 25 TABLET ORAL at 08:12

## 2025-08-04 RX ADMIN — VALSARTAN 20 MG: 40 TABLET ORAL at 08:12

## 2025-08-04 RX ADMIN — POTASSIUM CHLORIDE 10 MEQ: 750 TABLET, FILM COATED, EXTENDED RELEASE ORAL at 20:58

## 2025-08-04 RX ADMIN — METOLAZONE 5 MG: 2.5 TABLET ORAL at 20:59

## 2025-08-04 RX ADMIN — MICONAZOLE NITRATE: 20 CREAM TOPICAL at 08:15

## 2025-08-04 RX ADMIN — TORSEMIDE 60 MG: 20 TABLET ORAL at 22:42

## 2025-08-04 RX ADMIN — FUROSEMIDE 40 MG: 40 TABLET ORAL at 20:58

## 2025-08-04 RX ADMIN — MAGNESIUM 64 MG (MAGNESIUM CHLORIDE) TABLET,DELAYED RELEASE 535 MG: at 08:15

## 2025-08-04 RX ADMIN — HEPARIN SODIUM 5000 UNITS: 10000 INJECTION, SOLUTION INTRAVENOUS; SUBCUTANEOUS at 08:15

## 2025-08-04 RX ADMIN — VALSARTAN 20 MG: 40 TABLET ORAL at 20:58

## 2025-08-04 RX ADMIN — FUROSEMIDE 60 MG: 10 INJECTION, SOLUTION INTRAMUSCULAR; INTRAVENOUS at 03:41

## 2025-08-04 RX ADMIN — ACETAZOLAMIDE 500 MG: 500 INJECTION, POWDER, LYOPHILIZED, FOR SOLUTION INTRAVENOUS at 09:04

## 2025-08-04 RX ADMIN — MICONAZOLE NITRATE: 20 CREAM TOPICAL at 20:59

## 2025-08-04 RX ADMIN — FUROSEMIDE 40 MG: 40 TABLET ORAL at 14:03

## 2025-08-04 ASSESSMENT — ACTIVITIES OF DAILY LIVING (ADL)
ADLS_ACUITY_SCORE: 42
ADLS_ACUITY_SCORE: 41
ADLS_ACUITY_SCORE: 42
ADLS_ACUITY_SCORE: 41
ADLS_ACUITY_SCORE: 42
ADLS_ACUITY_SCORE: 41
ADLS_ACUITY_SCORE: 41
ADLS_ACUITY_SCORE: 42
ADLS_ACUITY_SCORE: 42

## 2025-08-04 NOTE — PLAN OF CARE
Goal Outcome Evaluation: educated pt on importance of fluid restriction and sodium restriction. He states understanding and has been cooperative throughout the night and not requesting excess of fluids or food. He did sleep soundly with the bipap on, several mask adjustments for alarm leaks. He did not state the need to urinate when staff in the room. He was very, very incontinent of urine when checked after he had not voided. Pt states he was sleeping and wasn't aware that he was incontinent.  Then, after the total linen change , he did call to stand at bedside to void. Pt moving easier and stronger then previously though still has SOB with activity.

## 2025-08-04 NOTE — PLAN OF CARE
Pt up to bathroom x 2, moved well. Denied increase in pain. Asked for assistance lifting scrotum on top legs. On RA desatted to 75%, on 2 L NC sats >85% (goal).  Voided all over floor.  Can not get Bipap brought into facility.  Call light in reach.  Sanaz Loco RN BSN    Problem: Adult Inpatient Plan of Care  Goal: Absence of Hospital-Acquired Illness or Injury  Intervention: Identify and Manage Fall Risk  Recent Flowsheet Documentation  Taken 8/4/2025 1600 by Sanaz Loco RN  Safety Promotion/Fall Prevention:   supervised activity   patient and family education   mobility aid in reach   clutter free environment maintained  Taken 8/4/2025 0800 by Sanaz Loco RN  Safety Promotion/Fall Prevention:   supervised activity   patient and family education   mobility aid in reach   clutter free environment maintained  Intervention: Prevent Skin Injury  Recent Flowsheet Documentation  Taken 8/4/2025 1600 by Sanaz Loco RN  Body Position:   position changed independently   supine, head elevated  Taken 8/4/2025 0800 by Sanaz Loco RN  Body Position:   position changed independently   supine, head elevated  Goal: Optimal Comfort and Wellbeing  Intervention: Provide Person-Centered Care  Recent Flowsheet Documentation  Taken 8/4/2025 1600 by Sanaz Loco RN  Trust Relationship/Rapport:   care explained   empathic listening provided  Taken 8/4/2025 0800 by Sanaz Loco RN  Trust Relationship/Rapport:   care explained   empathic listening provided     Problem: Risk for Delirium  Goal: Optimal Coping  Intervention: Optimize Psychosocial Adjustment to Delirium  Recent Flowsheet Documentation  Taken 8/4/2025 1600 by Sanaz Loco RN  Supportive Measures:   active listening utilized   self-care encouraged   self-responsibility promoted  Taken 8/4/2025 0800 by Sanaz Loco RN  Supportive Measures:   active listening utilized   self-care encouraged   self-responsibility  promoted  Goal: Improved Behavioral Control  Intervention: Minimize Safety Risk  Recent Flowsheet Documentation  Taken 8/4/2025 1600 by Sanaz Loco RN  Enhanced Safety Measures: patient/family teach back on injury risk  Trust Relationship/Rapport:   care explained   empathic listening provided  Taken 8/4/2025 0800 by Sanaz Loco RN  Enhanced Safety Measures: patient/family teach back on injury risk  Trust Relationship/Rapport:   care explained   empathic listening provided     Problem: Bariatric Environmental Safety  Goal: Safety Maintained with Care  Intervention: Promote Safety and Comfort  Recent Flowsheet Documentation  Taken 8/4/2025 1600 by Sanaz Loco RN  Bariatric Safety: bariatric commode at bedside  Taken 8/4/2025 0800 by Sanaz Loco RN  Bariatric Safety: bariatric commode at bedside     Problem: Skin Injury Risk Increased  Goal: Skin Health and Integrity  Intervention: Plan: Nurse Driven Intervention: Moisture Management  Recent Flowsheet Documentation  Taken 8/4/2025 1600 by Sanaz Loco RN  Moisture Interventions: Encourage regular toileting  Taken 8/4/2025 0800 by Sanaz Loco RN  Moisture Interventions: Encourage regular toileting  Intervention: Plan: Nurse Driven Intervention: Friction and Shear  Recent Flowsheet Documentation  Taken 8/4/2025 1600 by Sanaz Loco RN  Friction/Shear Interventions: HOB 30 degrees or less  Taken 8/4/2025 0800 by Sanaz Loco RN  Friction/Shear Interventions: HOB 30 degrees or less  Intervention: Optimize Skin Protection  Recent Flowsheet Documentation  Taken 8/4/2025 1600 by Sanaz Loco RN  Activity Management: activity adjusted per tolerance  Taken 8/4/2025 0800 by Sanaz Loco RN  Activity Management: activity adjusted per tolerance   Goal Outcome Evaluation:

## 2025-08-04 NOTE — PROGRESS NOTES
[facility]    Medicine Progress Note - Hospitalist Service    Date of Admission:  7/31/2025    Assessment & Plan   40 year old male with significant past medical history of morbid obesity, lymphedema, obesity hypoventilation syndrome, obstructive sleep apnea, psoriasis, and moderate persistent asthma admitted on 7/31/2025 with scrotal swelling and pain. He was not SOB but found to have acute respiratory failure with hypoxia and hypercapnia and suspected chronic right heart failure.    ed, wean as able    Update [ August 4, 2025 ]:  Patient wanting to leave this AM, discussed with him, it would be against medical advice. Will prescribe medications for him either way, but my recommendation is for him to stay. Can see what his output is like on oral lasix.       Impression:   Acute Decompensated HFpEF   -- aggressive IV diuresis, currently on Lasix 40 mg IV 68h, and Spironolactone 25 mg every day   -- due to insurance cost (and unavailability of candesartan)- start valsartan 20mg BID 8/2 08/03: Diamox daily 500mg iv , diuril 250mg x1 in afternoon (hold if urine output last 4 hours >200cc/hr or if SBP <120)     - continue:  - IV diuresis -> PO lasix 40mg BID  - Valsartan 20mg BID (08/02 start  - diamox daily x3d   - Aldactone 25mg daily  - start jardiance while in patient for augmented diuresis and hopeful decreased length of stay for THIS patient       Scrotal swelling -- related to heart failure   -- did have scrotal US which showed bilateral hydrocele, but this is from whole body fluid overload   -- compress and elevated scrotum as possible (PT and OT aware)      Acute respiratory failure with hypoxia and hypercapnia (H)    Obesity hypoventilation syndrome     MAYDA treated with BiPAP   -- will aim for O2 sat 85% or higher (too much O2 and he hypoventilates and retains more      Pulmonary hypertension (H)       Morbid obesity -- BMI 77.0    -- suspect weight not accurate, bed weight today  "showing +100lbs difference      Tobacco use      Asthma      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  Can't afford meds   -- Care management aware of situation, helping as much as possible   -- he would have to private pay for home care ... He says he can get by at home            Diet: Combination Diet High Kcal/High Protein Diet, ADULT; 2 gm NA Diet  Fluid restriction 2000 ML FLUID    DVT Prophylaxis: Heparin SQ  Araya Catheter: Not present  Lines: None     Cardiac Monitoring: ACTIVE order. Indication: acute respiratory failure  Code Status: Full Code      Clinically Significant Risk Factors         # Hyponatremia: Lowest Na = 134 mmol/L in last 2 days, will monitor as appropriate  # Hypochloremia: Lowest Cl = 90 mmol/L in last 2 days, will monitor as appropriate  # Hypocalcemia: Lowest Ca = 8 mg/dL in last 2 days, will monitor and replace as appropriate     # Hypoalbuminemia: Lowest albumin = 3.3 g/dL at 8/3/2025  8:06 AM, will monitor as appropriate                # Morbid Obesity: Estimated body mass index is 76.05 kg/m  as calculated from the following:    Height as of this encounter: 1.854 m (6' 1\").    Weight as of this encounter: 261.5 kg (576 lb 6.4 oz)., PRESENT ON ADMISSION     # Asthma: noted on problem list        Social Drivers of Health    Food Insecurity: High Risk (7/31/2025)    Food Insecurity     Within the past 12 months, did you worry that your food would run out before you got money to buy more?: Yes     Within the past 12 months, did the food you bought just not last and you didn t have money to get more?: Yes   Housing Stability: High Risk (7/31/2025)    Housing Stability     Do you have housing? : Yes     Are you worried about losing your housing?: Yes   Tobacco Use: High Risk (8/1/2024)    Patient History     Smoking Tobacco Use: Every Day     Smokeless Tobacco Use: Current   Financial Resource Strain: High Risk (7/31/2025) "    Financial Resource Strain     Within the past 12 months, have you or your family members you live with been unable to get utilities (heat, electricity) when it was really needed?: Yes   Transportation Needs: High Risk (7/31/2025)    Transportation Needs     Within the past 12 months, has lack of transportation kept you from medical appointments, getting your medicines, non-medical meetings or appointments, work, or from getting things that you need?: Yes          Disposition Plan     Medically Ready for Discharge: Anticipated Tomorrow             Yonatan Louis DO  Hospitalist Service  [facility]  Securely message with [employer] (more info)  Text page via [facility] Paging/Directory   ______________________________________________________________________    Interval History   Wanting to leave, would be AMA.    Physical Exam   Vital Signs: Temp: 98.7  F (37.1  C) Temp src: Oral BP: 125/84 Pulse: 92   Resp: 16 SpO2: 94 % O2 Device: Nasal cannula Oxygen Delivery: 2 LPM  Weight: 576 lbs 6.4 oz    Lymphedema    Hypoxic without O2    Flat affect    Obese        Medical Decision Making       50 MINUTES SPENT BY ME on the date of service doing chart review, history, exam, documentation & further activities per the note.      Data     I have personally reviewed the following data over the past 24 hrs:    N/A  \   N/A   / N/A     138 93 (L) 15.0 /  96   4.3 37 (H) 0.94 \     ALT: N/A AST: N/A AP: N/A TBILI: N/A   ALB: 3.5 TOT PROTEIN: N/A LIPASE: N/A       Imaging results reviewed over the past 24 hrs:   No results found for this or any previous visit (from the past 24 hours).

## 2025-08-05 VITALS
DIASTOLIC BLOOD PRESSURE: 85 MMHG | RESPIRATION RATE: 19 BRPM | HEART RATE: 101 BPM | TEMPERATURE: 97.9 F | HEIGHT: 73 IN | WEIGHT: 315 LBS | OXYGEN SATURATION: 95 % | SYSTOLIC BLOOD PRESSURE: 146 MMHG | BODY MASS INDEX: 41.75 KG/M2

## 2025-08-05 LAB
ALBUMIN SERPL BCG-MCNC: 3.6 G/DL (ref 3.5–5.2)
ANION GAP SERPL CALCULATED.3IONS-SCNC: 7 MMOL/L (ref 7–15)
BUN SERPL-MCNC: 16.4 MG/DL (ref 6–20)
CALCIUM SERPL-MCNC: 9 MG/DL (ref 8.8–10.4)
CHLORIDE SERPL-SCNC: 92 MMOL/L (ref 98–107)
CREAT SERPL-MCNC: 0.91 MG/DL (ref 0.67–1.17)
EGFRCR SERPLBLD CKD-EPI 2021: >90 ML/MIN/1.73M2
GLUCOSE SERPL-MCNC: 90 MG/DL (ref 70–99)
HCO3 SERPL-SCNC: 39 MMOL/L (ref 22–29)
HOLD SPECIMEN: NORMAL
NT-PROBNP SERPL-MCNC: 950 PG/ML (ref 0–93)
PHOSPHATE SERPL-MCNC: 3.7 MG/DL (ref 2.5–4.5)
POTASSIUM SERPL-SCNC: 4.2 MMOL/L (ref 3.4–5.3)
SODIUM SERPL-SCNC: 138 MMOL/L (ref 135–145)

## 2025-08-05 PROCEDURE — 250N000013 HC RX MED GY IP 250 OP 250 PS 637: Performed by: STUDENT IN AN ORGANIZED HEALTH CARE EDUCATION/TRAINING PROGRAM

## 2025-08-05 PROCEDURE — 80069 RENAL FUNCTION PANEL: CPT | Performed by: STUDENT IN AN ORGANIZED HEALTH CARE EDUCATION/TRAINING PROGRAM

## 2025-08-05 PROCEDURE — 94660 CPAP INITIATION&MGMT: CPT

## 2025-08-05 PROCEDURE — 36415 COLL VENOUS BLD VENIPUNCTURE: CPT | Performed by: STUDENT IN AN ORGANIZED HEALTH CARE EDUCATION/TRAINING PROGRAM

## 2025-08-05 PROCEDURE — 999N000157 HC STATISTIC RCP TIME EA 10 MIN

## 2025-08-05 PROCEDURE — 250N000011 HC RX IP 250 OP 636: Performed by: INTERNAL MEDICINE

## 2025-08-05 PROCEDURE — 83880 ASSAY OF NATRIURETIC PEPTIDE: CPT | Performed by: STUDENT IN AN ORGANIZED HEALTH CARE EDUCATION/TRAINING PROGRAM

## 2025-08-05 PROCEDURE — 99239 HOSP IP/OBS DSCHRG MGMT >30: CPT | Performed by: STUDENT IN AN ORGANIZED HEALTH CARE EDUCATION/TRAINING PROGRAM

## 2025-08-05 RX ORDER — VALSARTAN 40 MG/1
20 TABLET ORAL EVERY 12 HOURS
Qty: 30 TABLET | Refills: 11 | Status: SHIPPED | OUTPATIENT
Start: 2025-08-05

## 2025-08-05 RX ORDER — SPIRONOLACTONE 25 MG/1
25 TABLET ORAL DAILY
Qty: 30 TABLET | Refills: 11 | Status: SHIPPED | OUTPATIENT
Start: 2025-08-05

## 2025-08-05 RX ORDER — TORSEMIDE 100 MG/1
50 TABLET ORAL 2 TIMES DAILY
Qty: 30 TABLET | Refills: 11 | Status: SHIPPED | OUTPATIENT
Start: 2025-08-05

## 2025-08-05 RX ORDER — TORSEMIDE 100 MG/1
50 TABLET ORAL 2 TIMES DAILY
Qty: 30 TABLET | Refills: 0 | Status: SHIPPED | OUTPATIENT
Start: 2025-08-05 | End: 2025-08-05

## 2025-08-05 RX ORDER — TORSEMIDE 100 MG/1
100 TABLET ORAL DAILY
Status: DISCONTINUED | OUTPATIENT
Start: 2025-08-05 | End: 2025-08-05 | Stop reason: HOSPADM

## 2025-08-05 RX ORDER — POTASSIUM CHLORIDE 750 MG/1
10 TABLET, EXTENDED RELEASE ORAL 3 TIMES DAILY
Status: DISCONTINUED | OUTPATIENT
Start: 2025-08-05 | End: 2025-08-05 | Stop reason: HOSPADM

## 2025-08-05 RX ADMIN — POTASSIUM CHLORIDE 10 MEQ: 750 TABLET, FILM COATED, EXTENDED RELEASE ORAL at 08:47

## 2025-08-05 RX ADMIN — VALSARTAN 20 MG: 40 TABLET ORAL at 08:46

## 2025-08-05 RX ADMIN — HEPARIN SODIUM 5000 UNITS: 10000 INJECTION, SOLUTION INTRAVENOUS; SUBCUTANEOUS at 08:48

## 2025-08-05 RX ADMIN — TORSEMIDE 100 MG: 100 TABLET ORAL at 08:49

## 2025-08-05 RX ADMIN — EMPAGLIFLOZIN 10 MG: 10 TABLET, FILM COATED ORAL at 08:47

## 2025-08-05 RX ADMIN — SPIRONOLACTONE 25 MG: 25 TABLET ORAL at 08:47

## 2025-08-05 ASSESSMENT — ACTIVITIES OF DAILY LIVING (ADL)
ADLS_ACUITY_SCORE: 42

## 2025-08-05 NOTE — PROGRESS NOTES
The patient tolerated AVAPS well tonight with UTN mask. States that he would like humidity with PAP if he doesn't discharge today. RT offered to setup tonight at 0400 PAP check but the patient declined stating that he wants to be done with PAP for the night. RT will note this at shift handoff.

## 2025-08-05 NOTE — PLAN OF CARE
[facility] DISCHARGE NOTE    Patient discharged to home at 2:45 PM via wheel chair. Discharge instructions reviewed with patient, opportunity offered to ask questions. Prescriptions filled and sent with patient upon discharge. All belongings sent with patient.    Nelsy Correa RN

## 2025-08-05 NOTE — CONSULTS
Care Management Note:    JESSIE received referral from bedside RN informing that pt unable to afford discharge medications.     JESSIE spoke with [facility] staff, Emily, learned the cost of pt's discharge medications is $34.42.    JESSIE completed [facility] Care forms and submitted to  leadership for review & approval.    JESSIE informed bedside RN that pt is able to discharge, medications are filled at the discharge pharmacy.    JESSIE informed PharmD, Shawna BENÍTEZ, of above.    PEDRO Mckinley

## 2025-08-05 NOTE — PROGRESS NOTES
Assumed care:  1900 to 0700    Neuro:  Alert/oriented.  Makes needs known.      Cardiac: Tele SR.  VSS.      Respiratory: BIPAP while asleep.  NC at 2 L while awake.      GI/:  Large amount of urine output.  Incontinent of very large amount of urine x2.  Otherwise attempts to void in a large container at bedside.       Diet: 2L FR.      Activity:  SBA out of bed.  Uses walker to stand.      Skin/wound:  Rash in skin folds.  Several scabbed areas.    LDA's:  PIV x 1.

## 2025-08-05 NOTE — PLAN OF CARE
Physical Therapy Discharge Summary    Reason for therapy discharge:    Discharged to home.    Progress towards therapy goal(s). See goals on Care Plan in [facility] electronic health record for goal details.  Goals partially met.  Barriers to achieving goals:   discharge from facility.    Therapy recommendation(s):    No further therapy is recommended. Home care PT reccommended however pt does not have insurance.

## 2025-08-05 NOTE — DISCHARGE SUMMARY
"[facility]  Hospitalist Discharge Summary      Date of Admission:  7/31/2025  Date of Discharge:  8/5/2025  Discharging Provider: Yonatan Louis DO  Discharge Service: Hospitalist Service    Discharge Diagnoses   40 year old male with significant past medical history of morbid obesity, lymphedema, obesity hypoventilation syndrome, obstructive sleep apnea, psoriasis, and moderate persistent asthma admitted on 7/31/2025 with scrotal swelling and pain. He was not SOB but found to have acute respiratory failure with hypoxia and hypercapnia and suspected chronic right heart failure. Patient was diuresed down to 558lbs with a lot of fluid left but requesting discharge and no longer having physical-limitation from his scrotal edema and is off of oxygen. Patient given [facility] care medications (Torsemide, Valsartan, Aldactone) and discharged home with recommendation to follow up with his PCP and cardiology.    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    Weight as of this encounter: 253.1 kg (558 lb).       Follow-ups Needed After Discharge   Follow-up Appointments       Hospital Follow-up with Existing Primary Care Provider (PCP)          Schedule Primary Care visit within: 7 Days   Recommended labs and Imaging (to be ordered by Primary Care Provider): Standing scale weight, CMP, CBC               Unresulted Labs Ordered in the Past 30 Days of this Admission       No orders found " from 7/1/2025 to 8/1/2025.        These results will be followed up by n/a    Discharge Disposition   Discharged to home  Condition at discharge: Guarded    Hospital Course   40 year old male with significant past medical history of morbid obesity, lymphedema, obesity hypoventilation syndrome, obstructive sleep apnea, psoriasis, and moderate persistent asthma admitted on 7/31/2025 with scrotal swelling and pain. He was not SOB but found to have acute respiratory failure with hypoxia and hypercapnia and suspected chronic right heart failure.    ed, wean as able    Update [ August 4, 2025 ]:  Patient wanting to leave this AM, discussed with him, it would be against medical advice. Will prescribe medications for him either way, but my recommendation is for him to stay. Can see what his output is like on oral lasix.       Impression:   Acute Decompensated HFpEF   -- aggressive IV diuresis, currently on Lasix 40 mg IV 68h, and Spironolactone 25 mg every day   -- due to insurance cost (and unavailability of candesartan)- start valsartan 20mg BID 8/2 08/03: Diamox daily 500mg iv , diuril 250mg x1 in afternoon (hold if urine output last 4 hours >200cc/hr or if SBP <120)     - continue:  - IV diuresis -> PO lasix 40mg BID  - Valsartan 20mg BID (08/02 start  - diamox daily x3d   - Aldactone 25mg daily  - start jardiance while in patient for augmented diuresis and hopeful decreased length of stay for THIS patient       Scrotal swelling -- related to heart failure   -- did have scrotal US which showed bilateral hydrocele, but this is from whole body fluid overload   -- compress and elevated scrotum as possible (PT and OT aware)      Acute respiratory failure with hypoxia and hypercapnia (H)    Obesity hypoventilation syndrome     MAYDA treated with BiPAP   -- will aim for O2 sat 85% or higher (too much O2 and he hypoventilates and retains more      Pulmonary hypertension (H)       Morbid obesity -- BMI 77.0    -- suspect weight  not accurate, bed weight today showing +100lbs difference      Tobacco use      Asthma      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  Can't afford meds   -- Care management aware of situation, helping as much as possible   -- he would have to private pay for home care ... He says he can get by at home      Consultations This Hospital Stay   OCCUPATIONAL THERAPY ADULT IP CONSULT  CARE MANAGEMENT / SOCIAL WORK IP CONSULT  PHYSICAL THERAPY ADULT IP CONSULT  PHYSICAL THERAPY ADULT IP CONSULT  CARE MANAGEMENT / SOCIAL WORK IP CONSULT  CARE MANAGEMENT / SOCIAL WORK IP CONSULT    Code Status   Full Code    Time Spent on this Encounter   IYonatan DO, personally saw the patient today and spent greater than 30 minutes discharging this patient.       Yonatan Louis DO  [facility] INTENSIVE [facility]ARE  [address] [address]  Phone: [phone]  Fax: [phone]  ______________________________________________________________________    Physical Exam   Vital Signs: Temp: 97.9  F (36.6  C) Temp src: Oral BP: (!) 146/85 Pulse: 101   Resp: 19 SpO2: 95 % O2 Device: Nasal cannula Oxygen Delivery: 3 LPM  Weight: 558 lbs 0 oz    Obese    No distress, no breathing issues    Lymphedematous changes           Primary Care Physician   [facility]    Discharge Orders      Follow-Up with Cardiology      Reason for your hospital stay    You were hospitalized for congestive heart failure     Activity    Your activity upon discharge: activity as tolerated     Diet    Follow this diet upon discharge: Current Diet:Orders Placed This Encounter      Fluid restriction 2000 ML FLUID PER DAY  Diet:    Regular Diet with 2,000mg of Sodium (MAX) per day     Hospital Follow-up with Existing Primary Care Provider (PCP)            Significant Results and Procedures   Results for orders placed or performed during the hospital encounter of 07/31/25    US Testicular & Scrotum w Doppler Ltd    Narrative    EXAM: US TESTICULAR AND SCROTUM WITH DOPPLER LIMITED  LOCATION: [facility]  DATE: 07/31/2025    INDICATION: Severe scrotal swelling with pain and thickening.  COMPARISON: None.  TECHNIQUE: Ultrasound of scrotum with color flow and spectral Doppler with waveform analysis performed.    FINDINGS:  RIGHT: Right testicle measures 2.7 x 2.2 x 2.3 cm. Doppler flow documented. Color assessment reduced due to extensive soft tissue edema and depth of the testicle. Epididymis not well seen. Complex hydrocele. No varicocele.    LEFT: Left testicle measures 2.6 x 1.8 x 2.2 cm. Doppler flow documented. Color assessment reduced due to extensive soft tissue edema and depth of the testicle. Epididymis not well seen. Complex hydrocele. No varicocele.      Impression    IMPRESSION:  1.  Significant scrotal soft tissue edema bilaterally and bilateral complex hydroceles. Correlate for scrotal cellulitis.   CT Chest (PE) Abdomen Pelvis w Contrast    Narrative    EXAM: CT CHEST PE ABDOMEN PELVIS W CONTRAST  LOCATION: [facility]  DATE: 7/31/2025    INDICATION: Hypoxia and anasarca; severe scrotal swelling; morbid obesity.  COMPARISON: None available.  TECHNIQUE: CT chest pulmonary angiogram and routine CT abdomen pelvis with IV contrast. Arterial phase through the chest and venous phase through the abdomen and pelvis. Multiplanar reformats and MIP reconstructions were performed. Dose reduction   techniques were used.   CONTRAST: 100 mL Isovue-370.    FINDINGS: Limited study quality due to underpenetration, as a result of morbid obesity.    ANGIOGRAM CHEST: No identifiable acute pulmonary embolism. Study limitations preclude complete diagnostic assessment of subsegmental branches. Moderate right heart strain and flattening of the interventricular septum. Mild enlargement of the main   pulmonary artery.    Thoracic aorta is normal  in course and caliber.    LUNGS AND PLEURA: Trachea and large airways are patent. No focal airspace consolidation. Mild to moderate dependent atelectatic change within the right middle and right lower lobes. Associated elevation of the right hemidiaphragm.     Indeterminate 3 mm nodule in the subpleural right upper lobe, series 6 image 92.    No pneumothorax.     No pleural effusion.     MEDIASTINUM/AXILLAE: No mediastinal/hilar lymphadenopathy.     Thoracic esophagus is unremarkable.      No axillary lymphadenopathy.    Chest wall is unremarkable.    Mild cardiomegaly and asymmetric right heart enlargement. No pericardial effusion.    CORONARY ARTERY CALCIFICATION: None.    HEPATOBILIARY: Subtle liver surface nodularity and widening of periportal spaces, suggesting the early changes of cirrhosis.    Gallbladder is nondistended and demonstrates diffuse wall thickening, favoring edema. This may be secondary to underlying liver disease or anasarca.    No intrahepatic or intrahepatic biliary ductal dilatation.    PANCREAS: Enhances normally. No peripancreatic inflammatory fat stranding.    SPLEEN: Enhances normally. Normal size.    ADRENAL GLANDS: Normal.    KIDNEYS: Both kidneys enhance symmetrically, without hydronephrosis.    No nephroureterolithiasis.    Urinary bladder is unremarkable.    PELVIC ORGANS: No suspicious abnormality. Of note, the scrotum and testicles are excluded from field-of-view.    BOWEL: No evidence of acute gastrointestinal inflammation or obstruction. Normal appendix. Colonic diverticulosis.    No intraperitoneal free fluid or free air. Moderate sized fat-containing umbilical hernia.    LYMPH NODES: Mildly enlarged bilateral inguinal and iliac chain lymph nodes. For reference, a right external iliac chain lymph node measures 13 mm in short axis, series 12 image 218.    VASCULATURE: No abdominal aortic aneurysm.    MUSCULOSKELETAL: No suspicious abnormality.    OTHER: Mild body wall edema.       Impression    IMPRESSION:  1.  No identifiable acute pulmonary embolism. Study limitations preclude complete diagnostic assessment of subsegmental branches.  2.  Moderate right heart strain and flattening of the interventricular septum, which may be secondary to pulmonary hypertension (presumably chronic).  3.  Indeterminate 3 mm right upper lobe pulmonary nodule.  4.  Early changes of hepatic cirrhosis.  5.  Gallbladder wall thickening, favoring edema. This may be secondary to underlying liver disease or anasarca. Acute cholecystitis is unlikely, given the absence of gallbladder distention.  6.  Mildly enlarged bilateral inguinal and iliac chain lymph nodes, likely reactive. CT imaging follow-up recommended in 6 months.  7.  Colonic diverticulosis.  8.  Moderate-sized fat-containing umbilical hernia.  9.  Mild body wall edema, consistent with anasarca.  10.  Scrotum and testicles excluded from field-of-view. If requested, repeat CT imaging can be performed to include the scrotum and testicles, at no additional cost to the patient.     Echocardiogram Complete     Value    LVEF  55%    Narrative    [id number]  [id number]  [id number]  [id number]^SHREYA^TAY^ANDI     [facility]  Echocardiography Laboratory  [address] [place] [address]     Name: HARPREET PACK  MRN: [id number]  : 1985  Study Date: 2025 08:07 AM  Age: 40 yrs  Gender: Male  Patient Location: [facility]  Reason For Study: Heart Failure  Ordering Physician: TAY CASH  Referring Physician: [facility]  Performed By: Sophia Kidd RDCS     BSA: 2.9 m2  Height: 73 in  Weight: 416 lb  HR: 87  BP: 127/78 mmHg  ______________________________________________________________________________  Procedure  Echocardiogram with two-dimensional, color and spectral Doppler. Optison (NDC  #[id number]) given intravenously.  ______________________________________________________________________________  Interpretation  Summary     1. The left ventricle is normal in structure, function and size. The visual  ejection fraction is estimated at 55%.  2. The right ventricle is mild to moderately dilated. Moderately decreased  right ventricular systolic function Some septal flattening.  3. No valve disease.  4. Mild (35-45mmHg) pulmonary hypertension is present. The right ventricular  systolic pressure is approximated at 40mmHg plus the right atrial pressure.     No previous echo for comparison.  ______________________________________________________________________________  Left Ventricle  The left ventricle is normal in structure, function and size. Mild septal LVH.  The visual ejection fraction is estimated at 55%. Left ventricular diastolic  function is indeterminate. Normal left ventricular wall motion.     Right Ventricle  The right ventricle is mild to moderately dilated. Moderately decreased right  ventricular systolic function. Some septal flattening.     Atria  The left atrium is mildly dilated. The right atrium is mildly dilated. There  is no atrial shunt seen.     Mitral Valve  The mitral valve is normal in structure and function.     Tricuspid Valve  There is mild (1+) tricuspid regurgitation. Mild (35-45mmHg) pulmonary  hypertension is present. The right ventricular systolic pressure is  approximated at 40mmHg plus the right atrial pressure.     Aortic Valve  The aortic valve is normal in structure and function.     Pulmonic Valve  The pulmonic valve is normal in structure and function.     Vessels  Normal ascending, transverse (arch), and descending aorta. Dilation of the  inferior vena cava is present with abnormal respiratory variation in diameter.     Pericardium  There is no pericardial effusion.     Rhythm  Sinus rhythm was noted.  ______________________________________________________________________________  MMode/2D Measurements & Calculations  IVSd: 1.3 cm     LVIDd: 5.5 cm  LVIDs: 4.1 cm  LVPWd: 1.0 cm  FS: 25.6  %  LV mass(C)d: 259.1 grams  LV mass(C)dI: 88.2 grams/m2  Ao root diam: 3.1 cm  LA dimension: 5.2 cm  asc Aorta Diam: 2.9 cm  LA/Ao: 1.7  Ao root diam index Ht(cm/m): 1.7  Ao root diam index BSA (cm/m2): 1.1  Asc Ao diam index BSA (cm/m2): 1.00  Asc Ao diam index Ht(cm/m): 1.6  LA Volume (BP): 103.0 ml     LA Volume Index (BP): 35.0 ml/m2  RWT: 0.37     Doppler Measurements & Calculations  MV E max james: 109.0 cm/sec  MV A max james: 75.3 cm/sec  MV E/A: 1.4  MV dec time: 0.18 sec  PA acc time: 0.10 sec  TR max james: 314.4 cm/sec  TR max P.5 mmHg  E/E' avg: 10.4  Lateral E/e': 10.1  Medial E/e': 10.7     ______________________________________________________________________________  Report approved by: Arnie Keys MD on 2025 09:36 AM             Discharge Medications      Review of your medicines        START taking        Dose / Directions   spironolactone 25 MG tablet  Commonly known as: ALDACTONE  Used for: Acute heart failure, unspecified heart failure type (H), Lymphedema      Dose: 25 mg  Take 1 tablet (25 mg) by mouth daily.  Quantity: 30 tablet  Refills: 0     torsemide 100 MG tablet  Commonly known as: DEMADEX  Used for: Morbid obesity (H) BMI 68.34, Lymphedema      Dose: 50 mg  Take 0.5 tablets (50 mg) by mouth 2 times daily.  Quantity: 30 tablet  Refills: 0     valsartan 40 MG tablet  Commonly known as: DIOVAN  Used for: Acute heart failure, unspecified heart failure type (H), Lymphedema      Dose: 20 mg  Take 0.5 tablets (20 mg) by mouth every 12 hours.  Quantity: 30 tablet  Refills: 0            CONTINUE these medicines which have NOT CHANGED        Dose / Directions   albuterol 108 (90 Base) MCG/ACT inhaler  Commonly known as: PROAIR HFA/PROVENTIL HFA/VENTOLIN HFA  Used for: Moderate persistent asthma with acute exacerbation      Dose: 2 puff  Inhale 2 puffs into the lungs every 6 hours as needed for shortness of breath or wheezing  Quantity: 18 g  Refills: 1     calcipotriene 0.005 %  external cream  Commonly known as: DOVONOX  Used for: Psoriasis      Apply topically 2 times daily  Quantity: 60 g  Refills: 11     clobetasol propionate 0.05 % external cream  Commonly known as: TEMOVATE  Used for: Psoriasis      Apply topically 2 times daily  Quantity: 60 g  Refills: 11     Florajen Digestion Caps  Used for: Cellulitis of left lower extremity      Dose: 1 capsule  Take 1 capsule by mouth 2 times daily  Quantity: 60 capsule  Refills: 0     triamcinolone 0.5 % external ointment  Commonly known as: KENALOG  Used for: Psoriasis      To psoriasis as needed.  To be used until you get insurance coverage for calcipotriene and clobetasol -- do NOT use triamcinolone plus the others.  Quantity: 60 g  Refills: 1     Tylenol 325 MG tablet  Generic drug: acetaminophen      Dose: 650 mg  Take 2 tablets (650 mg) by mouth every 6 hours as needed for mild pain or fever  Refills: 0            STOP taking      furosemide 20 MG tablet  Commonly known as: LASIX        ibuprofen 200 MG tablet  Commonly known as: ADVIL/MOTRIN                  Where to get your medicines        These medications were sent to [facility] [place], [place] - [address]  [address] [address]      Phone: [phone]   spironolactone 25 MG tablet  torsemide 100 MG tablet  valsartan 40 MG tablet       Allergies   No Known Allergies

## 2025-08-06 ENCOUNTER — PATIENT OUTREACH (OUTPATIENT)
Dept: CARE COORDINATION | Facility: CLINIC | Age: 40
End: 2025-08-06